# Patient Record
Sex: MALE | Race: AMERICAN INDIAN OR ALASKA NATIVE | NOT HISPANIC OR LATINO
[De-identification: names, ages, dates, MRNs, and addresses within clinical notes are randomized per-mention and may not be internally consistent; named-entity substitution may affect disease eponyms.]

---

## 2017-01-01 PROBLEM — Z00.00 ENCOUNTER FOR PREVENTIVE HEALTH EXAMINATION: Status: ACTIVE | Noted: 2017-01-01

## 2017-01-31 ENCOUNTER — APPOINTMENT (OUTPATIENT)
Dept: PULMONOLOGY | Facility: CLINIC | Age: 67
End: 2017-01-31

## 2017-01-31 VITALS
HEIGHT: 66 IN | DIASTOLIC BLOOD PRESSURE: 90 MMHG | OXYGEN SATURATION: 97 % | SYSTOLIC BLOOD PRESSURE: 150 MMHG | BODY MASS INDEX: 22.18 KG/M2 | WEIGHT: 138 LBS | HEART RATE: 87 BPM

## 2017-01-31 DIAGNOSIS — Z82.3 FAMILY HISTORY OF STROKE: ICD-10-CM

## 2017-01-31 DIAGNOSIS — Z87.01 PERSONAL HISTORY OF PNEUMONIA (RECURRENT): ICD-10-CM

## 2017-02-14 ENCOUNTER — APPOINTMENT (OUTPATIENT)
Dept: PULMONOLOGY | Facility: CLINIC | Age: 67
End: 2017-02-14

## 2017-02-14 VITALS
DIASTOLIC BLOOD PRESSURE: 80 MMHG | SYSTOLIC BLOOD PRESSURE: 120 MMHG | HEIGHT: 66 IN | BODY MASS INDEX: 22.18 KG/M2 | OXYGEN SATURATION: 96 % | WEIGHT: 138 LBS | RESPIRATION RATE: 17 BRPM | HEART RATE: 82 BPM

## 2017-03-07 ENCOUNTER — RESULT CHARGE (OUTPATIENT)
Age: 67
End: 2017-03-07

## 2017-03-14 ENCOUNTER — APPOINTMENT (OUTPATIENT)
Dept: PULMONOLOGY | Facility: CLINIC | Age: 67
End: 2017-03-14

## 2017-04-18 ENCOUNTER — APPOINTMENT (OUTPATIENT)
Dept: PULMONOLOGY | Facility: CLINIC | Age: 67
End: 2017-04-18

## 2017-04-18 ENCOUNTER — OUTPATIENT (OUTPATIENT)
Dept: OUTPATIENT SERVICES | Facility: HOSPITAL | Age: 67
LOS: 1 days | Discharge: HOME | End: 2017-04-18

## 2017-04-18 VITALS
OXYGEN SATURATION: 97 % | RESPIRATION RATE: 18 BRPM | SYSTOLIC BLOOD PRESSURE: 112 MMHG | DIASTOLIC BLOOD PRESSURE: 72 MMHG | HEART RATE: 80 BPM

## 2017-04-18 DIAGNOSIS — E78.00 PURE HYPERCHOLESTEROLEMIA, UNSPECIFIED: ICD-10-CM

## 2017-04-18 RX ORDER — PREDNISONE 10 MG/1
10 TABLET ORAL
Qty: 20 | Refills: 0 | Status: DISCONTINUED | COMMUNITY
Start: 2017-01-31 | End: 2017-04-18

## 2017-04-18 RX ORDER — BUDESONIDE AND FORMOTEROL FUMARATE DIHYDRATE 160; 4.5 UG/1; UG/1
160-4.5 AEROSOL RESPIRATORY (INHALATION) TWICE DAILY
Qty: 1 | Refills: 11 | Status: DISCONTINUED | COMMUNITY
Start: 2017-01-31 | End: 2017-04-18

## 2017-04-18 RX ORDER — ALBUTEROL SULFATE 90 UG/1
108 (90 BASE) AEROSOL, METERED RESPIRATORY (INHALATION) EVERY 4 HOURS
Qty: 3 | Refills: 4 | Status: DISCONTINUED | COMMUNITY
Start: 2017-02-14 | End: 2017-04-18

## 2017-04-18 RX ORDER — TIOTROPIUM BROMIDE 18 UG/1
18 CAPSULE ORAL; RESPIRATORY (INHALATION) DAILY
Qty: 30 | Refills: 3 | Status: DISCONTINUED | COMMUNITY
End: 2017-04-18

## 2017-04-18 RX ORDER — TIOTROPIUM BROMIDE 18 UG/1
18 CAPSULE ORAL; RESPIRATORY (INHALATION) DAILY
Qty: 30 | Refills: 11 | Status: DISCONTINUED | COMMUNITY
Start: 2017-01-31 | End: 2017-04-18

## 2017-04-18 RX ORDER — ALBUTEROL SULFATE 90 UG/1
108 (90 BASE) AEROSOL, METERED RESPIRATORY (INHALATION)
Qty: 18 | Refills: 11 | Status: DISCONTINUED | COMMUNITY
Start: 2017-01-31 | End: 2017-04-18

## 2017-04-18 RX ORDER — BUDESONIDE AND FORMOTEROL FUMARATE DIHYDRATE 160; 4.5 UG/1; UG/1
160-4.5 AEROSOL RESPIRATORY (INHALATION) TWICE DAILY
Qty: 1 | Refills: 3 | Status: DISCONTINUED | COMMUNITY
End: 2017-04-18

## 2017-04-18 RX ORDER — ALBUTEROL 90 MCG
90 AEROSOL (GRAM) INHALATION
Qty: 1 | Refills: 5 | Status: DISCONTINUED | COMMUNITY
End: 2017-04-18

## 2017-06-27 ENCOUNTER — OUTPATIENT (OUTPATIENT)
Dept: OUTPATIENT SERVICES | Facility: HOSPITAL | Age: 67
LOS: 1 days | Discharge: HOME | End: 2017-06-27

## 2017-06-27 DIAGNOSIS — J44.9 CHRONIC OBSTRUCTIVE PULMONARY DISEASE, UNSPECIFIED: ICD-10-CM

## 2017-06-27 DIAGNOSIS — J44.1 CHRONIC OBSTRUCTIVE PULMONARY DISEASE WITH (ACUTE) EXACERBATION: ICD-10-CM

## 2017-06-28 DIAGNOSIS — R91.1 SOLITARY PULMONARY NODULE: ICD-10-CM

## 2017-11-28 ENCOUNTER — APPOINTMENT (OUTPATIENT)
Dept: PULMONOLOGY | Facility: CLINIC | Age: 67
End: 2017-11-28

## 2017-11-28 ENCOUNTER — OUTPATIENT (OUTPATIENT)
Dept: OUTPATIENT SERVICES | Facility: HOSPITAL | Age: 67
LOS: 1 days | Discharge: HOME | End: 2017-11-28

## 2017-11-28 VITALS
WEIGHT: 136 LBS | HEART RATE: 98 BPM | DIASTOLIC BLOOD PRESSURE: 68 MMHG | OXYGEN SATURATION: 93 % | SYSTOLIC BLOOD PRESSURE: 120 MMHG | BODY MASS INDEX: 21.86 KG/M2 | HEIGHT: 66 IN

## 2017-11-28 DIAGNOSIS — R91.1 SOLITARY PULMONARY NODULE: ICD-10-CM

## 2017-11-28 DIAGNOSIS — J44.9 CHRONIC OBSTRUCTIVE PULMONARY DISEASE, UNSPECIFIED: ICD-10-CM

## 2017-11-28 DIAGNOSIS — J44.1 CHRONIC OBSTRUCTIVE PULMONARY DISEASE WITH (ACUTE) EXACERBATION: ICD-10-CM

## 2017-12-19 ENCOUNTER — APPOINTMENT (OUTPATIENT)
Dept: PULMONOLOGY | Facility: CLINIC | Age: 67
End: 2017-12-19

## 2018-01-11 ENCOUNTER — INPATIENT (INPATIENT)
Facility: HOSPITAL | Age: 68
LOS: 3 days | Discharge: HOME | End: 2018-01-15
Attending: INTERNAL MEDICINE

## 2018-01-11 DIAGNOSIS — J44.1 CHRONIC OBSTRUCTIVE PULMONARY DISEASE WITH (ACUTE) EXACERBATION: ICD-10-CM

## 2018-01-19 DIAGNOSIS — Z99.81 DEPENDENCE ON SUPPLEMENTAL OXYGEN: ICD-10-CM

## 2018-01-19 DIAGNOSIS — Z87.891 PERSONAL HISTORY OF NICOTINE DEPENDENCE: ICD-10-CM

## 2018-01-19 DIAGNOSIS — J96.01 ACUTE RESPIRATORY FAILURE WITH HYPOXIA: ICD-10-CM

## 2018-01-19 DIAGNOSIS — J15.6 PNEUMONIA DUE TO OTHER GRAM-NEGATIVE BACTERIA: ICD-10-CM

## 2018-01-19 DIAGNOSIS — A41.9 SEPSIS, UNSPECIFIED ORGANISM: ICD-10-CM

## 2018-01-19 DIAGNOSIS — R91.1 SOLITARY PULMONARY NODULE: ICD-10-CM

## 2018-01-19 DIAGNOSIS — Z53.29 PROCEDURE AND TREATMENT NOT CARRIED OUT BECAUSE OF PATIENT'S DECISION FOR OTHER REASONS: ICD-10-CM

## 2018-01-19 DIAGNOSIS — E78.5 HYPERLIPIDEMIA, UNSPECIFIED: ICD-10-CM

## 2018-01-19 DIAGNOSIS — J44.0 CHRONIC OBSTRUCTIVE PULMONARY DISEASE WITH (ACUTE) LOWER RESPIRATORY INFECTION: ICD-10-CM

## 2018-01-19 DIAGNOSIS — R00.0 TACHYCARDIA, UNSPECIFIED: ICD-10-CM

## 2018-01-19 DIAGNOSIS — J44.1 CHRONIC OBSTRUCTIVE PULMONARY DISEASE WITH (ACUTE) EXACERBATION: ICD-10-CM

## 2018-01-30 ENCOUNTER — APPOINTMENT (OUTPATIENT)
Dept: PULMONOLOGY | Facility: CLINIC | Age: 68
End: 2018-01-30

## 2018-01-30 ENCOUNTER — OUTPATIENT (OUTPATIENT)
Dept: OUTPATIENT SERVICES | Facility: HOSPITAL | Age: 68
LOS: 1 days | Discharge: HOME | End: 2018-01-30

## 2018-01-30 VITALS
OXYGEN SATURATION: 96 % | SYSTOLIC BLOOD PRESSURE: 118 MMHG | HEART RATE: 86 BPM | DIASTOLIC BLOOD PRESSURE: 70 MMHG | HEIGHT: 66 IN | BODY MASS INDEX: 21.69 KG/M2 | WEIGHT: 135 LBS

## 2018-01-30 DIAGNOSIS — Z87.891 PERSONAL HISTORY OF NICOTINE DEPENDENCE: ICD-10-CM

## 2018-01-30 RX ORDER — TIOTROPIUM BROMIDE INHALATION SPRAY 3.12 UG/1
2.5 SPRAY, METERED RESPIRATORY (INHALATION) DAILY
Qty: 3 | Refills: 5 | Status: ACTIVE | COMMUNITY
Start: 2017-11-29

## 2018-01-30 RX ORDER — ALBUTEROL SULFATE 90 UG/1
108 (90 BASE) AEROSOL, METERED RESPIRATORY (INHALATION) EVERY 4 HOURS
Qty: 1 | Refills: 5 | Status: ACTIVE | COMMUNITY
Start: 2018-01-30 | End: 1900-01-01

## 2018-01-30 RX ORDER — BUDESONIDE AND FORMOTEROL FUMARATE DIHYDRATE 160; 4.5 UG/1; UG/1
160-4.5 AEROSOL RESPIRATORY (INHALATION) TWICE DAILY
Qty: 3 | Refills: 3 | Status: ACTIVE | COMMUNITY
Start: 2017-02-14 | End: 1900-01-01

## 2018-02-04 DIAGNOSIS — J44.1 CHRONIC OBSTRUCTIVE PULMONARY DISEASE WITH (ACUTE) EXACERBATION: ICD-10-CM

## 2018-02-10 ENCOUNTER — TRANSCRIPTION ENCOUNTER (OUTPATIENT)
Age: 68
End: 2018-02-10

## 2018-02-10 ENCOUNTER — INPATIENT (INPATIENT)
Facility: HOSPITAL | Age: 68
LOS: 5 days | Discharge: HOME | End: 2018-02-16
Attending: INTERNAL MEDICINE

## 2018-02-10 VITALS — HEART RATE: 108 BPM | OXYGEN SATURATION: 100 %

## 2018-02-10 DIAGNOSIS — Z89.431 ACQUIRED ABSENCE OF RIGHT FOOT: Chronic | ICD-10-CM

## 2018-02-10 LAB
ALBUMIN SERPL ELPH-MCNC: 3.3 G/DL — SIGNIFICANT CHANGE UP (ref 3–5.5)
ALP SERPL-CCNC: 71 U/L — SIGNIFICANT CHANGE UP (ref 30–115)
ALT FLD-CCNC: 24 U/L — SIGNIFICANT CHANGE UP (ref 0–41)
ANION GAP SERPL CALC-SCNC: 11 MMOL/L — SIGNIFICANT CHANGE UP (ref 7–14)
AST SERPL-CCNC: 24 U/L — SIGNIFICANT CHANGE UP (ref 0–41)
B-TYPE NATRIURETIC PEPTIDE BNP RESULT: 88 PG/ML — SIGNIFICANT CHANGE UP (ref 0–99)
BASE EXCESS BLDV CALC-SCNC: 1.6 MMOL/L — SIGNIFICANT CHANGE UP (ref -2–2)
BASOPHILS # BLD AUTO: 0.05 K/UL — SIGNIFICANT CHANGE UP (ref 0–0.2)
BASOPHILS NFR BLD AUTO: 0.2 % — SIGNIFICANT CHANGE UP (ref 0–1)
BILIRUB SERPL-MCNC: 3.8 MG/DL — HIGH (ref 0.2–1.2)
BUN SERPL-MCNC: 17 MG/DL — SIGNIFICANT CHANGE UP (ref 10–20)
CA-I SERPL-SCNC: 1.18 MMOL/L — SIGNIFICANT CHANGE UP (ref 1.12–1.3)
CALCIUM SERPL-MCNC: 9.4 MG/DL — SIGNIFICANT CHANGE UP (ref 8.5–10.1)
CHLORIDE SERPL-SCNC: 104 MMOL/L — SIGNIFICANT CHANGE UP (ref 98–110)
CK MB CFR SERPL CALC: 4.2 NG/ML — SIGNIFICANT CHANGE UP (ref 0.6–6.3)
CO2 SERPL-SCNC: 24 MMOL/L — SIGNIFICANT CHANGE UP (ref 17–32)
CREAT SERPL-MCNC: 1 MG/DL — SIGNIFICANT CHANGE UP (ref 0.7–1.5)
EOSINOPHIL # BLD AUTO: 0.06 K/UL — SIGNIFICANT CHANGE UP (ref 0–0.7)
EOSINOPHIL NFR BLD AUTO: 0.3 % — SIGNIFICANT CHANGE UP (ref 0–8)
FLU A RESULT: NEGATIVE — SIGNIFICANT CHANGE UP
FLU A RESULT: NEGATIVE — SIGNIFICANT CHANGE UP
FLUAV AG NPH QL: NEGATIVE — SIGNIFICANT CHANGE UP
FLUBV AG NPH QL: NEGATIVE — SIGNIFICANT CHANGE UP
GAS PNL BLDV: 134 MMOL/L — LOW (ref 136–145)
GAS PNL BLDV: SIGNIFICANT CHANGE UP
GAS PNL BLDV: SIGNIFICANT CHANGE UP
GLUCOSE SERPL-MCNC: 157 MG/DL — HIGH (ref 70–110)
HCO3 BLDV-SCNC: 28 MMOL/L — SIGNIFICANT CHANGE UP (ref 22–29)
HCT VFR BLD CALC: 47.6 % — SIGNIFICANT CHANGE UP (ref 42–52)
HGB BLD-MCNC: 15.5 G/DL — SIGNIFICANT CHANGE UP (ref 14–18)
IMM GRANULOCYTES NFR BLD AUTO: 0.7 % — HIGH (ref 0.1–0.3)
LACTATE BLDV-MCNC: 2.6 MMOL/L — HIGH (ref 0.5–1.6)
LACTATE SERPL-SCNC: 2.8 MMOL/L — HIGH (ref 0.5–2.2)
LYMPHOCYTES # BLD AUTO: 0.83 K/UL — LOW (ref 1.2–3.4)
LYMPHOCYTES # BLD AUTO: 4 % — LOW (ref 20.5–51.1)
MAGNESIUM SERPL-MCNC: 2.2 MG/DL — SIGNIFICANT CHANGE UP (ref 1.8–2.4)
MCHC RBC-ENTMCNC: 28.9 PG — SIGNIFICANT CHANGE UP (ref 27–31)
MCHC RBC-ENTMCNC: 32.6 G/DL — SIGNIFICANT CHANGE UP (ref 32–37)
MCV RBC AUTO: 88.6 FL — SIGNIFICANT CHANGE UP (ref 80–94)
MONOCYTES # BLD AUTO: 1.08 K/UL — HIGH (ref 0.1–0.6)
MONOCYTES NFR BLD AUTO: 5.2 % — SIGNIFICANT CHANGE UP (ref 1.7–9.3)
NEUTROPHILS # BLD AUTO: 18.57 K/UL — HIGH (ref 1.4–6.5)
NEUTROPHILS NFR BLD AUTO: 89.6 % — HIGH (ref 42.2–75.2)
NRBC # BLD: 0 /100 WBCS — SIGNIFICANT CHANGE UP (ref 0–0)
PCO2 BLDV: 49 MMHG — SIGNIFICANT CHANGE UP (ref 41–51)
PH BLDV: 7.36 — SIGNIFICANT CHANGE UP (ref 7.26–7.43)
PLATELET # BLD AUTO: 434 K/UL — HIGH (ref 130–400)
PO2 BLDV: 36 MMHG — SIGNIFICANT CHANGE UP (ref 20–40)
POTASSIUM BLDV-SCNC: 4.1 MMOL/L — SIGNIFICANT CHANGE UP (ref 3.3–5.6)
POTASSIUM SERPL-MCNC: 4.9 MMOL/L — SIGNIFICANT CHANGE UP (ref 3.5–5)
POTASSIUM SERPL-SCNC: 4.9 MMOL/L — SIGNIFICANT CHANGE UP (ref 3.5–5)
PROT SERPL-MCNC: 5.9 G/DL — LOW (ref 6–8)
RBC # BLD: 5.37 M/UL — SIGNIFICANT CHANGE UP (ref 4.7–6.1)
RBC # FLD: 16.7 % — HIGH (ref 11.5–14.5)
SAO2 % BLDV: 62 % — SIGNIFICANT CHANGE UP
SODIUM SERPL-SCNC: 139 MMOL/L — SIGNIFICANT CHANGE UP (ref 135–146)
TROPONIN I SERPL-MCNC: <0.02 NG/ML — SIGNIFICANT CHANGE UP (ref 0–0.05)
WBC # BLD: 20.73 K/UL — HIGH (ref 4.8–10.8)
WBC # FLD AUTO: 20.73 K/UL — HIGH (ref 4.8–10.8)

## 2018-02-10 RX ORDER — VANCOMYCIN HCL 1 G
1000 VIAL (EA) INTRAVENOUS EVERY 12 HOURS
Qty: 0 | Refills: 0 | Status: DISCONTINUED | OUTPATIENT
Start: 2018-02-11 | End: 2018-02-14

## 2018-02-10 RX ORDER — VANCOMYCIN HCL 1 G
1000 VIAL (EA) INTRAVENOUS ONCE
Qty: 0 | Refills: 0 | Status: COMPLETED | OUTPATIENT
Start: 2018-02-10 | End: 2018-02-10

## 2018-02-10 RX ORDER — TIOTROPIUM BROMIDE 18 UG/1
1 CAPSULE ORAL; RESPIRATORY (INHALATION) DAILY
Qty: 0 | Refills: 0 | Status: DISCONTINUED | OUTPATIENT
Start: 2018-02-10 | End: 2018-02-16

## 2018-02-10 RX ORDER — IPRATROPIUM/ALBUTEROL SULFATE 18-103MCG
3 AEROSOL WITH ADAPTER (GRAM) INHALATION
Qty: 0 | Refills: 0 | Status: COMPLETED | OUTPATIENT
Start: 2018-02-10 | End: 2018-02-10

## 2018-02-10 RX ORDER — MORPHINE SULFATE 50 MG/1
2 CAPSULE, EXTENDED RELEASE ORAL EVERY 6 HOURS
Qty: 0 | Refills: 0 | Status: DISCONTINUED | OUTPATIENT
Start: 2018-02-10 | End: 2018-02-16

## 2018-02-10 RX ORDER — MONTELUKAST 4 MG/1
10 TABLET, CHEWABLE ORAL DAILY
Qty: 0 | Refills: 0 | Status: DISCONTINUED | OUTPATIENT
Start: 2018-02-10 | End: 2018-02-16

## 2018-02-10 RX ORDER — ASPIRIN/CALCIUM CARB/MAGNESIUM 324 MG
325 TABLET ORAL ONCE
Qty: 0 | Refills: 0 | Status: DISCONTINUED | OUTPATIENT
Start: 2018-02-10 | End: 2018-02-10

## 2018-02-10 RX ORDER — CEFEPIME 1 G/1
2000 INJECTION, POWDER, FOR SOLUTION INTRAMUSCULAR; INTRAVENOUS EVERY 8 HOURS
Qty: 0 | Refills: 0 | Status: DISCONTINUED | OUTPATIENT
Start: 2018-02-10 | End: 2018-02-12

## 2018-02-10 RX ORDER — AZITHROMYCIN 500 MG/1
500 TABLET, FILM COATED ORAL ONCE
Qty: 0 | Refills: 0 | Status: COMPLETED | OUTPATIENT
Start: 2018-02-10 | End: 2018-02-10

## 2018-02-10 RX ORDER — VANCOMYCIN HCL 1 G
VIAL (EA) INTRAVENOUS
Qty: 0 | Refills: 0 | Status: DISCONTINUED | OUTPATIENT
Start: 2018-02-10 | End: 2018-02-14

## 2018-02-10 RX ORDER — PANTOPRAZOLE SODIUM 20 MG/1
40 TABLET, DELAYED RELEASE ORAL
Qty: 0 | Refills: 0 | Status: DISCONTINUED | OUTPATIENT
Start: 2018-02-10 | End: 2018-02-16

## 2018-02-10 RX ORDER — CEFEPIME 1 G/1
INJECTION, POWDER, FOR SOLUTION INTRAMUSCULAR; INTRAVENOUS
Qty: 0 | Refills: 0 | Status: DISCONTINUED | OUTPATIENT
Start: 2018-02-10 | End: 2018-02-12

## 2018-02-10 RX ORDER — BUDESONIDE AND FORMOTEROL FUMARATE DIHYDRATE 160; 4.5 UG/1; UG/1
2 AEROSOL RESPIRATORY (INHALATION)
Qty: 0 | Refills: 0 | Status: DISCONTINUED | OUTPATIENT
Start: 2018-02-10 | End: 2018-02-16

## 2018-02-10 RX ORDER — CEFTRIAXONE 500 MG/1
1 INJECTION, POWDER, FOR SOLUTION INTRAMUSCULAR; INTRAVENOUS ONCE
Qty: 0 | Refills: 0 | Status: COMPLETED | OUTPATIENT
Start: 2018-02-10 | End: 2018-02-10

## 2018-02-10 RX ORDER — ENOXAPARIN SODIUM 100 MG/ML
40 INJECTION SUBCUTANEOUS DAILY
Qty: 0 | Refills: 0 | Status: DISCONTINUED | OUTPATIENT
Start: 2018-02-10 | End: 2018-02-16

## 2018-02-10 RX ORDER — CEFEPIME 1 G/1
2000 INJECTION, POWDER, FOR SOLUTION INTRAMUSCULAR; INTRAVENOUS ONCE
Qty: 0 | Refills: 0 | Status: COMPLETED | OUTPATIENT
Start: 2018-02-10 | End: 2018-02-10

## 2018-02-10 RX ORDER — ALBUTEROL 90 UG/1
2.5 AEROSOL, METERED ORAL
Qty: 0 | Refills: 0 | Status: DISCONTINUED | OUTPATIENT
Start: 2018-02-10 | End: 2018-02-16

## 2018-02-10 RX ADMIN — CEFTRIAXONE 100 GRAM(S): 500 INJECTION, POWDER, FOR SOLUTION INTRAMUSCULAR; INTRAVENOUS at 11:48

## 2018-02-10 RX ADMIN — Medication 250 MILLIGRAM(S): at 19:39

## 2018-02-10 RX ADMIN — Medication 60 MILLIGRAM(S): at 18:18

## 2018-02-10 RX ADMIN — Medication 3 MILLILITER(S): at 12:08

## 2018-02-10 RX ADMIN — AZITHROMYCIN 255 MILLIGRAM(S): 500 TABLET, FILM COATED ORAL at 10:57

## 2018-02-10 RX ADMIN — Medication 3 MILLILITER(S): at 10:40

## 2018-02-10 RX ADMIN — Medication 100 MILLIGRAM(S): at 10:57

## 2018-02-10 RX ADMIN — Medication 3 MILLILITER(S): at 10:30

## 2018-02-10 RX ADMIN — ALBUTEROL 2.5 MILLIGRAM(S): 90 AEROSOL, METERED ORAL at 21:12

## 2018-02-10 RX ADMIN — CEFEPIME 100 MILLIGRAM(S): 1 INJECTION, POWDER, FOR SOLUTION INTRAMUSCULAR; INTRAVENOUS at 19:39

## 2018-02-10 NOTE — ED PROVIDER NOTE - PROGRESS NOTE DETAILS
Pt immediately placed on BIPAP with improvement in symptoms. EKG does not show STEMI, stable compared to previous one. CXr with PNA, will give abx. Labs pending. ATTENDING NOTE: 66 y/o male with pmhx of COPD presents to the ED stating he was referred [facility] elevations in EKG, pt c/o CP, SOB, cough, and subjective fever. Here pt is febrile with rhonchi left lower lung field, no respiratory distress. EKG here with no stemi, CXR shows pneumonia. Will give ABX, obtain lactate, give neb and steroid, likely admit.

## 2018-02-10 NOTE — H&P ADULT - NSHPLABSRESULTS_GEN_ALL_CORE
CARDIAC MARKERS ( 10 Feb 2018 10:30 )  <0.02 ng/mL / x     / x     / x     / 4.2 ng/mL                        15.5   20.73 )-----------( 434      ( 10 Feb 2018 10:30 )             47.6     02-10    139  |  104  |  17  ----------------------------<  157<H>  4.9   |  24  |  1.0    Ca    9.4      10 Feb 2018 10:30  Mg     2.2     02-10    TPro  5.9<L>  /  Alb  3.3  /  TBili  3.8<H>  /  DBili  x   /  AST  24  /  ALT  24  /  AlkPhos  71  02-10    LIVER FUNCTIONS - ( 10 Feb 2018 10:30 )  Alb: 3.3 g/dL / Pro: 5.9 g/dL / ALK PHOS: 71 U/L / ALT: 24 U/L / AST: 24 U/L / GGT: x  Influenza negative    CXR 2/10/2018: Interval increase in bibasilar opacities with likely bronchiectasis. No pleural effusions or pneumothorax.  CT Chest 3/2017: No proximal endobronchial obstruction.   Chronic right middle lobe atelectasis (with patent proximal airway).      Diffuse emphysema with areas of reticulonodular opacity/peripheral  bronchiectasis, compatible with chronic postinflammatory disease.      Lobulated 10 x 8 mm nodule right upper lobe (series 4 image 52). Follow-up CT  scan in 3 months time to assess stability suggested.  PET Scan 6/2017: negative uptake  Echo 12/2016: EF 55-65, G1DD

## 2018-02-10 NOTE — H&P ADULT - HISTORY OF PRESENT ILLNESS
66 y/o M, Cantonese speaking, with PMHx HTN, DLD, asthma/COPD on home O2 2L, s/p R foot transmetatarsal resection (2012) bc of concern for cancer but says that after the resection it was found to not be cancer. Pt presenting for sudden onset SOB and L sided chest pain that started this morning at 7am upon waking. The SOB was associated with yellow productive phelgm and fever of 101.8F. The chest pain was mostly L sided with radiation to the back and bilateral neck, worse with breathing and coughing, denies trauma to the area. Pt went to urgent care this morning and they told him to come to the ER. He does note that the urgent care place gave him a certain medication that helped with the chest pain, but he does not recall the name. Pt denies prior hx of chest pain and does not relate it to any physical activity. As per phone discussion with daughter, pt was experiencing the difficulty breathing the day prior to arrival as well, associated with general fatigue and muscle aches. Pt at time of exam said his breathing and chest pain were both improved since arrival. Of note, pt was admitted on 1/11/2018 for COPD 2/2 RLL PNA, was d/c on oral antibiotics as per ID recommendation. Pt states that during that admission he felt a tightness in his chest but does not feel that this admission is the same. Received flu and pneumonia vaccine. Endorses taking recent prednisone.

## 2018-02-10 NOTE — ED PROVIDER NOTE - PHYSICAL EXAMINATION
CONSTITUTIONAL: Well-developed; well-nourished; in no acute distress.   SKIN: warm, dry  HEAD: Normocephalic; atraumatic.  EYES: normal sclera and conjunctiva   ENT: No nasal discharge; airway clear.  NECK: Supple; non tender.  CARD: S1, S2 normal; no murmurs, gallops, or rubs. Tachycardia.   RESP: Decreased air entry bilaterally, accessory muscle use and increase work of breathing.   ABD: soft ntnd  EXT: Normal ROM.  No clubbing, cyanosis or edema.   LYMPH: No acute cervical adenopathy.  NEURO: Alert, oriented, grossly unremarkable  PSYCH: Cooperative, appropriate.

## 2018-02-10 NOTE — H&P ADULT - NSHPREVIEWOFSYSTEMS_GEN_ALL_CORE
General: +fever  Lungs: +SOB, +productive cough  HEART: +left sided chest pain with radiation to bilateral neck and back   Abd: NTND, no nausea, vomiting  LE: no edema

## 2018-02-10 NOTE — ED PROVIDER NOTE - NS ED ROS FT
Eyes:  No visual changes, eye pain or discharge.  ENMT:  No hearing changes, pain, discharge or infections. No neck pain or stiffness.  Cardiac:  CP, SOB. No edema.   Respiratory:  Cough, respiratory distress, sputum production.   GI:  No nausea, vomiting, diarrhea or abdominal pain.  :  No dysuria, frequency or burning.  MS:  No myalgia, muscle weakness, joint pain or back pain.  Neuro:  No headache or weakness.  No LOC.  Skin:  No skin rash.

## 2018-02-10 NOTE — H&P ADULT - NSHPPHYSICALEXAM_GEN_ALL_CORE
General: underweight, skin intact  HEENT: no lymphadenopathy  Lungs: poor air entry bilaterally, no wheezes appreciated  Abdomen: NTND, +BS  LE: no edema, +R foot transmetatarsal resection

## 2018-02-10 NOTE — H&P ADULT - ASSESSMENT
68 y/o M w/ PMHx HTN, DLD, asthma/COPD on home O2 2L presenting for now 2 day hx of SOB and left sided chest pain, with recent admission for COPD ex and PNA in 1/2018. Pt was placed on BIPAP after arrival to the ER and has not been switched over the nonrebreather. He was able to sit up and answer questions off the nonrebreather throughout the interview, sating 98% on RA.    1. SOB likely 2/2 Viral vs Bacterial Pulmonary Infection with COPD Ex vs Rule out cardiac etiology  -flu negative, check RSV, nasal MRSA, sputum culture  -elevated WBC could be 2/2 steroids, will start on abx: cefepime, levaquin, vancomycin (considering pt was recently receiving abx in the hospital)  -monitor for signs of infection, ID c/s: was seen by Dr Ignacio on prior admission  -consider Pulmonary c/s if pt declines, as of now will try to wean pt to nasal canula  -continue with symbicort, spiriva, albuterol nebs  -trend 3 sets of cardiac enzymes, repeat EKG in am, check electrolytes  -can give morphine prn for chest/pleuritic pain     2. HTN: not on any home meds  3. DVT PPx/GI PPx: lovenox 40mg subcut q24, protonix 40mg q24 68 y/o M w/ PMHx HTN, DLD, asthma/COPD on home O2 2L presenting for now 2 day hx of SOB and left sided chest pain, with recent admission for COPD ex and PNA in 1/2018. Pt was placed on BIPAP after arrival to the ER and has not been switched over the nonrebreather. He was able to sit up and answer questions off the nonrebreather throughout the interview, sating 98% on RA.    1. SOB likely 2/2 Viral vs Bacterial Pulmonary Infection with COPD Ex vs Rule out cardiac etiology  -admit to telemetry x24hrs  -flu negative, check RSV, nasal MRSA, sputum culture  -elevated WBC could be 2/2 steroids, will start on abx: cefepime, levaquin, vancomycin (considering pt was recently receiving abx in the hospital)  -monitor for signs of infection, ID c/s: was seen by Dr Ignacio on prior admission  -consider Pulmonary c/s if pt declines, as of now will try to wean pt to nasal canula  -continue with symbicort, spiriva, albuterol nebs  -trend 3 sets of cardiac enzymes, repeat EKG in am, check electrolytes  -can give morphine prn for chest/pleuritic pain     2. HTN: not on any home meds  3. DVT PPx/GI PPx: lovenox 40mg subcut q24, protonix 40mg q24 68 y/o M w/ PMHx HTN, DLD, asthma/COPD on home O2 2L presenting for now 2 day hx of SOB and left sided chest pain, with recent admission for COPD ex and PNA in 1/2018. Pt was placed on BIPAP after arrival to the ER and has not been switched over the nonrebreather. He was able to sit up and answer questions off the nonrebreather throughout the interview, sating 98% on RA.    1. SOB likely 2/2 Viral vs Bacterial Pulmonary Infection with COPD Ex vs Rule out cardiac etiology  -admit to telemetry x24hrs  -flu negative, check RSV, nasal MRSA, sputum culture  -elevated WBC could be 2/2 steroids, will start on abx: cefepime, levaquin, vancomycin (considering pt was recently receiving abx in the hospital)  -monitor for signs of infection, ID c/s: was seen by Dr Ignacio on prior admission  -consider Pulmonary c/s if pt declines, as of now will try to wean pt to nasal canula  -continue with symbicort, spiriva, albuterol nebs  -can place pt on bipap if resp status declines, uses 2L O2 at home via NC  -trend 3 sets of cardiac enzymes, repeat EKG in am, check electrolytes  -can give morphine prn for chest/pleuritic pain     2. HTN: not on any home meds  3. DVT PPx/GI PPx: lovenox 40mg subcut q24, protonix 40mg q24

## 2018-02-10 NOTE — H&P ADULT - NSHPSOCIALHISTORY_GEN_ALL_CORE
former smoker, quit 3 years ago, used to smoke 1 pack/day for 30-40 years  no alcohol or drug abuse  used to work in restoration  no recent travel  immigrated from [place] in 1983

## 2018-02-10 NOTE — ED PROVIDER NOTE - OBJECTIVE STATEMENT
67 y m pmh copd c/o sob and cp. SOB, subjective fever, increased sputum yesterday. Seen at urgent care today for new onset chest pain worse with deep breaths and sent in for possible ST elevations on his EKG. Denies abdominal pain, leg swelling, calf tenderness, n/v.

## 2018-02-10 NOTE — H&P ADULT - ATTENDING COMMENTS
Addendum:  The patient was sen and examined at bedside independently.  Agree with above by intern except as follows;    Sepsis ( POA) 2/2 PNA possibly complicated by viral syndrome.  agree with IV abx, but would consider to de-esecalate .  follow up with blood cx, RVP.  F/u with ID as well.

## 2018-02-11 LAB
ANION GAP SERPL CALC-SCNC: 9 MMOL/L — SIGNIFICANT CHANGE UP (ref 7–14)
BUN SERPL-MCNC: 21 MG/DL — HIGH (ref 10–20)
CALCIUM SERPL-MCNC: 9.2 MG/DL — SIGNIFICANT CHANGE UP (ref 8.5–10.1)
CHLORIDE SERPL-SCNC: 108 MMOL/L — SIGNIFICANT CHANGE UP (ref 98–110)
CK MB BLD-MCNC: 10 % — HIGH (ref 0–4)
CK MB CFR SERPL CALC: 3.4 NG/ML — SIGNIFICANT CHANGE UP (ref 0.6–6.3)
CK MB CFR SERPL CALC: 3.5 NG/ML — SIGNIFICANT CHANGE UP (ref 0.6–6.3)
CK SERPL-CCNC: 34 U/L — SIGNIFICANT CHANGE UP (ref 0–225)
CK SERPL-CCNC: 36 U/L — SIGNIFICANT CHANGE UP (ref 0–225)
CO2 SERPL-SCNC: 25 MMOL/L — SIGNIFICANT CHANGE UP (ref 17–32)
CREAT SERPL-MCNC: 1 MG/DL — SIGNIFICANT CHANGE UP (ref 0.7–1.5)
GLUCOSE SERPL-MCNC: 143 MG/DL — HIGH (ref 70–110)
HCT VFR BLD CALC: 43.8 % — SIGNIFICANT CHANGE UP (ref 42–52)
HGB BLD-MCNC: 14.3 G/DL — SIGNIFICANT CHANGE UP (ref 14–18)
MAGNESIUM SERPL-MCNC: 2.2 MG/DL — SIGNIFICANT CHANGE UP (ref 1.8–2.4)
MCHC RBC-ENTMCNC: 28.8 PG — SIGNIFICANT CHANGE UP (ref 27–31)
MCHC RBC-ENTMCNC: 32.6 G/DL — SIGNIFICANT CHANGE UP (ref 32–37)
MCV RBC AUTO: 88.3 FL — SIGNIFICANT CHANGE UP (ref 80–94)
NRBC # BLD: 0 /100 WBCS — SIGNIFICANT CHANGE UP (ref 0–0)
PLATELET # BLD AUTO: 357 K/UL — SIGNIFICANT CHANGE UP (ref 130–400)
POTASSIUM SERPL-MCNC: 4.3 MMOL/L — SIGNIFICANT CHANGE UP (ref 3.5–5)
POTASSIUM SERPL-SCNC: 4.3 MMOL/L — SIGNIFICANT CHANGE UP (ref 3.5–5)
RBC # BLD: 4.96 M/UL — SIGNIFICANT CHANGE UP (ref 4.7–6.1)
RBC # FLD: 16.2 % — HIGH (ref 11.5–14.5)
SODIUM SERPL-SCNC: 142 MMOL/L — SIGNIFICANT CHANGE UP (ref 135–146)
TROPONIN I SERPL-MCNC: <0.02 NG/ML — SIGNIFICANT CHANGE UP (ref 0–0.05)
TROPONIN I SERPL-MCNC: <0.02 NG/ML — SIGNIFICANT CHANGE UP (ref 0–0.05)
WBC # BLD: 19.91 K/UL — HIGH (ref 4.8–10.8)
WBC # FLD AUTO: 19.91 K/UL — HIGH (ref 4.8–10.8)

## 2018-02-11 RX ADMIN — Medication 250 MILLIGRAM(S): at 19:50

## 2018-02-11 RX ADMIN — BUDESONIDE AND FORMOTEROL FUMARATE DIHYDRATE 2 PUFF(S): 160; 4.5 AEROSOL RESPIRATORY (INHALATION) at 20:40

## 2018-02-11 RX ADMIN — Medication 250 MILLIGRAM(S): at 06:19

## 2018-02-11 RX ADMIN — TIOTROPIUM BROMIDE 1 CAPSULE(S): 18 CAPSULE ORAL; RESPIRATORY (INHALATION) at 12:24

## 2018-02-11 RX ADMIN — ENOXAPARIN SODIUM 40 MILLIGRAM(S): 100 INJECTION SUBCUTANEOUS at 12:24

## 2018-02-11 RX ADMIN — PANTOPRAZOLE SODIUM 40 MILLIGRAM(S): 20 TABLET, DELAYED RELEASE ORAL at 12:24

## 2018-02-11 RX ADMIN — Medication 60 MILLIGRAM(S): at 06:19

## 2018-02-11 RX ADMIN — ALBUTEROL 2.5 MILLIGRAM(S): 90 AEROSOL, METERED ORAL at 22:16

## 2018-02-11 RX ADMIN — BUDESONIDE AND FORMOTEROL FUMARATE DIHYDRATE 2 PUFF(S): 160; 4.5 AEROSOL RESPIRATORY (INHALATION) at 12:24

## 2018-02-11 RX ADMIN — Medication 60 MILLIGRAM(S): at 17:57

## 2018-02-11 RX ADMIN — MONTELUKAST 10 MILLIGRAM(S): 4 TABLET, CHEWABLE ORAL at 12:24

## 2018-02-11 RX ADMIN — CEFEPIME 100 MILLIGRAM(S): 1 INJECTION, POWDER, FOR SOLUTION INTRAMUSCULAR; INTRAVENOUS at 21:10

## 2018-02-11 RX ADMIN — CEFEPIME 100 MILLIGRAM(S): 1 INJECTION, POWDER, FOR SOLUTION INTRAMUSCULAR; INTRAVENOUS at 13:28

## 2018-02-11 RX ADMIN — CEFEPIME 100 MILLIGRAM(S): 1 INJECTION, POWDER, FOR SOLUTION INTRAMUSCULAR; INTRAVENOUS at 06:00

## 2018-02-12 DIAGNOSIS — R73.09 OTHER ABNORMAL GLUCOSE: ICD-10-CM

## 2018-02-12 DIAGNOSIS — I10 ESSENTIAL (PRIMARY) HYPERTENSION: ICD-10-CM

## 2018-02-12 DIAGNOSIS — J18.9 PNEUMONIA, UNSPECIFIED ORGANISM: ICD-10-CM

## 2018-02-12 DIAGNOSIS — E78.5 HYPERLIPIDEMIA, UNSPECIFIED: ICD-10-CM

## 2018-02-12 DIAGNOSIS — J44.9 CHRONIC OBSTRUCTIVE PULMONARY DISEASE, UNSPECIFIED: ICD-10-CM

## 2018-02-12 LAB
ANION GAP SERPL CALC-SCNC: 10 MMOL/L — SIGNIFICANT CHANGE UP (ref 7–14)
BUN SERPL-MCNC: 26 MG/DL — HIGH (ref 10–20)
CALCIUM SERPL-MCNC: 9 MG/DL — SIGNIFICANT CHANGE UP (ref 8.5–10.1)
CHLORIDE SERPL-SCNC: 105 MMOL/L — SIGNIFICANT CHANGE UP (ref 98–110)
CO2 SERPL-SCNC: 25 MMOL/L — SIGNIFICANT CHANGE UP (ref 17–32)
CREAT SERPL-MCNC: 1.2 MG/DL — SIGNIFICANT CHANGE UP (ref 0.7–1.5)
GLUCOSE SERPL-MCNC: 208 MG/DL — HIGH (ref 70–110)
HCT VFR BLD CALC: 43 % — SIGNIFICANT CHANGE UP (ref 42–52)
HGB BLD-MCNC: 13.9 G/DL — LOW (ref 14–18)
MAGNESIUM SERPL-MCNC: 2.3 MG/DL — SIGNIFICANT CHANGE UP (ref 1.8–2.4)
MCHC RBC-ENTMCNC: 29.1 PG — SIGNIFICANT CHANGE UP (ref 27–31)
MCHC RBC-ENTMCNC: 32.3 G/DL — SIGNIFICANT CHANGE UP (ref 32–37)
MCV RBC AUTO: 90 FL — SIGNIFICANT CHANGE UP (ref 80–94)
MRSA PCR RESULT.: NEGATIVE — SIGNIFICANT CHANGE UP
NRBC # BLD: 0 /100 WBCS — SIGNIFICANT CHANGE UP (ref 0–0)
PLATELET # BLD AUTO: 421 K/UL — HIGH (ref 130–400)
POTASSIUM SERPL-MCNC: 4.3 MMOL/L — SIGNIFICANT CHANGE UP (ref 3.5–5)
POTASSIUM SERPL-SCNC: 4.3 MMOL/L — SIGNIFICANT CHANGE UP (ref 3.5–5)
RBC # BLD: 4.78 M/UL — SIGNIFICANT CHANGE UP (ref 4.7–6.1)
RBC # FLD: 16.2 % — HIGH (ref 11.5–14.5)
SODIUM SERPL-SCNC: 140 MMOL/L — SIGNIFICANT CHANGE UP (ref 135–146)
WBC # BLD: 23.99 K/UL — HIGH (ref 4.8–10.8)
WBC # FLD AUTO: 23.99 K/UL — HIGH (ref 4.8–10.8)

## 2018-02-12 RX ORDER — DEXTROSE 50 % IN WATER 50 %
12.5 SYRINGE (ML) INTRAVENOUS ONCE
Qty: 0 | Refills: 0 | Status: DISCONTINUED | OUTPATIENT
Start: 2018-02-12 | End: 2018-02-16

## 2018-02-12 RX ORDER — INSULIN LISPRO 100/ML
3 VIAL (ML) SUBCUTANEOUS
Qty: 0 | Refills: 0 | Status: DISCONTINUED | OUTPATIENT
Start: 2018-02-12 | End: 2018-02-16

## 2018-02-12 RX ORDER — DEXTROSE 50 % IN WATER 50 %
25 SYRINGE (ML) INTRAVENOUS ONCE
Qty: 0 | Refills: 0 | Status: DISCONTINUED | OUTPATIENT
Start: 2018-02-12 | End: 2018-02-16

## 2018-02-12 RX ORDER — PIPERACILLIN AND TAZOBACTAM 4; .5 G/20ML; G/20ML
INJECTION, POWDER, LYOPHILIZED, FOR SOLUTION INTRAVENOUS
Qty: 0 | Refills: 0 | Status: DISCONTINUED | OUTPATIENT
Start: 2018-02-12 | End: 2018-02-12

## 2018-02-12 RX ORDER — PIPERACILLIN AND TAZOBACTAM 4; .5 G/20ML; G/20ML
4.5 INJECTION, POWDER, LYOPHILIZED, FOR SOLUTION INTRAVENOUS EVERY 6 HOURS
Qty: 0 | Refills: 0 | Status: DISCONTINUED | OUTPATIENT
Start: 2018-02-12 | End: 2018-02-16

## 2018-02-12 RX ORDER — PIPERACILLIN AND TAZOBACTAM 4; .5 G/20ML; G/20ML
4.5 INJECTION, POWDER, LYOPHILIZED, FOR SOLUTION INTRAVENOUS ONCE
Qty: 0 | Refills: 0 | Status: COMPLETED | OUTPATIENT
Start: 2018-02-12 | End: 2018-02-12

## 2018-02-12 RX ORDER — SODIUM CHLORIDE 9 MG/ML
1000 INJECTION, SOLUTION INTRAVENOUS
Qty: 0 | Refills: 0 | Status: DISCONTINUED | OUTPATIENT
Start: 2018-02-12 | End: 2018-02-16

## 2018-02-12 RX ORDER — GLUCAGON INJECTION, SOLUTION 0.5 MG/.1ML
1 INJECTION, SOLUTION SUBCUTANEOUS ONCE
Qty: 0 | Refills: 0 | Status: DISCONTINUED | OUTPATIENT
Start: 2018-02-12 | End: 2018-02-16

## 2018-02-12 RX ORDER — IPRATROPIUM/ALBUTEROL SULFATE 18-103MCG
3 AEROSOL WITH ADAPTER (GRAM) INHALATION EVERY 4 HOURS
Qty: 0 | Refills: 0 | Status: DISCONTINUED | OUTPATIENT
Start: 2018-02-12 | End: 2018-02-16

## 2018-02-12 RX ORDER — DEXTROSE 50 % IN WATER 50 %
1 SYRINGE (ML) INTRAVENOUS ONCE
Qty: 0 | Refills: 0 | Status: DISCONTINUED | OUTPATIENT
Start: 2018-02-12 | End: 2018-02-16

## 2018-02-12 RX ADMIN — Medication 3 MILLILITER(S): at 19:04

## 2018-02-12 RX ADMIN — Medication 60 MILLIGRAM(S): at 19:04

## 2018-02-12 RX ADMIN — BUDESONIDE AND FORMOTEROL FUMARATE DIHYDRATE 2 PUFF(S): 160; 4.5 AEROSOL RESPIRATORY (INHALATION) at 19:04

## 2018-02-12 RX ADMIN — Medication 250 MILLIGRAM(S): at 20:59

## 2018-02-12 RX ADMIN — PANTOPRAZOLE SODIUM 40 MILLIGRAM(S): 20 TABLET, DELAYED RELEASE ORAL at 05:56

## 2018-02-12 RX ADMIN — TIOTROPIUM BROMIDE 1 CAPSULE(S): 18 CAPSULE ORAL; RESPIRATORY (INHALATION) at 13:25

## 2018-02-12 RX ADMIN — ENOXAPARIN SODIUM 40 MILLIGRAM(S): 100 INJECTION SUBCUTANEOUS at 13:35

## 2018-02-12 RX ADMIN — CEFEPIME 100 MILLIGRAM(S): 1 INJECTION, POWDER, FOR SOLUTION INTRAMUSCULAR; INTRAVENOUS at 05:56

## 2018-02-12 RX ADMIN — Medication 60 MILLIGRAM(S): at 05:56

## 2018-02-12 RX ADMIN — CEFEPIME 100 MILLIGRAM(S): 1 INJECTION, POWDER, FOR SOLUTION INTRAMUSCULAR; INTRAVENOUS at 13:36

## 2018-02-12 RX ADMIN — Medication 250 MILLIGRAM(S): at 05:57

## 2018-02-12 RX ADMIN — PIPERACILLIN AND TAZOBACTAM 200 GRAM(S): 4; .5 INJECTION, POWDER, LYOPHILIZED, FOR SOLUTION INTRAVENOUS at 19:04

## 2018-02-12 RX ADMIN — Medication 3 UNIT(S): at 19:03

## 2018-02-12 RX ADMIN — MONTELUKAST 10 MILLIGRAM(S): 4 TABLET, CHEWABLE ORAL at 13:36

## 2018-02-12 RX ADMIN — PIPERACILLIN AND TAZOBACTAM 25 GRAM(S): 4; .5 INJECTION, POWDER, LYOPHILIZED, FOR SOLUTION INTRAVENOUS at 23:09

## 2018-02-12 RX ADMIN — BUDESONIDE AND FORMOTEROL FUMARATE DIHYDRATE 2 PUFF(S): 160; 4.5 AEROSOL RESPIRATORY (INHALATION) at 13:25

## 2018-02-12 NOTE — CONSULT NOTE ADULT - ASSESSMENT
67 year old Cantonese speaking male with PMHx of asthma/ COPD on 2L at home, s/p right foot metatarsal resection in 2012, presenting with left sided chest pain, productive cough and shortness of breath. Afebrile during this admission but daughter notes fever of 101.8F at home. Blood culture shows no growth after one day. XRAY shows worsening bibasilar opacities with likely bronchiectasis.     COPD exacerbation secondary to CAP without ARDS  Start Levofloxacin 750   c/w respiratory hygiene       NOT DISCUSSED WITH ATTENDING! 67 year old Cantonese speaking male with PMHx of asthma/ COPD on 2L at home, s/p right foot metatarsal resection in 2012, presenting with left sided chest pain, productive cough and shortness of breath. Afebrile during this admission but daughter notes fever of 101.8F at home. CBC shows significant leukocytosis with neurophilic predominance. Blood culture shows no growth after one day. XRAY shows worsening bibasilar opacities with likely bronchiectasis.     COPD exacerbation secondary to CAP without ARDS  Start Levofloxacin 750   c/w respiratory hygiene       NOT DISCUSSED WITH ATTENDING!

## 2018-02-12 NOTE — PROGRESS NOTE ADULT - PROBLEM SELECTOR PLAN 1
- Per ID: DC cefepime and Levaquin and continue with Vancomycin and Zosyn  - Flu swap negative  - MRSA nasal swap  - monitor cbc

## 2018-02-12 NOTE — PROGRESS NOTE ADULT - PROBLEM SELECTOR PLAN 3
- controlled  - c/w current meds elevated FS- no documented history of DM. Elevated FS may be 2/2 solumedrol  - f/u Hba1c   - started insulin lispro premeal. no basal insulin for now

## 2018-02-12 NOTE — CONSULT NOTE ADULT - ASSESSMENT
COPD with exacerbation, hypoxic respiratory failure, pneumonia due to gram -negative plus MRSA suspected.     D/C levaquin and cefepime  Continue with vancomycin and zosyn.   MRSA PCR was negative last admission. Check again.   Consider CT chest if no clinical improvement.

## 2018-02-12 NOTE — CONSULT NOTE ADULT - SUBJECTIVE AND OBJECTIVE BOX
67y Male seen previously by me presents with cough, dyspnea and sputum production. He was initially stabilized with bipap. Currently using 4 liters.       Allergies- No Known Allergies    Vital Signs Last 24 Hrs  T(C): 37.1 (12 Feb 2018 16:27), Max: 37.1 (12 Feb 2018 16:27)  T(F): 98.7 (12 Feb 2018 16:27), Max: 98.7 (12 Feb 2018 16:27)  HR: 82 (12 Feb 2018 16:27) (82 - 98)  BP: 108/67 (12 Feb 2018 16:27) (108/67 - 133/62)  BP(mean): --  RR: 18 (12 Feb 2018 16:27) (18 - 18)  SpO2: 100% (12 Feb 2018 16:27) (99% - 100%)    Awake, alert, resident helped with translation.   Bilateral crackles, S1S2, tachycardiac.   Abdomen- non tender.                         13.9   23.99 )-----------( 421      ( 12 Feb 2018 07:12 )             43.0       CBC Full  -  ( 12 Feb 2018 07:12 )  WBC Count : 23.99 K/uL  Hemoglobin : 13.9 g/dL  Hematocrit : 43.0 %  Platelet Count - Automated : 421 K/uL  Mean Cell Volume : 90.0 fL  Mean Cell Hemoglobin : 29.1 pg  Mean Cell Hemoglobin Concentration : 32.3 g/dL  Auto Neutrophil # : x  Auto Lymphocyte # : x  Auto Monocyte # : x  Auto Eosinophil # : x  Auto Basophil # : x  Auto Neutrophil % : x  Auto Lymphocyte % : x  Auto Monocyte % : x  Auto Eosinophil % : x  Auto Basophil % : x    HEALTH ISSUES - PROBLEM Dx:  Elevated glucose: Elevated glucose  Dyslipidemia: Dyslipidemia  Essential hypertension: Essential hypertension  COPD (chronic obstructive pulmonary disease): COPD (chronic obstructive pulmonary disease)  Pneumonia: Pneumonia      MEDICATIONS  (STANDING):  ALBUTerol/ipratropium for Nebulization 3 milliLiter(s) Nebulizer every 4 hours  buDESOnide 160 MICROgram(s)/formoterol 4.5 MICROgram(s) Inhaler 2 Puff(s) Inhalation two times a day  dextrose 5%. 1000 milliLiter(s) (50 mL/Hr) IV Continuous <Continuous>  dextrose 50% Injectable 12.5 Gram(s) IV Push once  dextrose 50% Injectable 25 Gram(s) IV Push once  dextrose 50% Injectable 25 Gram(s) IV Push once  enoxaparin Injectable 40 milliGRAM(s) SubCutaneous daily  insulin lispro Injectable (HumaLOG) 3 Unit(s) SubCutaneous three times a day before meals  methylPREDNISolone sodium succinate Injectable 60 milliGRAM(s) IV Push daily  montelukast 10 milliGRAM(s) Oral daily  pantoprazole    Tablet 40 milliGRAM(s) Oral before breakfast  piperacillin/tazobactam IVPB.      piperacillin/tazobactam IVPB. 4.5 Gram(s) IV Intermittent once  tiotropium 18 MICROgram(s) Capsule 1 Capsule(s) Inhalation daily  vancomycin  IVPB      vancomycin  IVPB 1000 milliGRAM(s) IV Intermittent every 12 hours    MEDICATIONS  (PRN):  ALBUTerol   0.5% 2.5 milliGRAM(s) Nebulizer four times a day PRN Shortness of Breath and/or Wheezing  dextrose Gel 1 Dose(s) Oral once PRN Blood Glucose LESS THAN 70 milliGRAM(s)/deciliter  glucagon  Injectable 1 milliGRAM(s) IntraMuscular once PRN Glucose LESS THAN 70 milligrams/deciliter  morphine  - Injectable 2 milliGRAM(s) IV Push every 6 hours PRN chest pain    FLU A B RSV Detection by PCR (02.10.18 @ 10:43)    Flu A Result: Negative: Notes  marie clark  Negative results do not preclude influenza infection and  should not be used as the sole basis for treatment or  other patient management decisions.  A positive result may occur in the absence of viable virus.  By: [employer] viral assay by Reverse Transcriptase  Polymerase Chain Reaction (RT-PCR).    Flu B Result: Negative: Notes  marie clark    Culture - Blood (02.10.18 @ 10:31)    Specimen Source: .Blood Blood    Culture Results:   No growth to date.    CXR- Interval increase in bibasilar opacities with likely bronchiectasis. No   pleural effusions or pneumothorax

## 2018-02-12 NOTE — PROGRESS NOTE ADULT - SUBJECTIVE AND OBJECTIVE BOX
Patient is a 67y old  Male who presents with a chief complaint of difficulty breathing, +chest pain (10 Feb 2018 15:46). Pt c/o difficulty bring up sputum today and decreased Po intake 2/2 nausea today. No vomiting. CP resolved.       PAST MEDICAL & SURGICAL HISTORY:  Dyslipidemia  Essential hypertension  COPD (chronic obstructive pulmonary disease)  S/P transmetatarsal amputation of foot, right      No Known Allergies      MEDICATIONS  (STANDING):  buDESOnide 160 MICROgram(s)/formoterol 4.5 MICROgram(s) Inhaler 2 Puff(s) Inhalation two times a day  cefepime  IVPB 2000 milliGRAM(s) IV Intermittent every 8 hours  cefepime  IVPB      enoxaparin Injectable 40 milliGRAM(s) SubCutaneous daily  levoFLOXacin IVPB 750 milliGRAM(s) IV Intermittent every 24 hours  levoFLOXacin IVPB      methylPREDNISolone sodium succinate Injectable 60 milliGRAM(s) IV Push two times a day  montelukast 10 milliGRAM(s) Oral daily  pantoprazole    Tablet 40 milliGRAM(s) Oral before breakfast  piperacillin/tazobactam IVPB.      piperacillin/tazobactam IVPB. 4.5 Gram(s) IV Intermittent once  tiotropium 18 MICROgram(s) Capsule 1 Capsule(s) Inhalation daily  vancomycin  IVPB      vancomycin  IVPB 1000 milliGRAM(s) IV Intermittent every 12 hours    MEDICATIONS  (PRN):  ALBUTerol   0.5% 2.5 milliGRAM(s) Nebulizer four times a day PRN Shortness of Breath and/or Wheezing  morphine  - Injectable 2 milliGRAM(s) IV Push every 6 hours PRN chest pain      Overnight events:    Vital Signs Last 24 Hrs  T(C): 37.1 (12 Feb 2018 16:27), Max: 37.1 (12 Feb 2018 16:27)  T(F): 98.7 (12 Feb 2018 16:27), Max: 98.7 (12 Feb 2018 16:27)  HR: 82 (12 Feb 2018 16:27) (82 - 98)  BP: 108/67 (12 Feb 2018 16:27) (108/67 - 133/62)  BP(mean): --  RR: 18 (12 Feb 2018 16:27) (18 - 18)  SpO2: 98% on 4 L NC  CAPILLARY BLOOD GLUCOSE        I&O's Summary      PHYSICAL EXAM:  Appearance: Normal	  HEENT:   Normal oral mucosa, PERRL, EOMI	  Lymphatic: No lymphadenopathy  Cardiovascular: Normal S1 S2, No JVD, No murmurs, No edema  Respiratory: Decreased air entry in bibasilar lung. no wheezes, rales  Psychiatry: A & O x 3, Mood & affect appropriate  Gastrointestinal:  Soft, Non-tender, + BS	  Skin: No rashes, No ecchymoses, No cyanosis	  Neurologic: Non-focal, A&Ox3, nonfocal, ALVES x 4  Extremities: Normal range of motion, No clubbing, cyanosis or edema  Vascular: Peripheral pulses palpable 2+ bilaterally        Labs:                        13.9   23.99 )-----------( 421      ( 12 Feb 2018 07:12 )             43.0             02-12    140  |  105  |  26<H>  ----------------------------<  208<H>  4.3   |  25  |  1.2    Ca    9.0      12 Feb 2018 07:12  Mg     2.3     02-12                CARDIAC MARKERS ( 11 Feb 2018 04:30 )  <0.02 ng/mL / x     / 36 U/L / x     / 3.5 ng/mL  CARDIAC MARKERS ( 10 Feb 2018 22:24 )  <0.02 ng/mL / x     / 34 U/L / x     / 3.4 ng/mL      Culture - Blood (collected 10 Feb 2018 10:31)  Source: .Blood Blood  Preliminary Report (11 Feb 2018 16:01):    No growth to date.    Influenza: Negative      Imaging:  < from: Xray Chest 1 View AP/PA (02.10.18 @ 10:37) >  Impression:    Interval increase in bibasilar opacities with likely bronchiectasis. No   pleural effusions or pneumothorax    < end of copied text >

## 2018-02-12 NOTE — CONSULT NOTE ADULT - ASSESSMENT
67 year old Cantonese speaking male with PMHx of asthma/ COPD on 2L at home, s/p right foot metatarsal resection in 2012, presenting with left sided chest pain, productive cough and shortness of breath. Afebrile during this admission but daughter notes fever of 101.8F at home. CBC shows significant leukocytosis with neurophilic predominance. Blood culture shows no growth after one day. XRAY shows worsening bibasilar opacities with likely bronchiectasis.   Discussed with patients daughter on the phone.  ex smoker. Quit 2 yrs ago. doesn't work    PNA with sepsis:  sob at rest with silent lungs.  will recommend a CT as the pt has significant SOB and the CXR is not that impressive  GNR PNA?    recommendations:  CT chest.   nares fro ORSA  Zosyn 4.5gm q6h

## 2018-02-12 NOTE — CONSULT NOTE ADULT - SUBJECTIVE AND OBJECTIVE BOX
NILE VALDEZ  67y, Male  Allergy: No Known Allergies      HPI:  66 y/o M, Cantonese speaking, with PMHx HTN, DLD, asthma/COPD on home O2 2L, s/p R foot transmetatarsal resection (2012) bc of concern for cancer but says that after the resection it was found to not be cancer. Pt presenting for sudden onset SOB and L sided chest pain that started this morning at 7am upon waking. The SOB was associated with yellow productive phelgm and fever of 101.8F. The chest pain was mostly L sided with radiation to the back and bilateral neck, worse with breathing and coughing, denies trauma to the area. Pt went to urgent care this morning and they told him to come to the ER. He does note that the urgent care place gave him a certain medication that helped with the chest pain, but he does not recall the name. Pt denies prior hx of chest pain and does not relate it to any physical activity. As per phone discussion with daughter, pt was experiencing the difficulty breathing the day prior to arrival as well, associated with general fatigue and muscle aches. Pt at time of exam said his breathing and chest pain were both improved since arrival. Of note, pt was admitted on 1/11/2018 for COPD 2/2 RLL PNA, was d/c on oral antibiotics as per ID recommendation. Pt states that during that admission he felt a tightness in his chest but does not feel that this admission is the same. Received flu and pneumonia vaccine. Endorses taking recent prednisone. (10 Feb 2018 15:46)    FAMILY HISTORY:    PAST MEDICAL & SURGICAL HISTORY:  Dyslipidemia  Essential hypertension  COPD (chronic obstructive pulmonary disease)  S/P transmetatarsal amputation of foot, right        VITALS:  T(F): 97.5, Max: 97.5 (02-12-18 @ 07:39)  HR: 88  BP: 133/62  RR: 18Vital Signs Last 24 Hrs  T(C): 36.4 (12 Feb 2018 07:39), Max: 36.4 (12 Feb 2018 07:39)  T(F): 97.5 (12 Feb 2018 07:39), Max: 97.5 (12 Feb 2018 07:39)  HR: 88 (12 Feb 2018 07:39) (88 - 98)  BP: 133/62 (12 Feb 2018 07:39) (114/69 - 133/62)  BP(mean): --  RR: 18 (12 Feb 2018 07:39) (18 - 18)  SpO2: 99% (12 Feb 2018 07:39) (99% - 100%)    TESTS & MEASUREMENTS:                        14.3   19.91 )-----------( 357      ( 11 Feb 2018 04:30 )             43.8     02-11    142  |  108  |  21<H>  ----------------------------<  143<H>  4.3   |  25  |  1.0    Ca    9.2      11 Feb 2018 04:30  Mg     2.2     02-11    TPro  5.9<L>  /  Alb  3.3  /  TBili  3.8<H>  /  DBili  x   /  AST  24  /  ALT  24  /  AlkPhos  71  02-10    LIVER FUNCTIONS - ( 10 Feb 2018 10:30 )  Alb: 3.3 g/dL / Pro: 5.9 g/dL / ALK PHOS: 71 U/L / ALT: 24 U/L / AST: 24 U/L / GGT: x             Culture - Blood (collected 02-10-18 @ 10:31)  Source: .Blood Blood  Preliminary Report (02-11-18 @ 16:01):    No growth to date.      RADIOLOGY & ADDITIONAL TESTS:  < from: Xray Chest 1 View AP/PA (02.10.18 @ 10:37) >  Impression:    Interval increase in bibasilar opacities with likely bronchiectasis. No   pleural effusions or pneumothorax      ANTIBIOTICS:  cefepime  IVPB 2000 milliGRAM(s) IV Intermittent every 8 hours     levoFLOXacin IVPB 750 milliGRAM(s) IV Intermittent every 24 hours         vancomycin  IVPB 1000 milliGRAM(s) IV Intermittent every 12 hours

## 2018-02-12 NOTE — PROGRESS NOTE ADULT - PROBLEM SELECTOR PLAN 2
- COPD exacerbation   - taper solumedrol to 60mg once a day and symbicort  - consider switching to po tomorrow once air entry improves  - c/w duoneb q4 and prn - COPD exacerbation   - taper solumedrol to 60mg once a day and symbicort and Spiriva  - consider switching to po tomorrow once air entry improves  - c/w duoneb q4 and prn

## 2018-02-13 LAB
ANION GAP SERPL CALC-SCNC: 12 MMOL/L — SIGNIFICANT CHANGE UP (ref 7–14)
BUN SERPL-MCNC: 25 MG/DL — HIGH (ref 10–20)
CALCIUM SERPL-MCNC: 8.7 MG/DL — SIGNIFICANT CHANGE UP (ref 8.5–10.1)
CHLORIDE SERPL-SCNC: 101 MMOL/L — SIGNIFICANT CHANGE UP (ref 98–110)
CO2 SERPL-SCNC: 27 MMOL/L — SIGNIFICANT CHANGE UP (ref 17–32)
CREAT SERPL-MCNC: 1 MG/DL — SIGNIFICANT CHANGE UP (ref 0.7–1.5)
GLUCOSE SERPL-MCNC: 183 MG/DL — HIGH (ref 70–110)
HCT VFR BLD CALC: 41.3 % — LOW (ref 42–52)
HGB BLD-MCNC: 13.2 G/DL — LOW (ref 14–18)
MAGNESIUM SERPL-MCNC: 2.4 MG/DL — SIGNIFICANT CHANGE UP (ref 1.8–2.4)
MCHC RBC-ENTMCNC: 28.5 PG — SIGNIFICANT CHANGE UP (ref 27–31)
MCHC RBC-ENTMCNC: 32 G/DL — SIGNIFICANT CHANGE UP (ref 32–37)
MCV RBC AUTO: 89.2 FL — SIGNIFICANT CHANGE UP (ref 80–94)
NRBC # BLD: 0 /100 WBCS — SIGNIFICANT CHANGE UP (ref 0–0)
PLATELET # BLD AUTO: 431 K/UL — HIGH (ref 130–400)
POTASSIUM SERPL-MCNC: 4.3 MMOL/L — SIGNIFICANT CHANGE UP (ref 3.5–5)
POTASSIUM SERPL-SCNC: 4.3 MMOL/L — SIGNIFICANT CHANGE UP (ref 3.5–5)
RBC # BLD: 4.63 M/UL — LOW (ref 4.7–6.1)
RBC # FLD: 16 % — HIGH (ref 11.5–14.5)
SODIUM SERPL-SCNC: 140 MMOL/L — SIGNIFICANT CHANGE UP (ref 135–146)
WBC # BLD: 19.27 K/UL — HIGH (ref 4.8–10.8)
WBC # FLD AUTO: 19.27 K/UL — HIGH (ref 4.8–10.8)

## 2018-02-13 RX ADMIN — Medication 3 MILLILITER(S): at 16:28

## 2018-02-13 RX ADMIN — Medication 60 MILLIGRAM(S): at 05:23

## 2018-02-13 RX ADMIN — PIPERACILLIN AND TAZOBACTAM 25 GRAM(S): 4; .5 INJECTION, POWDER, LYOPHILIZED, FOR SOLUTION INTRAVENOUS at 23:37

## 2018-02-13 RX ADMIN — BUDESONIDE AND FORMOTEROL FUMARATE DIHYDRATE 2 PUFF(S): 160; 4.5 AEROSOL RESPIRATORY (INHALATION) at 11:09

## 2018-02-13 RX ADMIN — PIPERACILLIN AND TAZOBACTAM 25 GRAM(S): 4; .5 INJECTION, POWDER, LYOPHILIZED, FOR SOLUTION INTRAVENOUS at 17:11

## 2018-02-13 RX ADMIN — Medication 250 MILLIGRAM(S): at 05:19

## 2018-02-13 RX ADMIN — Medication 3 UNIT(S): at 12:08

## 2018-02-13 RX ADMIN — PIPERACILLIN AND TAZOBACTAM 25 GRAM(S): 4; .5 INJECTION, POWDER, LYOPHILIZED, FOR SOLUTION INTRAVENOUS at 05:18

## 2018-02-13 RX ADMIN — PIPERACILLIN AND TAZOBACTAM 25 GRAM(S): 4; .5 INJECTION, POWDER, LYOPHILIZED, FOR SOLUTION INTRAVENOUS at 11:10

## 2018-02-13 RX ADMIN — Medication 3 MILLILITER(S): at 20:01

## 2018-02-13 RX ADMIN — TIOTROPIUM BROMIDE 1 CAPSULE(S): 18 CAPSULE ORAL; RESPIRATORY (INHALATION) at 08:16

## 2018-02-13 RX ADMIN — Medication 3 MILLILITER(S): at 11:38

## 2018-02-13 RX ADMIN — ENOXAPARIN SODIUM 40 MILLIGRAM(S): 100 INJECTION SUBCUTANEOUS at 11:10

## 2018-02-13 RX ADMIN — Medication 3 MILLILITER(S): at 08:16

## 2018-02-13 RX ADMIN — Medication 250 MILLIGRAM(S): at 17:12

## 2018-02-13 RX ADMIN — Medication 3 UNIT(S): at 08:59

## 2018-02-13 RX ADMIN — Medication 3 UNIT(S): at 17:11

## 2018-02-13 RX ADMIN — PANTOPRAZOLE SODIUM 40 MILLIGRAM(S): 20 TABLET, DELAYED RELEASE ORAL at 08:58

## 2018-02-13 RX ADMIN — MONTELUKAST 10 MILLIGRAM(S): 4 TABLET, CHEWABLE ORAL at 11:09

## 2018-02-13 NOTE — PROGRESS NOTE ADULT - SUBJECTIVE AND OBJECTIVE BOX
Tristan Podlog; [place]; Octavia Villarreal; User ADM; Not Available Doctor; An Matson; Ordering Physician; Jelena Vincent; Jelena Vincent; Kaitlynn Morgan; Michael Goldberg; Bryan Damian; Candy King; Alberto Estrada; Rafia Goode; Geovanna Bradshaw; Rich Schwab; Rich Schwab; Deion Mcwilliams  Patient is a 67y old  Male who presents with a chief complaint of difficulty breathing, +chest pain (10 Feb 2018 15:46)      Vital Signs Last 24 Hrs  T(C): 35.8 (13 Feb 2018 05:27), Max: 37.1 (12 Feb 2018 16:27)  T(F): 96.4 (13 Feb 2018 05:27), Max: 98.7 (12 Feb 2018 16:27)  HR: 76 (13 Feb 2018 05:27) (76 - 83)  BP: 109/62 (13 Feb 2018 05:27) (108/67 - 129/-)  BP(mean): --  RR: 18 (13 Feb 2018 05:27) (16 - 18)  SpO2: 95% (13 Feb 2018 07:36) (95% - 100%)  CAPILLARY BLOOD GLUCOSE  169 (13 Feb 2018 07:23)      PAST MEDICAL & SURGICAL HISTORY:  Dyslipidemia  Essential hypertension  COPD (chronic obstructive pulmonary disease)  S/P transmetatarsal amputation of foot, right    MEDICATIONS  (STANDING):  ALBUTerol/ipratropium for Nebulization 3 milliLiter(s) Nebulizer every 4 hours  buDESOnide 160 MICROgram(s)/formoterol 4.5 MICROgram(s) Inhaler 2 Puff(s) Inhalation two times a day  dextrose 5%. 1000 milliLiter(s) (50 mL/Hr) IV Continuous <Continuous>  dextrose 50% Injectable 12.5 Gram(s) IV Push once  dextrose 50% Injectable 25 Gram(s) IV Push once  dextrose 50% Injectable 25 Gram(s) IV Push once  enoxaparin Injectable 40 milliGRAM(s) SubCutaneous daily  insulin lispro Injectable (HumaLOG) 3 Unit(s) SubCutaneous three times a day before meals  methylPREDNISolone sodium succinate Injectable 60 milliGRAM(s) IV Push daily  montelukast 10 milliGRAM(s) Oral daily  pantoprazole    Tablet 40 milliGRAM(s) Oral before breakfast  piperacillin/tazobactam IVPB. 4.5 Gram(s) IV Intermittent every 6 hours  tiotropium 18 MICROgram(s) Capsule 1 Capsule(s) Inhalation daily  vancomycin  IVPB      vancomycin  IVPB 1000 milliGRAM(s) IV Intermittent every 12 hours    MEDICATIONS  (PRN):  ALBUTerol   0.5% 2.5 milliGRAM(s) Nebulizer four times a day PRN Shortness of Breath and/or Wheezing  dextrose Gel 1 Dose(s) Oral once PRN Blood Glucose LESS THAN 70 milliGRAM(s)/deciliter  glucagon  Injectable 1 milliGRAM(s) IntraMuscular once PRN Glucose LESS THAN 70 milligrams/deciliter  morphine  - Injectable 2 milliGRAM(s) IV Push every 6 hours PRN chest pain      Physical Exam:  General: WNWD, NAD  HEENT: Neck supple, no lymphadenopathy, PERRLA, EOMI  Heart: RRR, S1, S2 noted, no murmurs, rubs, gallops  Lungs: Poor respiratory effort, decreased lung sounds b/l. CTA b/l, no wheezes, rales, rhonchi   Abdomen: soft, non tender, non distended, + bowel sounds  Extremities: no C/C/E, full ROM in all extremities  Neuro: A&O x 3, no focal deficits  Skin: No rashes    Labs:                        13.9   23.99 )-----------( 421      ( 12 Feb 2018 07:12 )             43.0             02-12    140  |  105  |  26<H>  ----------------------------<  208<H>  4.3   |  25  |  1.2    Ca    9.0      12 Feb 2018 07:12  Mg     2.3     02-12    Troponin I, Serum (02.11.18 @ 04:30)    Troponin I, Serum: <0.02: TROPONIN I (ng/mL)     INTERPRETATION  -----------------------------------------------------------  0 - 0.05               NORMAL RANGE INCLUDES 99TH PERCENTILE  OF A HEALTHY REFERENCE POPULATION  ------------------------------------------------------------  0.05 - 0.5*            ELEVATED TROPONIN LEVEL. POSSIBLE  CARDIAC DAMAGE AND INCREASED RISK.  USE IN CONJUCTION WITH CLINICAL  FINDINGS AND/OR ECG CHANGES.  CONSIDER SERIAL MEASUREMENTS.  ------------------------------------------------------------  >0.5                   POSITIVE, SUGGESTIVE OF MYOCARDIAL  INFARCTION. SUGGEST ESTABLISHING  RISING OR FALLING PATTERN AND  EVIDENCE OF CARDIAC ISCHEMIA.  ------------------------------------------------------------  * NON ACUTE CORONARY SYNDROME CONDITIONS THAT CAN INCREASE  TROPONIN LEVELS INCLUDE CORONARY VASOSPASM, CONGESTIVE  CARDIAC FAILURE, MYOCARDITIS, PULMONARY EMBOLUS, SEPSIS AND  CARDIAC SURGERY. ng/mL                Culture - Blood (collected 10 Feb 2018 10:31)  Source: .Blood Blood  Preliminary Report (11 Feb 2018 16:01):    No growth to date.    FLU A B RSV Detection by PCR (02.10.18 @ 10:43)    Flu A Result: Negative: Notes  mon ,[place]  Negative results do not preclude influenza infection and  should not be used as the sole basis for treatment or  other patient management decisions.  A positive result may occur in the absence of viable virus.  By: Power OLEDsertFlu viral assay by Reverse Transcriptase  Polymerase Chain Reaction (RT-PCR).    Flu B Result: Negative: Notes  marie valdez    MRSA/MSSA PCR (02.12.18 @ 19:03)    MRSA PCR Result.: Negative: Notes  NILE VALDEZ  By: Real-Time PCR (Polymerase Reaction Method)        I&O's Summary      Imaging:  < from: Xray Chest 1 View AP/PA (02.10.18 @ 10:37) >  Interval increase in bibasilar opacities with likely bronchiectasis. No   pleural effusions or pneumothorax    < end of copied text >      ECG:  < from: 12 Lead ECG (02.10.18 @ 23:42) >  Diagnosis Line Normal sinus rhythm  Normal ECG    < end of copied text > Tristan Podlog; [place]; Octavia Villarreal; User ADM; Not Available Doctor; An Matson; Ordering Physician; Jelena Vincent; Jelena Vincent; Kaitlynn Morgan; Michael Goldberg; Bryan Damian; Candy King; Alberto Estrada; Rafia Goode; Geovanna Bradshaw; Rich Schwab; Rich Schwab; Deion Mcwilliams  Patient is a 67y old  Male who presents with a chief complaint of difficulty breathing, +chest pain (10 Feb 2018 15:46)      Vital Signs Last 24 Hrs  T(C): 36.3 (13 Feb 2018 12:34), Max: 37.1 (12 Feb 2018 16:27)  T(F): 97.3 (13 Feb 2018 12:34), Max: 98.7 (12 Feb 2018 16:27)  HR: 111 (13 Feb 2018 12:34) (76 - 111)  BP: 129/66 (13 Feb 2018 12:34) (108/67 - 129/-)  BP(mean): --  RR: 16 (13 Feb 2018 12:34) (16 - 18)  SpO2: 95% (13 Feb 2018 07:36) (95% - 100%)  CAPILLARY BLOOD GLUCOSE  151 (13 Feb 2018 11:22)  169 (13 Feb 2018 07:23)      PAST MEDICAL & SURGICAL HISTORY:  Dyslipidemia  Essential hypertension  COPD (chronic obstructive pulmonary disease)  S/P transmetatarsal amputation of foot, right    MEDICATIONS  (STANDING):  ALBUTerol/ipratropium for Nebulization 3 milliLiter(s) Nebulizer every 4 hours  buDESOnide 160 MICROgram(s)/formoterol 4.5 MICROgram(s) Inhaler 2 Puff(s) Inhalation two times a day  dextrose 5%. 1000 milliLiter(s) (50 mL/Hr) IV Continuous <Continuous>  dextrose 50% Injectable 12.5 Gram(s) IV Push once  dextrose 50% Injectable 25 Gram(s) IV Push once  dextrose 50% Injectable 25 Gram(s) IV Push once  enoxaparin Injectable 40 milliGRAM(s) SubCutaneous daily  insulin lispro Injectable (HumaLOG) 3 Unit(s) SubCutaneous three times a day before meals  methylPREDNISolone sodium succinate Injectable 60 milliGRAM(s) IV Push daily  montelukast 10 milliGRAM(s) Oral daily  pantoprazole    Tablet 40 milliGRAM(s) Oral before breakfast  piperacillin/tazobactam IVPB. 4.5 Gram(s) IV Intermittent every 6 hours  tiotropium 18 MICROgram(s) Capsule 1 Capsule(s) Inhalation daily  vancomycin  IVPB      vancomycin  IVPB 1000 milliGRAM(s) IV Intermittent every 12 hours    MEDICATIONS  (PRN):  ALBUTerol   0.5% 2.5 milliGRAM(s) Nebulizer four times a day PRN Shortness of Breath and/or Wheezing  dextrose Gel 1 Dose(s) Oral once PRN Blood Glucose LESS THAN 70 milliGRAM(s)/deciliter  glucagon  Injectable 1 milliGRAM(s) IntraMuscular once PRN Glucose LESS THAN 70 milligrams/deciliter  morphine  - Injectable 2 milliGRAM(s) IV Push every 6 hours PRN chest pain      Physical Exam:  General: WNWD, NAD  HEENT: Neck supple, no lymphadenopathy, PERRLA, EOMI  Heart: RRR, S1, S2 noted, no murmurs, rubs, gallops  Lungs: clear lungs b/l. Very little air flow noted in all lung fields  Abdomen: soft, non tender, non distended, + bowel sounds  Extremities: no C/C/E, full ROM in all extremities  Neuro: A&O x 3, no focal deficits  Skin: No rashes    Labs:                        13.2   19.27 )-----------( 431      ( 13 Feb 2018 07:14 )             41.3             02-13    140  |  101  |  25<H>  ----------------------------<  183<H>  4.3   |  27  |  1.0    Ca    8.7      13 Feb 2018 07:14  Mg     2.4     02-13                  Culture - Blood (collected 12 Feb 2018 07:12)  Source: .Blood None  Preliminary Report (13 Feb 2018 15:01):    No growth to date.      I&O's Summary      Imaging:  < from: Xray Chest 1 View AP/PA (02.10.18 @ 10:37) >  Interval increase in bibasilar opacities with likely bronchiectasis. No   pleural effusions or pneumothorax    < end of copied text >        ECG:

## 2018-02-13 NOTE — PROGRESS NOTE ADULT - SUBJECTIVE AND OBJECTIVE BOX
I was called for consult. Dr Damian informed via email and spectra.   INTERVAL HPI/OVERNIGHT EVENTS:  No acute events.   ANTIBIOTICS/RELEVANT:  Zosyn and vancomycin  Allergies- No Known Allergies    Vital Signs Last 24 Hrs  T(C): 36.3 (13 Feb 2018 12:34), Max: 37.1 (12 Feb 2018 16:27)  T(F): 97.3 (13 Feb 2018 12:34), Max: 98.7 (12 Feb 2018 16:27)  HR: 111 (13 Feb 2018 12:34) (76 - 111)  BP: 129/66 (13 Feb 2018 12:34) (108/67 - 129/-)  BP(mean): --  RR: 16 (13 Feb 2018 12:34) (16 - 18)  SpO2: 95% (13 Feb 2018 07:36) (95% - 100%)      ALBUTerol   0.5% 2.5 milliGRAM(s) Nebulizer four times a day PRN  ALBUTerol/ipratropium for Nebulization 3 milliLiter(s) Nebulizer every 4 hours  buDESOnide 160 MICROgram(s)/formoterol 4.5 MICROgram(s) Inhaler 2 Puff(s) Inhalation two times a day  dextrose 5%. 1000 milliLiter(s) IV Continuous <Continuous>  dextrose 50% Injectable 12.5 Gram(s) IV Push once  dextrose 50% Injectable 25 Gram(s) IV Push once  dextrose 50% Injectable 25 Gram(s) IV Push once  dextrose Gel 1 Dose(s) Oral once PRN  enoxaparin Injectable 40 milliGRAM(s) SubCutaneous daily  glucagon  Injectable 1 milliGRAM(s) IntraMuscular once PRN  insulin lispro Injectable (HumaLOG) 3 Unit(s) SubCutaneous three times a day before meals  methylPREDNISolone sodium succinate Injectable 60 milliGRAM(s) IV Push daily  montelukast 10 milliGRAM(s) Oral daily  morphine  - Injectable 2 milliGRAM(s) IV Push every 6 hours PRN  pantoprazole    Tablet 40 milliGRAM(s) Oral before breakfast  piperacillin/tazobactam IVPB. 4.5 Gram(s) IV Intermittent every 6 hours  tiotropium 18 MICROgram(s) Capsule 1 Capsule(s) Inhalation daily  vancomycin  IVPB      vancomycin  IVPB 1000 milliGRAM(s) IV Intermittent every 12 hours    PHYSICAL EXAM: Alert, using O2, oriented, Bilateral crackles, abdomen- soft.     LABS:                        13.2   19.27 )-----------( 431      ( 13 Feb 2018 07:14 )             41.3     02-13    140  |  101  |  25<H>  ----------------------------<  183<H>  4.3   |  27  |  1.0    Ca    8.7      13 Feb 2018 07:14  Mg     2.4     02-13    MRSA/MSSA PCR (02.12.18 @ 19:03)    MRSA PCR Result.: Negative: NILE Carty  By: Real-Time PCR (Polymerase Reaction Method)    Rapid Respiratory Viral Panel (02.10.18 @ 18:13)    Rapid RVP Result: NotDetec:     FLU A B RSV Detection by PCR (02.10.18 @ 10:43)    Flu A Result: Negative:   Polymerase Chain Reaction (RT-PCR).    Flu B Result: Negative:    Culture - Blood (02.10.18 @ 10:31)    Specimen Source: .Blood Blood    Culture Results:   No growth to date.    CXR 2/10  Interval increase in bibasilar opacities with likely bronchiectasis. No   pleural effusions or pneumothorax

## 2018-02-13 NOTE — PROGRESS NOTE ADULT - SUBJECTIVE AND OBJECTIVE BOX
INTERVAL HPI/OVERNIGHT EVENTS:  patient seen and examined independently on rounds-   no overnight events- sitting up in bed    T(F): 97.3 (02-13-18 @ 12:34), Max: 98.7 (02-12-18 @ 16:27)  HR: 111 (02-13-18 @ 12:34) (76 - 111)  BP: 129/66 (02-13-18 @ 12:34) (108/67 - 129/-)  RR: 16 (02-13-18 @ 12:34) (16 - 18)  SpO2: 95% (02-13-18 @ 07:36) (95% - 100%)    CAPILLARY BLOOD GLUCOSE  151 (13 Feb 2018 11:22)  169 (13 Feb 2018 07:23)        PHYSICAL EXAM:  GENERAL: NAD, AAOx3, cantonese speaking  HEAD:  Atraumatic, Normocephalic  EYES:  conjunctiva and sclera clear  ENMT: Moist mucous membranes  NECK: Supple  CHEST/LUNG: decreased air entry bilat   HEART: Regular rate and rhythm; No murmurs  ABDOMEN: Soft, Nontender, Nondistended; Bowel sounds present  EXTREMITIES:  2+ Peripheral Pulses, No edema, +Right transmetarsal amputation (stump c/d/i)  SKIN: No rashes or lesions    Consultant(s) Notes Reviewed:  [x ] YES  [ ] NO  Care Discussed with Consultants/Other Providers [ x] YES  [ ] NO    Medications reviewed    LABS:                        13.2   19.27 )-----------( 431      ( 13 Feb 2018 07:14 )             41.3     02-13    140  |  101  |  25<H>  ----------------------------<  183<H>  4.3   |  27  |  1.0    Ca    8.7      13 Feb 2018 07:14  Mg     2.4     02-13            RADIOLOGY & ADDITIONAL TESTS:    Imaging or report Personally Reviewed:  [X] YES  [ ] NO    Case discussed with resident    Care discussed with pt/family

## 2018-02-13 NOTE — PROGRESS NOTE ADULT - ASSESSMENT
COPD with exacerbation, hypoxic respiratory failure, pneumonia due to gram -negative plus MRSA suspected.     1.Continue with vancomycin and zosyn.   2. MRSA PCR was negative.   3. Will deescalate vancomycin tomorrow.

## 2018-02-13 NOTE — PROGRESS NOTE ADULT - ASSESSMENT
a/p:  #CAP- Sepsis  -cont abx (IV zosyn)---can dc vanco after today 2/2 MRSA pcr negative  -f/u bcx  -monitor wbc/fever curve  -ID consult    #COPD-with acute exac  -change to po prednisone 40 mg daily in am (clinically not wheezing)  -cont o2 suppl, nebs- monitor o2 sat    #DVT/GI ppx    d/w medicine resident

## 2018-02-13 NOTE — PROGRESS NOTE ADULT - SUBJECTIVE AND OBJECTIVE BOX
MON, NILE  67y, Male      OVERNIGHT EVENTS:    no fevers, SOB/ cough/minimal chest pain    VITALS:  T(F): 96.4, Max: 98.7 (02-12-18 @ 16:27)  HR: 76  BP: 109/62  RR: 18Vital Signs Last 24 Hrs  T(C): 35.8 (13 Feb 2018 05:27), Max: 37.1 (12 Feb 2018 16:27)  T(F): 96.4 (13 Feb 2018 05:27), Max: 98.7 (12 Feb 2018 16:27)  HR: 76 (13 Feb 2018 05:27) (76 - 88)  BP: 109/62 (13 Feb 2018 05:27) (108/67 - 133/62)  BP(mean): --  RR: 18 (13 Feb 2018 05:27) (16 - 18)  SpO2: 100% (12 Feb 2018 16:27) (99% - 100%)    TESTS & MEASUREMENTS:                        13.9   23.99 )-----------( 421      ( 12 Feb 2018 07:12 )             43.0     02-12    140  |  105  |  26<H>  ----------------------------<  208<H>  4.3   |  25  |  1.2    Ca    9.0      12 Feb 2018 07:12  Mg     2.3     02-12          Culture - Blood (collected 02-10-18 @ 10:31)  Source: .Blood Blood  Preliminary Report (02-11-18 @ 16:01):    No growth to date.            RADIOLOGY & ADDITIONAL TESTS:    ANTIBIOTICS:  piperacillin/tazobactam IVPB. 4.5 Gram(s) IV Intermittent every 6 hours  vancomycin  IVPB      vancomycin  IVPB 1000 milliGRAM(s) IV Intermittent every 12 hours

## 2018-02-13 NOTE — PROGRESS NOTE ADULT - ASSESSMENT
#Pneumonia w/ SIRS + sepsis  ID: c/w vancomycin and zosyn, recommend CT chest  flu negative  MRSA negative    #COPD exacerbation  c/w solumedrol taper  c/w nebs, and inhalers symbicort and spiriva    #Essential HTN:  c/w home meds    #dyslipidemia:  c/w current meds #Pneumonia w/ SIRS + sepsis  ID: c/w vanc and zosyn. d/c vanc 2/14, f/u CT chest  flu negative  MRSA negative    #COPD exacerbation  c/w solumedrol taper  starting prednisone PO tomorrow  c/w nebs, inhalers    #Essential HTN:  c/w home meds    #dyslipidemia:  c/w current meds

## 2018-02-14 LAB
ANION GAP SERPL CALC-SCNC: 7 MMOL/L — SIGNIFICANT CHANGE UP (ref 7–14)
BUN SERPL-MCNC: 21 MG/DL — HIGH (ref 10–20)
CALCIUM SERPL-MCNC: 8.9 MG/DL — SIGNIFICANT CHANGE UP (ref 8.5–10.1)
CHLORIDE SERPL-SCNC: 102 MMOL/L — SIGNIFICANT CHANGE UP (ref 98–110)
CO2 SERPL-SCNC: 31 MMOL/L — SIGNIFICANT CHANGE UP (ref 17–32)
CREAT SERPL-MCNC: 1.2 MG/DL — SIGNIFICANT CHANGE UP (ref 0.7–1.5)
GLUCOSE SERPL-MCNC: 143 MG/DL — HIGH (ref 70–110)
HCT VFR BLD CALC: 43.2 % — SIGNIFICANT CHANGE UP (ref 42–52)
HGB BLD-MCNC: 14 G/DL — SIGNIFICANT CHANGE UP (ref 14–18)
MCHC RBC-ENTMCNC: 29 PG — SIGNIFICANT CHANGE UP (ref 27–31)
MCHC RBC-ENTMCNC: 32.4 G/DL — SIGNIFICANT CHANGE UP (ref 32–37)
MCV RBC AUTO: 89.6 FL — SIGNIFICANT CHANGE UP (ref 80–94)
NRBC # BLD: 0 /100 WBCS — SIGNIFICANT CHANGE UP (ref 0–0)
PLATELET # BLD AUTO: 464 K/UL — HIGH (ref 130–400)
POTASSIUM SERPL-MCNC: 4 MMOL/L — SIGNIFICANT CHANGE UP (ref 3.5–5)
POTASSIUM SERPL-SCNC: 4 MMOL/L — SIGNIFICANT CHANGE UP (ref 3.5–5)
RBC # BLD: 4.82 M/UL — SIGNIFICANT CHANGE UP (ref 4.7–6.1)
RBC # FLD: 15.9 % — HIGH (ref 11.5–14.5)
SODIUM SERPL-SCNC: 140 MMOL/L — SIGNIFICANT CHANGE UP (ref 135–146)
WBC # BLD: 17.31 K/UL — HIGH (ref 4.8–10.8)
WBC # FLD AUTO: 17.31 K/UL — HIGH (ref 4.8–10.8)

## 2018-02-14 RX ADMIN — PIPERACILLIN AND TAZOBACTAM 25 GRAM(S): 4; .5 INJECTION, POWDER, LYOPHILIZED, FOR SOLUTION INTRAVENOUS at 11:39

## 2018-02-14 RX ADMIN — Medication 60 MILLIGRAM(S): at 11:39

## 2018-02-14 RX ADMIN — PANTOPRAZOLE SODIUM 40 MILLIGRAM(S): 20 TABLET, DELAYED RELEASE ORAL at 06:21

## 2018-02-14 RX ADMIN — Medication 3 MILLILITER(S): at 21:23

## 2018-02-14 RX ADMIN — Medication 60 MILLIGRAM(S): at 05:31

## 2018-02-14 RX ADMIN — Medication 3 UNIT(S): at 17:17

## 2018-02-14 RX ADMIN — ENOXAPARIN SODIUM 40 MILLIGRAM(S): 100 INJECTION SUBCUTANEOUS at 11:40

## 2018-02-14 RX ADMIN — PIPERACILLIN AND TAZOBACTAM 25 GRAM(S): 4; .5 INJECTION, POWDER, LYOPHILIZED, FOR SOLUTION INTRAVENOUS at 17:18

## 2018-02-14 RX ADMIN — Medication 250 MILLIGRAM(S): at 05:27

## 2018-02-14 RX ADMIN — TIOTROPIUM BROMIDE 1 CAPSULE(S): 18 CAPSULE ORAL; RESPIRATORY (INHALATION) at 08:40

## 2018-02-14 RX ADMIN — PIPERACILLIN AND TAZOBACTAM 25 GRAM(S): 4; .5 INJECTION, POWDER, LYOPHILIZED, FOR SOLUTION INTRAVENOUS at 05:27

## 2018-02-14 RX ADMIN — BUDESONIDE AND FORMOTEROL FUMARATE DIHYDRATE 2 PUFF(S): 160; 4.5 AEROSOL RESPIRATORY (INHALATION) at 23:39

## 2018-02-14 RX ADMIN — Medication 3 MILLILITER(S): at 08:39

## 2018-02-14 RX ADMIN — MONTELUKAST 10 MILLIGRAM(S): 4 TABLET, CHEWABLE ORAL at 11:39

## 2018-02-14 RX ADMIN — Medication 3 MILLILITER(S): at 16:04

## 2018-02-14 RX ADMIN — PIPERACILLIN AND TAZOBACTAM 25 GRAM(S): 4; .5 INJECTION, POWDER, LYOPHILIZED, FOR SOLUTION INTRAVENOUS at 23:04

## 2018-02-14 NOTE — PROGRESS NOTE ADULT - SUBJECTIVE AND OBJECTIVE BOX
Tristan Podlog; [place]; Octavia Villarreal; User ADM; Not Available Doctor; An Matson; Ordering Physician; Jelena Vincent; Jelena Vincent; Kaitlynn Morgan; Michael Goldberg; Bryan Damian; Candy King; Alberto Sean; Geovanna Leeann; Rich Schwab; Deion Mcwilliams; Niels Gonzales  Patient is a 67y old  Male who presents with a chief complaint of difficulty breathing, +chest pain (10 Feb 2018 15:46)      Vital Signs Last 24 Hrs  T(C): 36.2 (14 Feb 2018 06:41), Max: 36.6 (13 Feb 2018 20:45)  T(F): 97.1 (14 Feb 2018 06:41), Max: 97.9 (13 Feb 2018 20:45)  HR: 75 (14 Feb 2018 06:41) (75 - 111)  BP: 122/75 (14 Feb 2018 06:41) (114/63 - 129/66)  BP(mean): --  RR: 18 (14 Feb 2018 06:41) (16 - 18)  SpO2: 95% (14 Feb 2018 08:03) (95% - 95%)  CAPILLARY BLOOD GLUCOSE  79 (14 Feb 2018 08:08)  132 (13 Feb 2018 20:45)  226 (13 Feb 2018 16:42)          PAST MEDICAL & SURGICAL HISTORY:  Dyslipidemia  Essential hypertension  COPD (chronic obstructive pulmonary disease)  S/P transmetatarsal amputation of foot, right    MEDICATIONS  (STANDING):  ALBUTerol/ipratropium for Nebulization 3 milliLiter(s) Nebulizer every 4 hours  buDESOnide 160 MICROgram(s)/formoterol 4.5 MICROgram(s) Inhaler 2 Puff(s) Inhalation two times a day  dextrose 5%. 1000 milliLiter(s) (50 mL/Hr) IV Continuous <Continuous>  dextrose 50% Injectable 12.5 Gram(s) IV Push once  dextrose 50% Injectable 25 Gram(s) IV Push once  dextrose 50% Injectable 25 Gram(s) IV Push once  enoxaparin Injectable 40 milliGRAM(s) SubCutaneous daily  insulin lispro Injectable (HumaLOG) 3 Unit(s) SubCutaneous three times a day before meals  montelukast 10 milliGRAM(s) Oral daily  pantoprazole    Tablet 40 milliGRAM(s) Oral before breakfast  piperacillin/tazobactam IVPB. 4.5 Gram(s) IV Intermittent every 6 hours  predniSONE   Tablet 60 milliGRAM(s) Oral daily  tiotropium 18 MICROgram(s) Capsule 1 Capsule(s) Inhalation daily    MEDICATIONS  (PRN):  ALBUTerol   0.5% 2.5 milliGRAM(s) Nebulizer four times a day PRN Shortness of Breath and/or Wheezing  dextrose Gel 1 Dose(s) Oral once PRN Blood Glucose LESS THAN 70 milliGRAM(s)/deciliter  glucagon  Injectable 1 milliGRAM(s) IntraMuscular once PRN Glucose LESS THAN 70 milligrams/deciliter  morphine  - Injectable 2 milliGRAM(s) IV Push every 6 hours PRN chest pain      Physical Exam:  General: WNWD, NAD  HEENT: Neck supple, no lymphadenopathy, PERRLA, EOMI  Heart: RRR, S1, S2 noted, no murmurs, rubs, gallops  Lungs: very poor air flow bilaterally. No rales, rhonchi or wheezes noted on aucsultation.   Abdomen: soft, non tender, non distended, + bowel sounds  Extremities: no C/C/E, full ROM in all extremities  Neuro: A&O x 3, no focal deficits  Skin: No rashes    Labs:                        14.0   17.31 )-----------( 464      ( 14 Feb 2018 09:25 )             43.2             02-14    140  |  102  |  21<H>  ----------------------------<  143<H>  4.0   |  31  |  1.2    Ca    8.9      14 Feb 2018 09:25  Mg     2.4     02-13                Culture - Blood (collected 12 Feb 2018 07:12)  Source: .Blood None  Preliminary Report (13 Feb 2018 15:01):    No growth to date.      I&O's Summary    Imaging:  < from: Xray Chest 1 View AP/PA (02.10.18 @ 10:37) >  Interval increase in bibasilar opacities with likely bronchiectasis. No   pleural effusions or pneumothorax    < end of copied text >      ECG:

## 2018-02-14 NOTE — PROGRESS NOTE ADULT - ASSESSMENT
#Pneumonia w/ SIRS + sepsis  ID: currently on zosyn, f/u ID for any new recommendations   MRSA nares negative, flu negative  CXR: indicating likely bronchiectasis, need CT for better eval  CT chest: F/U results     #COPD exacerbation  Starting prednisone PO taper  c/w nebs, inhalers  improving    #Essential HTN: c/w home meds    #dyslipidemia: c/w current meds

## 2018-02-14 NOTE — PROGRESS NOTE ADULT - ASSESSMENT
COPD with exacerbation, hypoxic respiratory failure, pneumonia due to gram -negative plus MRSA suspected.     1.Continue with vancomycin and zosyn.   2. MRSA PCR was negative.   3. Will deescalate vancomycin tomorrow. COPD with exacerbation, hypoxic respiratory failure, pneumonia due to gram -negative plus MRSA suspected.     1.Continue with  zosyn for 1-2 days. Switch to PO Vanin 200 mg q12. Complete 10 days in total.   2. MRSA PCR was negative.   3. D/W primary team.

## 2018-02-14 NOTE — PROGRESS NOTE ADULT - SUBJECTIVE AND OBJECTIVE BOX
INTERVAL HPI/OVERNIGHT EVENTS:  ANTIBIOTICS/RELEVANT:  Zosyn and vancomycin  Allergies- No Known Allergies      Vital Signs Last 24 Hrs  T(C): 36.2 (14 Feb 2018 06:41), Max: 36.6 (13 Feb 2018 20:45)  T(F): 97.1 (14 Feb 2018 06:41), Max: 97.9 (13 Feb 2018 20:45)  HR: 75 (14 Feb 2018 06:41) (75 - 111)  BP: 122/75 (14 Feb 2018 06:41) (114/63 - 129/66)  BP(mean): --  RR: 18 (14 Feb 2018 06:41) (16 - 18)  SpO2: 95% (14 Feb 2018 08:03) (95% - 95%)      ALBUTerol   0.5% 2.5 milliGRAM(s) Nebulizer four times a day PRN  ALBUTerol/ipratropium for Nebulization 3 milliLiter(s) Nebulizer every 4 hours  buDESOnide 160 MICROgram(s)/formoterol 4.5 MICROgram(s) Inhaler 2 Puff(s) Inhalation two times a day  dextrose 5%. 1000 milliLiter(s) IV Continuous <Continuous>  dextrose 50% Injectable 12.5 Gram(s) IV Push once  dextrose 50% Injectable 25 Gram(s) IV Push once  dextrose 50% Injectable 25 Gram(s) IV Push once  dextrose Gel 1 Dose(s) Oral once PRN  enoxaparin Injectable 40 milliGRAM(s) SubCutaneous daily  glucagon  Injectable 1 milliGRAM(s) IntraMuscular once PRN  insulin lispro Injectable (HumaLOG) 3 Unit(s) SubCutaneous three times a day before meals  montelukast 10 milliGRAM(s) Oral daily  morphine  - Injectable 2 milliGRAM(s) IV Push every 6 hours PRN  pantoprazole    Tablet 40 milliGRAM(s) Oral before breakfast  piperacillin/tazobactam IVPB. 4.5 Gram(s) IV Intermittent every 6 hours  predniSONE   Tablet 60 milliGRAM(s) Oral daily  tiotropium 18 MICROgram(s) Capsule 1 Capsule(s) Inhalation daily    PHYSICAL EXAM: Alert, using O2, oriented, Bilateral crackles, abdomen- soft.     LABS:                        13.2   19.27 )-----------( 431      ( 13 Feb 2018 07:14 )             41.3     02-13    140  |  101  |  25<H>  ----------------------------<  183<H>  4.3   |  27  |  1.0    Ca    8.7      13 Feb 2018 07:14  Mg     2.4     02-13        MRSA/MSSA PCR (02.12.18 @ 19:03)    MRSA PCR Result.: Negative: NILE Carty  By: Real-Time PCR (Polymerase Reaction Method)    Rapid Respiratory Viral Panel (02.10.18 @ 18:13)    Rapid RVP Result: NotDete:     FLU A B RSV Detection by PCR (02.10.18 @ 10:43)    Flu A Result: Negative:   Polymerase Chain Reaction (RT-PCR).    Flu B Result: Negative:    Culture - Blood (02.10.18 @ 10:31)    Specimen Source: .Blood Blood    Culture Results:   No growth to date.    CXR 2/10  Interval increase in bibasilar opacities with likely bronchiectasis. No   pleural effusions or pneumothorax INTERVAL HPI/OVERNIGHT EVENTS: No acute events.   ANTIBIOTICS/RELEVANT:  Zosyn and vancomycin  Allergies- No Known Allergies    Vital Signs Last 24 Hrs  T(C): 36.2 (14 Feb 2018 06:41), Max: 36.6 (13 Feb 2018 20:45)  T(F): 97.1 (14 Feb 2018 06:41), Max: 97.9 (13 Feb 2018 20:45)  HR: 75 (14 Feb 2018 06:41) (75 - 111)  BP: 122/75 (14 Feb 2018 06:41) (114/63 - 129/66)  BP(mean): --  RR: 18 (14 Feb 2018 06:41) (16 - 18)  SpO2: 95% (14 Feb 2018 08:03) (95% - 95%)    ALBUTerol   0.5% 2.5 milliGRAM(s) Nebulizer four times a day PRN  ALBUTerol/ipratropium for Nebulization 3 milliLiter(s) Nebulizer every 4 hours  buDESOnide 160 MICROgram(s)/formoterol 4.5 MICROgram(s) Inhaler 2 Puff(s) Inhalation two times a day  dextrose 5%. 1000 milliLiter(s) IV Continuous <Continuous>  dextrose 50% Injectable 12.5 Gram(s) IV Push once  dextrose 50% Injectable 25 Gram(s) IV Push once  dextrose 50% Injectable 25 Gram(s) IV Push once  dextrose Gel 1 Dose(s) Oral once PRN  enoxaparin Injectable 40 milliGRAM(s) SubCutaneous daily  glucagon  Injectable 1 milliGRAM(s) IntraMuscular once PRN  insulin lispro Injectable (HumaLOG) 3 Unit(s) SubCutaneous three times a day before meals  montelukast 10 milliGRAM(s) Oral daily  morphine  - Injectable 2 milliGRAM(s) IV Push every 6 hours PRN  pantoprazole    Tablet 40 milliGRAM(s) Oral before breakfast  piperacillin/tazobactam IVPB. 4.5 Gram(s) IV Intermittent every 6 hours  predniSONE   Tablet 60 milliGRAM(s) Oral daily  tiotropium 18 MICROgram(s) Capsule 1 Capsule(s) Inhalation daily    PHYSICAL EXAM: Alert, using O2, oriented, Bilateral crackles, abdomen- soft. no edema.     LABS:                       13.2   19.27 )-----------( 431      ( 13 Feb 2018 07:14 )             41.3     02-13    140  |  101  |  25<H>  ----------------------------<  183<H>  4.3   |  27  |  1.0    Ca    8.7      13 Feb 2018 07:14  Mg     2.4     02-13    MRSA/MSSA PCR (02.12.18 @ 19:03)    MRSA PCR Result.: Negative: NILE Carty  By: Real-Time PCR (Polymerase Reaction Method)    Rapid Respiratory Viral Panel (02.10.18 @ 18:13)    Rapid RVP Result: NotDetec:     FLU A B RSV Detection by PCR (02.10.18 @ 10:43)    Flu A Result: Negative:   Polymerase Chain Reaction (RT-PCR).    Flu B Result: Negative:    Culture - Blood (02.10.18 @ 10:31)    Specimen Source: .Blood Blood    Culture Results: No growth to date.    CXR 2/10  Interval increase in bibasilar opacities with likely bronchiectasis. No   pleural effusions or pneumothorax

## 2018-02-15 ENCOUNTER — TRANSCRIPTION ENCOUNTER (OUTPATIENT)
Age: 68
End: 2018-02-15

## 2018-02-15 LAB
ANION GAP SERPL CALC-SCNC: 11 MMOL/L — SIGNIFICANT CHANGE UP (ref 7–14)
BUN SERPL-MCNC: 22 MG/DL — HIGH (ref 10–20)
CALCIUM SERPL-MCNC: 9.1 MG/DL — SIGNIFICANT CHANGE UP (ref 8.5–10.1)
CHLORIDE SERPL-SCNC: 98 MMOL/L — SIGNIFICANT CHANGE UP (ref 98–110)
CO2 SERPL-SCNC: 27 MMOL/L — SIGNIFICANT CHANGE UP (ref 17–32)
CREAT SERPL-MCNC: 1.1 MG/DL — SIGNIFICANT CHANGE UP (ref 0.7–1.5)
CULTURE RESULTS: SIGNIFICANT CHANGE UP
GLUCOSE SERPL-MCNC: 150 MG/DL — HIGH (ref 70–110)
HCT VFR BLD CALC: 44.7 % — SIGNIFICANT CHANGE UP (ref 42–52)
HGB BLD-MCNC: 14.3 G/DL — SIGNIFICANT CHANGE UP (ref 14–18)
MCHC RBC-ENTMCNC: 28.5 PG — SIGNIFICANT CHANGE UP (ref 27–31)
MCHC RBC-ENTMCNC: 32 G/DL — SIGNIFICANT CHANGE UP (ref 32–37)
MCV RBC AUTO: 89.2 FL — SIGNIFICANT CHANGE UP (ref 80–94)
NRBC # BLD: 0 /100 WBCS — SIGNIFICANT CHANGE UP (ref 0–0)
PLATELET # BLD AUTO: 494 K/UL — HIGH (ref 130–400)
POTASSIUM SERPL-MCNC: 5.1 MMOL/L — HIGH (ref 3.5–5)
POTASSIUM SERPL-SCNC: 5.1 MMOL/L — HIGH (ref 3.5–5)
RBC # BLD: 5.01 M/UL — SIGNIFICANT CHANGE UP (ref 4.7–6.1)
RBC # FLD: 16 % — HIGH (ref 11.5–14.5)
SODIUM SERPL-SCNC: 136 MMOL/L — SIGNIFICANT CHANGE UP (ref 135–146)
SPECIMEN SOURCE: SIGNIFICANT CHANGE UP
WBC # BLD: 21.8 K/UL — HIGH (ref 4.8–10.8)
WBC # FLD AUTO: 21.8 K/UL — HIGH (ref 4.8–10.8)

## 2018-02-15 RX ADMIN — BUDESONIDE AND FORMOTEROL FUMARATE DIHYDRATE 2 PUFF(S): 160; 4.5 AEROSOL RESPIRATORY (INHALATION) at 05:31

## 2018-02-15 RX ADMIN — Medication 3 UNIT(S): at 17:53

## 2018-02-15 RX ADMIN — ENOXAPARIN SODIUM 40 MILLIGRAM(S): 100 INJECTION SUBCUTANEOUS at 11:56

## 2018-02-15 RX ADMIN — Medication 3 MILLILITER(S): at 08:16

## 2018-02-15 RX ADMIN — PIPERACILLIN AND TAZOBACTAM 25 GRAM(S): 4; .5 INJECTION, POWDER, LYOPHILIZED, FOR SOLUTION INTRAVENOUS at 23:32

## 2018-02-15 RX ADMIN — BUDESONIDE AND FORMOTEROL FUMARATE DIHYDRATE 2 PUFF(S): 160; 4.5 AEROSOL RESPIRATORY (INHALATION) at 08:26

## 2018-02-15 RX ADMIN — Medication 60 MILLIGRAM(S): at 05:31

## 2018-02-15 RX ADMIN — Medication 3 UNIT(S): at 11:56

## 2018-02-15 RX ADMIN — Medication 3 UNIT(S): at 08:24

## 2018-02-15 RX ADMIN — Medication 3 MILLILITER(S): at 04:33

## 2018-02-15 RX ADMIN — PIPERACILLIN AND TAZOBACTAM 25 GRAM(S): 4; .5 INJECTION, POWDER, LYOPHILIZED, FOR SOLUTION INTRAVENOUS at 17:53

## 2018-02-15 RX ADMIN — PANTOPRAZOLE SODIUM 40 MILLIGRAM(S): 20 TABLET, DELAYED RELEASE ORAL at 06:22

## 2018-02-15 RX ADMIN — Medication 3 MILLILITER(S): at 16:26

## 2018-02-15 RX ADMIN — PIPERACILLIN AND TAZOBACTAM 25 GRAM(S): 4; .5 INJECTION, POWDER, LYOPHILIZED, FOR SOLUTION INTRAVENOUS at 05:30

## 2018-02-15 RX ADMIN — PIPERACILLIN AND TAZOBACTAM 25 GRAM(S): 4; .5 INJECTION, POWDER, LYOPHILIZED, FOR SOLUTION INTRAVENOUS at 13:04

## 2018-02-15 RX ADMIN — Medication 3 MILLILITER(S): at 11:55

## 2018-02-15 RX ADMIN — Medication 3 MILLILITER(S): at 20:52

## 2018-02-15 RX ADMIN — MONTELUKAST 10 MILLIGRAM(S): 4 TABLET, CHEWABLE ORAL at 11:55

## 2018-02-15 NOTE — PROGRESS NOTE ADULT - SUBJECTIVE AND OBJECTIVE BOX
Tristan Podlog; [place]; Octavia Villarreal; User ADM; Not Available Doctor; An Matson; Ordering Physician; Jelena Vincent; Jelena Vincent; Kaitlynn Morgan; Michael Goldberg; Bryan Damian; Candy King; Alberto Sean; Geovanna Bradshaw; Rich Schwab; Deion Mcwilliams; Niels Gonzales; Team [facility] TCM  Patient is a 67y old  Male who presents with a chief complaint of difficulty breathing, +chest pain (15 Feb 2018 10:29)      Vital Signs Last 24 Hrs  T(C): 36.2 (15 Feb 2018 05:50), Max: 37.2 (14 Feb 2018 12:55)  T(F): 97.1 (15 Feb 2018 05:50), Max: 99 (14 Feb 2018 12:55)  HR: 89 (15 Feb 2018 05:50) (88 - 114)  BP: 132/79 (15 Feb 2018 05:50) (105/73 - 132/79)  BP(mean): --  RR: 18 (15 Feb 2018 05:50) (16 - 18)  SpO2: --  CAPILLARY BLOOD GLUCOSE  116 (15 Feb 2018 11:28)  146 (15 Feb 2018 08:00)  202 (14 Feb 2018 20:18)  167 (14 Feb 2018 16:30)    PAST MEDICAL & SURGICAL HISTORY:  Dyslipidemia  Essential hypertension  COPD (chronic obstructive pulmonary disease)  S/P transmetatarsal amputation of foot, right    MEDICATIONS  (STANDING):  ALBUTerol/ipratropium for Nebulization 3 milliLiter(s) Nebulizer every 4 hours  buDESOnide 160 MICROgram(s)/formoterol 4.5 MICROgram(s) Inhaler 2 Puff(s) Inhalation two times a day  dextrose 5%. 1000 milliLiter(s) (50 mL/Hr) IV Continuous <Continuous>  dextrose 50% Injectable 12.5 Gram(s) IV Push once  dextrose 50% Injectable 25 Gram(s) IV Push once  dextrose 50% Injectable 25 Gram(s) IV Push once  enoxaparin Injectable 40 milliGRAM(s) SubCutaneous daily  insulin lispro Injectable (HumaLOG) 3 Unit(s) SubCutaneous three times a day before meals  montelukast 10 milliGRAM(s) Oral daily  pantoprazole    Tablet 40 milliGRAM(s) Oral before breakfast  piperacillin/tazobactam IVPB. 4.5 Gram(s) IV Intermittent every 6 hours  predniSONE   Tablet 60 milliGRAM(s) Oral daily  tiotropium 18 MICROgram(s) Capsule 1 Capsule(s) Inhalation daily    MEDICATIONS  (PRN):  ALBUTerol   0.5% 2.5 milliGRAM(s) Nebulizer four times a day PRN Shortness of Breath and/or Wheezing  dextrose Gel 1 Dose(s) Oral once PRN Blood Glucose LESS THAN 70 milliGRAM(s)/deciliter  glucagon  Injectable 1 milliGRAM(s) IntraMuscular once PRN Glucose LESS THAN 70 milligrams/deciliter  morphine  - Injectable 2 milliGRAM(s) IV Push every 6 hours PRN chest pain    Physical Exam:  General: WNWD, NAD  HEENT: Neck supple, no lymphadenopathy, PERRLA, EOMI  Heart: RRR, S1, S2 noted, no murmurs, rubs, gallops  Lungs: decreased breath sounds, no wheezes, rales   Abdomen: soft, non tender, non distended, + bowel sounds  Extremities: no C/C/E, full ROM in all extremities  Neuro: A&O x 3, no focal deficits  Skin: No rashes    Labs:                        14.3   21.80 )-----------( 494      ( 15 Feb 2018 08:20 )             44.7             02-15    136  |  98  |  22<H>  ----------------------------<  150<H>  5.1<H>   |  27  |  1.1    Ca    9.1      15 Feb 2018 08:20                  I&O's Summary    15 Feb 2018 07:01  -  15 Feb 2018 11:47  --------------------------------------------------------  IN: 280 mL / OUT: 1 mL / NET: 279 mL    Imaging:  < from: Xray Chest 1 View AP/PA (02.10.18 @ 10:37) >  Interval increase in bibasilar opacities with likely bronchiectasis. No   pleural effusions or pneumothorax    < end of copied text >      ECG:  < from: 12 Lead ECG (02.10.18 @ 23:42) >  Diagnosis Line Normal sinus rhythm  Normal ECG    < end of copied text >

## 2018-02-15 NOTE — DISCHARGE NOTE ADULT - HOSPITAL COURSE
67 year old male with medical history including COPD, HTN, dyslipidemia presented with complaint of difficulty breathing and chest pain. Cardiac cause of this was ruled out with negative cardiac enzymes x 3. Patient was found to be SIRS criteria +, COPD exacerbation secondary to pneumonia. Patient was treated with IV antibiotics for full course. He was also treated with steroids, inhaled nebulizer treatment and will continue to take prednisone taper for completion of steroid course. His COPD exacerbation resolved, and breathing improved and finished his antibiotic course. He has been instructed to follow up with his primary doctor and pulmonologist for re-evaluation and further instruction. 67 year old male with medical history including COPD, HTN, dyslipidemia presented with complaint of difficulty breathing and chest pain. Cardiac cause of this was ruled out with negative cardiac enzymes x 3. Patient was found to be SIRS criteria +, COPD exacerbation secondary to pneumonia. Patient was treated with IV antibiotics for full course. He was also treated with steroids, inhaled nebulizer treatment and will continue to take prednisone taper for completion of steroid course. His COPD exacerbation resolved, and breathing improved and finished his antibiotic course. He has been instructed to follow up with his primary doctor and pulmonologist for re-evaluation and further instruction.     Attending attestation : Patient seen/examined independently. Agree with above discharge note.

## 2018-02-15 NOTE — PROGRESS NOTE ADULT - ATTENDING COMMENTS
68 y/o M p/w PNA & COPD Exacerbation.    2 more days of IV Zosyn per ID.   Change to PO Prednisone.    Outpatient Pulm f/u for PFT
Patient is a 67y old  Male who presents with a chief complaint of difficulty breathing, +chest pain    1) Emphysema Exacerbation  : TAper Steroids. last day of IV zosyn tomorrow then D/c. F/u ID if he will need PO Antibiotics ?    Will F/u
PATIENT SEEN AND EXAMINED INDEPENDENTLY AGREE WITH PLAN OF RESIDENT.   Pneumonia- will get ID eval-presently on multidrug therapy and need to be adjusted.   COPD on solumedrol-- WBC elevated-- not febrile-- oxygen saturation stable.

## 2018-02-15 NOTE — DISCHARGE NOTE ADULT - CARE PROVIDER_API CALL
sonia espinoza  [address] [place] [address]  Phone: [phone]  Fax: (   )    -    Niels Meneses (MD; YANG), Critical Care Medicine; Internal Medicine; Pulmonary Disease  [address] [place] [address]  Phone: [phone]  Fax: [phone]

## 2018-02-15 NOTE — DISCHARGE NOTE ADULT - PLAN OF CARE
Resolved Pneumonia resolved. Patient completed course of antibiotics. Please follow up with your primary doctor in 1 week for follow up and re-evaluation. Resolving. To prevent complications of disease Continue taking prednisone as prescribed, and using inhalers as prescribed by your pulmonologist. Follow up with your primary doctor and pulmonologist in 1 to 2 weeks for re-evaluation and further instructions. Resolving Pneumonia resolving. Patient completed course of IV antibiotics in the hospital. Continue taking vantin 200mg every 12 hours for 4 more days starting 2/17. Please follow up with your primary doctor in 1 week for follow up and re-evaluation. Continue taking prednisone as prescribed, and using inhalers as prescribed by your pulmonologist. Follow up with your primary doctor and pulmonologist in 1 to 2 weeks for re-evaluation and further instructions. Continue taking prednisone as prescribed (40mg daily for 3 more days, then 20mg daily for 3 more days, then 10mg for 3 days then stop.)

## 2018-02-15 NOTE — DISCHARGE NOTE ADULT - CARE PLAN
Principal Discharge DX:	Pneumonia  Goal:	Resolved  Assessment and plan of treatment:	Pneumonia resolved. Patient completed course of antibiotics. Please follow up with your primary doctor in 1 week for follow up and re-evaluation.  Secondary Diagnosis:	COPD (chronic obstructive pulmonary disease)  Goal:	Resolving. To prevent complications of disease  Assessment and plan of treatment:	Continue taking prednisone as prescribed, and using inhalers as prescribed by your pulmonologist. Follow up with your primary doctor and pulmonologist in 1 to 2 weeks for re-evaluation and further instructions. Principal Discharge DX:	Pneumonia  Goal:	Resolving  Assessment and plan of treatment:	Pneumonia resolving. Patient completed course of IV antibiotics in the hospital. Continue taking vantin 200mg every 12 hours for 4 more days starting 2/17. Please follow up with your primary doctor in 1 week for follow up and re-evaluation.  Secondary Diagnosis:	COPD (chronic obstructive pulmonary disease)  Goal:	Resolving. To prevent complications of disease  Assessment and plan of treatment:	Continue taking prednisone as prescribed, and using inhalers as prescribed by your pulmonologist. Follow up with your primary doctor and pulmonologist in 1 to 2 weeks for re-evaluation and further instructions. Continue taking prednisone as prescribed (40mg daily for 3 more days, then 20mg daily for 3 more days, then 10mg for 3 days then stop.)

## 2018-02-15 NOTE — DISCHARGE NOTE ADULT - PATIENT PORTAL LINK FT
You can access the [facility] Patient Portal, offered by [facility], by registering with the following website: [email or web address]

## 2018-02-15 NOTE — DISCHARGE NOTE ADULT - MEDICATION SUMMARY - MEDICATIONS TO TAKE
I will START or STAY ON the medications listed below when I get home from the hospital:    predniSONE 10 mg oral tablet  -- 4 tab(s) by mouth once a day MDD:Take 4 tabs daily  for 3 days, then take 2 tabs daily for 3 days, then take 1 tab daily for 3 days, then stop  -- It is very important that you take or use this exactly as directed.  Do not skip doses or discontinue unless directed by your doctor.  Obtain medical advice before taking any non-prescription drugs as some may affect the action of this medication.  Take with food or milk.    -- Indication: For COPD (chronic obstructive pulmonary disease)    Spiriva 18 mcg inhalation capsule  -- 1 cap(s) inhaled once a day  -- Indication: For COPD (chronic obstructive pulmonary disease)    Ventolin HFA 90 mcg/inh inhalation aerosol  -- 2 puff(s) inhaled 4 times a day, As Needed  -- Indication: For COPD (chronic obstructive pulmonary disease)    Symbicort 160 mcg-4.5 mcg/inh inhalation aerosol  -- 2 puff(s) inhaled 2 times a day  -- Indication: For COPD (chronic obstructive pulmonary disease)    montelukast 10 mg oral tablet  -- 1 tab(s) by mouth once a day  -- Indication: For COPD (chronic obstructive pulmonary disease)    Protonix 40 mg oral delayed release tablet  -- 1 tab(s) by mouth once a day  -- Indication: For Gastrointestinal reflux I will START or STAY ON the medications listed below when I get home from the hospital:    predniSONE 10 mg oral tablet  -- 4 tab(s) by mouth once a day MDD:Take 4 tabs daily  for 3 days, then take 2 tabs daily for 3 days, then take 1 tab daily for 3 days, then stop  -- It is very important that you take or use this exactly as directed.  Do not skip doses or discontinue unless directed by your doctor.  Obtain medical advice before taking any non-prescription drugs as some may affect the action of this medication.  Take with food or milk.    -- Indication: For COPD (chronic obstructive pulmonary disease)    Spiriva 18 mcg inhalation capsule  -- 1 cap(s) inhaled once a day  -- Indication: For COPD (chronic obstructive pulmonary disease)    Ventolin HFA 90 mcg/inh inhalation aerosol  -- 2 puff(s) inhaled 4 times a day, As Needed  -- Indication: For COPD (chronic obstructive pulmonary disease)    Symbicort 160 mcg-4.5 mcg/inh inhalation aerosol  -- 2 puff(s) inhaled 2 times a day  -- Indication: For COPD (chronic obstructive pulmonary disease)    cefpodoxime 200 mg oral tablet  -- 1 tab(s) by mouth 2 times a day   -- Finish all this medication unless otherwise directed by prescriber.  Take with food or milk.    -- Indication: For Pneumonia    montelukast 10 mg oral tablet  -- 1 tab(s) by mouth once a day  -- Indication: For COPD (chronic obstructive pulmonary disease)    Protonix 40 mg oral delayed release tablet  -- 1 tab(s) by mouth once a day  -- Indication: For Gastrointestinal reflux

## 2018-02-15 NOTE — PROGRESS NOTE ADULT - ASSESSMENT
· Assessment		  #COPD 2/2 Pneumonia  ID: currently on zosyn 1 more day  CT chest: F/U results  today    #COPD exacerbation  c/w prednisone 60mg today, tomorrow go to 40mg PO    #Essential HTN: c/w home meds    #dyslipidemia: c/w current meds

## 2018-02-15 NOTE — DISCHARGE NOTE ADULT - CARE PROVIDERS DIRECT ADDRESSES
,DirectAddress_Unknown,[email or web address]

## 2018-02-16 VITALS
HEART RATE: 109 BPM | RESPIRATION RATE: 16 BRPM | SYSTOLIC BLOOD PRESSURE: 128 MMHG | DIASTOLIC BLOOD PRESSURE: 72 MMHG | TEMPERATURE: 97 F

## 2018-02-16 RX ORDER — CEFPODOXIME PROXETIL 100 MG
1 TABLET ORAL
Qty: 8 | Refills: 0 | OUTPATIENT
Start: 2018-02-16 | End: 2018-02-19

## 2018-02-16 RX ADMIN — Medication 60 MILLIGRAM(S): at 05:09

## 2018-02-16 RX ADMIN — BUDESONIDE AND FORMOTEROL FUMARATE DIHYDRATE 2 PUFF(S): 160; 4.5 AEROSOL RESPIRATORY (INHALATION) at 08:35

## 2018-02-16 RX ADMIN — TIOTROPIUM BROMIDE 1 CAPSULE(S): 18 CAPSULE ORAL; RESPIRATORY (INHALATION) at 08:36

## 2018-02-16 RX ADMIN — Medication 3 UNIT(S): at 11:33

## 2018-02-16 RX ADMIN — ENOXAPARIN SODIUM 40 MILLIGRAM(S): 100 INJECTION SUBCUTANEOUS at 11:28

## 2018-02-16 RX ADMIN — Medication 3 MILLILITER(S): at 11:30

## 2018-02-16 RX ADMIN — PIPERACILLIN AND TAZOBACTAM 25 GRAM(S): 4; .5 INJECTION, POWDER, LYOPHILIZED, FOR SOLUTION INTRAVENOUS at 11:28

## 2018-02-16 RX ADMIN — MONTELUKAST 10 MILLIGRAM(S): 4 TABLET, CHEWABLE ORAL at 11:28

## 2018-02-16 RX ADMIN — PIPERACILLIN AND TAZOBACTAM 25 GRAM(S): 4; .5 INJECTION, POWDER, LYOPHILIZED, FOR SOLUTION INTRAVENOUS at 05:10

## 2018-02-16 RX ADMIN — PANTOPRAZOLE SODIUM 40 MILLIGRAM(S): 20 TABLET, DELAYED RELEASE ORAL at 06:26

## 2018-02-16 RX ADMIN — Medication 40 MILLIGRAM(S): at 11:28

## 2018-02-16 RX ADMIN — Medication 3 UNIT(S): at 08:35

## 2018-02-16 RX ADMIN — Medication 3 MILLILITER(S): at 08:31

## 2018-02-16 NOTE — PROGRESS NOTE ADULT - SUBJECTIVE AND OBJECTIVE BOX
INTERVAL HPI/OVERNIGHT EVENTS: No acute events.   Clinically improving.     ANTIBIOTICS/RELEVANT:  Zosyn    Allergies- No Known Allergies    Vital Signs Last 24 Hrs  T(C): 35.9 (16 Feb 2018 12:36), Max: 36.5 (15 Feb 2018 14:15)  T(F): 96.7 (16 Feb 2018 12:36), Max: 97.7 (15 Feb 2018 14:15)  HR: 109 (16 Feb 2018 12:36) (87 - 109)  BP: 128/72 (16 Feb 2018 12:36) (101/56 - 128/72)  BP(mean): --  RR: 16 (16 Feb 2018 12:36) (16 - 18)  SpO2: --    02-15-18 @ 07:01  -  02-16-18 @ 07:00  --------------------------------------------------------  IN: 760 mL / OUT: 701 mL / NET: 59 mL    ALBUTerol   0.5% 2.5 milliGRAM(s) Nebulizer four times a day PRN  ALBUTerol/ipratropium for Nebulization 3 milliLiter(s) Nebulizer every 4 hours  buDESOnide 160 MICROgram(s)/formoterol 4.5 MICROgram(s) Inhaler 2 Puff(s) Inhalation two times a day  dextrose 5%. 1000 milliLiter(s) IV Continuous <Continuous>  dextrose 50% Injectable 12.5 Gram(s) IV Push once  dextrose 50% Injectable 25 Gram(s) IV Push once  dextrose 50% Injectable 25 Gram(s) IV Push once  dextrose Gel 1 Dose(s) Oral once PRN  enoxaparin Injectable 40 milliGRAM(s) SubCutaneous daily  glucagon  Injectable 1 milliGRAM(s) IntraMuscular once PRN  insulin lispro Injectable (HumaLOG) 3 Unit(s) SubCutaneous three times a day before meals  montelukast 10 milliGRAM(s) Oral daily  morphine  - Injectable 2 milliGRAM(s) IV Push every 6 hours PRN  pantoprazole    Tablet 40 milliGRAM(s) Oral before breakfast  piperacillin/tazobactam IVPB. 4.5 Gram(s) IV Intermittent every 6 hours  predniSONE   Tablet 40 milliGRAM(s) Oral daily  tiotropium 18 MICROgram(s) Capsule 1 Capsule(s) Inhalation daily    PHYSICAL EXAM: Alert, using O2, oriented, Bilateral crackles decreased from last exam. Abdomen- soft. No edema.   LABS:                        14.3   21.80 )-----------( 494      ( 15 Feb 2018 08:20 )             44.7     02-15    136  |  98  |  22<H>  ----------------------------<  150<H>  5.1<H>   |  27  |  1.1    Previous LABS:                       13.2   19.27 )-----------( 431      ( 13 Feb 2018 07:14 )             41.3     02-13    140  |  101  |  25<H>  ----------------------------<  183<H>  4.3   |  27  |  1.0    Ca    8.7      13 Feb 2018 07:14  Mg     2.4     02-13    MRSA/MSSA PCR (02.12.18 @ 19:03)    MRSA PCR Result.: Negative: Notes  NILE VALDEZ  By: Real-Time PCR (Polymerase Reaction Method)    Rapid Respiratory Viral Panel (02.10.18 @ 18:13)    Rapid RVP Result: [facility]:     FLU A B RSV Detection by PCR (02.10.18 @ 10:43)    Flu A Result: Negative:   Polymerase Chain Reaction (RT-PCR).    Flu B Result: Negative:    Culture - Blood (02.10.18 @ 10:31)    Specimen Source: .Blood Blood    Culture Results: No growth to date.    CXR 2/10  Interval increase in bibasilar opacities with likely bronchiectasis. No   pleural effusions or pneumothorax

## 2018-02-16 NOTE — PROGRESS NOTE ADULT - PROVIDER SPECIALTY LIST ADULT
Hospitalist
Infectious Disease
Internal Medicine

## 2018-02-16 NOTE — PROGRESS NOTE ADULT - ASSESSMENT
COPD with exacerbation, hypoxic respiratory failure, pneumonia due to gram -negative (MRSA PCR negative)      1.Continue with  zosyn for 1-2 days. Switch to PO Vanin 200 mg q12 for 4 more days. Steroid taper.   2. MRSA PCR was negative.   3. D/W primary team.

## 2018-02-17 LAB
CULTURE RESULTS: SIGNIFICANT CHANGE UP
SPECIMEN SOURCE: SIGNIFICANT CHANGE UP

## 2018-02-19 DIAGNOSIS — R06.02 SHORTNESS OF BREATH: ICD-10-CM

## 2018-02-19 DIAGNOSIS — A41.9 SEPSIS, UNSPECIFIED ORGANISM: ICD-10-CM

## 2018-02-19 DIAGNOSIS — Z99.81 DEPENDENCE ON SUPPLEMENTAL OXYGEN: ICD-10-CM

## 2018-02-19 DIAGNOSIS — B34.9 VIRAL INFECTION, UNSPECIFIED: ICD-10-CM

## 2018-02-19 DIAGNOSIS — T38.0X5A ADVERSE EFFECT OF GLUCOCORTICOIDS AND SYNTHETIC ANALOGUES, INITIAL ENCOUNTER: ICD-10-CM

## 2018-02-19 DIAGNOSIS — J47.0 BRONCHIECTASIS WITH ACUTE LOWER RESPIRATORY INFECTION: ICD-10-CM

## 2018-02-19 DIAGNOSIS — E78.5 HYPERLIPIDEMIA, UNSPECIFIED: ICD-10-CM

## 2018-02-19 DIAGNOSIS — R73.02 IMPAIRED GLUCOSE TOLERANCE (ORAL): ICD-10-CM

## 2018-02-19 DIAGNOSIS — J15.6 PNEUMONIA DUE TO OTHER GRAM-NEGATIVE BACTERIA: ICD-10-CM

## 2018-02-19 DIAGNOSIS — Z87.891 PERSONAL HISTORY OF NICOTINE DEPENDENCE: ICD-10-CM

## 2018-02-19 DIAGNOSIS — R07.9 CHEST PAIN, UNSPECIFIED: ICD-10-CM

## 2018-02-19 DIAGNOSIS — I10 ESSENTIAL (PRIMARY) HYPERTENSION: ICD-10-CM

## 2018-02-19 DIAGNOSIS — J44.1 CHRONIC OBSTRUCTIVE PULMONARY DISEASE WITH (ACUTE) EXACERBATION: ICD-10-CM

## 2018-02-19 DIAGNOSIS — J43.9 EMPHYSEMA, UNSPECIFIED: ICD-10-CM

## 2018-02-19 DIAGNOSIS — J96.11 CHRONIC RESPIRATORY FAILURE WITH HYPOXIA: ICD-10-CM

## 2018-02-19 DIAGNOSIS — Z89.431 ACQUIRED ABSENCE OF RIGHT FOOT: ICD-10-CM

## 2018-03-16 ENCOUNTER — TRANSCRIPTION ENCOUNTER (OUTPATIENT)
Age: 68
End: 2018-03-16

## 2018-06-26 ENCOUNTER — RX RENEWAL (OUTPATIENT)
Age: 68
End: 2018-06-26

## 2018-09-18 ENCOUNTER — RX RENEWAL (OUTPATIENT)
Age: 68
End: 2018-09-18

## 2018-09-18 RX ORDER — ALBUTEROL SULFATE 1.25 MG/3ML
1.25 SOLUTION RESPIRATORY (INHALATION)
Qty: 360 | Refills: 0 | Status: ACTIVE | COMMUNITY
Start: 2017-02-21 | End: 1900-01-01

## 2018-09-20 ENCOUNTER — EMERGENCY (EMERGENCY)
Facility: HOSPITAL | Age: 68
LOS: 0 days | Discharge: HOME | End: 2018-09-20
Attending: EMERGENCY MEDICINE | Admitting: EMERGENCY MEDICINE

## 2018-09-20 VITALS
TEMPERATURE: 96 F | SYSTOLIC BLOOD PRESSURE: 141 MMHG | HEART RATE: 70 BPM | DIASTOLIC BLOOD PRESSURE: 80 MMHG | OXYGEN SATURATION: 98 % | RESPIRATION RATE: 18 BRPM

## 2018-09-20 VITALS
DIASTOLIC BLOOD PRESSURE: 85 MMHG | WEIGHT: 130.07 LBS | RESPIRATION RATE: 22 BRPM | SYSTOLIC BLOOD PRESSURE: 142 MMHG | OXYGEN SATURATION: 97 % | TEMPERATURE: 97 F | HEART RATE: 68 BPM

## 2018-09-20 DIAGNOSIS — I10 ESSENTIAL (PRIMARY) HYPERTENSION: ICD-10-CM

## 2018-09-20 DIAGNOSIS — Z79.899 OTHER LONG TERM (CURRENT) DRUG THERAPY: ICD-10-CM

## 2018-09-20 DIAGNOSIS — Z99.81 DEPENDENCE ON SUPPLEMENTAL OXYGEN: ICD-10-CM

## 2018-09-20 DIAGNOSIS — J44.1 CHRONIC OBSTRUCTIVE PULMONARY DISEASE WITH (ACUTE) EXACERBATION: ICD-10-CM

## 2018-09-20 DIAGNOSIS — Z89.431 ACQUIRED ABSENCE OF RIGHT FOOT: Chronic | ICD-10-CM

## 2018-09-20 DIAGNOSIS — R09.81 NASAL CONGESTION: ICD-10-CM

## 2018-09-20 DIAGNOSIS — E78.5 HYPERLIPIDEMIA, UNSPECIFIED: ICD-10-CM

## 2018-09-20 DIAGNOSIS — Z87.891 PERSONAL HISTORY OF NICOTINE DEPENDENCE: ICD-10-CM

## 2018-09-20 DIAGNOSIS — J02.9 ACUTE PHARYNGITIS, UNSPECIFIED: ICD-10-CM

## 2018-09-20 DIAGNOSIS — K21.9 GASTRO-ESOPHAGEAL REFLUX DISEASE WITHOUT ESOPHAGITIS: ICD-10-CM

## 2018-09-20 PROBLEM — J44.9 CHRONIC OBSTRUCTIVE PULMONARY DISEASE, UNSPECIFIED: Chronic | Status: ACTIVE | Noted: 2018-02-10

## 2018-09-20 LAB
ALBUMIN SERPL ELPH-MCNC: 4.3 G/DL — SIGNIFICANT CHANGE UP (ref 3.5–5.2)
ALP SERPL-CCNC: 68 U/L — SIGNIFICANT CHANGE UP (ref 30–115)
ALT FLD-CCNC: 24 U/L — SIGNIFICANT CHANGE UP (ref 0–41)
ANION GAP SERPL CALC-SCNC: 14 MMOL/L — SIGNIFICANT CHANGE UP (ref 7–14)
AST SERPL-CCNC: 25 U/L — SIGNIFICANT CHANGE UP (ref 0–41)
BASOPHILS # BLD AUTO: 0.12 K/UL — SIGNIFICANT CHANGE UP (ref 0–0.2)
BASOPHILS NFR BLD AUTO: 0.9 % — SIGNIFICANT CHANGE UP (ref 0–1)
BILIRUB SERPL-MCNC: 1.2 MG/DL — SIGNIFICANT CHANGE UP (ref 0.2–1.2)
BUN SERPL-MCNC: 9 MG/DL — LOW (ref 10–20)
CALCIUM SERPL-MCNC: 9 MG/DL — SIGNIFICANT CHANGE UP (ref 8.5–10.1)
CHLORIDE SERPL-SCNC: 105 MMOL/L — SIGNIFICANT CHANGE UP (ref 98–110)
CO2 SERPL-SCNC: 23 MMOL/L — SIGNIFICANT CHANGE UP (ref 17–32)
CREAT SERPL-MCNC: 0.9 MG/DL — SIGNIFICANT CHANGE UP (ref 0.7–1.5)
EOSINOPHIL # BLD AUTO: 3.61 K/UL — HIGH (ref 0–0.7)
EOSINOPHIL NFR BLD AUTO: 26.1 % — HIGH (ref 0–8)
GLUCOSE SERPL-MCNC: 103 MG/DL — HIGH (ref 70–99)
HCT VFR BLD CALC: 43 % — SIGNIFICANT CHANGE UP (ref 42–52)
HGB BLD-MCNC: 14.2 G/DL — SIGNIFICANT CHANGE UP (ref 14–18)
LYMPHOCYTES # BLD AUTO: 1.08 K/UL — LOW (ref 1.2–3.4)
LYMPHOCYTES # BLD AUTO: 7.8 % — LOW (ref 20.5–51.1)
MCHC RBC-ENTMCNC: 29 PG — SIGNIFICANT CHANGE UP (ref 27–31)
MCHC RBC-ENTMCNC: 33 G/DL — SIGNIFICANT CHANGE UP (ref 32–37)
MCV RBC AUTO: 87.8 FL — SIGNIFICANT CHANGE UP (ref 80–94)
MONOCYTES # BLD AUTO: 0.59 K/UL — SIGNIFICANT CHANGE UP (ref 0.1–0.6)
MONOCYTES NFR BLD AUTO: 4.3 % — SIGNIFICANT CHANGE UP (ref 1.7–9.3)
NEUTROPHILS # BLD AUTO: 7.93 K/UL — HIGH (ref 1.4–6.5)
NEUTROPHILS NFR BLD AUTO: 57.4 % — SIGNIFICANT CHANGE UP (ref 42.2–75.2)
PLATELET # BLD AUTO: 318 K/UL — SIGNIFICANT CHANGE UP (ref 130–400)
POTASSIUM SERPL-MCNC: 4 MMOL/L — SIGNIFICANT CHANGE UP (ref 3.5–5)
POTASSIUM SERPL-SCNC: 4 MMOL/L — SIGNIFICANT CHANGE UP (ref 3.5–5)
PROT SERPL-MCNC: 6.6 G/DL — SIGNIFICANT CHANGE UP (ref 6–8)
RBC # BLD: 4.9 M/UL — SIGNIFICANT CHANGE UP (ref 4.7–6.1)
RBC # FLD: 15.8 % — HIGH (ref 11.5–14.5)
SODIUM SERPL-SCNC: 142 MMOL/L — SIGNIFICANT CHANGE UP (ref 135–146)
TROPONIN T SERPL-MCNC: <0.01 NG/ML — SIGNIFICANT CHANGE UP
WBC # BLD: 13.82 K/UL — HIGH (ref 4.8–10.8)
WBC # FLD AUTO: 13.82 K/UL — HIGH (ref 4.8–10.8)

## 2018-09-20 RX ORDER — AZITHROMYCIN 500 MG/1
1 TABLET, FILM COATED ORAL
Qty: 4 | Refills: 0 | OUTPATIENT
Start: 2018-09-20 | End: 2018-09-23

## 2018-09-20 RX ORDER — IPRATROPIUM/ALBUTEROL SULFATE 18-103MCG
3 AEROSOL WITH ADAPTER (GRAM) INHALATION ONCE
Qty: 0 | Refills: 0 | Status: COMPLETED | OUTPATIENT
Start: 2018-09-20 | End: 2018-09-20

## 2018-09-20 RX ORDER — AZITHROMYCIN 500 MG/1
500 TABLET, FILM COATED ORAL ONCE
Qty: 0 | Refills: 0 | Status: COMPLETED | OUTPATIENT
Start: 2018-09-20 | End: 2018-09-20

## 2018-09-20 RX ADMIN — Medication 125 MILLIGRAM(S): at 05:05

## 2018-09-20 RX ADMIN — Medication 3 MILLILITER(S): at 05:19

## 2018-09-20 RX ADMIN — AZITHROMYCIN 500 MILLIGRAM(S): 500 TABLET, FILM COATED ORAL at 06:42

## 2018-09-20 RX ADMIN — Medication 3 MILLILITER(S): at 05:05

## 2018-09-20 NOTE — ED ADULT NURSE NOTE - CHPI ED NUR SYMPTOMS NEG
no chest pain/no headache/no wheezing/no diaphoresis/no edema/no hemoptysis/no fever/no body aches/no chills

## 2018-09-20 NOTE — ED PROVIDER NOTE - PHYSICAL EXAMINATION
Constitutional: Well developed, well nourished. NAD.  Head: Normocephalic, atraumatic.  Eyes: PERRL. EOMI.  ENT: No nasal discharge. Mucous membranes moist.  Neck: Supple. Painless ROM.  Cardiovascular: Normal S1, S2. Regular rate and rhythm. No murmurs, rubs, or gallops.  Pulmonary: Normal respiratory rate and effort. Poor air movement bilaterally. Lungs clear to auscultation bilaterally. No wheezing, rales, or rhonchi.  Abdominal: Soft. Nondistended. Nontender. No rebound, guarding, rigidity.  Extremities. Pelvis stable. No lower extremity edema, symmetric calves.  Skin: No rashes, cyanosis.  Neuro: AAOx3. No focal neurological deficits.  Psych: Normal mood. Normal affect.

## 2018-09-20 NOTE — ED PROVIDER NOTE - ATTENDING CONTRIBUTION TO CARE
I personally evaluated the patient. I reviewed the Resident’s or Physician Assistant’s note (as assigned above), and agree with the findings and plan except as documented in my note.  68 yr M with COPD on 2L O2 at home around the clock, HTN, HLD, and GERD with complaints of 1 day of coughing, sore throat, nasal congestion and shortness of breath. Denies CP. No f/c. Quit smoking 5 years ago. VS reviewed, pt on NC O2, non toxic, NAD. Head ncat, pharyngeal exam w/o erythema, edema or exudates, neck supple, no JVD, normal s1s2 without any murmurs, Lungs CTAB with normal work of breathing, no wheezes. abd +BS, s/nd/nt, extremities wnl, neuro exam grossly normal. No acute skin rashes. Plan is EKG, labs, CXR, duonebs and steroids and reassess.

## 2018-09-20 NOTE — ED ADULT NURSE NOTE - OBJECTIVE STATEMENT
Pt presents with congestion x 1 day. Pt states he feels like something is stuck in his throat, unable to cough it out. Pt with hx of COPD, on home o2 2L NC. Pt denies chest pain, n/v, chills, fever

## 2018-09-20 NOTE — ED PROVIDER NOTE - OBJECTIVE STATEMENT
PT is a 69 y/o male with hx of COPD, angina on nitro, who presents to ED for congestion since yesterday, feels like something is stuck in his chest. + SOB, worse with exertion. No cough, fever, chills, abd pain, n/v/d. Pt also notes sore throat.

## 2018-09-20 NOTE — ED PROVIDER NOTE - MEDICAL DECISION MAKING DETAILS
PT with O2 dependent COPD with uri and wheezing. Feels much better after duonebs and steriods. Wheezing resolved. Will d/c with outpt PMD f/up

## 2018-09-20 NOTE — ED ADULT NURSE NOTE - NSIMPLEMENTINTERV_GEN_ALL_ED
Implemented All Universal Safety Interventions:  [place] to call system. Call bell, personal items and telephone within reach. Instruct patient to call for assistance. Room bathroom lighting operational. Non-slip footwear when patient is off stretcher. Physically safe environment: no spills, clutter or unnecessary equipment. Stretcher in lowest position, wheels locked, appropriate side rails in place.

## 2018-10-02 ENCOUNTER — APPOINTMENT (OUTPATIENT)
Dept: PULMONOLOGY | Facility: CLINIC | Age: 68
End: 2018-10-02

## 2018-10-02 ENCOUNTER — OUTPATIENT (OUTPATIENT)
Dept: OUTPATIENT SERVICES | Facility: HOSPITAL | Age: 68
LOS: 1 days | Discharge: HOME | End: 2018-10-02

## 2018-10-02 VITALS
HEIGHT: 66 IN | BODY MASS INDEX: 21.05 KG/M2 | WEIGHT: 131 LBS | SYSTOLIC BLOOD PRESSURE: 136 MMHG | HEART RATE: 84 BPM | OXYGEN SATURATION: 97 % | DIASTOLIC BLOOD PRESSURE: 80 MMHG

## 2018-10-02 DIAGNOSIS — R91.1 SOLITARY PULMONARY NODULE: ICD-10-CM

## 2018-10-02 DIAGNOSIS — Z89.431 ACQUIRED ABSENCE OF RIGHT FOOT: Chronic | ICD-10-CM

## 2018-10-02 DIAGNOSIS — J44.9 CHRONIC OBSTRUCTIVE PULMONARY DISEASE, UNSPECIFIED: ICD-10-CM

## 2018-10-02 RX ORDER — MONTELUKAST 10 MG/1
10 TABLET, FILM COATED ORAL DAILY
Qty: 90 | Refills: 3 | Status: DISCONTINUED | COMMUNITY
End: 2018-10-02

## 2018-10-03 RX ORDER — PREDNISONE 20 MG/1
20 TABLET ORAL DAILY
Qty: 10 | Refills: 0 | Status: ACTIVE | COMMUNITY
Start: 2017-04-18 | End: 1900-01-01

## 2018-10-03 NOTE — PHYSICAL EXAM
[No Acute Distress] : no acute distress [Normal Sclera/Conjunctiva] : normal sclera/conjunctiva [No JVD] : no jugular venous distention [No Respiratory Distress] : no respiratory distress  [Regular Rhythm] : with a regular rhythm [Normal S1, S2] : normal S1 and S2 [No Carotid Bruits] : no carotid bruits [No Rash] : no rash [de-identified] : Bilateral diffuse ronchi, mild scattered expiratory wheezing, bibasilar crackles [de-identified] : Bilateral hands clubbing

## 2018-10-03 NOTE — REVIEW OF SYSTEMS
[Shortness Of Breath] : shortness of breath [Wheezing] : wheezing [Cough] : cough [Dyspnea on Exertion] : dyspnea on exertion [Fever] : no fever [Night Sweats] : no night sweats [Discharge] : no discharge [Vision Problems] : no vision problems [Earache] : no earache [Sore Throat] : no sore throat [Chest Pain] : no chest pain [Palpitations] : no palpitations [Lower Ext Edema] : no lower extremity edema [Orthopena] : no orthopnea [Abdominal Pain] : no abdominal pain [Constipation] : no constipation [Diarrhea] : no diarrhea [Vomiting] : no vomiting [Dysuria] : no dysuria [Joint Pain] : no joint pain [Skin Rash] : no skin rash [Headache] : no headache [Suicidal] : not suicidal

## 2018-10-03 NOTE — ASSESSMENT
[FreeTextEntry1] : 66 yo male patient with PMHx of severe COPD on home O2 2L/min, lung nodule, DL, presenting following an ED visit for COPD exacerbation.\par \par 1- Severe COPD with recent exacerbation 2 weeks ago\par S/P 5 days of prednisone and azithromycin\par Patient is not back to his baseline\par Told the patient to continue monitoring himself for few days, if he does not improve, take prednisone and doxycycline as per rescue plan\par Patient asking for the double booster flu shot (which we do not carry); he does not want the regular flu shot and wants to get the other one from somewhere else.\par \par 2- Lung nodule\par stable, with negative PET scan a year ago\par no CT needed for now\par \par 3- RTC in 3m and as needed

## 2018-10-03 NOTE — HISTORY OF PRESENT ILLNESS
[Family Member] : family member [FreeTextEntry1] : Follow up post-ED visit 2 weeks ago [de-identified] : 66 yo male patient with PMHx of severe COPD on chronic O2 (2L by nasal cannula, its been 2 years), right upper lobe nodule 10x8mm (stable, negative PET in june 2017), presenting for follow up of ED visit 2 weeks ago.\par Patient is cantonese speaking, offered translation services but he wanted he daughter to translate for him.\par Patient reports URTI symptoms 2 weeks ago. He describes sore throat, runny nose and worsening in baseline SOB, cough and sputum. He went to ED, was diagnosed with COPD exacerbation, was prescribed azythromycin and prednisone for 5 days.\par Patient says he had partial improvement in his symptoms, but he is still not back to his baseline as of today.\par No fever, no chills. Still has SOB, cough and sputum more than his baseline.\par Denies other complaints.

## 2019-02-09 ENCOUNTER — INPATIENT (INPATIENT)
Facility: HOSPITAL | Age: 69
LOS: 3 days | Discharge: HOME HEALTH | End: 2019-02-13
Attending: INTERNAL MEDICINE | Admitting: INTERNAL MEDICINE

## 2019-02-09 VITALS
RESPIRATION RATE: 20 BRPM | DIASTOLIC BLOOD PRESSURE: 66 MMHG | OXYGEN SATURATION: 93 % | SYSTOLIC BLOOD PRESSURE: 134 MMHG | HEART RATE: 118 BPM | TEMPERATURE: 96 F

## 2019-02-09 DIAGNOSIS — Z89.431 ACQUIRED ABSENCE OF RIGHT FOOT: Chronic | ICD-10-CM

## 2019-02-10 LAB
ANION GAP SERPL CALC-SCNC: 17 MMOL/L — HIGH (ref 7–14)
APTT BLD: 36.3 SEC — SIGNIFICANT CHANGE UP (ref 27–39.2)
BASOPHILS # BLD AUTO: 0.08 K/UL — SIGNIFICANT CHANGE UP (ref 0–0.2)
BASOPHILS NFR BLD AUTO: 0.3 % — SIGNIFICANT CHANGE UP (ref 0–1)
BUN SERPL-MCNC: 14 MG/DL — SIGNIFICANT CHANGE UP (ref 10–20)
CALCIUM SERPL-MCNC: 9.3 MG/DL — SIGNIFICANT CHANGE UP (ref 8.5–10.1)
CHLORIDE SERPL-SCNC: 99 MMOL/L — SIGNIFICANT CHANGE UP (ref 98–110)
CK MB CFR SERPL CALC: 2 NG/ML — SIGNIFICANT CHANGE UP (ref 0.6–6.3)
CK SERPL-CCNC: 57 U/L — SIGNIFICANT CHANGE UP (ref 0–225)
CK SERPL-CCNC: 75 U/L — SIGNIFICANT CHANGE UP (ref 0–225)
CO2 SERPL-SCNC: 24 MMOL/L — SIGNIFICANT CHANGE UP (ref 17–32)
CREAT SERPL-MCNC: 1 MG/DL — SIGNIFICANT CHANGE UP (ref 0.7–1.5)
EOSINOPHIL # BLD AUTO: 2.34 K/UL — HIGH (ref 0–0.7)
EOSINOPHIL NFR BLD AUTO: 9.3 % — HIGH (ref 0–8)
FLU A RESULT: NEGATIVE — SIGNIFICANT CHANGE UP
FLU A RESULT: NEGATIVE — SIGNIFICANT CHANGE UP
FLUAV AG NPH QL: NEGATIVE — SIGNIFICANT CHANGE UP
FLUBV AG NPH QL: NEGATIVE — SIGNIFICANT CHANGE UP
GLUCOSE SERPL-MCNC: 125 MG/DL — HIGH (ref 70–99)
HCT VFR BLD CALC: 43.9 % — SIGNIFICANT CHANGE UP (ref 42–52)
HGB BLD-MCNC: 14.2 G/DL — SIGNIFICANT CHANGE UP (ref 14–18)
IMM GRANULOCYTES NFR BLD AUTO: 0.5 % — HIGH (ref 0.1–0.3)
INR BLD: 1.15 RATIO — SIGNIFICANT CHANGE UP (ref 0.65–1.3)
LYMPHOCYTES # BLD AUTO: 1.36 K/UL — SIGNIFICANT CHANGE UP (ref 1.2–3.4)
LYMPHOCYTES # BLD AUTO: 5.4 % — LOW (ref 20.5–51.1)
MCHC RBC-ENTMCNC: 28.7 PG — SIGNIFICANT CHANGE UP (ref 27–31)
MCHC RBC-ENTMCNC: 32.3 G/DL — SIGNIFICANT CHANGE UP (ref 32–37)
MCV RBC AUTO: 88.7 FL — SIGNIFICANT CHANGE UP (ref 80–94)
MONOCYTES # BLD AUTO: 1.83 K/UL — HIGH (ref 0.1–0.6)
MONOCYTES NFR BLD AUTO: 7.3 % — SIGNIFICANT CHANGE UP (ref 1.7–9.3)
NEUTROPHILS # BLD AUTO: 19.46 K/UL — HIGH (ref 1.4–6.5)
NEUTROPHILS NFR BLD AUTO: 77.2 % — HIGH (ref 42.2–75.2)
NRBC # BLD: 0 /100 WBCS — SIGNIFICANT CHANGE UP (ref 0–0)
NT-PROBNP SERPL-SCNC: 124 PG/ML — SIGNIFICANT CHANGE UP (ref 0–300)
PLATELET # BLD AUTO: 363 K/UL — SIGNIFICANT CHANGE UP (ref 130–400)
POTASSIUM SERPL-MCNC: 5 MMOL/L — SIGNIFICANT CHANGE UP (ref 3.5–5)
POTASSIUM SERPL-SCNC: 5 MMOL/L — SIGNIFICANT CHANGE UP (ref 3.5–5)
PROTHROM AB SERPL-ACNC: 13.2 SEC — HIGH (ref 9.95–12.87)
RBC # BLD: 4.95 M/UL — SIGNIFICANT CHANGE UP (ref 4.7–6.1)
RBC # FLD: 15.3 % — HIGH (ref 11.5–14.5)
RSV RESULT: NEGATIVE — SIGNIFICANT CHANGE UP
RSV RNA RESP QL NAA+PROBE: NEGATIVE — SIGNIFICANT CHANGE UP
SODIUM SERPL-SCNC: 140 MMOL/L — SIGNIFICANT CHANGE UP (ref 135–146)
TROPONIN T SERPL-MCNC: <0.01 NG/ML — SIGNIFICANT CHANGE UP
TROPONIN T SERPL-MCNC: <0.01 NG/ML — SIGNIFICANT CHANGE UP
WBC # BLD: 25.2 K/UL — HIGH (ref 4.8–10.8)
WBC # FLD AUTO: 25.2 K/UL — HIGH (ref 4.8–10.8)

## 2019-02-10 RX ORDER — AZITHROMYCIN 500 MG/1
500 TABLET, FILM COATED ORAL ONCE
Qty: 0 | Refills: 0 | Status: COMPLETED | OUTPATIENT
Start: 2019-02-10 | End: 2019-02-10

## 2019-02-10 RX ORDER — MONTELUKAST 4 MG/1
10 TABLET, CHEWABLE ORAL DAILY
Qty: 0 | Refills: 0 | Status: DISCONTINUED | OUTPATIENT
Start: 2019-02-10 | End: 2019-02-13

## 2019-02-10 RX ORDER — BUDESONIDE AND FORMOTEROL FUMARATE DIHYDRATE 160; 4.5 UG/1; UG/1
2 AEROSOL RESPIRATORY (INHALATION)
Qty: 0 | Refills: 0 | Status: DISCONTINUED | OUTPATIENT
Start: 2019-02-10 | End: 2019-02-13

## 2019-02-10 RX ORDER — REGADENOSON 0.08 MG/ML
0.4 INJECTION, SOLUTION INTRAVENOUS ONCE
Qty: 0 | Refills: 0 | Status: DISCONTINUED | OUTPATIENT
Start: 2019-02-10 | End: 2019-02-10

## 2019-02-10 RX ORDER — CEFTRIAXONE 500 MG/1
1 INJECTION, POWDER, FOR SOLUTION INTRAMUSCULAR; INTRAVENOUS ONCE
Qty: 0 | Refills: 0 | Status: COMPLETED | OUTPATIENT
Start: 2019-02-10 | End: 2019-02-10

## 2019-02-10 RX ORDER — PANTOPRAZOLE SODIUM 20 MG/1
40 TABLET, DELAYED RELEASE ORAL
Qty: 0 | Refills: 0 | Status: DISCONTINUED | OUTPATIENT
Start: 2019-02-10 | End: 2019-02-13

## 2019-02-10 RX ORDER — IPRATROPIUM/ALBUTEROL SULFATE 18-103MCG
3 AEROSOL WITH ADAPTER (GRAM) INHALATION EVERY 6 HOURS
Qty: 0 | Refills: 0 | Status: DISCONTINUED | OUTPATIENT
Start: 2019-02-10 | End: 2019-02-13

## 2019-02-10 RX ORDER — CEFTRIAXONE 500 MG/1
1 INJECTION, POWDER, FOR SOLUTION INTRAMUSCULAR; INTRAVENOUS EVERY 24 HOURS
Qty: 0 | Refills: 0 | Status: DISCONTINUED | OUTPATIENT
Start: 2019-02-10 | End: 2019-02-11

## 2019-02-10 RX ORDER — ENOXAPARIN SODIUM 100 MG/ML
40 INJECTION SUBCUTANEOUS DAILY
Qty: 0 | Refills: 0 | Status: DISCONTINUED | OUTPATIENT
Start: 2019-02-10 | End: 2019-02-13

## 2019-02-10 RX ORDER — IPRATROPIUM BROMIDE 0.2 MG/ML
500 SOLUTION, NON-ORAL INHALATION ONCE
Qty: 0 | Refills: 0 | Status: COMPLETED | OUTPATIENT
Start: 2019-02-10 | End: 2019-02-10

## 2019-02-10 RX ORDER — ALBUTEROL 90 UG/1
2.5 AEROSOL, METERED ORAL
Qty: 0 | Refills: 0 | Status: COMPLETED | OUTPATIENT
Start: 2019-02-10 | End: 2019-02-10

## 2019-02-10 RX ORDER — CHLORHEXIDINE GLUCONATE 213 G/1000ML
1 SOLUTION TOPICAL
Qty: 0 | Refills: 0 | Status: DISCONTINUED | OUTPATIENT
Start: 2019-02-10 | End: 2019-02-13

## 2019-02-10 RX ORDER — PANTOPRAZOLE SODIUM 20 MG/1
1 TABLET, DELAYED RELEASE ORAL
Qty: 0 | Refills: 0 | COMMUNITY

## 2019-02-10 RX ORDER — AZITHROMYCIN 500 MG/1
500 TABLET, FILM COATED ORAL EVERY 24 HOURS
Qty: 0 | Refills: 0 | Status: DISCONTINUED | OUTPATIENT
Start: 2019-02-10 | End: 2019-02-13

## 2019-02-10 RX ORDER — MAGNESIUM SULFATE 500 MG/ML
2 VIAL (ML) INJECTION ONCE
Qty: 0 | Refills: 0 | Status: COMPLETED | OUTPATIENT
Start: 2019-02-10 | End: 2019-02-10

## 2019-02-10 RX ADMIN — Medication 3 MILLILITER(S): at 13:12

## 2019-02-10 RX ADMIN — Medication 500 MICROGRAM(S): at 01:30

## 2019-02-10 RX ADMIN — ALBUTEROL 2.5 MILLIGRAM(S): 90 AEROSOL, METERED ORAL at 01:29

## 2019-02-10 RX ADMIN — CEFTRIAXONE 100 GRAM(S): 500 INJECTION, POWDER, FOR SOLUTION INTRAMUSCULAR; INTRAVENOUS at 02:35

## 2019-02-10 RX ADMIN — CEFTRIAXONE 100 GRAM(S): 500 INJECTION, POWDER, FOR SOLUTION INTRAMUSCULAR; INTRAVENOUS at 12:13

## 2019-02-10 RX ADMIN — ALBUTEROL 2.5 MILLIGRAM(S): 90 AEROSOL, METERED ORAL at 01:31

## 2019-02-10 RX ADMIN — MONTELUKAST 10 MILLIGRAM(S): 4 TABLET, CHEWABLE ORAL at 12:13

## 2019-02-10 RX ADMIN — Medication 2 GRAM(S): at 01:41

## 2019-02-10 RX ADMIN — ALBUTEROL 2.5 MILLIGRAM(S): 90 AEROSOL, METERED ORAL at 01:41

## 2019-02-10 RX ADMIN — BUDESONIDE AND FORMOTEROL FUMARATE DIHYDRATE 2 PUFF(S): 160; 4.5 AEROSOL RESPIRATORY (INHALATION) at 20:35

## 2019-02-10 RX ADMIN — AZITHROMYCIN 255 MILLIGRAM(S): 500 TABLET, FILM COATED ORAL at 12:13

## 2019-02-10 RX ADMIN — Medication 125 MILLIGRAM(S): at 01:28

## 2019-02-10 RX ADMIN — CEFTRIAXONE 1 GRAM(S): 500 INJECTION, POWDER, FOR SOLUTION INTRAMUSCULAR; INTRAVENOUS at 02:58

## 2019-02-10 RX ADMIN — Medication 50 GRAM(S): at 01:28

## 2019-02-10 RX ADMIN — Medication 3 MILLILITER(S): at 20:17

## 2019-02-10 RX ADMIN — AZITHROMYCIN 255 MILLIGRAM(S): 500 TABLET, FILM COATED ORAL at 02:58

## 2019-02-10 RX ADMIN — ENOXAPARIN SODIUM 40 MILLIGRAM(S): 100 INJECTION SUBCUTANEOUS at 12:13

## 2019-02-10 NOTE — ED PROVIDER NOTE - OBJECTIVE STATEMENT
patient is BIB son for evaluation of fever, cough, congestion, sob x 3 days, Temp at home was 101F, patient has been using his inhalers/nebulizers, but SOB continued, today started having chest pain, substernal area with radiation to B/L shoulders, so took NTG with some improvement, but pain continued, so took another NTG, and had no response, so was brought to ED. Patient is on Home oxygen for COPD, denies lower ext swelling, denies abd pain, no back pain, no travel, no sick contacts. Patient had pneumonia vaccine, but not flu vaccine.

## 2019-02-10 NOTE — H&P ADULT - ASSESSMENT
67 yo M with PMHx of COPD/Emphysema on 2 L NC (two COPD exacerbations requiring hospitalization in 2018), ex-smoker (120 pack-year), hx of intubation in 2004 for 3 days 2/2 COPD exacerbation, lung transplant candidate, pulmonary nodule (10 x 8 mm, 2017), essential HTN, DLD, GERD, asthma, R foot infection s/p metatarsal amputation, presents with community-acquired pneumonia.    #) COPD Exacerbation 2/2 community-acquired pneumonia in context of chronic bronchiectatic changes, FEV1/FVC 43% (GOLD 3, 03/2017)  - History of intubation due to COPD exacerbation (2004), refused lung transplantation (2018)  - Currently on BiPAP PRN, on 2 L NC at Home   - Symbicort, Duonebs  - Continue with IV Rocephin 1 g QD, IV Azithromycin 500 mg QD  - Blood culture, Sputum culture  - Maintain SpO2 88-92%  - Pulmonary consult     #) Chest pain, rule out ACS although unlikely  - CE negative x 1, follow-up second set at 11:00 am  - EKG NSR   - He denied stress testing or history of catheterization.   - Telemetry for 24 hrs    #) RUL Pulmonary Nodule   - 10 x 8 mm, had underwent negative PET scan (2017)    #) DVT ppx: Lovenox 40 mg QD  #) GI ppx: PO Protonix 40 mg QD    #) Diet: Low Sodium  #) Activity: Ambulate as tolerated    #) Dispo: From Home, lives with daughter, has a HHA (7 days/week + 7 hrs/day)  #) Full Code 69 yo M with PMHx of COPD/Emphysema on 2 L NC (two COPD exacerbations requiring hospitalization in 2018), ex-smoker (120 pack-year), hx of intubation in 2004 for 3 days 2/2 COPD exacerbation, lung transplant candidate, pulmonary nodule (10 x 8 mm, 2017), essential HTN, DLD, GERD, asthma, R foot infection s/p metatarsal amputation, presents with community-acquired pneumonia vs GNR PNA.    #) COPD Exacerbation 2/2 community-acquired pneumonia vs GNR PNA in context of chronic bronchiectatic changes, FEV1/FVC 43% (GOLD 3, 03/2017)  - History of intubation due to COPD exacerbation (2004), refused lung transplantation (2018)  - Currently on BiPAP PRN, on 2 L NC at Home   - Symbicort, Duonebs  - Continue with IV Rocephin 1 g QD, IV Azithromycin 500 mg QD  - Blood culture, Sputum culture  - Maintain SpO2 88-92%  - Pulmonary consult (Dr. Coates)     #) Atypical chest pain, rule out ACS although unlikely. Possible angina  - CE negative x 1, follow-up second set at 11:00 am  - EKG NSR   - He denied stress testing or history of catheterization.   - Telemetry for 24 hrs  - Nuclear medicine stress test; intermediate risk    #) RUL Pulmonary Nodule   - 10 x 8 mm, had underwent negative PET scan (2017)    #) DVT ppx: Lovenox 40 mg QD  #) GI ppx: PO Protonix 40 mg QD    #) Diet: Low Sodium  #) Activity: Ambulate as tolerated    #) Dispo: From Home, lives with daughter, has a HHA (7 days/week + 7 hrs/day)  #) Full Code 67 yo M with PMHx of COPD/Emphysema on 2 L NC (two COPD exacerbations requiring hospitalization in 2018), ex-smoker (120 pack-year), hx of intubation in 2004 for 3 days 2/2 COPD exacerbation, lung transplant candidate, pulmonary nodule (10 x 8 mm, 2017), essential HTN, DLD, GERD, asthma, R foot infection s/p metatarsal amputation, presents with community-acquired pneumonia vs GNR PNA.    #) COPD Exacerbation 2/2 community-acquired pneumonia vs GNR PNA in context of chronic bronchiectatic changes, FEV1/FVC 43% (GOLD 3, 03/2017)  - History of intubation due to COPD exacerbation (2004), refused lung transplantation (2018)  - Currently on BiPAP PRN, on 2 L NC at Home   - Symbicort, Duonebs  - Continue with IV Rocephin 1 g QD, IV Azithromycin 500 mg QD  - Blood culture, Sputum culture  - Maintain SpO2 88-92%  - Pulmonary consult (Dr. Coates)     #) Atypical chest pain, rule out ACS although unlikely. Possible angina  - CE negative x 1, follow-up second set at 11:00 am  - EKG NSR   - He denied stress testing or history of catheterization.   - Telemetry for 24 hrs  - Will do nuclear medicine stress test as outpatient if second cardiac enzyme is negative; intermediate risk    #) RUL Pulmonary Nodule   - 10 x 8 mm, had underwent negative PET scan (2017)    #) DVT ppx: Lovenox 40 mg QD  #) GI ppx: PO Protonix 40 mg QD    #) Diet: Low Sodium  #) Activity: Ambulate as tolerated    #) Dispo: From Home, lives with daughter, has a HHA (7 days/week + 7 hrs/day)  #) Full Code

## 2019-02-10 NOTE — H&P ADULT - ATTENDING COMMENTS
Patient seen and examined independently. Agree with resident note/ history / physical exam and plan of care with following exceptions/additions/updates. Case discussed with house-staff, nursing and patient/pt decision maker.     spoke with daughter on the phone as per pt request, refused phone  service at this time, he would prefer his daughter to translate, but if she would be unavailable, he would accept the phone instead.     daughter's name: Jane    pt is feeling better, sputum was yellow but now it is clear.     Vital Signs Last 24 Hrs  T(C): 36.3 (09 Feb 2019 23:48), Max: 36.3 (09 Feb 2019 23:48)  T(F): 97.3 (09 Feb 2019 23:48), Max: 97.3 (09 Feb 2019 23:48)  HR: 66 (10 Feb 2019 07:26) (66 - 118)  BP: 101/55 (09 Feb 2019 23:48) (101/55 - 134/66)  BP(mean): --  RR: 20 (09 Feb 2019 23:48) (20 - 20)  SpO2: 100% (10 Feb 2019 07:26) (93% - 100%)    Physical exam:   constitutional NAD, AAOX3, Respiratory  lungs bilat basal crackles, CVS heart RRR, GI: abdomen Soft NT, ND, BS+, skin: intact  neuro exam non focal.    < from: Xray Chest 1 View-PORTABLE IMMEDIATE (02.10.19 @ 00:57) >    Hyperaeration bilaterally. Upper lung field bilateral calcified   granulomas. Bibasilar opacities.    < end of copied text >    #Progress Note Handoff  Pending (specify):  Consults__pulm______  Family discussion: Full code, pt decisional.   Disposition: Home

## 2019-02-10 NOTE — ED ADULT NURSE NOTE - OBJECTIVE STATEMENT
Pt c/o chest pain x couple of days, denies n/v/d. Denies fevers/chills. Pt h/o home O2 use. H/O COPD.

## 2019-02-10 NOTE — H&P ADULT - HISTORY OF PRESENT ILLNESS
69 yo M with PMHx of COPD/Emphysema on 2 L NC (two COPD exacerbations requiring hospitalization in 2018), ex-smoker (120 pack-year), hx of intubation in 2004 for 3 days 2/2 COPD exacerbation, pulmonary nodule (10 x 8 mm, 2017), essential HTN, DLD, GERD, asthma, R foot infection s/p metatarsal amputation, presents with fever, generalized anterior chest pain, and SOB x 3 days. He been having an increased cough, productive of yellow sputum x 3 days. As per the patient, had been using his inhalers/nebulizers at home Q4H but shortness of breath continued to worsen. He had a T 101 F with development of parasternal chest pain with radiation to bilateral shoulders, pressure-like, worsened with breathing. He denied recent travel, sick contacts, abdominal pain, dysuria, bilateral lower extremity swelling, palpitations. He endorsed that he had received both pneumococcal and influenza vaccines. Of note, he mentioned that he was advised to be assessed for lung transplant at [place], but he refused. XR imaging in ED showed a LLL pneumonia, possible chronic bronchiectatic changes. Flu panel was negative. He was given IV Azithromycin 500 mg, IV Rocephin 1 g, IV Solumedrol 125 mg x 1, Albuterol x 3, Atrovent x 1. 67 yo M with PMHx of COPD/Emphysema on 2 L NC (two COPD exacerbations requiring hospitalization in 2018), ex-smoker (120 pack-year), hx of intubation in 2004 for 3 days 2/2 COPD exacerbation, pulmonary nodule (10 x 8 mm, 2017), essential HTN, DLD, GERD, asthma, R foot infection s/p metatarsal amputation, presents with fever, generalized anterior chest pain, and SOB x 3 days. He been having an increased cough, productive of yellow sputum x 3 days. As per the patient, had been using his inhalers/nebulizers at home Q4H but shortness of breath continued to worsen. He had a T 101 F with development of parasternal chest pain with radiation to bilateral shoulders, pressure-like, worsened with breathing. He denied recent travel, sick contacts, abdominal pain, dysuria, bilateral lower extremity swelling, palpitations. He endorsed that he had received both pneumococcal and influenza vaccines. Of note, he mentioned that he was advised to be assessed for lung transplant at [place], but he refused. XR imaging in ED showed a LLL pneumonia, possible chronic bronchiectatic changes. Flu panel was negative. He was given IV Azithromycin 500 mg, IV Rocephin 1 g, IV Solumedrol 125 mg x 1, Albuterol x 3, Atrovent x 1.     Addendum: He had complained of pressure-like chest pain, non radiating, L parasternal, lasting seconds, while he was at rest. EKG was ordered. He notes that the same episode had happened yesterday, however, last 3 hours and he had taken 2 tablets of nitroglycerin SL. The first tablet relieved the chest pain but the second tablet did not. So he decided to take Ibuprofen 800 x 2 tablets [originally prescribed by his PMD Dr. Bolaños for back pain] which initially relieved some of the pain, but returned. 67 yo M with PMHx of COPD/Emphysema on 2 L NC (two COPD exacerbations requiring hospitalization in 2018), ex-smoker (120 pack-year), hx of intubation in 2004 for 3 days 2/2 COPD exacerbation, pulmonary nodule (10 x 8 mm, 2017), essential HTN, DLD, GERD, asthma, R foot infection s/p metatarsal amputation, presents with fever, generalized anterior chest pain, and SOB x 3 days. He been having an increased cough, productive of yellow sputum x 3 days. As per the patient, had been using his inhalers/nebulizers at home Q4H but shortness of breath continued to worsen. He had a T 101 F with development of parasternal chest pain with radiation to bilateral shoulders, pressure-like, worsened with breathing. He denied recent travel, sick contacts, abdominal pain, dysuria, bilateral lower extremity swelling, palpitations. He endorsed that he had received both pneumococcal and influenza vaccines. Of note, he mentioned that he was advised to be assessed for lung transplant at [place], but he refused. XR imaging in ED showed a LLL pneumonia, possible chronic bronchiectatic changes. Flu panel was negative. He was given IV Azithromycin 500 mg, IV Rocephin 1 g, IV Solumedrol 125 mg x 1, Albuterol x 3, Atrovent x 1.     Addendum: He had complained of pressure-like chest pain, non radiating, L parasternal, lasting seconds, while he was at rest. EKG was ordered. He notes that the same episode had happened yesterday, however, last 3 hours and he had taken 2 tablets of nitroglycerin SL. The first tablet relieved the chest pain but the second tablet did not. So he decided to take Ibuprofen 800 x 2 tablets [originally prescribed by his PMD Dr. Bolaños for back pain] which initially relieved some of the pain, but returned. He says at baseline he can only walk about 10 ft before he becomes tachypneic, short of breath and pressure-like chest pain in parasternal lasting seconds.

## 2019-02-10 NOTE — H&P ADULT - NSHPLABSRESULTS_GEN_ALL_CORE
LABS:                        14.2   25.20 )-----------( 363      ( 10 Feb 2019 00:12 )             43.9     02-10    140  |  99  |  14  ----------------------------<  125<H>  5.0   |  24  |  1.0    Ca    9.3      10 Feb 2019 00:12      PT/INR - ( 10 Feb 2019 00:12 )   PT: 13.20 sec;   INR: 1.15 ratio         PTT - ( 10 Feb 2019 00:12 )  PTT:36.3 sec      Creatine Kinase, Serum: 75 U/L (02-10-19 @ 00:12)  Troponin T, Serum: <0.01 ng/mL (02-10-19 @ 00:12)      CARDIAC MARKERS ( 10 Feb 2019 00:12 )  x     / <0.01 ng/mL / 75 U/L / x     / x    CXR: LLL pneumonia vs chronic bronchiectatic change

## 2019-02-10 NOTE — H&P ADULT - NSHPPHYSICALEXAM_GEN_ALL_CORE
PHYSICAL EXAM:  GEN: No acute distress. On BiPAP  HEENT: NCAT  LUNGS: Distant breath sounds  HEART: S1/S2 present. RRR.   ABD: Soft, non-tender, non-distended. Bowel sounds present  EXT: No pitting edema, R metatarsal amputation  NEURO: AAOX3

## 2019-02-10 NOTE — ED PROVIDER NOTE - FAMILY DETAILS FREE TEXT FOR MDM ADDL HISTORY OBTAINED FROM QUESTION
patient son in ED, who also translated for patient as preferred by patient.   Patient SON Translated to communicate in patient preferred language.

## 2019-02-10 NOTE — ED PROVIDER NOTE - CLINICAL SUMMARY MEDICAL DECISION MAKING FREE TEXT BOX
Patient presented to ED with moderate respiratory distress, improved well with BIPAP and treatments/medications. Labs/EKG/CXR findings noted and discussed with patient and his son, abx given, discussed with Dr. Simons and MAR, patient is admitted to Medicine.

## 2019-02-10 NOTE — ED PROVIDER NOTE - CARE PLAN
Principal Discharge DX:	Pneumonia  Secondary Diagnosis:	COPD exacerbation  Secondary Diagnosis:	Chest pain, unspecified type

## 2019-02-11 LAB
ANION GAP SERPL CALC-SCNC: 16 MMOL/L — HIGH (ref 7–14)
BASOPHILS # BLD AUTO: 0.06 K/UL — SIGNIFICANT CHANGE UP (ref 0–0.2)
BASOPHILS NFR BLD AUTO: 0.3 % — SIGNIFICANT CHANGE UP (ref 0–1)
BUN SERPL-MCNC: 24 MG/DL — HIGH (ref 10–20)
CALCIUM SERPL-MCNC: 8.9 MG/DL — SIGNIFICANT CHANGE UP (ref 8.5–10.1)
CHLORIDE SERPL-SCNC: 102 MMOL/L — SIGNIFICANT CHANGE UP (ref 98–110)
CO2 SERPL-SCNC: 23 MMOL/L — SIGNIFICANT CHANGE UP (ref 17–32)
CREAT SERPL-MCNC: 1 MG/DL — SIGNIFICANT CHANGE UP (ref 0.7–1.5)
EOSINOPHIL # BLD AUTO: 0.05 K/UL — SIGNIFICANT CHANGE UP (ref 0–0.7)
EOSINOPHIL NFR BLD AUTO: 0.3 % — SIGNIFICANT CHANGE UP (ref 0–8)
GLUCOSE SERPL-MCNC: 108 MG/DL — HIGH (ref 70–99)
GRAM STN FLD: SIGNIFICANT CHANGE UP
HCT VFR BLD CALC: 39.1 % — LOW (ref 42–52)
HGB BLD-MCNC: 12.5 G/DL — LOW (ref 14–18)
IMM GRANULOCYTES NFR BLD AUTO: 0.6 % — HIGH (ref 0.1–0.3)
LYMPHOCYTES # BLD AUTO: 1.23 K/UL — SIGNIFICANT CHANGE UP (ref 1.2–3.4)
LYMPHOCYTES # BLD AUTO: 6.2 % — LOW (ref 20.5–51.1)
MCHC RBC-ENTMCNC: 28.2 PG — SIGNIFICANT CHANGE UP (ref 27–31)
MCHC RBC-ENTMCNC: 32 G/DL — SIGNIFICANT CHANGE UP (ref 32–37)
MCV RBC AUTO: 88.3 FL — SIGNIFICANT CHANGE UP (ref 80–94)
MONOCYTES # BLD AUTO: 1.68 K/UL — HIGH (ref 0.1–0.6)
MONOCYTES NFR BLD AUTO: 8.5 % — SIGNIFICANT CHANGE UP (ref 1.7–9.3)
MRSA PCR RESULT.: NEGATIVE — SIGNIFICANT CHANGE UP
NEUTROPHILS # BLD AUTO: 16.63 K/UL — HIGH (ref 1.4–6.5)
NEUTROPHILS NFR BLD AUTO: 84.1 % — HIGH (ref 42.2–75.2)
NRBC # BLD: 0 /100 WBCS — SIGNIFICANT CHANGE UP (ref 0–0)
PLATELET # BLD AUTO: 395 K/UL — SIGNIFICANT CHANGE UP (ref 130–400)
POTASSIUM SERPL-MCNC: 4.4 MMOL/L — SIGNIFICANT CHANGE UP (ref 3.5–5)
POTASSIUM SERPL-SCNC: 4.4 MMOL/L — SIGNIFICANT CHANGE UP (ref 3.5–5)
RBC # BLD: 4.43 M/UL — LOW (ref 4.7–6.1)
RBC # FLD: 15 % — HIGH (ref 11.5–14.5)
SODIUM SERPL-SCNC: 141 MMOL/L — SIGNIFICANT CHANGE UP (ref 135–146)
SPECIMEN SOURCE: SIGNIFICANT CHANGE UP
WBC # BLD: 19.77 K/UL — HIGH (ref 4.8–10.8)
WBC # FLD AUTO: 19.77 K/UL — HIGH (ref 4.8–10.8)

## 2019-02-11 RX ORDER — REGADENOSON 0.08 MG/ML
0.4 INJECTION, SOLUTION INTRAVENOUS ONCE
Qty: 0 | Refills: 0 | Status: DISCONTINUED | OUTPATIENT
Start: 2019-02-11 | End: 2019-02-13

## 2019-02-11 RX ORDER — PIPERACILLIN AND TAZOBACTAM 4; .5 G/20ML; G/20ML
4.5 INJECTION, POWDER, LYOPHILIZED, FOR SOLUTION INTRAVENOUS EVERY 8 HOURS
Qty: 0 | Refills: 0 | Status: DISCONTINUED | OUTPATIENT
Start: 2019-02-11 | End: 2019-02-11

## 2019-02-11 RX ADMIN — BUDESONIDE AND FORMOTEROL FUMARATE DIHYDRATE 2 PUFF(S): 160; 4.5 AEROSOL RESPIRATORY (INHALATION) at 10:03

## 2019-02-11 RX ADMIN — Medication 60 MILLIGRAM(S): at 06:17

## 2019-02-11 RX ADMIN — MONTELUKAST 10 MILLIGRAM(S): 4 TABLET, CHEWABLE ORAL at 12:49

## 2019-02-11 RX ADMIN — PIPERACILLIN AND TAZOBACTAM 25 GRAM(S): 4; .5 INJECTION, POWDER, LYOPHILIZED, FOR SOLUTION INTRAVENOUS at 09:49

## 2019-02-11 RX ADMIN — Medication 3 MILLILITER(S): at 07:41

## 2019-02-11 RX ADMIN — PANTOPRAZOLE SODIUM 40 MILLIGRAM(S): 20 TABLET, DELAYED RELEASE ORAL at 06:17

## 2019-02-11 RX ADMIN — BUDESONIDE AND FORMOTEROL FUMARATE DIHYDRATE 2 PUFF(S): 160; 4.5 AEROSOL RESPIRATORY (INHALATION) at 22:09

## 2019-02-11 RX ADMIN — PIPERACILLIN AND TAZOBACTAM 25 GRAM(S): 4; .5 INJECTION, POWDER, LYOPHILIZED, FOR SOLUTION INTRAVENOUS at 14:07

## 2019-02-11 RX ADMIN — AZITHROMYCIN 255 MILLIGRAM(S): 500 TABLET, FILM COATED ORAL at 17:28

## 2019-02-11 RX ADMIN — ENOXAPARIN SODIUM 40 MILLIGRAM(S): 100 INJECTION SUBCUTANEOUS at 12:50

## 2019-02-11 RX ADMIN — Medication 3 MILLILITER(S): at 13:04

## 2019-02-11 RX ADMIN — Medication 3 MILLILITER(S): at 20:35

## 2019-02-11 NOTE — CONSULT NOTE ADULT - ASSESSMENT
IMPRESSION:        PLAN: IMPRESSION:    Severe COPD on home oxygen currently not in exacerbation last FEV1/FVC 40%, FEV1 31% consistent with severe obstructive defect  CXR hyperinflation and flattened diaphragms and stable bibasilar likely bronchiectatic changes no acute infiltrates  Hx of RUL Pulmonary nodule enlarging but Not FDG avid on PET scan 6/2017  Chest pain relieved with Nitroglycerine need to evaluate for ischemic cardiac disease    PLAN:    ·	Oxygen therapy to keep pulse ox>92%  ·	C/w home inhalers, symbicort and spiriva, nebs prn  ·	D/c solumedrol, prednisone 40mg x 5 days  ·	Repeat CT chest noncontrast to evaluate further bibasilar opacities  ·	Cardiology evaluation  ·	DVT ppx IMPRESSION:    Severe COPD on home oxygen currently not in exacerbation last FEV1/FVC 40%, FEV1 31% consistent with severe obstructive defect  CXR hyperinflation and flattened diaphragms and stable bibasilar likely bronchiectatic changes no acute infiltrates  Hx of RUL Pulmonary nodule enlarging but Not FDG avid on PET scan 6/2017  Chest pain relieved with Nitroglycerine need to evaluate for ischemic cardiac disease    PLAN:    ·	Oxygen therapy to keep pulse ox>92%  ·	C/w home inhalers, symbicort and spiriva, nebs prn  ·	D/c solumedrol, prednisone 40mg x 5 days  ·	C/w azithro and zosyn, if cultures negative and CT chest unchanged would deescalate to azithro 5 day course  ·	Repeat CT chest noncontrast to evaluate further bibasilar opacities  ·	Cardiology evaluation  ·	DVT ppx IMPRESSION:    End stage COPD currently not in exacerbation last FEV1/FVC 40%, FEV1 31% consistent with severe obstructive defect, Refused lung transplant evaluation  Cant rule out pneumonia with leukocytosis [which may be second to prednisone he is on intermittently] and fever, no change in cough quality or quantity  CXR hyperinflation and flattened diaphragms and bibasilar likely bronchiectatic changes but need CT chest to further evaluate any new infiltrates   Hx of RUL Pulmonary nodule Not FDG avid on PET scan 6/2017  Chest pain relieved with Nitroglycerine need to evaluate for ischemic cardiac disease    PLAN:    ·	Oxygen therapy to keep pulse ox>92%  ·	C/w home inhalers, symbicort and spiriva, nebs prn  ·	D/c solumedrol, prednisone 40mg x 5 days  ·	C/w azithro and zosyn, if cultures negative and CT chest unchanged would deescalate to azithro 5 day course  ·	CT chest noncontrast to evaluate further bibasilar opacities  ·	Ischemic Cardiac workup  ·	Discussed goals of care - wishes to be full code  ·	DVT ppx  ·	Oupatient follow up, Please send for 6 min walk test to have done before he follows up with us in next clinic visit IMPRESSION:    End stage COPD currently not in exacerbation last FEV1/FVC 40%, FEV1 31% (<1L)  consistent with severe obstructive defect, Refused lung transplant evaluation  Cant rule out pneumonia with leukocytosis [which may be second to prednisone he is on intermittently] and fever, no change in cough quality or quantity  CXR hyperinflation and flattened diaphragms and bibasilar likely scaring but need CT chest to further evaluate any new infiltrates . Lung exam was diffuse decreased breath sounds with no other findings  Hx of RUL Pulmonary nodule Not FDG avid on PET scan 6/2017 - size was unchanged over 3 months at that time - will reevaluate with CT this admission  Chest pain relieved with Nitroglycerine need to evaluate for ischemic cardiac disease. BNP normal, troponins negative, ekg no acute changes    PLAN:    ·	Oxygen therapy to keep pulse ox>92%  ·	C/w home inhalers, symbicort and spiriva, nebs prn  ·	D/c solumedrol, prednisone 40mg x 5 days  ·	C/w azithro and zosyn, if cultures negative and CT chest unchanged would deescalate to azithro 5 day course  ·	CT chest noncontrast to evaluate further bibasilar opacities  ·	Ischemic Cardiac workup  ·	Discussed goals of care - wishes to be full code  ·	DVT ppx  ·	Oupatient follow up, Please send for 6 min walk test to have done before he follows up with us in next clinic visit

## 2019-02-11 NOTE — PROGRESS NOTE ADULT - SUBJECTIVE AND OBJECTIVE BOX
NILE VALDEZ  68y Male    CHIEF COMPLAINT:    Patient is a 68y old  Male who presents with a chief complaint of Generalized anterior chest pain, SOB and fever (2019 07:47)      INTERVAL HPI/OVERNIGHT EVENTS:    Patient seen and examined. C/O cough with yellow colored sputum. No fever. Also complains that he had cp son admission which got relieved by SL NTG.    ROS: All other systems are negative.    Vital Signs:    T(F): 96.9 (19 @ 05:41), Max: 98.2 (02-10-19 @ 17:54)  HR: 66 (19 @ 05:41) (66 - 92)  BP: 115/64 (19 @ 05:41) (105/56 - 150/84)  RR: 18 (19 @ 05:41) (18 - 18)  SpO2: 99% (02-10-19 @ 16:54) (99% - 99%)  I&O's Summary    Daily Height in cm: 162.56 (10 Feb 2019 17:54)    Daily Weight in k.8 (10 Feb 2019 17:54)  CAPILLARY BLOOD GLUCOSE          PHYSICAL EXAM:    GENERAL:  NAD  SKIN: No rashes or lesions  HENT: Atrumatic. Normocephalic. PERRL. Moist membranes.  NECK: Supple, No JVD. No lymphadenopathy.  PULMONARY: Intensity of BS is remarkably decreased B/L. No wheezing. No rales  CVS: Normal S1, S2. Rate and Rythm are regular. No murmurs.  ABDOMEN/GI: Soft, Nontender, Nondistended; BS present  EXTREMITIES: Peripheral pulses intact. No edema B/L LE.  NEUROLOGIC:  No motor or sensory deficit.  PSYCH: Alert & oriented x 3    Consultant(s) Notes Reviewed:  [x ] YES  [ ] NO  Care Discussed with Consultants/Other Providers [ x] YES  [ ] NO    EKG reviewed  Telemetry reviewed    LABS:                        12.5   19.77 )-----------( 395      ( 2019 05:45 )             39.1         141  |  102  |  24<H>  ----------------------------<  108<H>  4.4   |  23  |  1.0    Ca    8.9      2019 05:45      PT/INR - ( 10 Feb 2019 00:12 )   PT: 13.20 sec;   INR: 1.15 ratio         PTT - ( 10 Feb 2019 00:12 )  PTT:36.3 sec  Serum Pro-Brain Natriuretic Peptide: 124 pg/mL (02-10-19 @ 00:12)    Trop <0.01, CKMB 2.0, CK 57, 02-10-19 @ 11:51  Trop <0.01, CKMB --, CK 75, 02-10-19 @ 00:12      Culture - Blood (collected 10 Feb 2019 00:12)  Source: .Blood Blood-Venous  Preliminary Report (2019 08:00):    No growth to date.        RADIOLOGY & ADDITIONAL TESTS:    < from: CT Chest No Cont (19 @ 11:39) >    IMPRESSION:     Secretions within the central airways with areas of distal   bronchiectasis. Minor areas of patchy reticulonodular opacities within   the anterior left upper lobe and bilateral lower lobes, improved since     < end of copied text >    Imaging or report Personally Reviewed:  [ ] YES  [ ] NO    Medications:  Standing  ALBUTerol/ipratropium for Nebulization 3 milliLiter(s) Nebulizer every 6 hours  azithromycin  IVPB 500 milliGRAM(s) IV Intermittent every 24 hours  buDESOnide 160 MICROgram(s)/formoterol 4.5 MICROgram(s) Inhaler 2 Puff(s) Inhalation two times a day  chlorhexidine 4% Liquid 1 Application(s) Topical <User Schedule>  enoxaparin Injectable 40 milliGRAM(s) SubCutaneous daily  methylPREDNISolone sodium succinate Injectable 60 milliGRAM(s) IV Push daily  montelukast 10 milliGRAM(s) Oral daily  pantoprazole    Tablet 40 milliGRAM(s) Oral before breakfast  piperacillin/tazobactam IVPB. 4.5 Gram(s) IV Intermittent every 8 hours    PRN Meds      Case discussed with resident    Care discussed with pt/family

## 2019-02-11 NOTE — PROGRESS NOTE ADULT - ASSESSMENT
69 yo M with PMHx of COPD/Emphysema on 2 L NC (two COPD exacerbations requiring hospitalization in 2018), ex-smoker (120 pack-year), hx of intubation in 2004 for 3 days 2/2 COPD exacerbation, pulmonary nodule (10 x 8 mm, 2017), essential HTN, DLD, GERD, asthma, R foot infection s/p metatarsal amputation, presents with fever, generalized anterior chest pain, and SOB x 3 days. He been having an increased cough, productive of yellow sputum x 3 days.    1.	Endstage COPD on home O2  2.	Ac. Bronchitis  3.	No evidence of PNA  4.	CP   5.	HTN/DL         PLAN:    ·	Care D/W the pulmonary  ·	CT chest reviewed. No evidence of PNA. Will cont Azithromycin and D/C other Abx  ·	D/C Solumedrol. Start Prednisone 40 mg po daily  ·	Cont Nebs and Symbicort.  ·	Cont tele and order Lexiscan  ·	CE x 3 negative.  ·	Overall poor prognosis

## 2019-02-11 NOTE — CONSULT NOTE ADULT - SUBJECTIVE AND OBJECTIVE BOX
Patient is a 68y old  Male who presents with a chief complaint of Generalized anterior chest pain, SOB and fever (10 Feb 2019 08:56)      HPI:  67 yo M with PMHx of COPD/Emphysema on 2 L NC (two COPD exacerbations requiring hospitalization in 2018), ex-smoker (120 pack-year), hx of intubation in 2004 for 3 days 2/2 COPD exacerbation, pulmonary nodule (10 x 8 mm, 2017), essential HTN, DLD, GERD, asthma, R foot infection s/p metatarsal amputation, presents with fever, generalized anterior chest pain, and SOB x 3 days. He been having an increased cough, productive of yellow sputum x 3 days. As per the patient, had been using his inhalers/nebulizers at home Q4H but shortness of breath continued to worsen. He had a T 101 F with development of parasternal chest pain with radiation to bilateral shoulders, pressure-like, worsened with breathing. He denied recent travel, sick contacts, abdominal pain, dysuria, bilateral lower extremity swelling, palpitations. He endorsed that he had received both pneumococcal and influenza vaccines. Of note, he mentioned that he was advised to be assessed for lung transplant at [place], but he refused. XR imaging in ED showed a LLL pneumonia, possible chronic bronchiectatic changes. Flu panel was negative. He was given IV Azithromycin 500 mg, IV Rocephin 1 g, IV Solumedrol 125 mg x 1, Albuterol x 3, Atrovent x 1.     Addendum: He had complained of pressure-like chest pain, non radiating, L parasternal, lasting seconds, while he was at rest. EKG was ordered. He notes that the same episode had happened yesterday, however, last 3 hours and he had taken 2 tablets of nitroglycerin SL. The first tablet relieved the chest pain but the second tablet did not. So he decided to take Ibuprofen 800 x 2 tablets [originally prescribed by his PMD Dr. Bolaños for back pain] which initially relieved some of the pain, but returned. He says at baseline he can only walk about 10 ft before he becomes tachypneic, short of breath and pressure-like chest pain in parasternal lasting seconds. (10 Feb 2019 08:56)      PAST MEDICAL & SURGICAL HISTORY:  Dyslipidemia  Essential hypertension  COPD (chronic obstructive pulmonary disease)  S/P transmetatarsal amputation of foot, right      SOCIAL HX:   Smoking                         ETOH                            Other    FAMILY HISTORY:  No pertinent family history in first degree relatives  .  No cardiovascular or pulmonary family history     Review of System:  See HPI    Allergies    No Known Allergies    Intolerances          PHYSICAL EXAM  Vital Signs Last 24 Hrs  T(C): 36.1 (11 Feb 2019 05:41), Max: 36.8 (10 Feb 2019 17:54)  T(F): 96.9 (11 Feb 2019 05:41), Max: 98.2 (10 Feb 2019 17:54)  HR: 66 (11 Feb 2019 05:41) (66 - 92)  BP: 115/64 (11 Feb 2019 05:41) (105/56 - 150/84)  BP(mean): --  RR: 18 (11 Feb 2019 05:41) (18 - 18)  SpO2: 99% (10 Feb 2019 16:54) (99% - 99%)    General: In NAD  HEENT: FRANNY             Lymphatic system: No cervical LN     Lungs: Catrachito BS  Cardiovascular: Regular  Gastrointestinal: Soft.  + BS   Musculoskeletal: No Clubbing.  Full range of motion.. Moves all extremities  Skin: Warm.  Intact  Neurological: No motor or sensory deficit       LABS:                          14.2   25.20 )-----------( 363      ( 10 Feb 2019 00:12 )             43.9                                               02-10    140  |  99  |  14  ----------------------------<  125<H>  5.0   |  24  |  1.0    Ca    9.3      10 Feb 2019 00:12        PT/INR - ( 10 Feb 2019 00:12 )   PT: 13.20 sec;   INR: 1.15 ratio         PTT - ( 10 Feb 2019 00:12 )  PTT:36.3 sec                                           CARDIAC MARKERS ( 10 Feb 2019 11:51 )  x     / <0.01 ng/mL / 57 U/L / x     / 2.0 ng/mL  CARDIAC MARKERS ( 10 Feb 2019 00:12 )  x     / <0.01 ng/mL / 75 U/L / x     / x                                                                                     < from: Xray Chest 1 View-PORTABLE IMMEDIATE (02.10.19 @ 00:57) >    Impression:      Hyperaeration bilaterally. Upper lung field bilateral calcified   granulomas. Bibasilar opacities.    < end of copied text >                                                      MEDICATIONS  (STANDING):  ALBUTerol/ipratropium for Nebulization 3 milliLiter(s) Nebulizer every 6 hours  azithromycin  IVPB 500 milliGRAM(s) IV Intermittent every 24 hours  buDESOnide 160 MICROgram(s)/formoterol 4.5 MICROgram(s) Inhaler 2 Puff(s) Inhalation two times a day  cefTRIAXone   IVPB 1 Gram(s) IV Intermittent every 24 hours  chlorhexidine 4% Liquid 1 Application(s) Topical <User Schedule>  enoxaparin Injectable 40 milliGRAM(s) SubCutaneous daily  methylPREDNISolone sodium succinate Injectable 60 milliGRAM(s) IV Push daily  montelukast 10 milliGRAM(s) Oral daily  pantoprazole    Tablet 40 milliGRAM(s) Oral before breakfast    MEDICATIONS  (PRN): Patient is a 68y old  Male who presents with a chief complaint of Generalized anterior chest pain, SOB and fever (10 Feb 2019 08:56)      HPI:  69 yo M with PMHx of COPD/Emphysema on 2 L NC (two COPD exacerbations requiring hospitalization in 2018), ex-smoker (120 pack-year), hx of intubation in 2004 for 3 days 2/2 COPD exacerbation, pulmonary nodule (10 x 8 mm, 2017), essential HTN, DLD, GERD, asthma, R foot infection s/p metatarsal amputation, presents with fever, generalized anterior chest pain, and SOB x 3 days. He been having an increased cough, productive of yellow sputum x 3 days. As per the patient, had been using his inhalers/nebulizers at home Q4H but shortness of breath continued to worsen. He had a T 101 F with development of parasternal chest pain with radiation to bilateral shoulders, pressure-like, worsened with breathing. He denied recent travel, sick contacts, abdominal pain, dysuria, bilateral lower extremity swelling, palpitations. He endorsed that he had received both pneumococcal and influenza vaccines. Of note, he mentioned that he was advised to be assessed for lung transplant at [place], but he refused. XR imaging in ED showed a LLL pneumonia, possible chronic bronchiectatic changes. Flu panel was negative. He was given IV Azithromycin 500 mg, IV Rocephin 1 g, IV Solumedrol 125 mg x 1, Albuterol x 3, Atrovent x 1.     Addendum: He had complained of pressure-like chest pain, non radiating, L parasternal, lasting seconds, while he was at rest. EKG was ordered. He notes that the same episode had happened yesterday, however, last 3 hours and he had taken 2 tablets of nitroglycerin SL. The first tablet relieved the chest pain but the second tablet did not. So he decided to take Ibuprofen 800 x 2 tablets [originally prescribed by his PMD Dr. Bolaños for back pain] which initially relieved some of the pain, but returned. He says at baseline he can only walk about 10 ft before he becomes tachypneic, short of breath and pressure-like chest pain in parasternal lasting seconds. (10 Feb 2019 08:56)      PAST MEDICAL & SURGICAL HISTORY:  Dyslipidemia  Essential hypertension  COPD (chronic obstructive pulmonary disease)  S/P transmetatarsal amputation of foot, right      SOCIAL HX:   Smoking     former                    ETOH     no                           FAMILY HISTORY:  No pertinent family history in first degree relatives  .  No cardiovascular or pulmonary family history     Review of System:  See HPI    Allergies    No Known Allergies    Intolerances    PHYSICAL EXAM  Vital Signs Last 24 Hrs  T(C): 36.1 (11 Feb 2019 05:41), Max: 36.8 (10 Feb 2019 17:54)  T(F): 96.9 (11 Feb 2019 05:41), Max: 98.2 (10 Feb 2019 17:54)  HR: 66 (11 Feb 2019 05:41) (66 - 92)  BP: 115/64 (11 Feb 2019 05:41) (105/56 - 150/84)  BP(mean): --  RR: 18 (11 Feb 2019 05:41) (18 - 18)  SpO2: 99% (10 Feb 2019 16:54) (99% - 99%) 2L    General: In NAD  HEENT: FRANNY             Lymphatic system: No cervical LN     Lungs: Catrachito BS reduced b/l no wheeze no crackles increased AP diameter, reduced br sounds b/l  Cardiovascular: Regular  Gastrointestinal: Soft.  + BS   Musculoskeletal: No Clubbing.  Full range of motion.. Moves all extremities no edema  Skin: Warm.  Intact  Neurological: No motor or sensory deficit       LABS:                          14.2   25.20 )-----------( 363      ( 10 Feb 2019 00:12 )             43.9                                               02-10    140  |  99  |  14  ----------------------------<  125<H>  5.0   |  24  |  1.0    Ca    9.3      10 Feb 2019 00:12        PT/INR - ( 10 Feb 2019 00:12 )   PT: 13.20 sec;   INR: 1.15 ratio         PTT - ( 10 Feb 2019 00:12 )  PTT:36.3 sec                                           CARDIAC MARKERS ( 10 Feb 2019 11:51 )  x     / <0.01 ng/mL / 57 U/L / x     / 2.0 ng/mL  CARDIAC MARKERS ( 10 Feb 2019 00:12 )  x     / <0.01 ng/mL / 75 U/L / x     / x                                                                                     < from: Xray Chest 1 View-PORTABLE IMMEDIATE (02.10.19 @ 00:57) >    Impression:      Hyperaeration bilaterally. Upper lung field bilateral calcified   granulomas. Bibasilar opacities.    < end of copied text >                                                      MEDICATIONS  (STANDING):  ALBUTerol/ipratropium for Nebulization 3 milliLiter(s) Nebulizer every 6 hours  azithromycin  IVPB 500 milliGRAM(s) IV Intermittent every 24 hours  buDESOnide 160 MICROgram(s)/formoterol 4.5 MICROgram(s) Inhaler 2 Puff(s) Inhalation two times a day  cefTRIAXone   IVPB 1 Gram(s) IV Intermittent every 24 hours  chlorhexidine 4% Liquid 1 Application(s) Topical <User Schedule>  enoxaparin Injectable 40 milliGRAM(s) SubCutaneous daily  methylPREDNISolone sodium succinate Injectable 60 milliGRAM(s) IV Push daily  montelukast 10 milliGRAM(s) Oral daily  pantoprazole    Tablet 40 milliGRAM(s) Oral before breakfast    MEDICATIONS  (PRN):

## 2019-02-11 NOTE — PROGRESS NOTE ADULT - ASSESSMENT
67 yo M PMH COPD/Emphysema on 2 L NC (two COPD exacerbations requiring hospitalization in Jan and Feb 2018), ex-smoker (120 pack-year), hx of intubation in 2004 for 3 days 2/2 COPD exacerbation, lung transplant candidate, pulmonary nodule (10 x 8 mm, 2017), essential HTN, DLD, GERD, asthma, R foot infection s/p metatarsal amputation, p/w fever, generalized anterior chest pain, SOB, cough with productive yellow sputum x 3 days.     #) Endstage COPD, acute bronchitis- Active  - Hx intubation due to COPD exacerbation (2004), refused lung transplantation (2018)  -Pulm recs maggie  - Currently on BiPAP PRN, does not use bipap at home, on 2 L NC at Home   - c/w Symbicort, Duonebs, tiotropium  -CT chest- Secretions within central airways with areas of distal bronchiectasis. Minor areas of patchy reticulonodular opacities within anterior BERENICE and b/l lower lobes, improved since 2017. C/w chronic small airways disease.  -No sign of PNA on CT chest- d/c zosyn. Continue azithro 5 days  -D/c solumedrol, prednisone 40mg x 5 days  - f/u Blood culture, Sputum culture. MRSA nares neg  -6 min walk test to have done before he f/u pulm outpt    #) Atypical chest pain, possible unstable angina- Active  -CP relieved by sublingual med per pt, possibly nitroglycerin  - CE negative x 2  - EKG NSR   - Lexiscan- NPO midnight, no caffeine before test  - He denied stress testing or history of catheterization.   - Cont tele  - Will do nuclear medicine stress test as outpatient if second cardiac enzyme is negative; intermediate risk    #) RUL Pulmonary Nodule Stable  - Stable on CT chest- 10 x 8 mm, had underwent negative PET scan (2017)    #) DVT ppx: Lovenox 40 mg QD  #) GI ppx: PO Protonix 40 mg QD    #) Diet: Low Sodium  #) Activity: Ambulate as tolerated    #) Dispo: From Home, lives with daughter, has a HHA (7 days/week + 7 hrs/day)  #) Full Code- spoke with pt today about code status 67 yo M PMH COPD/Emphysema on 2 L NC (two COPD exacerbations requiring hospitalization in Jan and Feb 2018), ex-smoker (120 pack-year), hx of intubation in 2004 for 3 days 2/2 COPD exacerbation, lung transplant candidate, pulmonary nodule (10 x 8 mm, 2017), essential HTN, DLD, GERD, asthma, R foot infection s/p metatarsal amputation, p/w fever, generalized anterior chest pain, SOB, cough with productive yellow sputum x 3 days.     #) Endstage COPD, acute bronchitis- Active  - Hx intubation due to COPD exacerbation (2004), refused lung transplantation (2018)  -Pulm recs maggie  - Currently on BiPAP PRN, does not use bipap at home, on 2 L NC at Home   - c/w Symbicort, Duonebs, tiotropium  -CT chest- Secretions within central airways with areas of distal bronchiectasis. Minor areas of patchy reticulonodular opacities within anterior BERENICE and b/l lower lobes, improved since 2017. C/w chronic small airways disease.  -No sign of PNA on CT chest- d/c zosyn. Continue azithro 5 days  -D/c solumedrol, prednisone 40mg x 5 days  - f/u Blood culture, Sputum culture. MRSA nares, flu, RSV neg  -6 min walk test to have done before he f/u pulm outpt    #) Atypical chest pain, possible unstable angina- Active  -CP relieved by sublingual med per pt, possibly nitroglycerin  - CE negative x 2  - EKG NSR   - Lexiscan- NPO midnight, no caffeine before test  - He denied stress testing or history of catheterization.   - Cont tele  - Will do nuclear medicine stress test as outpatient if second cardiac enzyme is negative; intermediate risk    #) RUL Pulmonary Nodule Stable  - Stable on CT chest- 10 x 8 mm, had underwent negative PET scan (2017)    #) DVT ppx: Lovenox 40 mg QD  #) GI ppx: PO Protonix 40 mg QD    #) Diet: Low Sodium  #) Activity: Ambulate as tolerated    #) Dispo: From Home, lives with daughter, has a HHA (7 days/week + 7 hrs/day)  #) Full Code- spoke with pt today about code status

## 2019-02-11 NOTE — PROGRESS NOTE ADULT - SUBJECTIVE AND OBJECTIVE BOX
SUBJECTIVE:    Patient is a 68y old Male who presents with a chief complaint of Generalized anterior chest pain, SOB and fever (11 Feb 2019 14:50)    Currently admitted to medicine with the primary diagnosis of Pneumonia     Today is hospital day 1d. Cantonese speaker. SOB controlled on current regimen. Denied current CP, abd pain, dysuria, diarrhea, constipation.     PAST MEDICAL & SURGICAL HISTORY  Dyslipidemia  Essential hypertension  COPD (chronic obstructive pulmonary disease)  S/P transmetatarsal amputation of foot, right        ALLERGIES:  No Known Allergies    MEDICATIONS:  STANDING MEDICATIONS  ALBUTerol/ipratropium for Nebulization 3 milliLiter(s) Nebulizer every 6 hours  azithromycin  IVPB 500 milliGRAM(s) IV Intermittent every 24 hours  buDESOnide 160 MICROgram(s)/formoterol 4.5 MICROgram(s) Inhaler 2 Puff(s) Inhalation two times a day  chlorhexidine 4% Liquid 1 Application(s) Topical <User Schedule>  enoxaparin Injectable 40 milliGRAM(s) SubCutaneous daily  montelukast 10 milliGRAM(s) Oral daily  pantoprazole    Tablet 40 milliGRAM(s) Oral before breakfast  predniSONE   Tablet 40 milliGRAM(s) Oral daily  regadenoson Injectable 0.4 milliGRAM(s) IV Push once    PRN MEDICATIONS    VITALS:   T(F): 96.9  HR: 66  BP: 115/64  RR: 18  SpO2: --    LABS:                        12.5   19.77 )-----------( 395      ( 11 Feb 2019 05:45 )             39.1     02-11    141  |  102  |  24<H>  ----------------------------<  108<H>  4.4   |  23  |  1.0    Ca    8.9      11 Feb 2019 05:45      PT/INR - ( 10 Feb 2019 00:12 )   PT: 13.20 sec;   INR: 1.15 ratio         PTT - ( 10 Feb 2019 00:12 )  PTT:36.3 sec          Culture - Blood (collected 10 Feb 2019 00:12)  Source: .Blood Blood-Venous  Preliminary Report (11 Feb 2019 08:00):    No growth to date.      CARDIAC MARKERS ( 10 Feb 2019 11:51 )  x     / <0.01 ng/mL / 57 U/L / x     / 2.0 ng/mL  CARDIAC MARKERS ( 10 Feb 2019 00:12 )  x     / <0.01 ng/mL / 75 U/L / x     / x          RADIOLOGY:  < from: CT Chest No Cont (02.11.19 @ 11:39) >    AIRWAYS, LUNGS AND PLEURA: There are opacities in the central bronchi   compatible with secretions. There are areas of distal bronchiectasis.   Severe upper lobe predominant emphysema. There is complete right middle   lobe scarring/collapse, with subsegmental similar findings in the   lingula, stable since 2017. Minor areas of patchy reticulonodular   opacities within the anterior left upper lobe and bilateral lower lobes,   improved since 2017. Stable right upper lobe 8 mm nodule (series 5, image   74), stable since 2017. No pleural effusion. No pneumothorax.    MEDIASTINUM: There are no enlarged mediastinal, hilar or axillary lymph   nodes. The visualized portion of the thyroid gland is unremarkable. The   esophagus is unremarkable.    HEART AND VESSELS: The heart is normal in size. There is no pericardial   effusion.     UPPER ABDOMEN: Imagesof the upper abdomen demonstrate cholelithiasis.   Bilateral fat-containing Bochdalek hernias.    BONES AND SOFT TISSUES: Degenerative changes of the spine.     TUBES AND LINES: None.    IMPRESSION:     Secretions within the central airways with areas of distal   bronchiectasis. Minor areas of patchy reticulonodular opacities within   the anterior left upper lobe and bilateral lower lobes, improved since   2017. Findings compatible with chronic small airways disease.      < end of copied text >    PHYSICAL EXAM:  GENERAL:  NAD  SKIN: No rashes or lesions  HENT: Atraumatic. Normocephalic. PERRL. Moist membranes.  NECK: Supple, No JVD. No lymphadenopathy.  PULMONARY: Dec BS B/L. No wheezing. No rales  CVS: Normal S1, S2. Rate and Rhythm are regular. No murmurs.  ABDOMEN/GI: Soft, Nontender, Nondistended; BS present  EXTREMITIES: Peripheral pulses intact. No edema B/L LE.  NEUROLOGIC:  No motor or sensory deficit.  PSYCH: Alert & oriented x 3

## 2019-02-12 LAB
ALBUMIN SERPL ELPH-MCNC: 3.8 G/DL — SIGNIFICANT CHANGE UP (ref 3.5–5.2)
ALP SERPL-CCNC: 77 U/L — SIGNIFICANT CHANGE UP (ref 30–115)
ALT FLD-CCNC: 42 U/L — HIGH (ref 0–41)
ANION GAP SERPL CALC-SCNC: 16 MMOL/L — HIGH (ref 7–14)
AST SERPL-CCNC: 30 U/L — SIGNIFICANT CHANGE UP (ref 0–41)
BASOPHILS # BLD AUTO: 0.06 K/UL — SIGNIFICANT CHANGE UP (ref 0–0.2)
BASOPHILS NFR BLD AUTO: 0.3 % — SIGNIFICANT CHANGE UP (ref 0–1)
BILIRUB SERPL-MCNC: 0.6 MG/DL — SIGNIFICANT CHANGE UP (ref 0.2–1.2)
BLD GP AB SCN SERPL QL: SIGNIFICANT CHANGE UP
BUN SERPL-MCNC: 23 MG/DL — HIGH (ref 10–20)
CALCIUM SERPL-MCNC: 9.6 MG/DL — SIGNIFICANT CHANGE UP (ref 8.5–10.1)
CHLORIDE SERPL-SCNC: 101 MMOL/L — SIGNIFICANT CHANGE UP (ref 98–110)
CO2 SERPL-SCNC: 26 MMOL/L — SIGNIFICANT CHANGE UP (ref 17–32)
CREAT SERPL-MCNC: 1.1 MG/DL — SIGNIFICANT CHANGE UP (ref 0.7–1.5)
EOSINOPHIL # BLD AUTO: 0.01 K/UL — SIGNIFICANT CHANGE UP (ref 0–0.7)
EOSINOPHIL NFR BLD AUTO: 0 % — SIGNIFICANT CHANGE UP (ref 0–8)
GAS PNL BLDA: SIGNIFICANT CHANGE UP
GLUCOSE SERPL-MCNC: 116 MG/DL — HIGH (ref 70–99)
HCT VFR BLD CALC: 42 % — SIGNIFICANT CHANGE UP (ref 42–52)
HGB BLD-MCNC: 13.6 G/DL — LOW (ref 14–18)
IMM GRANULOCYTES NFR BLD AUTO: 0.7 % — HIGH (ref 0.1–0.3)
LYMPHOCYTES # BLD AUTO: 1.72 K/UL — SIGNIFICANT CHANGE UP (ref 1.2–3.4)
LYMPHOCYTES # BLD AUTO: 8.4 % — LOW (ref 20.5–51.1)
MAGNESIUM SERPL-MCNC: 2.4 MG/DL — SIGNIFICANT CHANGE UP (ref 1.8–2.4)
MCHC RBC-ENTMCNC: 28.6 PG — SIGNIFICANT CHANGE UP (ref 27–31)
MCHC RBC-ENTMCNC: 32.4 G/DL — SIGNIFICANT CHANGE UP (ref 32–37)
MCV RBC AUTO: 88.4 FL — SIGNIFICANT CHANGE UP (ref 80–94)
MONOCYTES # BLD AUTO: 1.44 K/UL — HIGH (ref 0.1–0.6)
MONOCYTES NFR BLD AUTO: 7 % — SIGNIFICANT CHANGE UP (ref 1.7–9.3)
NEUTROPHILS # BLD AUTO: 17.2 K/UL — HIGH (ref 1.4–6.5)
NEUTROPHILS NFR BLD AUTO: 83.6 % — HIGH (ref 42.2–75.2)
NRBC # BLD: 0 /100 WBCS — SIGNIFICANT CHANGE UP (ref 0–0)
PLATELET # BLD AUTO: 442 K/UL — HIGH (ref 130–400)
POTASSIUM SERPL-MCNC: 4.6 MMOL/L — SIGNIFICANT CHANGE UP (ref 3.5–5)
POTASSIUM SERPL-SCNC: 4.6 MMOL/L — SIGNIFICANT CHANGE UP (ref 3.5–5)
PROT SERPL-MCNC: 6.5 G/DL — SIGNIFICANT CHANGE UP (ref 6–8)
RBC # BLD: 4.75 M/UL — SIGNIFICANT CHANGE UP (ref 4.7–6.1)
RBC # FLD: 14.7 % — HIGH (ref 11.5–14.5)
SODIUM SERPL-SCNC: 143 MMOL/L — SIGNIFICANT CHANGE UP (ref 135–146)
TYPE + AB SCN PNL BLD: SIGNIFICANT CHANGE UP
WBC # BLD: 20.58 K/UL — HIGH (ref 4.8–10.8)
WBC # FLD AUTO: 20.58 K/UL — HIGH (ref 4.8–10.8)

## 2019-02-12 RX ADMIN — MONTELUKAST 10 MILLIGRAM(S): 4 TABLET, CHEWABLE ORAL at 14:00

## 2019-02-12 RX ADMIN — Medication 3 MILLILITER(S): at 21:03

## 2019-02-12 RX ADMIN — PANTOPRAZOLE SODIUM 40 MILLIGRAM(S): 20 TABLET, DELAYED RELEASE ORAL at 06:08

## 2019-02-12 RX ADMIN — ENOXAPARIN SODIUM 40 MILLIGRAM(S): 100 INJECTION SUBCUTANEOUS at 14:01

## 2019-02-12 RX ADMIN — Medication 3 MILLILITER(S): at 07:56

## 2019-02-12 RX ADMIN — BUDESONIDE AND FORMOTEROL FUMARATE DIHYDRATE 2 PUFF(S): 160; 4.5 AEROSOL RESPIRATORY (INHALATION) at 14:01

## 2019-02-12 RX ADMIN — Medication 40 MILLIGRAM(S): at 06:08

## 2019-02-12 RX ADMIN — AZITHROMYCIN 255 MILLIGRAM(S): 500 TABLET, FILM COATED ORAL at 13:59

## 2019-02-12 RX ADMIN — Medication 3 MILLILITER(S): at 13:31

## 2019-02-12 NOTE — PROGRESS NOTE ADULT - ASSESSMENT
69 yo M PMH COPD/Emphysema on 2 L NC (two COPD exacerbations requiring hospitalization in Jan and Feb 2018), ex-smoker (120 pack-year), hx of intubation in 2004 for 3 days 2/2 COPD exacerbation, lung transplant candidate, pulmonary nodule (10 x 8 mm, 2017), essential HTN, DLD, GERD, asthma, R foot infection s/p metatarsal amputation, p/w fever, generalized anterior chest pain, SOB, cough with productive yellow sputum x 3 days.     #) Endstage COPD, acute bronchitis- Improved  - Hx intubation due to COPD exacerbation (2004), refused lung transplantation (2018)  -Pulm recs maggie  - Currently on BiPAP PRN, does not use bipap at home, on 2 L NC at Home   - c/w Symbicort, Duonebs, tiotropium  -CT chest- Secretions within central airways with areas of distal bronchiectasis. Minor areas of patchy reticulonodular opacities within anterior BERENICE and b/l lower lobes, improved since 2017. C/w chronic small airways disease.  -No sign of PNA on CT chest- zosyn was stopped continue azithro 5 days  - prednisone 40mg x 5 days (Day 2)  - BCx NGTD, Sputum cx normal resp toni. MRSA nares, flu, RSV neg  -6 min walk test to have done before he f/u pulm outpt  -Eval pt for home O2    #) Atypical chest pain- resolved  - CE negative x 2  - EKG NSR   - Lexiscan normal      #) RUL Pulmonary Nodule Stable  - Stable on CT chest- 10 x 8 mm, had underwent negative PET scan (2017)    #) DVT ppx: Lovenox 40 mg QD  #) GI ppx: PO Protonix 40 mg QD    #) Diet: Low Sodium  #) Activity: Ambulate as tolerated    #) Dispo: From Home, lives with daughter, has a HHA (7 days/week + 7 hrs/day)  #) Full Code 69 yo M PMH COPD/Emphysema on 2 L NC (two COPD exacerbations requiring hospitalization in Jan and Feb 2018), ex-smoker (120 pack-year), hx of intubation in 2004 for 3 days 2/2 COPD exacerbation, lung transplant candidate, pulmonary nodule (10 x 8 mm, 2017), essential HTN, DLD, GERD, asthma, R foot infection s/p metatarsal amputation, p/w fever, generalized anterior chest pain, SOB, cough with productive yellow sputum x 3 days.     #) Endstage COPD, acute bronchitis- Improved  - Hx intubation due to COPD exacerbation (2004), refused lung transplantation (2018)  -Pulm recs maggie  - Currently on BiPAP PRN, does not use bipap at home, on 2 L NC at Home   - c/w Symbicort, Duonebs, tiotropium  -CT chest- Secretions within central airways with areas of distal bronchiectasis. Minor areas of patchy reticulonodular opacities within anterior BERENICE and b/l lower lobes, improved since 2017. C/w chronic small airways disease.  -No sign of PNA on CT chest- zosyn was stopped continue azithro 5 days  - prednisone 40mg x 5 days (Day 2)  - BCx NGTD, Sputum cx normal resp toni. MRSA nares, flu, RSV neg  -6 min walk test to have done before he f/u pulm outpt  -Eval pt for home O2    #) Atypical chest pain- resolved  - CE negative x 2  - EKG NSR   - Lexiscan normal      #) RUL Pulmonary Nodule Stable  - Stable on CT chest- 10 x 8 mm, had underwent negative PET scan (2017)    #) DVT ppx: Lovenox 40 mg QD  #) GI ppx: PO Protonix 40 mg QD    #) Diet: Low Sodium  #) Activity: Ambulate as tolerated    #) Dispo: From Home, lives with daughter, has a HHA (7 days/week + 7 hrs/day)  #) Full Code    Prepare for d/c tomorrow 67 yo M PMH COPD/Emphysema on 2 L NC (two COPD exacerbations requiring hospitalization in Jan and Feb 2018), ex-smoker (120 pack-year), hx of intubation in 2004 for 3 days 2/2 COPD exacerbation, lung transplant candidate, pulmonary nodule (10 x 8 mm, 2017), essential HTN, DLD, GERD, asthma, R foot infection s/p metatarsal amputation, p/w fever, generalized anterior chest pain, SOB, cough with productive yellow sputum x 3 days.     #) Endstage COPD, acute bronchitis- Improved  - Hx intubation due to COPD exacerbation (2004), refused lung transplantation (2018)  -Pulm recs maggie  - Currently on BiPAP PRN, does not use bipap at home, on 2 L NC at Home   - c/w Symbicort, Duonebs, tiotropium  -CT chest- Secretions within central airways with areas of distal bronchiectasis. Minor areas of patchy reticulonodular opacities within anterior BERENICE and b/l lower lobes, improved since 2017. C/w chronic small airways disease.  -No sign of PNA on CT chest- zosyn was stopped continue azithro 5 days  - prednisone 40mg x 5 days (Day 2)  - BCx NGTD, Sputum cx normal resp toni. MRSA nares, flu, RSV neg  -6 min walk test to have done before he f/u pulm outpt  -Pt O2 sat %RA at rest. Pt hs COPD and therefore needs home O2. Pt is aware he will be going home on oxygen. Pt was tested in a chronic, stable state. Pt O2 sat is 83% RA upon ambulation. O2 sat is 93% on 3 LPM via NC.      #) Atypical chest pain- resolved  - CE negative x 2  - EKG NSR   - Lexiscan normal      #) RUL Pulmonary Nodule Stable  - Stable on CT chest- 10 x 8 mm, had underwent negative PET scan (2017)    #) DVT ppx: Lovenox 40 mg QD  #) GI ppx: PO Protonix 40 mg QD    #) Diet: Low Sodium  #) Activity: Ambulate as tolerated    #) Dispo: From Home, lives with daughter, has a HHA (7 days/week + 7 hrs/day)  #) Full Code    Prepare for d/c tomorrow 67 yo M PMH COPD/Emphysema on 2 L NC (two COPD exacerbations requiring hospitalization in Jan and Feb 2018), ex-smoker (120 pack-year), hx of intubation in 2004 for 3 days 2/2 COPD exacerbation, lung transplant candidate, pulmonary nodule (10 x 8 mm, 2017), essential HTN, DLD, GERD, asthma, R foot infection s/p metatarsal amputation, p/w fever, generalized anterior chest pain, SOB, cough with productive yellow sputum x 3 days.     #) Endstage COPD, acute bronchitis- Improved  - Hx intubation due to COPD exacerbation (2004), refused lung transplantation (2018)  -Pulm recs maggie  - Currently on BiPAP PRN, does not use bipap at home, on 2 L NC at Home   - c/w Symbicort, Duonebs, tiotropium  -CT chest- Secretions within central airways with areas of distal bronchiectasis. Minor areas of patchy reticulonodular opacities within anterior BERENICE and b/l lower lobes, improved since 2017. C/w chronic small airways disease.  -No sign of PNA on CT chest- zosyn was stopped continue azithro 5 days  - prednisone 40mg x 5 days (Day 2)  - BCx NGTD, Sputum cx normal resp toni. MRSA nares, flu, RSV neg  -6 min walk test to have done before he f/u pulm outpt  -Pt O2 sat 94% RA at rest. Pt hs COPD and therefore needs home O2. Pt is aware he will be going home on oxygen. Pt was tested in a chronic, stable state. Pt O2 sat is 83% RA upon ambulation. O2 sat is 91% on 2 LPM via NC.      #) Atypical chest pain- resolved  - CE negative x 2  - EKG NSR   - Lexiscan normal      #) RUL Pulmonary Nodule Stable  - Stable on CT chest- 10 x 8 mm, had underwent negative PET scan (2017)    #) DVT ppx: Lovenox 40 mg QD  #) GI ppx: PO Protonix 40 mg QD    #) Diet: Low Sodium  #) Activity: Ambulate as tolerated    #) Dispo: From Home, lives with daughter, has a HHA (7 days/week + 7 hrs/day)  #) Full Code    Prepare for d/c tomorrow

## 2019-02-12 NOTE — PROGRESS NOTE ADULT - SUBJECTIVE AND OBJECTIVE BOX
NILE VALDEZ  68y Male    CHIEF COMPLAINT:    Patient is a 68y old  Male who presents with a chief complaint of Generalized anterior chest pain, SOB and fever (12 Feb 2019 10:19)      INTERVAL HPI/OVERNIGHT EVENTS:    Patient seen and examined. Having cough with yellow colored sputum but improving. No more fever. +ve sob but at baseline.     ROS: All other systems are negative.    Vital Signs:    T(F): 96.3 (02-12-19 @ 13:03), Max: 97.4 (02-12-19 @ 04:15)  HR: 97 (02-12-19 @ 13:03) (69 - 97)  BP: 131/70 (02-12-19 @ 13:03) (117/60 - 131/70)  RR: 18 (02-12-19 @ 13:03) (18 - 18)  SpO2: --  I&O's Summary    Daily     Daily   CAPILLARY BLOOD GLUCOSE          PHYSICAL EXAM:    GENERAL:  NAD  SKIN: No rashes or lesions  HENT: Atrumatic. Normocephalic. PERRL. Moist membranes.  NECK: Supple, No JVD. No lymphadenopathy.  PULMONARY: BS are remarkably decreased B/L. No wheezing. No rales  CVS: Normal S1, S2. Rate and Rythm are regular. No murmurs.  ABDOMEN/GI: Soft, Nontender, Nondistended; BS present  EXTREMITIES: Peripheral pulses intact. No edema B/L LE.  NEUROLOGIC:  No motor or sensory deficit.  PSYCH: Alert & oriented x 3    Consultant(s) Notes Reviewed:  [x ] YES  [ ] NO  Care Discussed with Consultants/Other Providers [ x] YES  [ ] NO    EKG reviewed  Telemetry reviewed    LABS:                        13.6   20.58 )-----------( 442      ( 12 Feb 2019 05:54 )             42.0     02-12    143  |  101  |  23<H>  ----------------------------<  116<H>  4.6   |  26  |  1.1    Ca    9.6      12 Feb 2019 05:54  Mg     2.4     02-12    TPro  6.5  /  Alb  3.8  /  TBili  0.6  /  DBili  x   /  AST  30  /  ALT  42<H>  /  AlkPhos  77  02-12      Serum Pro-Brain Natriuretic Peptide: 124 pg/mL (02-10-19 @ 00:12)    Trop <0.01, CKMB 2.0, CK 57, 02-10-19 @ 11:51  Trop <0.01, CKMB --, CK 75, 02-10-19 @ 00:12      Culture - Sputum (collected 11 Feb 2019 08:26)  Source: .Sputum Sputum  Gram Stain (11 Feb 2019 23:29):    Few polymorphonuclear leukocytes per low power field    Moderate Squamous epithelial cells per low power field    Few Gram positive cocci in pairs per oil power field    Few Gram Variable Rods per oil power field    Culture - Blood (collected 10 Feb 2019 00:12)  Source: .Blood Blood-Venous  Preliminary Report (11 Feb 2019 08:00):    No growth to date.        RADIOLOGY & ADDITIONAL TESTS:      Imaging or report Personally Reviewed:  [ ] YES  [ ] NO  < from: NM Nuclear Stress Pharmacologic Multiple (02.12.19 @ 13:21) >    Impression:   1. NORMAL LEXISCAN / REST MYOCARDIAL PERFUSION TOMOGRAPHY, WITH NO   EVIDENCE FOR ISCHEMIA DURING LEXISCAN INFUSION.   2. NORMAL RESTING LEFT VENTRICULAR WALLMOTION AND WALL THICKENING.  3. LEFT VENTRICULAR EJECTION FRACTION OF  79 % WHICH IS WITHIN RANGE OF   NORMAL.     < end of copied text >    Medications:  Standing  ALBUTerol/ipratropium for Nebulization 3 milliLiter(s) Nebulizer every 6 hours  azithromycin  IVPB 500 milliGRAM(s) IV Intermittent every 24 hours  buDESOnide 160 MICROgram(s)/formoterol 4.5 MICROgram(s) Inhaler 2 Puff(s) Inhalation two times a day  chlorhexidine 4% Liquid 1 Application(s) Topical <User Schedule>  enoxaparin Injectable 40 milliGRAM(s) SubCutaneous daily  montelukast 10 milliGRAM(s) Oral daily  pantoprazole    Tablet 40 milliGRAM(s) Oral before breakfast  predniSONE   Tablet 40 milliGRAM(s) Oral daily  regadenoson Injectable 0.4 milliGRAM(s) IV Push once    PRN Meds      Case discussed with resident    Care discussed with pt/family

## 2019-02-12 NOTE — PROGRESS NOTE ADULT - ASSESSMENT
IMPRESSION:    End stage COPD currently not in exacerbation last FEV1/FVC 40%, FEV1 31% (<1L)  consistent with severe obstructive defect, Refused lung transplant evaluation  CT chest reviewed - no evidence of pneumonia, has bibasilar bronchiectasis and scarring, RUL pulmonary nodule which is stable since 2017  Chest pain relieved with Nitroglycerine need to evaluate for ischemic cardiac disease. BNP normal, troponins negative, ekg no acute changes. Stress test pending    PLAN:    ·	Oxygen therapy to keep pulse ox>92%  ·	C/w home inhalers, symbicort and spiriva, nebs prn  ·	D/c solumedrol, prednisone 40mg x 5 days  ·	Complete course of azithro  ·	Discussed goals of care - wishes to be full code  ·	DVT ppx  ·	Oupatient follow up, Please send for 6 min walk test to have done before he follows up with us in next clinic visit IMPRESSION:    End stage COPD currently not in exacerbation last FEV1/FVC 40%, FEV1 31% (<1L)  consistent with severe obstructive defect, Refused lung transplant evaluation  CT chest reviewed - no evidence of pneumonia, has bibasilar bronchiectasis and scarring unchanged from previous imaging, RUL pulmonary nodule which is stable since 2017  Chest pain relieved with Nitroglycerine need to evaluate for ischemic cardiac disease. BNP normal, troponins negative, ekg no acute changes. Stress test pending. History of overuse of prednisone as he does not appear to have much response to steroids.    PLAN:    ·	Oxygen therapy to keep pulse ox>92%  ·	C/w home inhalers, symbicort and spiriva, nebs prn  ·	D/c solumedrol, prednisone 40mg x 5 days  ·	Complete course of azithro  ·	Discussed goals of care - wishes to be full code  ·	DVT ppx  ·	Oupatient follow up, Please send for 6 min walk test to have done before he follows up with us in next clinic visit

## 2019-02-12 NOTE — PROGRESS NOTE ADULT - SUBJECTIVE AND OBJECTIVE BOX
SUBJECTIVE:    Patient is a 68y old Male who presents with a chief complaint of Generalized anterior chest pain, SOB and fever (12 Feb 2019 14:41)    Currently admitted to medicine with the primary diagnosis of Pneumonia     Today is hospital day 2d. Overnight no event. SOB controlled on 3 L NC. Denied CP, abd pain, dysuria, diarrhea/constipation.    PAST MEDICAL & SURGICAL HISTORY  Dyslipidemia  Essential hypertension  COPD (chronic obstructive pulmonary disease)  S/P transmetatarsal amputation of foot, right        ALLERGIES:  No Known Allergies    MEDICATIONS:  STANDING MEDICATIONS  ALBUTerol/ipratropium for Nebulization 3 milliLiter(s) Nebulizer every 6 hours  azithromycin  IVPB 500 milliGRAM(s) IV Intermittent every 24 hours  buDESOnide 160 MICROgram(s)/formoterol 4.5 MICROgram(s) Inhaler 2 Puff(s) Inhalation two times a day  chlorhexidine 4% Liquid 1 Application(s) Topical <User Schedule>  enoxaparin Injectable 40 milliGRAM(s) SubCutaneous daily  montelukast 10 milliGRAM(s) Oral daily  pantoprazole    Tablet 40 milliGRAM(s) Oral before breakfast  predniSONE   Tablet 40 milliGRAM(s) Oral daily  regadenoson Injectable 0.4 milliGRAM(s) IV Push once    PRN MEDICATIONS    VITALS:   T(F): 96.3  HR: 97  BP: 131/70  RR: 18  SpO2: --    LABS:                        13.6   20.58 )-----------( 442      ( 12 Feb 2019 05:54 )             42.0     02-12    143  |  101  |  23<H>  ----------------------------<  116<H>  4.6   |  26  |  1.1    Ca    9.6      12 Feb 2019 05:54  Mg     2.4     02-12    TPro  6.5  /  Alb  3.8  /  TBili  0.6  /  DBili  x   /  AST  30  /  ALT  42<H>  /  AlkPhos  77  02-12        ABG - ( 12 Feb 2019 16:24 )  pH, Arterial: 7.43  pH, Blood: x     /  pCO2: 41    /  pO2: 78    / HCO3: 27    / Base Excess: 2.8   /  SaO2: 95                    Culture - Sputum (collected 11 Feb 2019 08:26)  Source: .Sputum Sputum  Gram Stain (11 Feb 2019 23:29):    Few polymorphonuclear leukocytes per low power field    Moderate Squamous epithelial cells per low power field    Few Gram positive cocci in pairs per oil power field    Few Gram Variable Rods per oil power field  Preliminary Report (12 Feb 2019 17:11):    Normal Respiratory Shereen present    Culture - Blood (collected 11 Feb 2019 05:45)  Source: .Blood None  Preliminary Report (12 Feb 2019 16:01):    No growth to date.    Culture - Blood (collected 10 Feb 2019 00:12)  Source: .Blood Blood-Venous  Preliminary Report (11 Feb 2019 08:00):    No growth to date.          RADIOLOGY:  < from: NM Nuclear Stress Pharmacologic Multiple (02.12.19 @ 13:21) >  1. NORMAL LEXISCAN / REST MYOCARDIAL PERFUSION TOMOGRAPHY, WITH NO   EVIDENCE FOR ISCHEMIA DURING LEXISCAN INFUSION.   2. NORMAL RESTING LEFT VENTRICULAR WALLMOTION AND WALL THICKENING.  3. LEFT VENTRICULAR EJECTION FRACTION OF  79 % WHICH IS WITHIN RANGE OF   NORMAL.     < end of copied text >    PHYSICAL EXAM:  GENERAL:  NAD  SKIN: No rashes or lesions  HENT: Atraumatic. Normocephalic. PERRL. Moist membranes.  NECK: Supple, No JVD. No lymphadenopathy.  PULMONARY: Dec BS B/L. No wheezing. No rales  CVS: Normal S1, S2. Rate and Rhythm are regular. No murmurs.  ABDOMEN/GI: Soft, Nontender, Nondistended; BS present  EXTREMITIES: Peripheral pulses intact. No edema B/L LE.  NEUROLOGIC:  No motor or sensory deficit.  PSYCH: Alert & oriented x 3

## 2019-02-12 NOTE — CONSULT NOTE ADULT - SUBJECTIVE AND OBJECTIVE BOX
NILE VALDEZ  68y, Male  Allergy: No Known Allergies      HPI:  69 yo M with PMHx of COPD/Emphysema on 2 L NC (two COPD exacerbations requiring hospitalization in 2018), ex-smoker (120 pack-year), hx of intubation in 2004 for 3 days 2/2 COPD exacerbation, pulmonary nodule (10 x 8 mm, 2017), essential HTN, DLD, GERD, asthma, R foot infection s/p metatarsal amputation, presents with fever, generalized anterior chest pain, and SOB x 3 days. He been having an increased cough, productive of yellow sputum x 3 days. As per the patient, had been using his inhalers/nebulizers at home Q4H but shortness of breath continued to worsen. He had a T 101 F with development of parasternal chest pain with radiation to bilateral shoulders, pressure-like, worsened with breathing. He denied recent travel, sick contacts, abdominal pain, dysuria, bilateral lower extremity swelling, palpitations. He endorsed that he had received both pneumococcal and influenza vaccines. Of note, he mentioned that he was advised to be assessed for lung transplant at [place], but he refused. XR imaging in ED showed a LLL pneumonia, possible chronic bronchiectatic changes. Flu panel was negative. He was given IV Azithromycin 500 mg, IV Rocephin 1 g, IV Solumedrol 125 mg x 1, Albuterol x 3, Atrovent x 1.     Addendum: He had complained of pressure-like chest pain, non radiating, L parasternal, lasting seconds, while he was at rest. EKG was ordered. He notes that the same episode had happened yesterday, however, last 3 hours and he had taken 2 tablets of nitroglycerin SL. The first tablet relieved the chest pain but the second tablet did not. So he decided to take Ibuprofen 800 x 2 tablets [originally prescribed by his PMD Dr. Bolaños for back pain] which initially relieved some of the pain, but returned. He says at baseline he can only walk about 10 ft before he becomes tachypneic, short of breath and pressure-like chest pain in parasternal lasting seconds. (10 Feb 2019 08:56)    FAMILY HISTORY:  No pertinent family history in first degree relatives    PAST MEDICAL & SURGICAL HISTORY:  Dyslipidemia  Essential hypertension  COPD (chronic obstructive pulmonary disease)  S/P transmetatarsal amputation of foot, right        VITALS:  T(F): 97.4, Max: 97.4 (02-12-19 @ 04:15)  HR: 69  BP: 128/70  RR: 18Vital Signs Last 24 Hrs  T(C): 36.3 (12 Feb 2019 04:15), Max: 36.3 (12 Feb 2019 04:15)  T(F): 97.4 (12 Feb 2019 04:15), Max: 97.4 (12 Feb 2019 04:15)  HR: 69 (12 Feb 2019 04:15) (69 - 72)  BP: 128/70 (12 Feb 2019 04:15) (117/60 - 128/70)  BP(mean): --  RR: 18 (12 Feb 2019 04:15) (18 - 18)  SpO2: --    TESTS & MEASUREMENTS:                        13.6   20.58 )-----------( 442      ( 12 Feb 2019 05:54 )             42.0     02-12    143  |  101  |  23<H>  ----------------------------<  116<H>  4.6   |  26  |  1.1    Ca    9.6      12 Feb 2019 05:54  Mg     2.4     02-12    TPro  6.5  /  Alb  3.8  /  TBili  0.6  /  DBili  x   /  AST  30  /  ALT  42<H>  /  AlkPhos  77  02-12    LIVER FUNCTIONS - ( 12 Feb 2019 05:54 )  Alb: 3.8 g/dL / Pro: 6.5 g/dL / ALK PHOS: 77 U/L / ALT: 42 U/L / AST: 30 U/L / GGT: x             Culture - Sputum (collected 02-11-19 @ 08:26)  Source: .Sputum Sputum  Gram Stain (02-11-19 @ 23:29):    Few polymorphonuclear leukocytes per low power field    Moderate Squamous epithelial cells per low power field    Few Gram positive cocci in pairs per oil power field    Few Gram Variable Rods per oil power field    Culture - Blood (collected 02-10-19 @ 00:12)  Source: .Blood Blood-Venous  Preliminary Report (02-11-19 @ 08:00):    No growth to date.            RADIOLOGY & ADDITIONAL TESTS:    ANTIBIOTICS:  azithromycin  IVPB 500 milliGRAM(s) IV Intermittent every 24 hours

## 2019-02-12 NOTE — PROGRESS NOTE ADULT - SUBJECTIVE AND OBJECTIVE BOX
Patient is a 68y old  Male who presents with a chief complaint of Generalized anterior chest pain, SOB and fever (12 Feb 2019 08:03)        SUBJECTIVE:    Down in nuclear getting stress test    REVIEW OF SYSTEMS:  See HPI    PHYSICAL EXAM  Vital Signs Last 24 Hrs  T(C): 36.3 (12 Feb 2019 04:15), Max: 36.3 (12 Feb 2019 04:15)  T(F): 97.4 (12 Feb 2019 04:15), Max: 97.4 (12 Feb 2019 04:15)  HR: 69 (12 Feb 2019 04:15) (69 - 72)  BP: 128/70 (12 Feb 2019 04:15) (117/60 - 128/70)  BP(mean): --  RR: 18 (12 Feb 2019 04:15) (18 - 18)  SpO2: --    Not in room did not examine    LABS:                          13.6   20.58 )-----------( 442      ( 12 Feb 2019 05:54 )             42.0                                               02-12    143  |  101  |  23<H>  ----------------------------<  116<H>  4.6   |  26  |  1.1    Ca    9.6      12 Feb 2019 05:54  Mg     2.4     02-12    TPro  6.5  /  Alb  3.8  /  TBili  0.6  /  DBili  x   /  AST  30  /  ALT  42<H>  /  AlkPhos  77  02-12                                                 CARDIAC MARKERS ( 10 Feb 2019 11:51 )  x     / <0.01 ng/mL / 57 U/L / x     / 2.0 ng/mL                                            LIVER FUNCTIONS - ( 12 Feb 2019 05:54 )  Alb: 3.8 g/dL / Pro: 6.5 g/dL / ALK PHOS: 77 U/L / ALT: 42 U/L / AST: 30 U/L / GGT: x                                                  Culture - Sputum (collected 11 Feb 2019 08:26)  Source: .Sputum Sputum  Gram Stain (11 Feb 2019 23:29):    Few polymorphonuclear leukocytes per low power field    Moderate Squamous epithelial cells per low power field    Few Gram positive cocci in pairs per oil power field    Few Gram Variable Rods per oil power field    Culture - Blood (collected 10 Feb 2019 00:12)  Source: .Blood Blood-Venous  Preliminary Report (11 Feb 2019 08:00):    No growth to date.                                                    MEDICATIONS  (STANDING):  ALBUTerol/ipratropium for Nebulization 3 milliLiter(s) Nebulizer every 6 hours  azithromycin  IVPB 500 milliGRAM(s) IV Intermittent every 24 hours  buDESOnide 160 MICROgram(s)/formoterol 4.5 MICROgram(s) Inhaler 2 Puff(s) Inhalation two times a day  chlorhexidine 4% Liquid 1 Application(s) Topical <User Schedule>  enoxaparin Injectable 40 milliGRAM(s) SubCutaneous daily  montelukast 10 milliGRAM(s) Oral daily  pantoprazole    Tablet 40 milliGRAM(s) Oral before breakfast  predniSONE   Tablet 40 milliGRAM(s) Oral daily  regadenoson Injectable 0.4 milliGRAM(s) IV Push once    MEDICATIONS  (PRN):

## 2019-02-12 NOTE — PROGRESS NOTE ADULT - ASSESSMENT
67 yo M with PMHx of COPD/Emphysema on 2 L NC (two COPD exacerbations requiring hospitalization in 2018), ex-smoker (120 pack-year), hx of intubation in 2004 for 3 days 2/2 COPD exacerbation, pulmonary nodule (10 x 8 mm, 2017), essential HTN, DLD, GERD, asthma, R foot infection s/p metatarsal amputation, presents with fever, generalized anterior chest pain, and SOB x 3 days. He been having an increased cough, productive of yellow sputum x 3 days.    1.	Endstage COPD on home O2  2.	Ac. Bronchitis  3.	No evidence of PNA  4.	CP   5.	HTN/DL         PLAN:    ·	Care D/W the pulmonary today. Can D/C from pulmonary standpoint  ·	CT chest reviewed. No evidence of PNA. Will cont Azithromycin for bronchitis   ·	Prednisone 40 mg po daily for 5 days.  ·	Cont Nebs and Symbicort.  ·	Do ABG'S so see if pt qualifies for home O2  ·	Lexiscan negative for ischemia  ·	CE x 3 negative.  ·	Overall poor prognosis  ·	Goals of care D/W the pt. Wishes to be full code.  ·	D/C planning.    * Med rec reviewed with the intern. Plan of care D/W the pt. Time spent 40 minutes.

## 2019-02-12 NOTE — CONSULT NOTE ADULT - ASSESSMENT
69 yo M with PMHx of COPD/Emphysema on 2 L NC (two COPD exacerbations requiring hospitalization in 2018), ex-smoker (120 pack-year), hx of intubation in 2004 for 3 days 2/2 COPD exacerbation, pulmonary nodule (10 x 8 mm, 2017), essential HTN, DLD, GERD, asthma, R foot infection s/p metatarsal amputation, presents with fever, generalized anterior chest pain, and SOB x 3 days. He been having an increased cough, productive of yellow sputum x 3 days PTA.  Hospital course has been unremarkable with no progression of complaints    IMPRESSION:  Bacterial bronchitis  RVP NTD  BCx NTD  Sputa with few PMNs, mixed toni   CT with no focal consolidation  No PNA    RECOMMENDATIONS:  Azithromycin 500 mg po q24h for 7 days in all

## 2019-02-13 ENCOUNTER — TRANSCRIPTION ENCOUNTER (OUTPATIENT)
Age: 69
End: 2019-02-13

## 2019-02-13 VITALS
TEMPERATURE: 96 F | RESPIRATION RATE: 19 BRPM | DIASTOLIC BLOOD PRESSURE: 74 MMHG | SYSTOLIC BLOOD PRESSURE: 134 MMHG | HEART RATE: 95 BPM

## 2019-02-13 LAB
ALBUMIN SERPL ELPH-MCNC: 3.3 G/DL — LOW (ref 3.5–5.2)
ALP SERPL-CCNC: 59 U/L — SIGNIFICANT CHANGE UP (ref 30–115)
ALT FLD-CCNC: 37 U/L — SIGNIFICANT CHANGE UP (ref 0–41)
ANION GAP SERPL CALC-SCNC: 14 MMOL/L — SIGNIFICANT CHANGE UP (ref 7–14)
AST SERPL-CCNC: 21 U/L — SIGNIFICANT CHANGE UP (ref 0–41)
BASOPHILS # BLD AUTO: 0.1 K/UL — SIGNIFICANT CHANGE UP (ref 0–0.2)
BASOPHILS NFR BLD AUTO: 0.9 % — SIGNIFICANT CHANGE UP (ref 0–1)
BILIRUB SERPL-MCNC: 0.7 MG/DL — SIGNIFICANT CHANGE UP (ref 0.2–1.2)
BUN SERPL-MCNC: 22 MG/DL — HIGH (ref 10–20)
CALCIUM SERPL-MCNC: 8.8 MG/DL — SIGNIFICANT CHANGE UP (ref 8.5–10.1)
CHLORIDE SERPL-SCNC: 103 MMOL/L — SIGNIFICANT CHANGE UP (ref 98–110)
CO2 SERPL-SCNC: 25 MMOL/L — SIGNIFICANT CHANGE UP (ref 17–32)
CREAT SERPL-MCNC: 0.9 MG/DL — SIGNIFICANT CHANGE UP (ref 0.7–1.5)
CULTURE RESULTS: SIGNIFICANT CHANGE UP
EOSINOPHIL # BLD AUTO: 0.73 K/UL — HIGH (ref 0–0.7)
EOSINOPHIL NFR BLD AUTO: 6.8 % — SIGNIFICANT CHANGE UP (ref 0–8)
GLUCOSE SERPL-MCNC: 89 MG/DL — SIGNIFICANT CHANGE UP (ref 70–99)
HCT VFR BLD CALC: 38.4 % — LOW (ref 42–52)
HGB BLD-MCNC: 12.5 G/DL — LOW (ref 14–18)
IMM GRANULOCYTES NFR BLD AUTO: 0.7 % — HIGH (ref 0.1–0.3)
LYMPHOCYTES # BLD AUTO: 2.41 K/UL — SIGNIFICANT CHANGE UP (ref 1.2–3.4)
LYMPHOCYTES # BLD AUTO: 22.4 % — SIGNIFICANT CHANGE UP (ref 20.5–51.1)
MAGNESIUM SERPL-MCNC: 2.2 MG/DL — SIGNIFICANT CHANGE UP (ref 1.8–2.4)
MCHC RBC-ENTMCNC: 28.7 PG — SIGNIFICANT CHANGE UP (ref 27–31)
MCHC RBC-ENTMCNC: 32.6 G/DL — SIGNIFICANT CHANGE UP (ref 32–37)
MCV RBC AUTO: 88.3 FL — SIGNIFICANT CHANGE UP (ref 80–94)
MONOCYTES # BLD AUTO: 1.28 K/UL — HIGH (ref 0.1–0.6)
MONOCYTES NFR BLD AUTO: 11.9 % — HIGH (ref 1.7–9.3)
NEUTROPHILS # BLD AUTO: 6.15 K/UL — SIGNIFICANT CHANGE UP (ref 1.4–6.5)
NEUTROPHILS NFR BLD AUTO: 57.3 % — SIGNIFICANT CHANGE UP (ref 42.2–75.2)
NRBC # BLD: 0 /100 WBCS — SIGNIFICANT CHANGE UP (ref 0–0)
PLATELET # BLD AUTO: 426 K/UL — HIGH (ref 130–400)
POTASSIUM SERPL-MCNC: 4.3 MMOL/L — SIGNIFICANT CHANGE UP (ref 3.5–5)
POTASSIUM SERPL-SCNC: 4.3 MMOL/L — SIGNIFICANT CHANGE UP (ref 3.5–5)
PROT SERPL-MCNC: 5.5 G/DL — LOW (ref 6–8)
RBC # BLD: 4.35 M/UL — LOW (ref 4.7–6.1)
RBC # FLD: 14.6 % — HIGH (ref 11.5–14.5)
SODIUM SERPL-SCNC: 142 MMOL/L — SIGNIFICANT CHANGE UP (ref 135–146)
SPECIMEN SOURCE: SIGNIFICANT CHANGE UP
WBC # BLD: 10.74 K/UL — SIGNIFICANT CHANGE UP (ref 4.8–10.8)
WBC # FLD AUTO: 10.74 K/UL — SIGNIFICANT CHANGE UP (ref 4.8–10.8)

## 2019-02-13 RX ORDER — AZITHROMYCIN 500 MG/1
1 TABLET, FILM COATED ORAL
Qty: 2 | Refills: 0
Start: 2019-02-13 | End: 2019-02-14

## 2019-02-13 RX ADMIN — AZITHROMYCIN 255 MILLIGRAM(S): 500 TABLET, FILM COATED ORAL at 11:19

## 2019-02-13 RX ADMIN — ENOXAPARIN SODIUM 40 MILLIGRAM(S): 100 INJECTION SUBCUTANEOUS at 11:19

## 2019-02-13 RX ADMIN — PANTOPRAZOLE SODIUM 40 MILLIGRAM(S): 20 TABLET, DELAYED RELEASE ORAL at 06:20

## 2019-02-13 RX ADMIN — BUDESONIDE AND FORMOTEROL FUMARATE DIHYDRATE 2 PUFF(S): 160; 4.5 AEROSOL RESPIRATORY (INHALATION) at 08:52

## 2019-02-13 RX ADMIN — Medication 40 MILLIGRAM(S): at 06:20

## 2019-02-13 RX ADMIN — MONTELUKAST 10 MILLIGRAM(S): 4 TABLET, CHEWABLE ORAL at 11:19

## 2019-02-13 RX ADMIN — Medication 3 MILLILITER(S): at 07:39

## 2019-02-13 NOTE — PROGRESS NOTE ADULT - SUBJECTIVE AND OBJECTIVE BOX
NILE VALDEZ  68y Male    CHIEF COMPLAINT:    Patient is a 68y old  Male who presents with a chief complaint of Generalized anterior chest pain, SOB and fever (12 Feb 2019 18:31)      INTERVAL HPI/OVERNIGHT EVENTS:    Patient seen and examined.    ROS: All other systems are negative.    Vital Signs:    T(F): 97.4 (02-13-19 @ 05:19), Max: 97.4 (02-13-19 @ 05:19)  HR: 65 (02-13-19 @ 05:19) (65 - 97)  BP: 114/67 (02-13-19 @ 05:19) (110/61 - 131/70)  RR: 18 (02-13-19 @ 05:19) (18 - 18)  SpO2: 83% (02-13-19 @ 05:19) (83% - 83%)  I&O's Summary    Daily     Daily   CAPILLARY BLOOD GLUCOSE          PHYSICAL EXAM:    GENERAL:  NAD  SKIN: No rashes or lesions  HENT: Atrumatic. Normocephalic. PERRL. Moist membranes.  NECK: Supple, No JVD. No lymphadenopathy.  PULMONARY: CTA B/L. No wheezing. No rales  CVS: Normal S1, S2. Rate and Rythm are regular. No murmurs.  ABDOMEN/GI: Soft, Nontender, Nondistended; BS present  EXTREMITIES: Peripheral pulses intact. No edema B/L LE.  NEUROLOGIC:  No motor or sensory deficit.  PSYCH: Alert & oriented x 3    Consultant(s) Notes Reviewed:  [x ] YES  [ ] NO  Care Discussed with Consultants/Other Providers [ x] YES  [ ] NO    EKG reviewed  Telemetry reviewed    LABS:                        12.5   10.74 )-----------( 426      ( 13 Feb 2019 05:42 )             38.4     02-13    142  |  103  |  22<H>  ----------------------------<  89  4.3   |  25  |  0.9    Ca    8.8      13 Feb 2019 05:42  Mg     2.2     02-13    TPro  5.5<L>  /  Alb  3.3<L>  /  TBili  0.7  /  DBili  x   /  AST  21  /  ALT  37  /  AlkPhos  59  02-13      Serum Pro-Brain Natriuretic Peptide: 124 pg/mL (02-10-19 @ 00:12)    Trop <0.01, CKMB 2.0, CK 57, 02-10-19 @ 11:51      Culture - Sputum (collected 11 Feb 2019 08:26)  Source: .Sputum Sputum  Gram Stain (11 Feb 2019 23:29):    Few polymorphonuclear leukocytes per low power field    Moderate Squamous epithelial cells per low power field    Few Gram positive cocci in pairs per oil power field    Few Gram Variable Rods per oil power field  Preliminary Report (12 Feb 2019 17:11):    Normal Respiratory Shereen present    Culture - Blood (collected 11 Feb 2019 05:45)  Source: .Blood None  Preliminary Report (12 Feb 2019 16:01):    No growth to date.        RADIOLOGY & ADDITIONAL TESTS:      Imaging or report Personally Reviewed:  [ ] YES  [ ] NO    Medications:  Standing  ALBUTerol/ipratropium for Nebulization 3 milliLiter(s) Nebulizer every 6 hours  azithromycin  IVPB 500 milliGRAM(s) IV Intermittent every 24 hours  buDESOnide 160 MICROgram(s)/formoterol 4.5 MICROgram(s) Inhaler 2 Puff(s) Inhalation two times a day  chlorhexidine 4% Liquid 1 Application(s) Topical <User Schedule>  enoxaparin Injectable 40 milliGRAM(s) SubCutaneous daily  montelukast 10 milliGRAM(s) Oral daily  pantoprazole    Tablet 40 milliGRAM(s) Oral before breakfast  predniSONE   Tablet 40 milliGRAM(s) Oral daily  regadenoson Injectable 0.4 milliGRAM(s) IV Push once    PRN Meds      Case discussed with resident    Care discussed with pt/family NILE VALDEZ  68y Male    CHIEF COMPLAINT:    Patient is a 68y old  Male who presents with a chief complaint of Generalized anterior chest pain, SOB and fever (12 Feb 2019 18:31)      INTERVAL HPI/OVERNIGHT EVENTS:    Patient seen and examined. Feels much better. No cough. No fever. Dropped his O2 saturation to 83% on exertion as per RN    ROS: All other systems are negative.    Vital Signs:    T(F): 97.4 (02-13-19 @ 05:19), Max: 97.4 (02-13-19 @ 05:19)  HR: 65 (02-13-19 @ 05:19) (65 - 97)  BP: 114/67 (02-13-19 @ 05:19) (110/61 - 131/70)  RR: 18 (02-13-19 @ 05:19) (18 - 18)  SpO2: 83% (02-13-19 @ 05:19) (83% - 83%)  I&O's Summary    Daily     Daily   CAPILLARY BLOOD GLUCOSE          PHYSICAL EXAM:    GENERAL:  NAD  SKIN: No rashes or lesions  HENT: Atrumatic. Normocephalic. PERRL. Moist membranes.  NECK: Supple, No JVD. No lymphadenopathy.  PULMONARY: BS are decreased remarkably B/L. No wheezing. No rales  CVS: Normal S1, S2. Rate and Rhythm are regular. No murmurs.  ABDOMEN/GI: Soft, Nontender, Nondistended; BS present  EXTREMITIES: Peripheral pulses intact. No edema B/L LE.  NEUROLOGIC:  No motor or sensory deficit.  PSYCH: Alert & oriented x 3    Consultant(s) Notes Reviewed:  [x ] YES  [ ] NO  Care Discussed with Consultants/Other Providers [ x] YES  [ ] NO    EKG reviewed  Telemetry reviewed    LABS:                        12.5   10.74 )-----------( 426      ( 13 Feb 2019 05:42 )             38.4     02-13    142  |  103  |  22<H>  ----------------------------<  89  4.3   |  25  |  0.9    Ca    8.8      13 Feb 2019 05:42  Mg     2.2     02-13    TPro  5.5<L>  /  Alb  3.3<L>  /  TBili  0.7  /  DBili  x   /  AST  21  /  ALT  37  /  AlkPhos  59  02-13      Serum Pro-Brain Natriuretic Peptide: 124 pg/mL (02-10-19 @ 00:12)    Trop <0.01, CKMB 2.0, CK 57, 02-10-19 @ 11:51      Culture - Sputum (collected 11 Feb 2019 08:26)  Source: .Sputum Sputum  Gram Stain (11 Feb 2019 23:29):    Few polymorphonuclear leukocytes per low power field    Moderate Squamous epithelial cells per low power field    Few Gram positive cocci in pairs per oil power field    Few Gram Variable Rods per oil power field  Preliminary Report (12 Feb 2019 17:11):    Normal Respiratory Shereen present    Culture - Blood (collected 11 Feb 2019 05:45)  Source: .Blood None  Preliminary Report (12 Feb 2019 16:01):    No growth to date.        RADIOLOGY & ADDITIONAL TESTS:      Imaging or report Personally Reviewed:  [ ] YES  [ ] NO    Medications:  Standing  ALBUTerol/ipratropium for Nebulization 3 milliLiter(s) Nebulizer every 6 hours  azithromycin  IVPB 500 milliGRAM(s) IV Intermittent every 24 hours  buDESOnide 160 MICROgram(s)/formoterol 4.5 MICROgram(s) Inhaler 2 Puff(s) Inhalation two times a day  chlorhexidine 4% Liquid 1 Application(s) Topical <User Schedule>  enoxaparin Injectable 40 milliGRAM(s) SubCutaneous daily  montelukast 10 milliGRAM(s) Oral daily  pantoprazole    Tablet 40 milliGRAM(s) Oral before breakfast  predniSONE   Tablet 40 milliGRAM(s) Oral daily  regadenoson Injectable 0.4 milliGRAM(s) IV Push once    PRN Meds      Case discussed with resident    Care discussed with pt/family

## 2019-02-13 NOTE — DISCHARGE NOTE ADULT - MEDICATION SUMMARY - MEDICATIONS TO STOP TAKING
I will STOP taking the medications listed below when I get home from the hospital:    cefpodoxime 200 mg oral tablet  -- 1 tab(s) by mouth 2 times a day   -- Finish all this medication unless otherwise directed by prescriber.  Take with food or milk.    predniSONE 50 mg oral tablet  -- 1 tab(s) by mouth once a day   -- It is very important that you take or use this exactly as directed.  Do not skip doses or discontinue unless directed by your doctor.  Obtain medical advice before taking any non-prescription drugs as some may affect the action of this medication.  Take with food or milk.

## 2019-02-13 NOTE — DISCHARGE NOTE ADULT - PLAN OF CARE
Treat a For COPD exacerbation possibly from acute bronchitis, you were treated with prednisone and azithromycin (continue for 2 more days for total of 5 days), duonebs, montelukast, and symbicort. You did not have the flu or pneumonia. Take medications as prescribed.  Follow up with pulmonologist Dr. Coates and primary care Dr. Bolaños within 1 week.  Please set up 6 min walk test before seeing Dr. Coates.  Your oxygen saturation is 94% on room air at rest. You have COPD and therefore need home O2. You were tested in a chronic, stable state. Oxygen saturation is 83% room air upon ambulation. Oxygen saturation is 91% on 2 LPM via nasal cannula.    Return to emergency room if worsening symptoms Monitor Chest pain is not from heart attack. Lexiscan stress test was negative for ischemia. Troponin blood work were negative. EKG was normal. Follow up with cardiologist or primary care doctor. For COPD exacerbation possibly from acute bronchitis, you were treated with prednisone and azithromycin (continue for 2 more days for total of 5 days), duonebs, montelukast, and symbicort. You did not have the flu or pneumonia. Take medications as prescribed.  Follow up with pulmonologist Dr. Coates and primary care Dr. Bolaños within 1 week.  Please set up 6 min walk test before seeing Dr. Coates.  Your oxygen saturation is 94% on room air at rest. You have COPD and therefore need home O2. You were tested in a chronic, stable state. Oxygen saturation is 83% room air upon ambulation. Oxygen saturation is 91% on 2 LPM via nasal cannula with ambulation.    Return to emergency room if worsening symptoms

## 2019-02-13 NOTE — DISCHARGE NOTE ADULT - MEDICATION SUMMARY - MEDICATIONS TO TAKE
I will START or STAY ON the medications listed below when I get home from the hospital:    predniSONE 20 mg oral tablet  -- 2 tab(s) by mouth once a day for 2 more days  -- Indication: For COPD exacerbation    Spiriva 18 mcg inhalation capsule  -- 1 cap(s) inhaled once a day  -- Indication: For COPD    Ventolin HFA 90 mcg/inh inhalation aerosol  -- 2 puff(s) inhaled 4 times a day, As Needed  -- Indication: For COPD    Symbicort 160 mcg-4.5 mcg/inh inhalation aerosol  -- 2 puff(s) inhaled 2 times a day  -- Indication: For COPD    montelukast 10 mg oral tablet  -- 1 tab(s) by mouth once a day  -- Indication: For COPD    azithromycin 250 mg oral tablet  -- 1 tab(s) by mouth once a day for 2 days  -- Do not take dairy products, antacids, or iron preparations within one hour of this medication.  Finish all this medication unless otherwise directed by prescriber.    -- Indication: For COPD, bronchitis

## 2019-02-13 NOTE — PROGRESS NOTE ADULT - ASSESSMENT
69 yo M with PMHx of COPD/Emphysema on 2 L NC (two COPD exacerbations requiring hospitalization in 2018), ex-smoker (120 pack-year), hx of intubation in 2004 for 3 days 2/2 COPD exacerbation, pulmonary nodule (10 x 8 mm, 2017), essential HTN, DLD, GERD, asthma, R foot infection s/p metatarsal amputation, presents with fever, generalized anterior chest pain, and SOB x 3 days. He been having an increased cough, productive of yellow sputum x 3 days.    1.	Endstage COPD on home O2  2.	Ac. Bronchitis  3.	No evidence of PNA  4.	CP   5.	HTN/DL         PLAN:    ·	As per RN pt dropped his O2 saturation to 83% on exertion. Will arrange home O2 and D/C him home.  ·	Care D/W the pulmonary . Can D/C from pulmonary standpoint  ·	CT chest reviewed. No evidence of PNA. Will cont Azithromycin for bronchitis   ·	Prednisone 40 mg po daily for 5 days.  ·	Cont Nebs and Symbicort.  ·	Do ABG'S so see if pt qualifies for home O2  ·	Lexiscan negative for ischemia  ·	CE x 3 negative.  ·	Overall poor prognosis  ·	Goals of care D/W the pt. Wishes to be full code.  ·	D/C planning.    * Med rec reviewed with the intern. Plan of care D/W the pt. Time spent 40 minutes. 67 yo M with PMHx of COPD/Emphysema on 2 L NC (two COPD exacerbations requiring hospitalization in 2018), ex-smoker (120 pack-year), hx of intubation in 2004 for 3 days 2/2 COPD exacerbation, pulmonary nodule (10 x 8 mm, 2017), essential HTN, DLD, GERD, asthma, R foot infection s/p metatarsal amputation, presents with fever, generalized anterior chest pain, and SOB x 3 days. He been having an increased cough, productive of yellow sputum x 3 days.    1.	Endstage COPD on home O2  2.	Cachexia due to #1  3.	Ac. Bronchitis  4.	No evidence of PNA  5.	CP   6.	HTN/DL         PLAN:    ·	As per RN pt dropped his O2 saturation to 83% on exertion. Will arrange home O2 and D/C him home.  ·	Care D/W the pulmonary . Can D/C from pulmonary standpoint  ·	CT chest reviewed. No evidence of PNA. Will cont Azithromycin for bronchitis   ·	Prednisone 40 mg po daily for 5 days.  ·	Cont Nebs and Symbicort.  ·	Lexiscan negative for ischemia  ·	CE x 3 negative.  ·	Overall poor prognosis  ·	Goals of care D/W the pt. Wishes to be full code.  ·	D/C planning    * Med rec reviewed with the intern. Plan of care D/W the pt. Time spent 40 minutes.

## 2019-02-13 NOTE — DISCHARGE NOTE ADULT - PROVIDER TOKENS
PROVIDER:[TOKEN:[[id number]]],FREE:[LAST:[Mami],FIRST:[Marlin],PHONE:[phone]],FAX:[(   )    -]]

## 2019-02-13 NOTE — DISCHARGE NOTE ADULT - FUNCTIONAL SCREEN CURRENT LEVEL: AMBULATION, MLM
I will STOP taking the medications listed below when I get home from the hospital:  None 0 = independent

## 2019-02-13 NOTE — DISCHARGE NOTE ADULT - HOSPITAL COURSE
67 yo M PMH COPD/Emphysema on 2 L NC (two COPD exacerbations requiring hospitalization in Jan and Feb 2018), ex-smoker (120 pack-year), hx of intubation in 2004 for 3 days 2/2 COPD exacerbation, lung transplant candidate, pulmonary nodule (10 x 8 mm, 2017), essential HTN, DLD, GERD, asthma, R foot infection s/p metatarsal amputation, p/w fever, generalized anterior chest pain, SOB, cough with productive yellow sputum x 3 days.     #) Endstage COPD, acute bronchitis- Improved  - Hx intubation due to COPD exacerbation (2004), refused lung transplantation (2018)  -Pulm recs maggie  - Currently on BiPAP PRN, does not use bipap at home, on 2 L NC at Home   - c/w Symbicort, Duonebs, tiotropium  -CT chest- Secretions within central airways with areas of distal bronchiectasis. Minor areas of patchy reticulonodular opacities within anterior BERENICE and b/l lower lobes, improved since 2017. C/w chronic small airways disease.  -No sign of PNA on CT chest- zosyn was stopped continue azithro 5 days  - prednisone 40mg x 5 days (Day 2)  - BCx NGTD, Sputum cx normal resp toni. MRSA nares, flu, RSV neg  -6 min walk test to have done before he f/u pulm outpt  -Pt O2 sat 94% RA at rest. Pt hs COPD and therefore needs home O2. Pt is aware he will be going home on oxygen. Pt was tested in a chronic, stable state. Pt O2 sat is 83% RA upon ambulation. O2 sat is 91% on 2 LPM via NC.      #) Atypical chest pain- resolved  - CE negative x 2  - EKG NSR   - Lexiscan normal      #) RUL Pulmonary Nodule Stable  - Stable on CT chest- 10 x 8 mm, had underwent negative PET scan (2017)    #) DVT ppx: Lovenox 40 mg QD  #) GI ppx: PO Protonix 40 mg QD    #) Diet: Low Sodium  #) Activity: Ambulate as tolerated    #) Dispo: From Home, lives with daughter, has a HHA (7 days/week + 7 hrs/day) 67 yo M PMH COPD/Emphysema on 2 L NC (two COPD exacerbations requiring hospitalization in Jan and Feb 2018), ex-smoker (120 pack-year), hx of intubation in 2004 for 3 days 2/2 COPD exacerbation, lung transplant candidate, pulmonary nodule (10 x 8 mm, 2017), essential HTN, DLD, GERD, asthma, R foot infection s/p metatarsal amputation, p/w fever, generalized anterior chest pain, SOB, cough with productive yellow sputum x 3 days.     #) Endstage COPD, acute bronchitis- Improved  - Hx intubation due to COPD exacerbation (2004), refused lung transplantation (2018)  -Pulm recs maggie  - Currently on BiPAP PRN, does not use bipap at home, on 2 L NC at Home   - c/w Symbicort, Duonebs, tiotropium  -CT chest- Secretions within central airways with areas of distal bronchiectasis. Minor areas of patchy reticulonodular opacities within anterior BERENICE and b/l lower lobes, improved since 2017. C/w chronic small airways disease.  -No sign of PNA on CT chest- zosyn was stopped continue azithro 5 days  - prednisone 40mg x 5 days   - BCx NGTD, Sputum cx normal resp toni. MRSA nares, flu, RSV neg  -6 min walk test to have done before he f/u pulm outpt  -Pt O2 sat 94% RA at rest. Pt hs COPD and therefore needs home O2. Pt is aware he will be going home on oxygen. Pt was tested in a chronic, stable state. Pt O2 sat is 83% RA upon ambulation. O2 sat is 91% on 2 LPM via NC.      #) Atypical chest pain- resolved  - CE negative x 2  - EKG NSR   - Lexiscan normal      #) RUL Pulmonary Nodule Stable  - Stable on CT chest- 10 x 8 mm, had underwent negative PET scan (2017)    Pt will be discharged home with home O2. 67 yo M PMH COPD/Emphysema on 2 L NC (two COPD exacerbations requiring hospitalization in Jan and Feb 2018), ex-smoker (120 pack-year), hx of intubation in 2004 for 3 days 2/2 COPD exacerbation, lung transplant candidate, pulmonary nodule (10 x 8 mm, 2017), essential HTN, DLD, GERD, asthma, R foot infection s/p metatarsal amputation, p/w fever, generalized anterior chest pain, SOB, cough with productive yellow sputum x 3 days.     #) Endstage COPD, acute bronchitis- Improved  - Hx intubation due to COPD exacerbation (2004), refused lung transplantation (2018)  -Pulm recs maggie  - Currently on BiPAP PRN, does not use bipap at home, on 2 L NC at Home   - c/w Symbicort, Duonebs, tiotropium  -CT chest- Secretions within central airways with areas of distal bronchiectasis. Minor areas of patchy reticulonodular opacities within anterior BERENICE and b/l lower lobes, improved since 2017. C/w chronic small airways disease.  -No sign of PNA on CT chest- zosyn was stopped continue azithro 5 days  - prednisone 40mg x 5 days   - BCx NGTD, Sputum cx normal resp toni. MRSA nares, flu, RSV neg  -6 min walk test to have done before he f/u pulm outpt  -Pt O2 sat 94% RA at rest. Pt hs COPD and therefore needs home O2. Pt is aware he will be going home on oxygen. Pt was tested in a chronic, stable state. Pt O2 sat is 83% RA upon ambulation. O2 sat is 91% on 2 LPM via NC.      #) Atypical chest pain- resolved  - CE negative x 2  - EKG NSR   - Lexiscan normal      #) RUL Pulmonary Nodule Stable  - Stable on CT chest- 10 x 8 mm, had underwent negative PET scan (2017)    Pt will be discharged home with home O2.    -Despite steroids, azithromycin, inhalers, pt still hypoxic. Pt O2 sat 94% RA at rest. Pt hs COPD and therefore needs home O2. Pt is aware he will be going home on oxygen. Pt was tested in a chronic, stable state. Pt O2 sat is 83% RA with ambulation. O2 sat is 91% on 2 LPM via NC with ambulation.

## 2019-02-13 NOTE — DISCHARGE NOTE ADULT - MEDICATION SUMMARY - MEDICATIONS TO CHANGE
I will SWITCH the dose or number of times a day I take the medications listed below when I get home from the hospital:    predniSONE 10 mg oral tablet  -- 4 tab(s) by mouth once a day MDD:Take 4 tabs daily  for 3 days, then take 2 tabs daily for 3 days, then take 1 tab daily for 3 days, then stop  -- It is very important that you take or use this exactly as directed.  Do not skip doses or discontinue unless directed by your doctor.  Obtain medical advice before taking any non-prescription drugs as some may affect the action of this medication.  Take with food or milk.

## 2019-02-13 NOTE — DISCHARGE NOTE ADULT - CARE PROVIDER_API CALL
Niels Meneses (MD; YANG)  Critical Care Medicine; Internal Medicine; Pulmonary Disease  [address] [place] [address]  Phone: [phone]  Fax: [phone]  Follow Up Time:     Marlin Bolaños  Phone: [phone]  Fax: (   )    -  Follow Up Time:

## 2019-02-13 NOTE — DISCHARGE NOTE ADULT - PATIENT PORTAL LINK FT
You can access the [facility] Patient Portal, offered by [facility], by registering with the following website: [email or web address]

## 2019-02-13 NOTE — DISCHARGE NOTE ADULT - CARE PLAN
Principal Discharge DX:	COPD exacerbation  Goal:	Treat  Assessment and plan of treatment:	a  Secondary Diagnosis:	Chest pain, unspecified type Principal Discharge DX:	COPD exacerbation  Goal:	Treat  Assessment and plan of treatment:	For COPD exacerbation possibly from acute bronchitis, you were treated with prednisone and azithromycin (continue for 2 more days for total of 5 days), duonebs, montelukast, and symbicort. You did not have the flu or pneumonia. Take medications as prescribed.  Follow up with pulmonologist Dr. Coates and primary care Dr. Bolaños within 1 week.  Please set up 6 min walk test before seeing Dr. Coates.  Your oxygen saturation is 94% on room air at rest. You have COPD and therefore need home O2. You were tested in a chronic, stable state. Oxygen saturation is 83% room air upon ambulation. Oxygen saturation is 91% on 2 LPM via nasal cannula.    Return to emergency room if worsening symptoms  Secondary Diagnosis:	Chest pain, unspecified type  Goal:	Monitor  Assessment and plan of treatment:	Chest pain is not from heart attack. Lexiscan stress test was negative for ischemia. Troponin blood work were negative. EKG was normal. Follow up with cardiologist or primary care doctor. Principal Discharge DX:	COPD exacerbation  Goal:	Treat  Assessment and plan of treatment:	For COPD exacerbation possibly from acute bronchitis, you were treated with prednisone and azithromycin (continue for 2 more days for total of 5 days), duonebs, montelukast, and symbicort. You did not have the flu or pneumonia. Take medications as prescribed.  Follow up with pulmonologist Dr. Coates and primary care Dr. Bolaños within 1 week.  Please set up 6 min walk test before seeing Dr. Coates.  Your oxygen saturation is 94% on room air at rest. You have COPD and therefore need home O2. You were tested in a chronic, stable state. Oxygen saturation is 83% room air upon ambulation. Oxygen saturation is 91% on 2 LPM via nasal cannula with ambulation.    Return to emergency room if worsening symptoms  Secondary Diagnosis:	Chest pain, unspecified type  Goal:	Monitor  Assessment and plan of treatment:	Chest pain is not from heart attack. Lexiscan stress test was negative for ischemia. Troponin blood work were negative. EKG was normal. Follow up with cardiologist or primary care doctor.

## 2019-02-15 LAB
CULTURE RESULTS: SIGNIFICANT CHANGE UP
SPECIMEN SOURCE: SIGNIFICANT CHANGE UP

## 2019-02-16 LAB
CULTURE RESULTS: SIGNIFICANT CHANGE UP
SPECIMEN SOURCE: SIGNIFICANT CHANGE UP

## 2019-02-26 ENCOUNTER — CHART COPY (OUTPATIENT)
Age: 69
End: 2019-02-26

## 2019-02-27 DIAGNOSIS — Z89.431 ACQUIRED ABSENCE OF RIGHT FOOT: ICD-10-CM

## 2019-02-27 DIAGNOSIS — J20.9 ACUTE BRONCHITIS, UNSPECIFIED: ICD-10-CM

## 2019-02-27 DIAGNOSIS — R91.1 SOLITARY PULMONARY NODULE: ICD-10-CM

## 2019-02-27 DIAGNOSIS — Z87.891 PERSONAL HISTORY OF NICOTINE DEPENDENCE: ICD-10-CM

## 2019-02-27 DIAGNOSIS — K21.9 GASTRO-ESOPHAGEAL REFLUX DISEASE WITHOUT ESOPHAGITIS: ICD-10-CM

## 2019-02-27 DIAGNOSIS — J44.0 CHRONIC OBSTRUCTIVE PULMONARY DISEASE WITH (ACUTE) LOWER RESPIRATORY INFECTION: ICD-10-CM

## 2019-02-27 DIAGNOSIS — I10 ESSENTIAL (PRIMARY) HYPERTENSION: ICD-10-CM

## 2019-02-27 DIAGNOSIS — R09.02 HYPOXEMIA: ICD-10-CM

## 2019-02-27 DIAGNOSIS — J44.1 CHRONIC OBSTRUCTIVE PULMONARY DISEASE WITH (ACUTE) EXACERBATION: ICD-10-CM

## 2019-02-27 DIAGNOSIS — R07.89 OTHER CHEST PAIN: ICD-10-CM

## 2019-02-27 DIAGNOSIS — R64 CACHEXIA: ICD-10-CM

## 2019-02-27 DIAGNOSIS — E78.5 HYPERLIPIDEMIA, UNSPECIFIED: ICD-10-CM

## 2019-02-27 DIAGNOSIS — Z76.82 AWAITING ORGAN TRANSPLANT STATUS: ICD-10-CM

## 2019-02-27 DIAGNOSIS — Z99.81 DEPENDENCE ON SUPPLEMENTAL OXYGEN: ICD-10-CM
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
## 2019-12-19 NOTE — ED PROVIDER NOTE - TOBACCO USE
Quality 110: Preventive Care And Screening: Influenza Immunization: Influenza Immunization not Administered for Documented Reasons.
Detail Level: Zone
Former smoker

## 2020-06-26 NOTE — ED ADULT NURSE NOTE - DISCHARGE DATE/TIME
Requested Prescriptions     Signed Prescriptions Disp Refills    atorvastatin (LIPITOR) 80 mg tablet 90 Tab 2     Sig: Take 1 Tab by mouth nightly. Authorizing Provider: Cherry Gomez     Ordering User: Gilberto Mohamud     Refill per verbal order Dr. Selma Redman. 12-Feb-2018 19:22

## 2021-05-25 NOTE — ED PROVIDER NOTE - CARE PLAN
Principal Discharge DX:	Pneumonia  Secondary Diagnosis:	COPD (chronic obstructive pulmonary disease) Mucosal Advancement Flap Text: Given the location of the defect, shape of the defect and the proximity to free margins a mucosal advancement flap was deemed most appropriate. Incisions were made with a 15 blade scalpel in the appropriate fashion along the cutaneous vermilion border and the mucosal lip. The remaining actinically damaged mucosal tissue was excised.  The mucosal advancement flap was then elevated to the gingival sulcus with care taken to preserve the neurovascular structures and advanced into the primary defect. Care was taken to ensure that precise realignment of the vermilion border was achieved.

## 2022-01-01 ENCOUNTER — INPATIENT (INPATIENT)
Facility: HOSPITAL | Age: 72
LOS: 19 days | Discharge: HOME HEALTH | End: 2022-08-19
Attending: INTERNAL MEDICINE | Admitting: INTERNAL MEDICINE

## 2022-01-01 ENCOUNTER — APPOINTMENT (OUTPATIENT)
Dept: CARE COORDINATION | Facility: HOME HEALTH | Age: 72
End: 2022-01-01

## 2022-01-01 ENCOUNTER — TRANSCRIPTION ENCOUNTER (OUTPATIENT)
Age: 72
End: 2022-01-01

## 2022-01-01 ENCOUNTER — INPATIENT (INPATIENT)
Facility: HOSPITAL | Age: 72
LOS: 4 days | End: 2022-09-01
Attending: INTERNAL MEDICINE | Admitting: INTERNAL MEDICINE

## 2022-01-01 VITALS
DIASTOLIC BLOOD PRESSURE: 90 MMHG | HEART RATE: 130 BPM | RESPIRATION RATE: 26 BRPM | OXYGEN SATURATION: 97 % | SYSTOLIC BLOOD PRESSURE: 120 MMHG

## 2022-01-01 VITALS
DIASTOLIC BLOOD PRESSURE: 74 MMHG | HEART RATE: 93 BPM | OXYGEN SATURATION: 96 % | RESPIRATION RATE: 26 BRPM | TEMPERATURE: 97 F | SYSTOLIC BLOOD PRESSURE: 109 MMHG

## 2022-01-01 VITALS
SYSTOLIC BLOOD PRESSURE: 137 MMHG | RESPIRATION RATE: 28 BRPM | HEIGHT: 64 IN | DIASTOLIC BLOOD PRESSURE: 80 MMHG | HEART RATE: 127 BPM | WEIGHT: 139.99 LBS | TEMPERATURE: 100 F | OXYGEN SATURATION: 100 %

## 2022-01-01 VITALS — OXYGEN SATURATION: 80 %

## 2022-01-01 DIAGNOSIS — D72.829 ELEVATED WHITE BLOOD CELL COUNT, UNSPECIFIED: ICD-10-CM

## 2022-01-01 DIAGNOSIS — J43.9 EMPHYSEMA, UNSPECIFIED: ICD-10-CM

## 2022-01-01 DIAGNOSIS — Q79.0 CONGENITAL DIAPHRAGMATIC HERNIA: ICD-10-CM

## 2022-01-01 DIAGNOSIS — Z89.421 ACQUIRED ABSENCE OF OTHER RIGHT TOE(S): ICD-10-CM

## 2022-01-01 DIAGNOSIS — E63.9 NUTRITIONAL DEFICIENCY, UNSPECIFIED: ICD-10-CM

## 2022-01-01 DIAGNOSIS — K21.9 GASTRO-ESOPHAGEAL REFLUX DISEASE WITHOUT ESOPHAGITIS: ICD-10-CM

## 2022-01-01 DIAGNOSIS — J96.02 ACUTE RESPIRATORY FAILURE WITH HYPERCAPNIA: ICD-10-CM

## 2022-01-01 DIAGNOSIS — E66.9 OBESITY, UNSPECIFIED: ICD-10-CM

## 2022-01-01 DIAGNOSIS — Z99.89 DEPENDENCE ON OTHER ENABLING MACHINES AND DEVICES: ICD-10-CM

## 2022-01-01 DIAGNOSIS — Z79.51 LONG TERM (CURRENT) USE OF INHALED STEROIDS: ICD-10-CM

## 2022-01-01 DIAGNOSIS — F17.200 NICOTINE DEPENDENCE, UNSPECIFIED, UNCOMPLICATED: ICD-10-CM

## 2022-01-01 DIAGNOSIS — E78.5 HYPERLIPIDEMIA, UNSPECIFIED: ICD-10-CM

## 2022-01-01 DIAGNOSIS — J96.21 ACUTE AND CHRONIC RESPIRATORY FAILURE WITH HYPOXIA: ICD-10-CM

## 2022-01-01 DIAGNOSIS — J44.1 CHRONIC OBSTRUCTIVE PULMONARY DISEASE WITH (ACUTE) EXACERBATION: ICD-10-CM

## 2022-01-01 DIAGNOSIS — A41.9 SEPSIS, UNSPECIFIED ORGANISM: ICD-10-CM

## 2022-01-01 DIAGNOSIS — I10 ESSENTIAL (PRIMARY) HYPERTENSION: ICD-10-CM

## 2022-01-01 DIAGNOSIS — E87.5 HYPERKALEMIA: ICD-10-CM

## 2022-01-01 DIAGNOSIS — R91.8 OTHER NONSPECIFIC ABNORMAL FINDING OF LUNG FIELD: ICD-10-CM

## 2022-01-01 DIAGNOSIS — Z66 DO NOT RESUSCITATE: ICD-10-CM

## 2022-01-01 DIAGNOSIS — E43 UNSPECIFIED SEVERE PROTEIN-CALORIE MALNUTRITION: ICD-10-CM

## 2022-01-01 DIAGNOSIS — J45.909 UNSPECIFIED ASTHMA, UNCOMPLICATED: ICD-10-CM

## 2022-01-01 DIAGNOSIS — R06.00 DYSPNEA, UNSPECIFIED: ICD-10-CM

## 2022-01-01 DIAGNOSIS — Z20.822 CONTACT WITH AND (SUSPECTED) EXPOSURE TO COVID-19: ICD-10-CM

## 2022-01-01 DIAGNOSIS — R79.89 OTHER SPECIFIED ABNORMAL FINDINGS OF BLOOD CHEMISTRY: ICD-10-CM

## 2022-01-01 DIAGNOSIS — M85.80 OTHER SPECIFIED DISORDERS OF BONE DENSITY AND STRUCTURE, UNSPECIFIED SITE: ICD-10-CM

## 2022-01-01 DIAGNOSIS — Z71.89 OTHER SPECIFIED COUNSELING: ICD-10-CM

## 2022-01-01 DIAGNOSIS — J69.0 PNEUMONITIS DUE TO INHALATION OF FOOD AND VOMIT: ICD-10-CM

## 2022-01-01 DIAGNOSIS — Z89.431 ACQUIRED ABSENCE OF RIGHT FOOT: Chronic | ICD-10-CM

## 2022-01-01 DIAGNOSIS — K76.89 OTHER SPECIFIED DISEASES OF LIVER: ICD-10-CM

## 2022-01-01 DIAGNOSIS — Z74.01 BED CONFINEMENT STATUS: ICD-10-CM

## 2022-01-01 DIAGNOSIS — R64 CACHEXIA: ICD-10-CM

## 2022-01-01 DIAGNOSIS — J44.9 CHRONIC OBSTRUCTIVE PULMONARY DISEASE, UNSPECIFIED: ICD-10-CM

## 2022-01-01 DIAGNOSIS — J98.11 ATELECTASIS: ICD-10-CM

## 2022-01-01 DIAGNOSIS — Z51.5 ENCOUNTER FOR PALLIATIVE CARE: ICD-10-CM

## 2022-01-01 DIAGNOSIS — J18.9 PNEUMONIA, UNSPECIFIED ORGANISM: ICD-10-CM

## 2022-01-01 DIAGNOSIS — Z79.52 LONG TERM (CURRENT) USE OF SYSTEMIC STEROIDS: ICD-10-CM

## 2022-01-01 DIAGNOSIS — R65.21 SEVERE SEPSIS WITH SEPTIC SHOCK: ICD-10-CM

## 2022-01-01 DIAGNOSIS — Z99.81 DEPENDENCE ON SUPPLEMENTAL OXYGEN: ICD-10-CM

## 2022-01-01 DIAGNOSIS — D18.03 HEMANGIOMA OF INTRA-ABDOMINAL STRUCTURES: ICD-10-CM

## 2022-01-01 DIAGNOSIS — T38.0X5A ADVERSE EFFECT OF GLUCOCORTICOIDS AND SYNTHETIC ANALOGUES, INITIAL ENCOUNTER: ICD-10-CM

## 2022-01-01 DIAGNOSIS — G47.33 OBSTRUCTIVE SLEEP APNEA (ADULT) (PEDIATRIC): ICD-10-CM

## 2022-01-01 DIAGNOSIS — D72.10 EOSINOPHILIA, UNSPECIFIED: ICD-10-CM

## 2022-01-01 DIAGNOSIS — Z87.891 PERSONAL HISTORY OF NICOTINE DEPENDENCE: ICD-10-CM

## 2022-01-01 DIAGNOSIS — Z89.431 ACQUIRED ABSENCE OF RIGHT FOOT: ICD-10-CM

## 2022-01-01 DIAGNOSIS — E87.2 ACIDOSIS: ICD-10-CM

## 2022-01-01 DIAGNOSIS — E11.9 TYPE 2 DIABETES MELLITUS WITHOUT COMPLICATIONS: ICD-10-CM

## 2022-01-01 DIAGNOSIS — K83.1 OBSTRUCTION OF BILE DUCT: ICD-10-CM

## 2022-01-01 DIAGNOSIS — E80.7 DISORDER OF BILIRUBIN METABOLISM, UNSPECIFIED: ICD-10-CM

## 2022-01-01 DIAGNOSIS — E83.42 HYPOMAGNESEMIA: ICD-10-CM

## 2022-01-01 DIAGNOSIS — Z99.3 DEPENDENCE ON WHEELCHAIR: ICD-10-CM

## 2022-01-01 LAB
A1C WITH ESTIMATED AVERAGE GLUCOSE RESULT: 6.5 % — HIGH (ref 4–5.6)
A1C WITH ESTIMATED AVERAGE GLUCOSE RESULT: 7.3 % — HIGH (ref 4–5.6)
ALBUMIN SERPL ELPH-MCNC: 2.8 G/DL — LOW (ref 3.5–5.2)
ALBUMIN SERPL ELPH-MCNC: 2.8 G/DL — LOW (ref 3.5–5.2)
ALBUMIN SERPL ELPH-MCNC: 3.3 G/DL — LOW (ref 3.5–5.2)
ALBUMIN SERPL ELPH-MCNC: 3.4 G/DL — LOW (ref 3.5–5.2)
ALBUMIN SERPL ELPH-MCNC: 3.5 G/DL — SIGNIFICANT CHANGE UP (ref 3.5–5.2)
ALBUMIN SERPL ELPH-MCNC: 3.5 G/DL — SIGNIFICANT CHANGE UP (ref 3.5–5.2)
ALBUMIN SERPL ELPH-MCNC: 3.6 G/DL — SIGNIFICANT CHANGE UP (ref 3.5–5.2)
ALBUMIN SERPL ELPH-MCNC: 3.7 G/DL — SIGNIFICANT CHANGE UP (ref 3.5–5.2)
ALBUMIN SERPL ELPH-MCNC: 3.8 G/DL — SIGNIFICANT CHANGE UP (ref 3.5–5.2)
ALBUMIN SERPL ELPH-MCNC: 3.8 G/DL — SIGNIFICANT CHANGE UP (ref 3.5–5.2)
ALBUMIN SERPL ELPH-MCNC: 3.9 G/DL — SIGNIFICANT CHANGE UP (ref 3.5–5.2)
ALBUMIN SERPL ELPH-MCNC: 4 G/DL — SIGNIFICANT CHANGE UP (ref 3.5–5.2)
ALBUMIN SERPL ELPH-MCNC: 4.1 G/DL — SIGNIFICANT CHANGE UP (ref 3.5–5.2)
ALBUMIN SERPL ELPH-MCNC: 4.1 G/DL — SIGNIFICANT CHANGE UP (ref 3.5–5.2)
ALBUMIN SERPL ELPH-MCNC: 4.4 G/DL — SIGNIFICANT CHANGE UP (ref 3.5–5.2)
ALP SERPL-CCNC: 59 U/L — SIGNIFICANT CHANGE UP (ref 30–115)
ALP SERPL-CCNC: 64 U/L — SIGNIFICANT CHANGE UP (ref 30–115)
ALP SERPL-CCNC: 65 U/L — SIGNIFICANT CHANGE UP (ref 30–115)
ALP SERPL-CCNC: 65 U/L — SIGNIFICANT CHANGE UP (ref 30–115)
ALP SERPL-CCNC: 68 U/L — SIGNIFICANT CHANGE UP (ref 30–115)
ALP SERPL-CCNC: 69 U/L — SIGNIFICANT CHANGE UP (ref 30–115)
ALP SERPL-CCNC: 70 U/L — SIGNIFICANT CHANGE UP (ref 30–115)
ALP SERPL-CCNC: 70 U/L — SIGNIFICANT CHANGE UP (ref 30–115)
ALP SERPL-CCNC: 71 U/L — SIGNIFICANT CHANGE UP (ref 30–115)
ALP SERPL-CCNC: 72 U/L — SIGNIFICANT CHANGE UP (ref 30–115)
ALP SERPL-CCNC: 73 U/L — SIGNIFICANT CHANGE UP (ref 30–115)
ALP SERPL-CCNC: 74 U/L — SIGNIFICANT CHANGE UP (ref 30–115)
ALP SERPL-CCNC: 76 U/L — SIGNIFICANT CHANGE UP (ref 30–115)
ALP SERPL-CCNC: 77 U/L — SIGNIFICANT CHANGE UP (ref 30–115)
ALP SERPL-CCNC: 77 U/L — SIGNIFICANT CHANGE UP (ref 30–115)
ALP SERPL-CCNC: 79 U/L — SIGNIFICANT CHANGE UP (ref 30–115)
ALP SERPL-CCNC: 79 U/L — SIGNIFICANT CHANGE UP (ref 30–115)
ALP SERPL-CCNC: 90 U/L — SIGNIFICANT CHANGE UP (ref 30–115)
ALP SERPL-CCNC: 94 U/L — SIGNIFICANT CHANGE UP (ref 30–115)
ALP SERPL-CCNC: 95 U/L — SIGNIFICANT CHANGE UP (ref 30–115)
ALT FLD-CCNC: 18 U/L — SIGNIFICANT CHANGE UP (ref 0–41)
ALT FLD-CCNC: 35 U/L — SIGNIFICANT CHANGE UP (ref 0–41)
ALT FLD-CCNC: 42 U/L — HIGH (ref 0–41)
ALT FLD-CCNC: 42 U/L — HIGH (ref 0–41)
ALT FLD-CCNC: 43 U/L — HIGH (ref 0–41)
ALT FLD-CCNC: 52 U/L — HIGH (ref 0–41)
ALT FLD-CCNC: 55 U/L — HIGH (ref 0–41)
ALT FLD-CCNC: 59 U/L — HIGH (ref 0–41)
ALT FLD-CCNC: 59 U/L — HIGH (ref 0–41)
ALT FLD-CCNC: 62 U/L — HIGH (ref 0–41)
ALT FLD-CCNC: 67 U/L — HIGH (ref 0–41)
ALT FLD-CCNC: 68 U/L — HIGH (ref 0–41)
ALT FLD-CCNC: 72 U/L — HIGH (ref 0–41)
ALT FLD-CCNC: 72 U/L — HIGH (ref 0–41)
ALT FLD-CCNC: 73 U/L — HIGH (ref 0–41)
ALT FLD-CCNC: 75 U/L — HIGH (ref 0–41)
ALT FLD-CCNC: 77 U/L — HIGH (ref 0–41)
ALT FLD-CCNC: 87 U/L — HIGH (ref 0–41)
ALT FLD-CCNC: 89 U/L — HIGH (ref 0–41)
ALT FLD-CCNC: 89 U/L — HIGH (ref 0–41)
ANION GAP SERPL CALC-SCNC: 10 MMOL/L — SIGNIFICANT CHANGE UP (ref 7–14)
ANION GAP SERPL CALC-SCNC: 11 MMOL/L — SIGNIFICANT CHANGE UP (ref 7–14)
ANION GAP SERPL CALC-SCNC: 12 MMOL/L — SIGNIFICANT CHANGE UP (ref 7–14)
ANION GAP SERPL CALC-SCNC: 12 MMOL/L — SIGNIFICANT CHANGE UP (ref 7–14)
ANION GAP SERPL CALC-SCNC: 13 MMOL/L — SIGNIFICANT CHANGE UP (ref 7–14)
ANION GAP SERPL CALC-SCNC: 13 MMOL/L — SIGNIFICANT CHANGE UP (ref 7–14)
ANION GAP SERPL CALC-SCNC: 14 MMOL/L — SIGNIFICANT CHANGE UP (ref 7–14)
ANION GAP SERPL CALC-SCNC: 15 MMOL/L — HIGH (ref 7–14)
ANION GAP SERPL CALC-SCNC: 16 MMOL/L — HIGH (ref 7–14)
ANION GAP SERPL CALC-SCNC: 4 MMOL/L — LOW (ref 7–14)
ANION GAP SERPL CALC-SCNC: 7 MMOL/L — SIGNIFICANT CHANGE UP (ref 7–14)
ANION GAP SERPL CALC-SCNC: 8 MMOL/L — SIGNIFICANT CHANGE UP (ref 7–14)
ANION GAP SERPL CALC-SCNC: 9 MMOL/L — SIGNIFICANT CHANGE UP (ref 7–14)
APPEARANCE UR: CLEAR — SIGNIFICANT CHANGE UP
APPEARANCE UR: CLEAR — SIGNIFICANT CHANGE UP
APTT BLD: 40.1 SEC — HIGH (ref 27–39.2)
AST SERPL-CCNC: 21 U/L — SIGNIFICANT CHANGE UP (ref 0–41)
AST SERPL-CCNC: 23 U/L — SIGNIFICANT CHANGE UP (ref 0–41)
AST SERPL-CCNC: 24 U/L — SIGNIFICANT CHANGE UP (ref 0–41)
AST SERPL-CCNC: 25 U/L — SIGNIFICANT CHANGE UP (ref 0–41)
AST SERPL-CCNC: 26 U/L — SIGNIFICANT CHANGE UP (ref 0–41)
AST SERPL-CCNC: 27 U/L — SIGNIFICANT CHANGE UP (ref 0–41)
AST SERPL-CCNC: 29 U/L — SIGNIFICANT CHANGE UP (ref 0–41)
AST SERPL-CCNC: 30 U/L — SIGNIFICANT CHANGE UP (ref 0–41)
AST SERPL-CCNC: 31 U/L — SIGNIFICANT CHANGE UP (ref 0–41)
AST SERPL-CCNC: 32 U/L — SIGNIFICANT CHANGE UP (ref 0–41)
AST SERPL-CCNC: 32 U/L — SIGNIFICANT CHANGE UP (ref 0–41)
AST SERPL-CCNC: 33 U/L — SIGNIFICANT CHANGE UP (ref 0–41)
AST SERPL-CCNC: 34 U/L — SIGNIFICANT CHANGE UP (ref 0–41)
AST SERPL-CCNC: 34 U/L — SIGNIFICANT CHANGE UP (ref 0–41)
AST SERPL-CCNC: 35 U/L — SIGNIFICANT CHANGE UP (ref 0–41)
AST SERPL-CCNC: 44 U/L — HIGH (ref 0–41)
AST SERPL-CCNC: 48 U/L — HIGH (ref 0–41)
AST SERPL-CCNC: 60 U/L — HIGH (ref 0–41)
BACTERIA # UR AUTO: NEGATIVE — SIGNIFICANT CHANGE UP
BASE EXCESS BLDA CALC-SCNC: -2.4 MMOL/L — LOW (ref -2–3)
BASE EXCESS BLDA CALC-SCNC: 1.9 MMOL/L — SIGNIFICANT CHANGE UP (ref -2–3)
BASE EXCESS BLDV CALC-SCNC: 1.2 MMOL/L — SIGNIFICANT CHANGE UP (ref -2–3)
BASE EXCESS BLDV CALC-SCNC: 5.5 MMOL/L — HIGH (ref -2–3)
BASOPHILS # BLD AUTO: 0 K/UL — SIGNIFICANT CHANGE UP (ref 0–0.2)
BASOPHILS # BLD AUTO: 0 K/UL — SIGNIFICANT CHANGE UP (ref 0–0.2)
BASOPHILS # BLD AUTO: 0.01 K/UL — SIGNIFICANT CHANGE UP (ref 0–0.2)
BASOPHILS # BLD AUTO: 0.02 K/UL — SIGNIFICANT CHANGE UP (ref 0–0.2)
BASOPHILS # BLD AUTO: 0.02 K/UL — SIGNIFICANT CHANGE UP (ref 0–0.2)
BASOPHILS # BLD AUTO: 0.03 K/UL — SIGNIFICANT CHANGE UP (ref 0–0.2)
BASOPHILS # BLD AUTO: 0.04 K/UL — SIGNIFICANT CHANGE UP (ref 0–0.2)
BASOPHILS # BLD AUTO: 0.05 K/UL — SIGNIFICANT CHANGE UP (ref 0–0.2)
BASOPHILS # BLD AUTO: 0.06 K/UL — SIGNIFICANT CHANGE UP (ref 0–0.2)
BASOPHILS # BLD AUTO: 0.06 K/UL — SIGNIFICANT CHANGE UP (ref 0–0.2)
BASOPHILS # BLD AUTO: 0.07 K/UL — SIGNIFICANT CHANGE UP (ref 0–0.2)
BASOPHILS # BLD AUTO: 0.09 K/UL — SIGNIFICANT CHANGE UP (ref 0–0.2)
BASOPHILS NFR BLD AUTO: 0 % — SIGNIFICANT CHANGE UP (ref 0–1)
BASOPHILS NFR BLD AUTO: 0 % — SIGNIFICANT CHANGE UP (ref 0–1)
BASOPHILS NFR BLD AUTO: 0.1 % — SIGNIFICANT CHANGE UP (ref 0–1)
BASOPHILS NFR BLD AUTO: 0.2 % — SIGNIFICANT CHANGE UP (ref 0–1)
BASOPHILS NFR BLD AUTO: 0.3 % — SIGNIFICANT CHANGE UP (ref 0–1)
BASOPHILS NFR BLD AUTO: 0.4 % — SIGNIFICANT CHANGE UP (ref 0–1)
BILIRUB DIRECT SERPL-MCNC: 0.2 MG/DL — SIGNIFICANT CHANGE UP (ref 0–0.3)
BILIRUB INDIRECT FLD-MCNC: 0.8 MG/DL — SIGNIFICANT CHANGE UP (ref 0.2–1.2)
BILIRUB SERPL-MCNC: 0.6 MG/DL — SIGNIFICANT CHANGE UP (ref 0.2–1.2)
BILIRUB SERPL-MCNC: 0.8 MG/DL — SIGNIFICANT CHANGE UP (ref 0.2–1.2)
BILIRUB SERPL-MCNC: 0.9 MG/DL — SIGNIFICANT CHANGE UP (ref 0.2–1.2)
BILIRUB SERPL-MCNC: 0.9 MG/DL — SIGNIFICANT CHANGE UP (ref 0.2–1.2)
BILIRUB SERPL-MCNC: 1 MG/DL — SIGNIFICANT CHANGE UP (ref 0.2–1.2)
BILIRUB SERPL-MCNC: 1.1 MG/DL — SIGNIFICANT CHANGE UP (ref 0.2–1.2)
BILIRUB SERPL-MCNC: 1.2 MG/DL — SIGNIFICANT CHANGE UP (ref 0.2–1.2)
BILIRUB SERPL-MCNC: 1.3 MG/DL — HIGH (ref 0.2–1.2)
BILIRUB SERPL-MCNC: 1.4 MG/DL — HIGH (ref 0.2–1.2)
BILIRUB SERPL-MCNC: 1.4 MG/DL — HIGH (ref 0.2–1.2)
BILIRUB SERPL-MCNC: 1.5 MG/DL — HIGH (ref 0.2–1.2)
BILIRUB SERPL-MCNC: 1.5 MG/DL — HIGH (ref 0.2–1.2)
BILIRUB SERPL-MCNC: 1.6 MG/DL — HIGH (ref 0.2–1.2)
BILIRUB SERPL-MCNC: 1.9 MG/DL — HIGH (ref 0.2–1.2)
BILIRUB SERPL-MCNC: 2.3 MG/DL — HIGH (ref 0.2–1.2)
BILIRUB UR-MCNC: NEGATIVE — SIGNIFICANT CHANGE UP
BILIRUB UR-MCNC: NEGATIVE — SIGNIFICANT CHANGE UP
BUN SERPL-MCNC: 14 MG/DL — SIGNIFICANT CHANGE UP (ref 10–20)
BUN SERPL-MCNC: 20 MG/DL — SIGNIFICANT CHANGE UP (ref 10–20)
BUN SERPL-MCNC: 21 MG/DL — HIGH (ref 10–20)
BUN SERPL-MCNC: 22 MG/DL — HIGH (ref 10–20)
BUN SERPL-MCNC: 24 MG/DL — HIGH (ref 10–20)
BUN SERPL-MCNC: 24 MG/DL — HIGH (ref 10–20)
BUN SERPL-MCNC: 25 MG/DL — HIGH (ref 10–20)
BUN SERPL-MCNC: 25 MG/DL — HIGH (ref 10–20)
BUN SERPL-MCNC: 26 MG/DL — HIGH (ref 10–20)
BUN SERPL-MCNC: 28 MG/DL — HIGH (ref 10–20)
BUN SERPL-MCNC: 29 MG/DL — HIGH (ref 10–20)
BUN SERPL-MCNC: 29 MG/DL — HIGH (ref 10–20)
BUN SERPL-MCNC: 30 MG/DL — HIGH (ref 10–20)
BUN SERPL-MCNC: 30 MG/DL — HIGH (ref 10–20)
BUN SERPL-MCNC: 31 MG/DL — HIGH (ref 10–20)
BUN SERPL-MCNC: 33 MG/DL — HIGH (ref 10–20)
BUN SERPL-MCNC: 34 MG/DL — HIGH (ref 10–20)
BUN SERPL-MCNC: 35 MG/DL — HIGH (ref 10–20)
BUN SERPL-MCNC: 41 MG/DL — HIGH (ref 10–20)
CA-I SERPL-SCNC: 1.15 MMOL/L — SIGNIFICANT CHANGE UP (ref 1.15–1.33)
CALCIUM SERPL-MCNC: 8.5 MG/DL — SIGNIFICANT CHANGE UP (ref 8.5–10.1)
CALCIUM SERPL-MCNC: 8.6 MG/DL — SIGNIFICANT CHANGE UP (ref 8.5–10.1)
CALCIUM SERPL-MCNC: 8.8 MG/DL — SIGNIFICANT CHANGE UP (ref 8.5–10.1)
CALCIUM SERPL-MCNC: 8.8 MG/DL — SIGNIFICANT CHANGE UP (ref 8.5–10.1)
CALCIUM SERPL-MCNC: 9 MG/DL — SIGNIFICANT CHANGE UP (ref 8.5–10.1)
CALCIUM SERPL-MCNC: 9.1 MG/DL — SIGNIFICANT CHANGE UP (ref 8.5–10.1)
CALCIUM SERPL-MCNC: 9.2 MG/DL — SIGNIFICANT CHANGE UP (ref 8.5–10.1)
CALCIUM SERPL-MCNC: 9.3 MG/DL — SIGNIFICANT CHANGE UP (ref 8.5–10.1)
CALCIUM SERPL-MCNC: 9.4 MG/DL — SIGNIFICANT CHANGE UP (ref 8.5–10.1)
CALCIUM SERPL-MCNC: 9.6 MG/DL — SIGNIFICANT CHANGE UP (ref 8.5–10.1)
CALCIUM SERPL-MCNC: 9.6 MG/DL — SIGNIFICANT CHANGE UP (ref 8.5–10.1)
CALCIUM SERPL-MCNC: 9.7 MG/DL — SIGNIFICANT CHANGE UP (ref 8.5–10.1)
CALCIUM SERPL-MCNC: 9.8 MG/DL — SIGNIFICANT CHANGE UP (ref 8.5–10.1)
CALCIUM SERPL-MCNC: 9.9 MG/DL — SIGNIFICANT CHANGE UP (ref 8.5–10.1)
CHLORIDE SERPL-SCNC: 100 MMOL/L — SIGNIFICANT CHANGE UP (ref 98–110)
CHLORIDE SERPL-SCNC: 100 MMOL/L — SIGNIFICANT CHANGE UP (ref 98–110)
CHLORIDE SERPL-SCNC: 101 MMOL/L — SIGNIFICANT CHANGE UP (ref 98–110)
CHLORIDE SERPL-SCNC: 102 MMOL/L — SIGNIFICANT CHANGE UP (ref 98–110)
CHLORIDE SERPL-SCNC: 105 MMOL/L — SIGNIFICANT CHANGE UP (ref 98–110)
CHLORIDE SERPL-SCNC: 91 MMOL/L — LOW (ref 98–110)
CHLORIDE SERPL-SCNC: 92 MMOL/L — LOW (ref 98–110)
CHLORIDE SERPL-SCNC: 93 MMOL/L — LOW (ref 98–110)
CHLORIDE SERPL-SCNC: 94 MMOL/L — LOW (ref 98–110)
CHLORIDE SERPL-SCNC: 95 MMOL/L — LOW (ref 98–110)
CHLORIDE SERPL-SCNC: 95 MMOL/L — LOW (ref 98–110)
CHLORIDE SERPL-SCNC: 96 MMOL/L — LOW (ref 98–110)
CHLORIDE SERPL-SCNC: 98 MMOL/L — SIGNIFICANT CHANGE UP (ref 98–110)
CHLORIDE SERPL-SCNC: 98 MMOL/L — SIGNIFICANT CHANGE UP (ref 98–110)
CHLORIDE SERPL-SCNC: 99 MMOL/L — SIGNIFICANT CHANGE UP (ref 98–110)
CHOLEST SERPL-MCNC: 133 MG/DL — SIGNIFICANT CHANGE UP
CHOLEST SERPL-MCNC: 206 MG/DL — HIGH
CK MB CFR SERPL CALC: 6.4 NG/ML — HIGH (ref 0.6–6.3)
CK SERPL-CCNC: 50 U/L — SIGNIFICANT CHANGE UP (ref 0–225)
CO2 SERPL-SCNC: 24 MMOL/L — SIGNIFICANT CHANGE UP (ref 17–32)
CO2 SERPL-SCNC: 27 MMOL/L — SIGNIFICANT CHANGE UP (ref 17–32)
CO2 SERPL-SCNC: 28 MMOL/L — SIGNIFICANT CHANGE UP (ref 17–32)
CO2 SERPL-SCNC: 28 MMOL/L — SIGNIFICANT CHANGE UP (ref 17–32)
CO2 SERPL-SCNC: 29 MMOL/L — SIGNIFICANT CHANGE UP (ref 17–32)
CO2 SERPL-SCNC: 30 MMOL/L — SIGNIFICANT CHANGE UP (ref 17–32)
CO2 SERPL-SCNC: 30 MMOL/L — SIGNIFICANT CHANGE UP (ref 17–32)
CO2 SERPL-SCNC: 31 MMOL/L — SIGNIFICANT CHANGE UP (ref 17–32)
CO2 SERPL-SCNC: 31 MMOL/L — SIGNIFICANT CHANGE UP (ref 17–32)
CO2 SERPL-SCNC: 32 MMOL/L — SIGNIFICANT CHANGE UP (ref 17–32)
CO2 SERPL-SCNC: 33 MMOL/L — HIGH (ref 17–32)
CO2 SERPL-SCNC: 34 MMOL/L — HIGH (ref 17–32)
CO2 SERPL-SCNC: 36 MMOL/L — HIGH (ref 17–32)
CO2 SERPL-SCNC: 37 MMOL/L — HIGH (ref 17–32)
CO2 SERPL-SCNC: 40 MMOL/L — HIGH (ref 17–32)
COLOR SPEC: YELLOW — SIGNIFICANT CHANGE UP
COLOR SPEC: YELLOW — SIGNIFICANT CHANGE UP
CREAT SERPL-MCNC: 0.5 MG/DL — LOW (ref 0.7–1.5)
CREAT SERPL-MCNC: 0.6 MG/DL — LOW (ref 0.7–1.5)
CREAT SERPL-MCNC: 0.6 MG/DL — LOW (ref 0.7–1.5)
CREAT SERPL-MCNC: 0.7 MG/DL — SIGNIFICANT CHANGE UP (ref 0.7–1.5)
CREAT SERPL-MCNC: 0.8 MG/DL — SIGNIFICANT CHANGE UP (ref 0.7–1.5)
CREAT SERPL-MCNC: 0.9 MG/DL — SIGNIFICANT CHANGE UP (ref 0.7–1.5)
CREAT SERPL-MCNC: <0.5 MG/DL — LOW (ref 0.7–1.5)
CREAT SERPL-MCNC: <0.5 MG/DL — LOW (ref 0.7–1.5)
CULTURE RESULTS: SIGNIFICANT CHANGE UP
CULTURE RESULTS: SIGNIFICANT CHANGE UP
D DIMER BLD IA.RAPID-MCNC: 164 NG/ML DDU — SIGNIFICANT CHANGE UP (ref 0–230)
D DIMER BLD IA.RAPID-MCNC: 289 NG/ML DDU — HIGH (ref 0–230)
D DIMER BLD IA.RAPID-MCNC: 295 NG/ML DDU — HIGH (ref 0–230)
DEPRECATED S PNEUM 1 IGG SER-MCNC: 4 MCG/ML — SIGNIFICANT CHANGE UP
DEPRECATED S PNEUM12 IGG SER-MCNC: <0.4 MCG/ML — SIGNIFICANT CHANGE UP
DEPRECATED S PNEUM14 IGG SER-MCNC: 7.3 MCG/ML — SIGNIFICANT CHANGE UP
DEPRECATED S PNEUM17 IGG SER-MCNC: 0.8 MCG/ML — SIGNIFICANT CHANGE UP
DEPRECATED S PNEUM19 IGG SER-MCNC: 1.4 MCG/ML — SIGNIFICANT CHANGE UP
DEPRECATED S PNEUM19 IGG SER-MCNC: 17.7 MCG/ML — SIGNIFICANT CHANGE UP
DEPRECATED S PNEUM2 IGG SER-MCNC: 1.3 MCG/ML — SIGNIFICANT CHANGE UP
DEPRECATED S PNEUM20 IGG SER-MCNC: 5.2 MCG/ML — SIGNIFICANT CHANGE UP
DEPRECATED S PNEUM22 IGG SER-MCNC: 5 MCG/ML — SIGNIFICANT CHANGE UP
DEPRECATED S PNEUM23 IGG SER-MCNC: 5.9 MCG/ML — SIGNIFICANT CHANGE UP
DEPRECATED S PNEUM3 IGG SER-MCNC: 18.3 MCG/ML — SIGNIFICANT CHANGE UP
DEPRECATED S PNEUM4 IGG SER-MCNC: 10.8 MCG/ML — SIGNIFICANT CHANGE UP
DEPRECATED S PNEUM5 IGG SER-MCNC: 19.7 MCG/ML — SIGNIFICANT CHANGE UP
DEPRECATED S PNEUM8 IGG SER-MCNC: 1 MCG/ML — SIGNIFICANT CHANGE UP
DEPRECATED S PNEUM9 IGG SER-MCNC: 50.1 MCG/ML — SIGNIFICANT CHANGE UP
DEPRECATED S PNEUM9 IGG SER-MCNC: SIGNIFICANT CHANGE UP MCG/ML
DIFF PNL FLD: ABNORMAL
DIFF PNL FLD: NEGATIVE — SIGNIFICANT CHANGE UP
EGFR: 100 ML/MIN/1.73M2 — SIGNIFICANT CHANGE UP
EGFR: 104 ML/MIN/1.73M2 — SIGNIFICANT CHANGE UP
EGFR: 104 ML/MIN/1.73M2 — SIGNIFICANT CHANGE UP
EGFR: 110 ML/MIN/1.73M2 — SIGNIFICANT CHANGE UP
EGFR: 117 ML/MIN/1.73M2 — SIGNIFICANT CHANGE UP
EGFR: 128 ML/MIN/1.73M2 — SIGNIFICANT CHANGE UP
EGFR: 92 ML/MIN/1.73M2 — SIGNIFICANT CHANGE UP
EGFR: 95 ML/MIN/1.73M2 — SIGNIFICANT CHANGE UP
EGFR: 96 ML/MIN/1.73M2 — SIGNIFICANT CHANGE UP
EGFR: 96 ML/MIN/1.73M2 — SIGNIFICANT CHANGE UP
EGFR: 99 ML/MIN/1.73M2 — SIGNIFICANT CHANGE UP
EOSINOPHIL # BLD AUTO: 0 K/UL — SIGNIFICANT CHANGE UP (ref 0–0.7)
EOSINOPHIL # BLD AUTO: 0.01 K/UL — SIGNIFICANT CHANGE UP (ref 0–0.7)
EOSINOPHIL # BLD AUTO: 0.02 K/UL — SIGNIFICANT CHANGE UP (ref 0–0.7)
EOSINOPHIL # BLD AUTO: 0.03 K/UL — SIGNIFICANT CHANGE UP (ref 0–0.7)
EOSINOPHIL # BLD AUTO: 0.04 K/UL — SIGNIFICANT CHANGE UP (ref 0–0.7)
EOSINOPHIL # BLD AUTO: 0.42 K/UL — SIGNIFICANT CHANGE UP (ref 0–0.7)
EOSINOPHIL # BLD AUTO: 0.63 K/UL — SIGNIFICANT CHANGE UP (ref 0–0.7)
EOSINOPHIL # BLD AUTO: 3.58 K/UL — HIGH (ref 0–0.7)
EOSINOPHIL NFR BLD AUTO: 0 % — SIGNIFICANT CHANGE UP (ref 0–8)
EOSINOPHIL NFR BLD AUTO: 0.1 % — SIGNIFICANT CHANGE UP (ref 0–8)
EOSINOPHIL NFR BLD AUTO: 0.3 % — SIGNIFICANT CHANGE UP (ref 0–8)
EOSINOPHIL NFR BLD AUTO: 0.4 % — SIGNIFICANT CHANGE UP (ref 0–8)
EOSINOPHIL NFR BLD AUTO: 16.5 % — HIGH (ref 0–8)
EOSINOPHIL NFR BLD AUTO: 3.5 % — SIGNIFICANT CHANGE UP (ref 0–8)
EOSINOPHIL NFR BLD AUTO: 4.2 % — SIGNIFICANT CHANGE UP (ref 0–8)
EPI CELLS # UR: 1 /HPF — SIGNIFICANT CHANGE UP (ref 0–5)
ESTIMATED AVERAGE GLUCOSE: 140 MG/DL — HIGH (ref 68–114)
ESTIMATED AVERAGE GLUCOSE: 163 MG/DL — HIGH (ref 68–114)
FOLATE SERPL-MCNC: 10.8 NG/ML — SIGNIFICANT CHANGE UP
GAS PNL BLDA: SIGNIFICANT CHANGE UP
GAS PNL BLDA: SIGNIFICANT CHANGE UP
GAS PNL BLDV: 129 MMOL/L — LOW (ref 136–145)
GAS PNL BLDV: SIGNIFICANT CHANGE UP
GIANT PLATELETS BLD QL SMEAR: PRESENT — SIGNIFICANT CHANGE UP
GLUCOSE BLDC GLUCOMTR-MCNC: 100 MG/DL — HIGH (ref 70–99)
GLUCOSE BLDC GLUCOMTR-MCNC: 102 MG/DL — HIGH (ref 70–99)
GLUCOSE BLDC GLUCOMTR-MCNC: 109 MG/DL — HIGH (ref 70–99)
GLUCOSE BLDC GLUCOMTR-MCNC: 113 MG/DL — HIGH (ref 70–99)
GLUCOSE BLDC GLUCOMTR-MCNC: 114 MG/DL — HIGH (ref 70–99)
GLUCOSE BLDC GLUCOMTR-MCNC: 115 MG/DL — HIGH (ref 70–99)
GLUCOSE BLDC GLUCOMTR-MCNC: 118 MG/DL — HIGH (ref 70–99)
GLUCOSE BLDC GLUCOMTR-MCNC: 118 MG/DL — HIGH (ref 70–99)
GLUCOSE BLDC GLUCOMTR-MCNC: 119 MG/DL — HIGH (ref 70–99)
GLUCOSE BLDC GLUCOMTR-MCNC: 123 MG/DL — HIGH (ref 70–99)
GLUCOSE BLDC GLUCOMTR-MCNC: 123 MG/DL — HIGH (ref 70–99)
GLUCOSE BLDC GLUCOMTR-MCNC: 125 MG/DL — HIGH (ref 70–99)
GLUCOSE BLDC GLUCOMTR-MCNC: 125 MG/DL — HIGH (ref 70–99)
GLUCOSE BLDC GLUCOMTR-MCNC: 126 MG/DL — HIGH (ref 70–99)
GLUCOSE BLDC GLUCOMTR-MCNC: 127 MG/DL — HIGH (ref 70–99)
GLUCOSE BLDC GLUCOMTR-MCNC: 131 MG/DL — HIGH (ref 70–99)
GLUCOSE BLDC GLUCOMTR-MCNC: 132 MG/DL — HIGH (ref 70–99)
GLUCOSE BLDC GLUCOMTR-MCNC: 133 MG/DL — HIGH (ref 70–99)
GLUCOSE BLDC GLUCOMTR-MCNC: 133 MG/DL — HIGH (ref 70–99)
GLUCOSE BLDC GLUCOMTR-MCNC: 135 MG/DL — HIGH (ref 70–99)
GLUCOSE BLDC GLUCOMTR-MCNC: 137 MG/DL — HIGH (ref 70–99)
GLUCOSE BLDC GLUCOMTR-MCNC: 139 MG/DL — HIGH (ref 70–99)
GLUCOSE BLDC GLUCOMTR-MCNC: 139 MG/DL — HIGH (ref 70–99)
GLUCOSE BLDC GLUCOMTR-MCNC: 141 MG/DL — HIGH (ref 70–99)
GLUCOSE BLDC GLUCOMTR-MCNC: 141 MG/DL — HIGH (ref 70–99)
GLUCOSE BLDC GLUCOMTR-MCNC: 142 MG/DL — HIGH (ref 70–99)
GLUCOSE BLDC GLUCOMTR-MCNC: 145 MG/DL — HIGH (ref 70–99)
GLUCOSE BLDC GLUCOMTR-MCNC: 146 MG/DL — HIGH (ref 70–99)
GLUCOSE BLDC GLUCOMTR-MCNC: 146 MG/DL — HIGH (ref 70–99)
GLUCOSE BLDC GLUCOMTR-MCNC: 147 MG/DL — HIGH (ref 70–99)
GLUCOSE BLDC GLUCOMTR-MCNC: 148 MG/DL — HIGH (ref 70–99)
GLUCOSE BLDC GLUCOMTR-MCNC: 150 MG/DL — HIGH (ref 70–99)
GLUCOSE BLDC GLUCOMTR-MCNC: 150 MG/DL — HIGH (ref 70–99)
GLUCOSE BLDC GLUCOMTR-MCNC: 151 MG/DL — HIGH (ref 70–99)
GLUCOSE BLDC GLUCOMTR-MCNC: 151 MG/DL — HIGH (ref 70–99)
GLUCOSE BLDC GLUCOMTR-MCNC: 152 MG/DL — HIGH (ref 70–99)
GLUCOSE BLDC GLUCOMTR-MCNC: 152 MG/DL — HIGH (ref 70–99)
GLUCOSE BLDC GLUCOMTR-MCNC: 155 MG/DL — HIGH (ref 70–99)
GLUCOSE BLDC GLUCOMTR-MCNC: 156 MG/DL — HIGH (ref 70–99)
GLUCOSE BLDC GLUCOMTR-MCNC: 158 MG/DL — HIGH (ref 70–99)
GLUCOSE BLDC GLUCOMTR-MCNC: 159 MG/DL — HIGH (ref 70–99)
GLUCOSE BLDC GLUCOMTR-MCNC: 159 MG/DL — HIGH (ref 70–99)
GLUCOSE BLDC GLUCOMTR-MCNC: 160 MG/DL — HIGH (ref 70–99)
GLUCOSE BLDC GLUCOMTR-MCNC: 161 MG/DL — HIGH (ref 70–99)
GLUCOSE BLDC GLUCOMTR-MCNC: 161 MG/DL — HIGH (ref 70–99)
GLUCOSE BLDC GLUCOMTR-MCNC: 162 MG/DL — HIGH (ref 70–99)
GLUCOSE BLDC GLUCOMTR-MCNC: 163 MG/DL — HIGH (ref 70–99)
GLUCOSE BLDC GLUCOMTR-MCNC: 164 MG/DL — HIGH (ref 70–99)
GLUCOSE BLDC GLUCOMTR-MCNC: 164 MG/DL — HIGH (ref 70–99)
GLUCOSE BLDC GLUCOMTR-MCNC: 171 MG/DL — HIGH (ref 70–99)
GLUCOSE BLDC GLUCOMTR-MCNC: 172 MG/DL — HIGH (ref 70–99)
GLUCOSE BLDC GLUCOMTR-MCNC: 174 MG/DL — HIGH (ref 70–99)
GLUCOSE BLDC GLUCOMTR-MCNC: 174 MG/DL — HIGH (ref 70–99)
GLUCOSE BLDC GLUCOMTR-MCNC: 177 MG/DL — HIGH (ref 70–99)
GLUCOSE BLDC GLUCOMTR-MCNC: 177 MG/DL — HIGH (ref 70–99)
GLUCOSE BLDC GLUCOMTR-MCNC: 178 MG/DL — HIGH (ref 70–99)
GLUCOSE BLDC GLUCOMTR-MCNC: 180 MG/DL — HIGH (ref 70–99)
GLUCOSE BLDC GLUCOMTR-MCNC: 181 MG/DL — HIGH (ref 70–99)
GLUCOSE BLDC GLUCOMTR-MCNC: 182 MG/DL — HIGH (ref 70–99)
GLUCOSE BLDC GLUCOMTR-MCNC: 183 MG/DL — HIGH (ref 70–99)
GLUCOSE BLDC GLUCOMTR-MCNC: 184 MG/DL — HIGH (ref 70–99)
GLUCOSE BLDC GLUCOMTR-MCNC: 185 MG/DL — HIGH (ref 70–99)
GLUCOSE BLDC GLUCOMTR-MCNC: 186 MG/DL — HIGH (ref 70–99)
GLUCOSE BLDC GLUCOMTR-MCNC: 187 MG/DL — HIGH (ref 70–99)
GLUCOSE BLDC GLUCOMTR-MCNC: 188 MG/DL — HIGH (ref 70–99)
GLUCOSE BLDC GLUCOMTR-MCNC: 188 MG/DL — HIGH (ref 70–99)
GLUCOSE BLDC GLUCOMTR-MCNC: 194 MG/DL — HIGH (ref 70–99)
GLUCOSE BLDC GLUCOMTR-MCNC: 197 MG/DL — HIGH (ref 70–99)
GLUCOSE BLDC GLUCOMTR-MCNC: 204 MG/DL — HIGH (ref 70–99)
GLUCOSE BLDC GLUCOMTR-MCNC: 215 MG/DL — HIGH (ref 70–99)
GLUCOSE BLDC GLUCOMTR-MCNC: 219 MG/DL — HIGH (ref 70–99)
GLUCOSE BLDC GLUCOMTR-MCNC: 223 MG/DL — HIGH (ref 70–99)
GLUCOSE BLDC GLUCOMTR-MCNC: 224 MG/DL — HIGH (ref 70–99)
GLUCOSE BLDC GLUCOMTR-MCNC: 239 MG/DL — HIGH (ref 70–99)
GLUCOSE BLDC GLUCOMTR-MCNC: 247 MG/DL — HIGH (ref 70–99)
GLUCOSE BLDC GLUCOMTR-MCNC: 248 MG/DL — HIGH (ref 70–99)
GLUCOSE BLDC GLUCOMTR-MCNC: 265 MG/DL — HIGH (ref 70–99)
GLUCOSE BLDC GLUCOMTR-MCNC: 271 MG/DL — HIGH (ref 70–99)
GLUCOSE BLDC GLUCOMTR-MCNC: 283 MG/DL — HIGH (ref 70–99)
GLUCOSE BLDC GLUCOMTR-MCNC: 72 MG/DL — SIGNIFICANT CHANGE UP (ref 70–99)
GLUCOSE BLDC GLUCOMTR-MCNC: 78 MG/DL — SIGNIFICANT CHANGE UP (ref 70–99)
GLUCOSE SERPL-MCNC: 102 MG/DL — HIGH (ref 70–99)
GLUCOSE SERPL-MCNC: 105 MG/DL — HIGH (ref 70–99)
GLUCOSE SERPL-MCNC: 108 MG/DL — HIGH (ref 70–99)
GLUCOSE SERPL-MCNC: 115 MG/DL — HIGH (ref 70–99)
GLUCOSE SERPL-MCNC: 119 MG/DL — HIGH (ref 70–99)
GLUCOSE SERPL-MCNC: 119 MG/DL — HIGH (ref 70–99)
GLUCOSE SERPL-MCNC: 120 MG/DL — HIGH (ref 70–99)
GLUCOSE SERPL-MCNC: 131 MG/DL — HIGH (ref 70–99)
GLUCOSE SERPL-MCNC: 138 MG/DL — HIGH (ref 70–99)
GLUCOSE SERPL-MCNC: 143 MG/DL — HIGH (ref 70–99)
GLUCOSE SERPL-MCNC: 145 MG/DL — HIGH (ref 70–99)
GLUCOSE SERPL-MCNC: 147 MG/DL — HIGH (ref 70–99)
GLUCOSE SERPL-MCNC: 148 MG/DL — HIGH (ref 70–99)
GLUCOSE SERPL-MCNC: 155 MG/DL — HIGH (ref 70–99)
GLUCOSE SERPL-MCNC: 156 MG/DL — HIGH (ref 70–99)
GLUCOSE SERPL-MCNC: 159 MG/DL — HIGH (ref 70–99)
GLUCOSE SERPL-MCNC: 163 MG/DL — HIGH (ref 70–99)
GLUCOSE SERPL-MCNC: 164 MG/DL — HIGH (ref 70–99)
GLUCOSE SERPL-MCNC: 166 MG/DL — HIGH (ref 70–99)
GLUCOSE SERPL-MCNC: 170 MG/DL — HIGH (ref 70–99)
GLUCOSE SERPL-MCNC: 173 MG/DL — HIGH (ref 70–99)
GLUCOSE SERPL-MCNC: 175 MG/DL — HIGH (ref 70–99)
GLUCOSE SERPL-MCNC: 198 MG/DL — HIGH (ref 70–99)
GLUCOSE SERPL-MCNC: 233 MG/DL — HIGH (ref 70–99)
GLUCOSE SERPL-MCNC: 80 MG/DL — SIGNIFICANT CHANGE UP (ref 70–99)
GLUCOSE SERPL-MCNC: 93 MG/DL — SIGNIFICANT CHANGE UP (ref 70–99)
GLUCOSE SERPL-MCNC: 98 MG/DL — SIGNIFICANT CHANGE UP (ref 70–99)
GLUCOSE UR QL: ABNORMAL
GLUCOSE UR QL: SIGNIFICANT CHANGE UP
HCO3 BLDA-SCNC: 25 MMOL/L — SIGNIFICANT CHANGE UP (ref 21–28)
HCO3 BLDA-SCNC: 28 MMOL/L — SIGNIFICANT CHANGE UP (ref 21–28)
HCO3 BLDV-SCNC: 29 MMOL/L — SIGNIFICANT CHANGE UP (ref 22–29)
HCO3 BLDV-SCNC: 32 MMOL/L — HIGH (ref 22–29)
HCT VFR BLD CALC: 29.9 % — LOW (ref 42–52)
HCT VFR BLD CALC: 33.5 % — LOW (ref 42–52)
HCT VFR BLD CALC: 35.1 % — LOW (ref 42–52)
HCT VFR BLD CALC: 41.2 % — LOW (ref 42–52)
HCT VFR BLD CALC: 42.3 % — SIGNIFICANT CHANGE UP (ref 42–52)
HCT VFR BLD CALC: 43.6 % — SIGNIFICANT CHANGE UP (ref 42–52)
HCT VFR BLD CALC: 43.7 % — SIGNIFICANT CHANGE UP (ref 42–52)
HCT VFR BLD CALC: 44.1 % — SIGNIFICANT CHANGE UP (ref 42–52)
HCT VFR BLD CALC: 44.5 % — SIGNIFICANT CHANGE UP (ref 42–52)
HCT VFR BLD CALC: 44.6 % — SIGNIFICANT CHANGE UP (ref 42–52)
HCT VFR BLD CALC: 44.9 % — SIGNIFICANT CHANGE UP (ref 42–52)
HCT VFR BLD CALC: 45.3 % — SIGNIFICANT CHANGE UP (ref 42–52)
HCT VFR BLD CALC: 46.1 % — SIGNIFICANT CHANGE UP (ref 42–52)
HCT VFR BLD CALC: 47.2 % — SIGNIFICANT CHANGE UP (ref 42–52)
HCT VFR BLD CALC: 47.3 % — SIGNIFICANT CHANGE UP (ref 42–52)
HCT VFR BLD CALC: 48.6 % — SIGNIFICANT CHANGE UP (ref 42–52)
HCT VFR BLD CALC: 49.2 % — SIGNIFICANT CHANGE UP (ref 42–52)
HCT VFR BLD CALC: 49.7 % — SIGNIFICANT CHANGE UP (ref 42–52)
HCT VFR BLD CALC: 50.1 % — SIGNIFICANT CHANGE UP (ref 42–52)
HCT VFR BLD CALC: 50.1 % — SIGNIFICANT CHANGE UP (ref 42–52)
HCT VFR BLD CALC: 50.4 % — SIGNIFICANT CHANGE UP (ref 42–52)
HCT VFR BLD CALC: 50.5 % — SIGNIFICANT CHANGE UP (ref 42–52)
HCT VFR BLD CALC: 50.6 % — SIGNIFICANT CHANGE UP (ref 42–52)
HCT VFR BLD CALC: 51 % — SIGNIFICANT CHANGE UP (ref 42–52)
HCT VFR BLDA CALC: 41 % — SIGNIFICANT CHANGE UP (ref 39–51)
HCV AB S/CO SERPL IA: 0.03 COI — SIGNIFICANT CHANGE UP
HCV AB SERPL-IMP: SIGNIFICANT CHANGE UP
HDLC SERPL-MCNC: 57 MG/DL — SIGNIFICANT CHANGE UP
HDLC SERPL-MCNC: 66 MG/DL — SIGNIFICANT CHANGE UP
HGB BLD CALC-MCNC: 13.8 G/DL — SIGNIFICANT CHANGE UP (ref 12.6–17.4)
HGB BLD-MCNC: 11.3 G/DL — LOW (ref 14–18)
HGB BLD-MCNC: 11.9 G/DL — LOW (ref 14–18)
HGB BLD-MCNC: 13.7 G/DL — LOW (ref 14–18)
HGB BLD-MCNC: 14.2 G/DL — SIGNIFICANT CHANGE UP (ref 14–18)
HGB BLD-MCNC: 14.2 G/DL — SIGNIFICANT CHANGE UP (ref 14–18)
HGB BLD-MCNC: 14.3 G/DL — SIGNIFICANT CHANGE UP (ref 14–18)
HGB BLD-MCNC: 14.7 G/DL — SIGNIFICANT CHANGE UP (ref 14–18)
HGB BLD-MCNC: 14.8 G/DL — SIGNIFICANT CHANGE UP (ref 14–18)
HGB BLD-MCNC: 14.9 G/DL — SIGNIFICANT CHANGE UP (ref 14–18)
HGB BLD-MCNC: 15.2 G/DL — SIGNIFICANT CHANGE UP (ref 14–18)
HGB BLD-MCNC: 15.2 G/DL — SIGNIFICANT CHANGE UP (ref 14–18)
HGB BLD-MCNC: 15.3 G/DL — SIGNIFICANT CHANGE UP (ref 14–18)
HGB BLD-MCNC: 15.5 G/DL — SIGNIFICANT CHANGE UP (ref 14–18)
HGB BLD-MCNC: 15.6 G/DL — SIGNIFICANT CHANGE UP (ref 14–18)
HGB BLD-MCNC: 15.9 G/DL — SIGNIFICANT CHANGE UP (ref 14–18)
HGB BLD-MCNC: 16.1 G/DL — SIGNIFICANT CHANGE UP (ref 14–18)
HGB BLD-MCNC: 16.2 G/DL — SIGNIFICANT CHANGE UP (ref 14–18)
HGB BLD-MCNC: 16.3 G/DL — SIGNIFICANT CHANGE UP (ref 14–18)
HGB BLD-MCNC: 16.4 G/DL — SIGNIFICANT CHANGE UP (ref 14–18)
HGB BLD-MCNC: 16.4 G/DL — SIGNIFICANT CHANGE UP (ref 14–18)
HGB BLD-MCNC: 16.5 G/DL — SIGNIFICANT CHANGE UP (ref 14–18)
HGB BLD-MCNC: 16.8 G/DL — SIGNIFICANT CHANGE UP (ref 14–18)
HGB BLD-MCNC: 17 G/DL — SIGNIFICANT CHANGE UP (ref 14–18)
HGB BLD-MCNC: 9.4 G/DL — LOW (ref 14–18)
HIV 1+2 AB+HIV1 P24 AG SERPL QL IA: SIGNIFICANT CHANGE UP
HYALINE CASTS # UR AUTO: 2 /LPF — SIGNIFICANT CHANGE UP (ref 0–7)
IMM GRANULOCYTES NFR BLD AUTO: 0.4 % — HIGH (ref 0.1–0.3)
IMM GRANULOCYTES NFR BLD AUTO: 0.5 % — HIGH (ref 0.1–0.3)
IMM GRANULOCYTES NFR BLD AUTO: 0.5 % — HIGH (ref 0.1–0.3)
IMM GRANULOCYTES NFR BLD AUTO: 0.6 % — HIGH (ref 0.1–0.3)
IMM GRANULOCYTES NFR BLD AUTO: 0.7 % — HIGH (ref 0.1–0.3)
IMM GRANULOCYTES NFR BLD AUTO: 0.8 % — HIGH (ref 0.1–0.3)
IMM GRANULOCYTES NFR BLD AUTO: 1.1 % — HIGH (ref 0.1–0.3)
IMM GRANULOCYTES NFR BLD AUTO: 1.2 % — HIGH (ref 0.1–0.3)
IMM GRANULOCYTES NFR BLD AUTO: 1.3 % — HIGH (ref 0.1–0.3)
IMM GRANULOCYTES NFR BLD AUTO: 1.4 % — HIGH (ref 0.1–0.3)
IMM GRANULOCYTES NFR BLD AUTO: 2 % — HIGH (ref 0.1–0.3)
IMM GRANULOCYTES NFR BLD AUTO: 2.1 % — HIGH (ref 0.1–0.3)
IMM GRANULOCYTES NFR BLD AUTO: 2.2 % — HIGH (ref 0.1–0.3)
IMM GRANULOCYTES NFR BLD AUTO: 3 % — HIGH (ref 0.1–0.3)
IMM GRANULOCYTES NFR BLD AUTO: 3.2 % — HIGH (ref 0.1–0.3)
INR BLD: 0.96 RATIO — SIGNIFICANT CHANGE UP (ref 0.65–1.3)
KETONES UR-MCNC: ABNORMAL
KETONES UR-MCNC: ABNORMAL
LACTATE BLDV-MCNC: 1.2 MMOL/L — SIGNIFICANT CHANGE UP (ref 0.5–2)
LACTATE BLDV-MCNC: 2.2 MMOL/L — HIGH (ref 0.5–2)
LACTATE SERPL-SCNC: 1.3 MMOL/L — SIGNIFICANT CHANGE UP (ref 0.7–2)
LEUKOCYTE ESTERASE UR-ACNC: NEGATIVE — SIGNIFICANT CHANGE UP
LEUKOCYTE ESTERASE UR-ACNC: NEGATIVE — SIGNIFICANT CHANGE UP
LIPID PNL WITH DIRECT LDL SERPL: 129 MG/DL — HIGH
LIPID PNL WITH DIRECT LDL SERPL: 60 MG/DL — SIGNIFICANT CHANGE UP
LYMPHOCYTES # BLD AUTO: 0.18 K/UL — LOW (ref 1.2–3.4)
LYMPHOCYTES # BLD AUTO: 0.21 K/UL — LOW (ref 1.2–3.4)
LYMPHOCYTES # BLD AUTO: 0.22 K/UL — LOW (ref 1.2–3.4)
LYMPHOCYTES # BLD AUTO: 0.23 K/UL — LOW (ref 1.2–3.4)
LYMPHOCYTES # BLD AUTO: 0.26 K/UL — LOW (ref 1.2–3.4)
LYMPHOCYTES # BLD AUTO: 0.32 K/UL — LOW (ref 1.2–3.4)
LYMPHOCYTES # BLD AUTO: 0.33 K/UL — LOW (ref 1.2–3.4)
LYMPHOCYTES # BLD AUTO: 0.37 K/UL — LOW (ref 1.2–3.4)
LYMPHOCYTES # BLD AUTO: 0.37 K/UL — LOW (ref 1.2–3.4)
LYMPHOCYTES # BLD AUTO: 0.39 K/UL — LOW (ref 1.2–3.4)
LYMPHOCYTES # BLD AUTO: 0.47 K/UL — LOW (ref 1.2–3.4)
LYMPHOCYTES # BLD AUTO: 0.48 K/UL — LOW (ref 1.2–3.4)
LYMPHOCYTES # BLD AUTO: 0.48 K/UL — LOW (ref 1.2–3.4)
LYMPHOCYTES # BLD AUTO: 0.51 K/UL — LOW (ref 1.2–3.4)
LYMPHOCYTES # BLD AUTO: 0.57 K/UL — LOW (ref 1.2–3.4)
LYMPHOCYTES # BLD AUTO: 0.76 K/UL — LOW (ref 1.2–3.4)
LYMPHOCYTES # BLD AUTO: 0.87 K/UL — LOW (ref 1.2–3.4)
LYMPHOCYTES # BLD AUTO: 0.9 % — LOW (ref 20.5–51.1)
LYMPHOCYTES # BLD AUTO: 0.94 K/UL — LOW (ref 1.2–3.4)
LYMPHOCYTES # BLD AUTO: 0.96 K/UL — LOW (ref 1.2–3.4)
LYMPHOCYTES # BLD AUTO: 1.05 K/UL — LOW (ref 1.2–3.4)
LYMPHOCYTES # BLD AUTO: 1.1 % — LOW (ref 20.5–51.1)
LYMPHOCYTES # BLD AUTO: 1.3 % — LOW (ref 20.5–51.1)
LYMPHOCYTES # BLD AUTO: 1.4 % — LOW (ref 20.5–51.1)
LYMPHOCYTES # BLD AUTO: 1.5 % — LOW (ref 20.5–51.1)
LYMPHOCYTES # BLD AUTO: 1.5 K/UL — SIGNIFICANT CHANGE UP (ref 1.2–3.4)
LYMPHOCYTES # BLD AUTO: 1.7 % — LOW (ref 20.5–51.1)
LYMPHOCYTES # BLD AUTO: 1.8 % — LOW (ref 20.5–51.1)
LYMPHOCYTES # BLD AUTO: 1.9 % — LOW (ref 20.5–51.1)
LYMPHOCYTES # BLD AUTO: 2.3 % — LOW (ref 20.5–51.1)
LYMPHOCYTES # BLD AUTO: 2.3 % — LOW (ref 20.5–51.1)
LYMPHOCYTES # BLD AUTO: 2.5 % — LOW (ref 20.5–51.1)
LYMPHOCYTES # BLD AUTO: 2.6 % — LOW (ref 20.5–51.1)
LYMPHOCYTES # BLD AUTO: 2.7 % — LOW (ref 20.5–51.1)
LYMPHOCYTES # BLD AUTO: 3.2 % — LOW (ref 20.5–51.1)
LYMPHOCYTES # BLD AUTO: 3.5 % — LOW (ref 20.5–51.1)
LYMPHOCYTES # BLD AUTO: 3.7 % — LOW (ref 20.5–51.1)
LYMPHOCYTES # BLD AUTO: 5.1 % — LOW (ref 20.5–51.1)
LYMPHOCYTES # BLD AUTO: 5.8 % — LOW (ref 20.5–51.1)
LYMPHOCYTES # BLD AUTO: 6 % — LOW (ref 20.5–51.1)
LYMPHOCYTES # BLD AUTO: 6.9 % — LOW (ref 20.5–51.1)
LYMPHOCYTES # BLD AUTO: 7.6 % — LOW (ref 20.5–51.1)
MAGNESIUM SERPL-MCNC: 1.8 MG/DL — SIGNIFICANT CHANGE UP (ref 1.8–2.4)
MAGNESIUM SERPL-MCNC: 2 MG/DL — SIGNIFICANT CHANGE UP (ref 1.8–2.4)
MAGNESIUM SERPL-MCNC: 2.1 MG/DL — SIGNIFICANT CHANGE UP (ref 1.8–2.4)
MAGNESIUM SERPL-MCNC: 2.2 MG/DL — SIGNIFICANT CHANGE UP (ref 1.8–2.4)
MAGNESIUM SERPL-MCNC: 2.3 MG/DL — SIGNIFICANT CHANGE UP (ref 1.8–2.4)
MAGNESIUM SERPL-MCNC: 2.4 MG/DL — SIGNIFICANT CHANGE UP (ref 1.8–2.4)
MANUAL SMEAR VERIFICATION: SIGNIFICANT CHANGE UP
MANUAL SMEAR VERIFICATION: SIGNIFICANT CHANGE UP
MCHC RBC-ENTMCNC: 28.7 PG — SIGNIFICANT CHANGE UP (ref 27–31)
MCHC RBC-ENTMCNC: 28.7 PG — SIGNIFICANT CHANGE UP (ref 27–31)
MCHC RBC-ENTMCNC: 28.8 PG — SIGNIFICANT CHANGE UP (ref 27–31)
MCHC RBC-ENTMCNC: 28.9 PG — SIGNIFICANT CHANGE UP (ref 27–31)
MCHC RBC-ENTMCNC: 28.9 PG — SIGNIFICANT CHANGE UP (ref 27–31)
MCHC RBC-ENTMCNC: 29 PG — SIGNIFICANT CHANGE UP (ref 27–31)
MCHC RBC-ENTMCNC: 29.1 PG — SIGNIFICANT CHANGE UP (ref 27–31)
MCHC RBC-ENTMCNC: 29.1 PG — SIGNIFICANT CHANGE UP (ref 27–31)
MCHC RBC-ENTMCNC: 29.2 PG — SIGNIFICANT CHANGE UP (ref 27–31)
MCHC RBC-ENTMCNC: 29.2 PG — SIGNIFICANT CHANGE UP (ref 27–31)
MCHC RBC-ENTMCNC: 29.3 PG — SIGNIFICANT CHANGE UP (ref 27–31)
MCHC RBC-ENTMCNC: 29.4 PG — SIGNIFICANT CHANGE UP (ref 27–31)
MCHC RBC-ENTMCNC: 29.4 PG — SIGNIFICANT CHANGE UP (ref 27–31)
MCHC RBC-ENTMCNC: 29.5 PG — SIGNIFICANT CHANGE UP (ref 27–31)
MCHC RBC-ENTMCNC: 29.7 PG — SIGNIFICANT CHANGE UP (ref 27–31)
MCHC RBC-ENTMCNC: 29.8 PG — SIGNIFICANT CHANGE UP (ref 27–31)
MCHC RBC-ENTMCNC: 29.8 PG — SIGNIFICANT CHANGE UP (ref 27–31)
MCHC RBC-ENTMCNC: 30.1 PG — SIGNIFICANT CHANGE UP (ref 27–31)
MCHC RBC-ENTMCNC: 31.4 G/DL — LOW (ref 32–37)
MCHC RBC-ENTMCNC: 32.4 G/DL — SIGNIFICANT CHANGE UP (ref 32–37)
MCHC RBC-ENTMCNC: 32.5 G/DL — SIGNIFICANT CHANGE UP (ref 32–37)
MCHC RBC-ENTMCNC: 32.6 G/DL — SIGNIFICANT CHANGE UP (ref 32–37)
MCHC RBC-ENTMCNC: 32.7 G/DL — SIGNIFICANT CHANGE UP (ref 32–37)
MCHC RBC-ENTMCNC: 32.8 G/DL — SIGNIFICANT CHANGE UP (ref 32–37)
MCHC RBC-ENTMCNC: 32.9 G/DL — SIGNIFICANT CHANGE UP (ref 32–37)
MCHC RBC-ENTMCNC: 32.9 G/DL — SIGNIFICANT CHANGE UP (ref 32–37)
MCHC RBC-ENTMCNC: 33 G/DL — SIGNIFICANT CHANGE UP (ref 32–37)
MCHC RBC-ENTMCNC: 33.1 G/DL — SIGNIFICANT CHANGE UP (ref 32–37)
MCHC RBC-ENTMCNC: 33.2 G/DL — SIGNIFICANT CHANGE UP (ref 32–37)
MCHC RBC-ENTMCNC: 33.3 G/DL — SIGNIFICANT CHANGE UP (ref 32–37)
MCHC RBC-ENTMCNC: 33.6 G/DL — SIGNIFICANT CHANGE UP (ref 32–37)
MCHC RBC-ENTMCNC: 33.6 G/DL — SIGNIFICANT CHANGE UP (ref 32–37)
MCHC RBC-ENTMCNC: 33.7 G/DL — SIGNIFICANT CHANGE UP (ref 32–37)
MCHC RBC-ENTMCNC: 33.8 G/DL — SIGNIFICANT CHANGE UP (ref 32–37)
MCHC RBC-ENTMCNC: 33.9 G/DL — SIGNIFICANT CHANGE UP (ref 32–37)
MCHC RBC-ENTMCNC: 34.3 G/DL — SIGNIFICANT CHANGE UP (ref 32–37)
MCV RBC AUTO: 86.8 FL — SIGNIFICANT CHANGE UP (ref 80–94)
MCV RBC AUTO: 87.5 FL — SIGNIFICANT CHANGE UP (ref 80–94)
MCV RBC AUTO: 87.5 FL — SIGNIFICANT CHANGE UP (ref 80–94)
MCV RBC AUTO: 87.6 FL — SIGNIFICANT CHANGE UP (ref 80–94)
MCV RBC AUTO: 87.8 FL — SIGNIFICANT CHANGE UP (ref 80–94)
MCV RBC AUTO: 87.9 FL — SIGNIFICANT CHANGE UP (ref 80–94)
MCV RBC AUTO: 87.9 FL — SIGNIFICANT CHANGE UP (ref 80–94)
MCV RBC AUTO: 88.1 FL — SIGNIFICANT CHANGE UP (ref 80–94)
MCV RBC AUTO: 88.2 FL — SIGNIFICANT CHANGE UP (ref 80–94)
MCV RBC AUTO: 88.4 FL — SIGNIFICANT CHANGE UP (ref 80–94)
MCV RBC AUTO: 88.4 FL — SIGNIFICANT CHANGE UP (ref 80–94)
MCV RBC AUTO: 88.6 FL — SIGNIFICANT CHANGE UP (ref 80–94)
MCV RBC AUTO: 88.6 FL — SIGNIFICANT CHANGE UP (ref 80–94)
MCV RBC AUTO: 89.1 FL — SIGNIFICANT CHANGE UP (ref 80–94)
MCV RBC AUTO: 89.2 FL — SIGNIFICANT CHANGE UP (ref 80–94)
MCV RBC AUTO: 89.3 FL — SIGNIFICANT CHANGE UP (ref 80–94)
MCV RBC AUTO: 89.3 FL — SIGNIFICANT CHANGE UP (ref 80–94)
MCV RBC AUTO: 89.5 FL — SIGNIFICANT CHANGE UP (ref 80–94)
MCV RBC AUTO: 89.5 FL — SIGNIFICANT CHANGE UP (ref 80–94)
MCV RBC AUTO: 90.1 FL — SIGNIFICANT CHANGE UP (ref 80–94)
MCV RBC AUTO: 90.5 FL — SIGNIFICANT CHANGE UP (ref 80–94)
MCV RBC AUTO: 92 FL — SIGNIFICANT CHANGE UP (ref 80–94)
METAMYELOCYTES # FLD: 0.9 % — HIGH (ref 0–0)
MONOCYTES # BLD AUTO: 0.2 K/UL — SIGNIFICANT CHANGE UP (ref 0.1–0.6)
MONOCYTES # BLD AUTO: 0.3 K/UL — SIGNIFICANT CHANGE UP (ref 0.1–0.6)
MONOCYTES # BLD AUTO: 0.3 K/UL — SIGNIFICANT CHANGE UP (ref 0.1–0.6)
MONOCYTES # BLD AUTO: 0.47 K/UL — SIGNIFICANT CHANGE UP (ref 0.1–0.6)
MONOCYTES # BLD AUTO: 0.55 K/UL — SIGNIFICANT CHANGE UP (ref 0.1–0.6)
MONOCYTES # BLD AUTO: 0.57 K/UL — SIGNIFICANT CHANGE UP (ref 0.1–0.6)
MONOCYTES # BLD AUTO: 0.62 K/UL — HIGH (ref 0.1–0.6)
MONOCYTES # BLD AUTO: 0.64 K/UL — HIGH (ref 0.1–0.6)
MONOCYTES # BLD AUTO: 0.72 K/UL — HIGH (ref 0.1–0.6)
MONOCYTES # BLD AUTO: 0.74 K/UL — HIGH (ref 0.1–0.6)
MONOCYTES # BLD AUTO: 0.84 K/UL — HIGH (ref 0.1–0.6)
MONOCYTES # BLD AUTO: 0.9 K/UL — HIGH (ref 0.1–0.6)
MONOCYTES # BLD AUTO: 0.92 K/UL — HIGH (ref 0.1–0.6)
MONOCYTES # BLD AUTO: 1 K/UL — HIGH (ref 0.1–0.6)
MONOCYTES # BLD AUTO: 1.07 K/UL — HIGH (ref 0.1–0.6)
MONOCYTES # BLD AUTO: 1.3 K/UL — HIGH (ref 0.1–0.6)
MONOCYTES # BLD AUTO: 1.49 K/UL — HIGH (ref 0.1–0.6)
MONOCYTES # BLD AUTO: 1.7 K/UL — HIGH (ref 0.1–0.6)
MONOCYTES # BLD AUTO: 1.79 K/UL — HIGH (ref 0.1–0.6)
MONOCYTES # BLD AUTO: 1.83 K/UL — HIGH (ref 0.1–0.6)
MONOCYTES # BLD AUTO: 1.89 K/UL — HIGH (ref 0.1–0.6)
MONOCYTES NFR BLD AUTO: 1.7 % — SIGNIFICANT CHANGE UP (ref 1.7–9.3)
MONOCYTES NFR BLD AUTO: 11.5 % — HIGH (ref 1.7–9.3)
MONOCYTES NFR BLD AUTO: 2 % — SIGNIFICANT CHANGE UP (ref 1.7–9.3)
MONOCYTES NFR BLD AUTO: 2.3 % — SIGNIFICANT CHANGE UP (ref 1.7–9.3)
MONOCYTES NFR BLD AUTO: 2.7 % — SIGNIFICANT CHANGE UP (ref 1.7–9.3)
MONOCYTES NFR BLD AUTO: 3 % — SIGNIFICANT CHANGE UP (ref 1.7–9.3)
MONOCYTES NFR BLD AUTO: 3.2 % — SIGNIFICANT CHANGE UP (ref 1.7–9.3)
MONOCYTES NFR BLD AUTO: 3.3 % — SIGNIFICANT CHANGE UP (ref 1.7–9.3)
MONOCYTES NFR BLD AUTO: 3.6 % — SIGNIFICANT CHANGE UP (ref 1.7–9.3)
MONOCYTES NFR BLD AUTO: 4.2 % — SIGNIFICANT CHANGE UP (ref 1.7–9.3)
MONOCYTES NFR BLD AUTO: 4.3 % — SIGNIFICANT CHANGE UP (ref 1.7–9.3)
MONOCYTES NFR BLD AUTO: 4.7 % — SIGNIFICANT CHANGE UP (ref 1.7–9.3)
MONOCYTES NFR BLD AUTO: 4.9 % — SIGNIFICANT CHANGE UP (ref 1.7–9.3)
MONOCYTES NFR BLD AUTO: 5.3 % — SIGNIFICANT CHANGE UP (ref 1.7–9.3)
MONOCYTES NFR BLD AUTO: 5.7 % — SIGNIFICANT CHANGE UP (ref 1.7–9.3)
MONOCYTES NFR BLD AUTO: 5.8 % — SIGNIFICANT CHANGE UP (ref 1.7–9.3)
MONOCYTES NFR BLD AUTO: 6.2 % — SIGNIFICANT CHANGE UP (ref 1.7–9.3)
MONOCYTES NFR BLD AUTO: 6.3 % — SIGNIFICANT CHANGE UP (ref 1.7–9.3)
MONOCYTES NFR BLD AUTO: 8.3 % — SIGNIFICANT CHANGE UP (ref 1.7–9.3)
MONOCYTES NFR BLD AUTO: 8.7 % — SIGNIFICANT CHANGE UP (ref 1.7–9.3)
MONOCYTES NFR BLD AUTO: 9.9 % — HIGH (ref 1.7–9.3)
NEUTROPHILS # BLD AUTO: 12.37 K/UL — HIGH (ref 1.4–6.5)
NEUTROPHILS # BLD AUTO: 12.45 K/UL — HIGH (ref 1.4–6.5)
NEUTROPHILS # BLD AUTO: 12.82 K/UL — HIGH (ref 1.4–6.5)
NEUTROPHILS # BLD AUTO: 13 K/UL — HIGH (ref 1.4–6.5)
NEUTROPHILS # BLD AUTO: 14.46 K/UL — HIGH (ref 1.4–6.5)
NEUTROPHILS # BLD AUTO: 14.53 K/UL — HIGH (ref 1.4–6.5)
NEUTROPHILS # BLD AUTO: 15.02 K/UL — HIGH (ref 1.4–6.5)
NEUTROPHILS # BLD AUTO: 15.19 K/UL — HIGH (ref 1.4–6.5)
NEUTROPHILS # BLD AUTO: 15.8 K/UL — HIGH (ref 1.4–6.5)
NEUTROPHILS # BLD AUTO: 16.49 K/UL — HIGH (ref 1.4–6.5)
NEUTROPHILS # BLD AUTO: 20.44 K/UL — HIGH (ref 1.4–6.5)
NEUTROPHILS # BLD AUTO: 20.7 K/UL — HIGH (ref 1.4–6.5)
NEUTROPHILS # BLD AUTO: 22.37 K/UL — HIGH (ref 1.4–6.5)
NEUTROPHILS # BLD AUTO: 22.45 K/UL — HIGH (ref 1.4–6.5)
NEUTROPHILS # BLD AUTO: 22.71 K/UL — HIGH (ref 1.4–6.5)
NEUTROPHILS # BLD AUTO: 22.91 K/UL — HIGH (ref 1.4–6.5)
NEUTROPHILS # BLD AUTO: 23.32 K/UL — HIGH (ref 1.4–6.5)
NEUTROPHILS # BLD AUTO: 24.14 K/UL — HIGH (ref 1.4–6.5)
NEUTROPHILS # BLD AUTO: 8.54 K/UL — HIGH (ref 1.4–6.5)
NEUTROPHILS # BLD AUTO: 8.56 K/UL — HIGH (ref 1.4–6.5)
NEUTROPHILS # BLD AUTO: 8.92 K/UL — HIGH (ref 1.4–6.5)
NEUTROPHILS NFR BLD AUTO: 66.1 % — SIGNIFICANT CHANGE UP (ref 42.2–75.2)
NEUTROPHILS NFR BLD AUTO: 80.8 % — HIGH (ref 42.2–75.2)
NEUTROPHILS NFR BLD AUTO: 82.8 % — HIGH (ref 42.2–75.2)
NEUTROPHILS NFR BLD AUTO: 85.1 % — HIGH (ref 42.2–75.2)
NEUTROPHILS NFR BLD AUTO: 86.9 % — HIGH (ref 42.2–75.2)
NEUTROPHILS NFR BLD AUTO: 87.7 % — HIGH (ref 42.2–75.2)
NEUTROPHILS NFR BLD AUTO: 88.1 % — HIGH (ref 42.2–75.2)
NEUTROPHILS NFR BLD AUTO: 88.8 % — HIGH (ref 42.2–75.2)
NEUTROPHILS NFR BLD AUTO: 89.3 % — HIGH (ref 42.2–75.2)
NEUTROPHILS NFR BLD AUTO: 90.2 % — HIGH (ref 42.2–75.2)
NEUTROPHILS NFR BLD AUTO: 91.3 % — HIGH (ref 42.2–75.2)
NEUTROPHILS NFR BLD AUTO: 91.6 % — HIGH (ref 42.2–75.2)
NEUTROPHILS NFR BLD AUTO: 91.8 % — HIGH (ref 42.2–75.2)
NEUTROPHILS NFR BLD AUTO: 92 % — HIGH (ref 42.2–75.2)
NEUTROPHILS NFR BLD AUTO: 92.7 % — HIGH (ref 42.2–75.2)
NEUTROPHILS NFR BLD AUTO: 93 % — HIGH (ref 42.2–75.2)
NEUTROPHILS NFR BLD AUTO: 93 % — HIGH (ref 42.2–75.2)
NEUTROPHILS NFR BLD AUTO: 93.4 % — HIGH (ref 42.2–75.2)
NEUTROPHILS NFR BLD AUTO: 93.9 % — HIGH (ref 42.2–75.2)
NEUTROPHILS NFR BLD AUTO: 94.3 % — HIGH (ref 42.2–75.2)
NEUTROPHILS NFR BLD AUTO: 94.4 % — HIGH (ref 42.2–75.2)
NEUTS BAND # BLD: 0.9 % — SIGNIFICANT CHANGE UP (ref 0–6)
NEUTS BAND # BLD: 0.9 % — SIGNIFICANT CHANGE UP (ref 0–6)
NITRITE UR-MCNC: NEGATIVE — SIGNIFICANT CHANGE UP
NITRITE UR-MCNC: NEGATIVE — SIGNIFICANT CHANGE UP
NON HDL CHOLESTEROL: 140 MG/DL — HIGH
NON HDL CHOLESTEROL: 76 MG/DL — SIGNIFICANT CHANGE UP
NRBC # BLD: 0 /100 WBCS — SIGNIFICANT CHANGE UP (ref 0–0)
NT-PROBNP SERPL-SCNC: 69 PG/ML — SIGNIFICANT CHANGE UP (ref 0–300)
NT-PROBNP SERPL-SCNC: 829 PG/ML — HIGH (ref 0–300)
PCO2 BLDA: 50 MMHG — HIGH (ref 35–48)
PCO2 BLDA: 50 MMHG — HIGH (ref 35–48)
PCO2 BLDV: 56 MMHG — HIGH (ref 42–55)
PCO2 BLDV: 61 MMHG — HIGH (ref 42–55)
PH BLDA: 7.3 — LOW (ref 7.35–7.45)
PH BLDA: 7.36 — SIGNIFICANT CHANGE UP (ref 7.35–7.45)
PH BLDV: 7.29 — LOW (ref 7.32–7.43)
PH BLDV: 7.37 — SIGNIFICANT CHANGE UP (ref 7.32–7.43)
PH UR: 6 — SIGNIFICANT CHANGE UP (ref 5–8)
PH UR: 6.5 — SIGNIFICANT CHANGE UP (ref 5–8)
PHOSPHATE SERPL-MCNC: 3 MG/DL — SIGNIFICANT CHANGE UP (ref 2.1–4.9)
PHOSPHATE SERPL-MCNC: 3.3 MG/DL — SIGNIFICANT CHANGE UP (ref 2.1–4.9)
PHOSPHATE SERPL-MCNC: 4.2 MG/DL — SIGNIFICANT CHANGE UP (ref 2.1–4.9)
PLAT MORPH BLD: NORMAL — SIGNIFICANT CHANGE UP
PLAT MORPH BLD: NORMAL — SIGNIFICANT CHANGE UP
PLATELET # BLD AUTO: 201 K/UL — SIGNIFICANT CHANGE UP (ref 130–400)
PLATELET # BLD AUTO: 215 K/UL — SIGNIFICANT CHANGE UP (ref 130–400)
PLATELET # BLD AUTO: 254 K/UL — SIGNIFICANT CHANGE UP (ref 130–400)
PLATELET # BLD AUTO: 285 K/UL — SIGNIFICANT CHANGE UP (ref 130–400)
PLATELET # BLD AUTO: 288 K/UL — SIGNIFICANT CHANGE UP (ref 130–400)
PLATELET # BLD AUTO: 301 K/UL — SIGNIFICANT CHANGE UP (ref 130–400)
PLATELET # BLD AUTO: 314 K/UL — SIGNIFICANT CHANGE UP (ref 130–400)
PLATELET # BLD AUTO: 315 K/UL — SIGNIFICANT CHANGE UP (ref 130–400)
PLATELET # BLD AUTO: 352 K/UL — SIGNIFICANT CHANGE UP (ref 130–400)
PLATELET # BLD AUTO: 354 K/UL — SIGNIFICANT CHANGE UP (ref 130–400)
PLATELET # BLD AUTO: 355 K/UL — SIGNIFICANT CHANGE UP (ref 130–400)
PLATELET # BLD AUTO: 359 K/UL — SIGNIFICANT CHANGE UP (ref 130–400)
PLATELET # BLD AUTO: 362 K/UL — SIGNIFICANT CHANGE UP (ref 130–400)
PLATELET # BLD AUTO: 373 K/UL — SIGNIFICANT CHANGE UP (ref 130–400)
PLATELET # BLD AUTO: 378 K/UL — SIGNIFICANT CHANGE UP (ref 130–400)
PLATELET # BLD AUTO: 379 K/UL — SIGNIFICANT CHANGE UP (ref 130–400)
PLATELET # BLD AUTO: 387 K/UL — SIGNIFICANT CHANGE UP (ref 130–400)
PLATELET # BLD AUTO: 404 K/UL — HIGH (ref 130–400)
PLATELET # BLD AUTO: 408 K/UL — HIGH (ref 130–400)
PLATELET # BLD AUTO: 409 K/UL — HIGH (ref 130–400)
PLATELET # BLD AUTO: 415 K/UL — HIGH (ref 130–400)
PLATELET # BLD AUTO: 435 K/UL — HIGH (ref 130–400)
PLATELET # BLD AUTO: 436 K/UL — HIGH (ref 130–400)
PLATELET # BLD AUTO: 452 K/UL — HIGH (ref 130–400)
PO2 BLDA: 111 MMHG — HIGH (ref 83–108)
PO2 BLDA: 176 MMHG — HIGH (ref 83–108)
PO2 BLDV: 37 MMHG — SIGNIFICANT CHANGE UP
PO2 BLDV: 41 MMHG — SIGNIFICANT CHANGE UP
POLYCHROMASIA BLD QL SMEAR: SLIGHT — SIGNIFICANT CHANGE UP
POTASSIUM BLDV-SCNC: 3.6 MMOL/L — SIGNIFICANT CHANGE UP (ref 3.5–5.1)
POTASSIUM SERPL-MCNC: 3.6 MMOL/L — SIGNIFICANT CHANGE UP (ref 3.5–5)
POTASSIUM SERPL-MCNC: 3.9 MMOL/L — SIGNIFICANT CHANGE UP (ref 3.5–5)
POTASSIUM SERPL-MCNC: 4.2 MMOL/L — SIGNIFICANT CHANGE UP (ref 3.5–5)
POTASSIUM SERPL-MCNC: 4.2 MMOL/L — SIGNIFICANT CHANGE UP (ref 3.5–5)
POTASSIUM SERPL-MCNC: 4.3 MMOL/L — SIGNIFICANT CHANGE UP (ref 3.5–5)
POTASSIUM SERPL-MCNC: 4.4 MMOL/L — SIGNIFICANT CHANGE UP (ref 3.5–5)
POTASSIUM SERPL-MCNC: 4.5 MMOL/L — SIGNIFICANT CHANGE UP (ref 3.5–5)
POTASSIUM SERPL-MCNC: 4.6 MMOL/L — SIGNIFICANT CHANGE UP (ref 3.5–5)
POTASSIUM SERPL-MCNC: 4.7 MMOL/L — SIGNIFICANT CHANGE UP (ref 3.5–5)
POTASSIUM SERPL-MCNC: 4.7 MMOL/L — SIGNIFICANT CHANGE UP (ref 3.5–5)
POTASSIUM SERPL-MCNC: 4.8 MMOL/L — SIGNIFICANT CHANGE UP (ref 3.5–5)
POTASSIUM SERPL-MCNC: 4.9 MMOL/L — SIGNIFICANT CHANGE UP (ref 3.5–5)
POTASSIUM SERPL-MCNC: 5 MMOL/L — SIGNIFICANT CHANGE UP (ref 3.5–5)
POTASSIUM SERPL-MCNC: 5.1 MMOL/L — HIGH (ref 3.5–5)
POTASSIUM SERPL-MCNC: 5.1 MMOL/L — HIGH (ref 3.5–5)
POTASSIUM SERPL-MCNC: 5.2 MMOL/L — HIGH (ref 3.5–5)
POTASSIUM SERPL-MCNC: 5.2 MMOL/L — HIGH (ref 3.5–5)
POTASSIUM SERPL-MCNC: 5.3 MMOL/L — HIGH (ref 3.5–5)
POTASSIUM SERPL-MCNC: 5.4 MMOL/L — HIGH (ref 3.5–5)
POTASSIUM SERPL-MCNC: 5.5 MMOL/L — HIGH (ref 3.5–5)
POTASSIUM SERPL-MCNC: 5.8 MMOL/L — HIGH (ref 3.5–5)
POTASSIUM SERPL-SCNC: 3.6 MMOL/L — SIGNIFICANT CHANGE UP (ref 3.5–5)
POTASSIUM SERPL-SCNC: 3.9 MMOL/L — SIGNIFICANT CHANGE UP (ref 3.5–5)
POTASSIUM SERPL-SCNC: 4.2 MMOL/L — SIGNIFICANT CHANGE UP (ref 3.5–5)
POTASSIUM SERPL-SCNC: 4.2 MMOL/L — SIGNIFICANT CHANGE UP (ref 3.5–5)
POTASSIUM SERPL-SCNC: 4.3 MMOL/L — SIGNIFICANT CHANGE UP (ref 3.5–5)
POTASSIUM SERPL-SCNC: 4.4 MMOL/L — SIGNIFICANT CHANGE UP (ref 3.5–5)
POTASSIUM SERPL-SCNC: 4.5 MMOL/L — SIGNIFICANT CHANGE UP (ref 3.5–5)
POTASSIUM SERPL-SCNC: 4.6 MMOL/L — SIGNIFICANT CHANGE UP (ref 3.5–5)
POTASSIUM SERPL-SCNC: 4.7 MMOL/L — SIGNIFICANT CHANGE UP (ref 3.5–5)
POTASSIUM SERPL-SCNC: 4.7 MMOL/L — SIGNIFICANT CHANGE UP (ref 3.5–5)
POTASSIUM SERPL-SCNC: 4.8 MMOL/L — SIGNIFICANT CHANGE UP (ref 3.5–5)
POTASSIUM SERPL-SCNC: 4.9 MMOL/L — SIGNIFICANT CHANGE UP (ref 3.5–5)
POTASSIUM SERPL-SCNC: 5 MMOL/L — SIGNIFICANT CHANGE UP (ref 3.5–5)
POTASSIUM SERPL-SCNC: 5.1 MMOL/L — HIGH (ref 3.5–5)
POTASSIUM SERPL-SCNC: 5.1 MMOL/L — HIGH (ref 3.5–5)
POTASSIUM SERPL-SCNC: 5.2 MMOL/L — HIGH (ref 3.5–5)
POTASSIUM SERPL-SCNC: 5.2 MMOL/L — HIGH (ref 3.5–5)
POTASSIUM SERPL-SCNC: 5.3 MMOL/L — HIGH (ref 3.5–5)
POTASSIUM SERPL-SCNC: 5.4 MMOL/L — HIGH (ref 3.5–5)
POTASSIUM SERPL-SCNC: 5.5 MMOL/L — HIGH (ref 3.5–5)
POTASSIUM SERPL-SCNC: 5.8 MMOL/L — HIGH (ref 3.5–5)
PROCALCITONIN SERPL-MCNC: 0.05 NG/ML — SIGNIFICANT CHANGE UP (ref 0.02–0.1)
PROCALCITONIN SERPL-MCNC: 0.37 NG/ML — HIGH (ref 0.02–0.1)
PROCALCITONIN SERPL-MCNC: 1.04 NG/ML — HIGH (ref 0.02–0.1)
PROT SERPL-MCNC: 4.7 G/DL — LOW (ref 6–8)
PROT SERPL-MCNC: 4.9 G/DL — LOW (ref 6–8)
PROT SERPL-MCNC: 5.2 G/DL — LOW (ref 6–8)
PROT SERPL-MCNC: 5.4 G/DL — LOW (ref 6–8)
PROT SERPL-MCNC: 5.4 G/DL — LOW (ref 6–8)
PROT SERPL-MCNC: 5.6 G/DL — LOW (ref 6–8)
PROT SERPL-MCNC: 5.6 G/DL — LOW (ref 6–8)
PROT SERPL-MCNC: 5.7 G/DL — LOW (ref 6–8)
PROT SERPL-MCNC: 5.8 G/DL — LOW (ref 6–8)
PROT SERPL-MCNC: 5.9 G/DL — LOW (ref 6–8)
PROT SERPL-MCNC: 6 G/DL — SIGNIFICANT CHANGE UP (ref 6–8)
PROT SERPL-MCNC: 6.1 G/DL — SIGNIFICANT CHANGE UP (ref 6–8)
PROT SERPL-MCNC: 6.1 G/DL — SIGNIFICANT CHANGE UP (ref 6–8)
PROT SERPL-MCNC: 6.2 G/DL — SIGNIFICANT CHANGE UP (ref 6–8)
PROT SERPL-MCNC: 6.3 G/DL — SIGNIFICANT CHANGE UP (ref 6–8)
PROT SERPL-MCNC: 6.6 G/DL — SIGNIFICANT CHANGE UP (ref 6–8)
PROT SERPL-MCNC: 6.7 G/DL — SIGNIFICANT CHANGE UP (ref 6–8)
PROT SERPL-MCNC: 7 G/DL — SIGNIFICANT CHANGE UP (ref 6–8)
PROT UR-MCNC: ABNORMAL
PROT UR-MCNC: SIGNIFICANT CHANGE UP
PROTHROM AB SERPL-ACNC: 11.1 SEC — SIGNIFICANT CHANGE UP (ref 9.95–12.87)
RAPID RVP RESULT: SIGNIFICANT CHANGE UP
RBC # BLD: 3.25 M/UL — LOW (ref 4.7–6.1)
RBC # BLD: 3.86 M/UL — LOW (ref 4.7–6.1)
RBC # BLD: 4.01 M/UL — LOW (ref 4.7–6.1)
RBC # BLD: 4.67 M/UL — LOW (ref 4.7–6.1)
RBC # BLD: 4.83 M/UL — SIGNIFICANT CHANGE UP (ref 4.7–6.1)
RBC # BLD: 4.85 M/UL — SIGNIFICANT CHANGE UP (ref 4.7–6.1)
RBC # BLD: 4.88 M/UL — SIGNIFICANT CHANGE UP (ref 4.7–6.1)
RBC # BLD: 5.03 M/UL — SIGNIFICANT CHANGE UP (ref 4.7–6.1)
RBC # BLD: 5.06 M/UL — SIGNIFICANT CHANGE UP (ref 4.7–6.1)
RBC # BLD: 5.08 M/UL — SIGNIFICANT CHANGE UP (ref 4.7–6.1)
RBC # BLD: 5.14 M/UL — SIGNIFICANT CHANGE UP (ref 4.7–6.1)
RBC # BLD: 5.16 M/UL — SIGNIFICANT CHANGE UP (ref 4.7–6.1)
RBC # BLD: 5.27 M/UL — SIGNIFICANT CHANGE UP (ref 4.7–6.1)
RBC # BLD: 5.34 M/UL — SIGNIFICANT CHANGE UP (ref 4.7–6.1)
RBC # BLD: 5.36 M/UL — SIGNIFICANT CHANGE UP (ref 4.7–6.1)
RBC # BLD: 5.5 M/UL — SIGNIFICANT CHANGE UP (ref 4.7–6.1)
RBC # BLD: 5.53 M/UL — SIGNIFICANT CHANGE UP (ref 4.7–6.1)
RBC # BLD: 5.57 M/UL — SIGNIFICANT CHANGE UP (ref 4.7–6.1)
RBC # BLD: 5.59 M/UL — SIGNIFICANT CHANGE UP (ref 4.7–6.1)
RBC # BLD: 5.62 M/UL — SIGNIFICANT CHANGE UP (ref 4.7–6.1)
RBC # BLD: 5.67 M/UL — SIGNIFICANT CHANGE UP (ref 4.7–6.1)
RBC # BLD: 5.69 M/UL — SIGNIFICANT CHANGE UP (ref 4.7–6.1)
RBC # BLD: 5.7 M/UL — SIGNIFICANT CHANGE UP (ref 4.7–6.1)
RBC # BLD: 5.71 M/UL — SIGNIFICANT CHANGE UP (ref 4.7–6.1)
RBC # FLD: 13.8 % — SIGNIFICANT CHANGE UP (ref 11.5–14.5)
RBC # FLD: 13.8 % — SIGNIFICANT CHANGE UP (ref 11.5–14.5)
RBC # FLD: 14 % — SIGNIFICANT CHANGE UP (ref 11.5–14.5)
RBC # FLD: 14 % — SIGNIFICANT CHANGE UP (ref 11.5–14.5)
RBC # FLD: 14.1 % — SIGNIFICANT CHANGE UP (ref 11.5–14.5)
RBC # FLD: 14.2 % — SIGNIFICANT CHANGE UP (ref 11.5–14.5)
RBC # FLD: 14.3 % — SIGNIFICANT CHANGE UP (ref 11.5–14.5)
RBC # FLD: 14.4 % — SIGNIFICANT CHANGE UP (ref 11.5–14.5)
RBC # FLD: 14.8 % — HIGH (ref 11.5–14.5)
RBC # FLD: 14.8 % — HIGH (ref 11.5–14.5)
RBC # FLD: 14.9 % — HIGH (ref 11.5–14.5)
RBC # FLD: 15.3 % — HIGH (ref 11.5–14.5)
RBC BLD AUTO: NORMAL — SIGNIFICANT CHANGE UP
RBC BLD AUTO: NORMAL — SIGNIFICANT CHANGE UP
RBC CASTS # UR COMP ASSIST: SIGNIFICANT CHANGE UP /HPF (ref 0–4)
S PNEUM SEROTYPE IGG SER-IMP: 0.9 MCG/ML — SIGNIFICANT CHANGE UP
S PNEUM SEROTYPE IGG SER-IMP: 1.8 MCG/ML — SIGNIFICANT CHANGE UP
S PNEUM SEROTYPE IGG SER-IMP: 10.7 MCG/ML — SIGNIFICANT CHANGE UP
S PNEUM SEROTYPE IGG SER-IMP: 2.3 MCG/ML — SIGNIFICANT CHANGE UP
S PNEUM SEROTYPE IGG SER-IMP: 2.6 MCG/ML — SIGNIFICANT CHANGE UP
S PNEUM SEROTYPE IGG SER-IMP: 4.1 MCG/ML — SIGNIFICANT CHANGE UP
S PNEUM SEROTYPE IGG SER-IMP: 4.9 MCG/ML — SIGNIFICANT CHANGE UP
SAO2 % BLDA: 98.9 % — HIGH (ref 94–98)
SAO2 % BLDA: 99.3 % — HIGH (ref 94–98)
SAO2 % BLDV: 67.5 % — SIGNIFICANT CHANGE UP
SARS-COV-2 RNA SPEC QL NAA+PROBE: SIGNIFICANT CHANGE UP
SODIUM SERPL-SCNC: 132 MMOL/L — LOW (ref 135–146)
SODIUM SERPL-SCNC: 134 MMOL/L — LOW (ref 135–146)
SODIUM SERPL-SCNC: 134 MMOL/L — LOW (ref 135–146)
SODIUM SERPL-SCNC: 135 MMOL/L — SIGNIFICANT CHANGE UP (ref 135–146)
SODIUM SERPL-SCNC: 136 MMOL/L — SIGNIFICANT CHANGE UP (ref 135–146)
SODIUM SERPL-SCNC: 137 MMOL/L — SIGNIFICANT CHANGE UP (ref 135–146)
SODIUM SERPL-SCNC: 138 MMOL/L — SIGNIFICANT CHANGE UP (ref 135–146)
SODIUM SERPL-SCNC: 139 MMOL/L — SIGNIFICANT CHANGE UP (ref 135–146)
SODIUM SERPL-SCNC: 139 MMOL/L — SIGNIFICANT CHANGE UP (ref 135–146)
SODIUM SERPL-SCNC: 140 MMOL/L — SIGNIFICANT CHANGE UP (ref 135–146)
SODIUM SERPL-SCNC: 141 MMOL/L — SIGNIFICANT CHANGE UP (ref 135–146)
SODIUM SERPL-SCNC: 141 MMOL/L — SIGNIFICANT CHANGE UP (ref 135–146)
SODIUM SERPL-SCNC: 142 MMOL/L — SIGNIFICANT CHANGE UP (ref 135–146)
SODIUM SERPL-SCNC: 143 MMOL/L — SIGNIFICANT CHANGE UP (ref 135–146)
SODIUM SERPL-SCNC: 146 MMOL/L — SIGNIFICANT CHANGE UP (ref 135–146)
SP GR SPEC: >1.05 (ref 1.01–1.03)
SP GR SPEC: >1.05 (ref 1.01–1.03)
SPECIMEN SOURCE: SIGNIFICANT CHANGE UP
SPECIMEN SOURCE: SIGNIFICANT CHANGE UP
T3 SERPL-MCNC: 31 NG/DL — LOW (ref 80–200)
T4 AB SER-ACNC: 4.7 UG/DL — SIGNIFICANT CHANGE UP (ref 4.6–12)
TRIGL SERPL-MCNC: 58 MG/DL — SIGNIFICANT CHANGE UP
TRIGL SERPL-MCNC: 81 MG/DL — SIGNIFICANT CHANGE UP
TROPONIN T SERPL-MCNC: 0.03 NG/ML — CRITICAL HIGH
TROPONIN T SERPL-MCNC: 0.04 NG/ML — CRITICAL HIGH
TROPONIN T SERPL-MCNC: 0.06 NG/ML — CRITICAL HIGH
TROPONIN T SERPL-MCNC: <0.01 NG/ML — SIGNIFICANT CHANGE UP
TROPONIN T SERPL-MCNC: <0.01 NG/ML — SIGNIFICANT CHANGE UP
TRYPTASE SERPL-MCNC: 3.7 UG/L — SIGNIFICANT CHANGE UP (ref 2.2–13.2)
TSH SERPL-MCNC: 0.3 UIU/ML — SIGNIFICANT CHANGE UP (ref 0.27–4.2)
UROBILINOGEN FLD QL: SIGNIFICANT CHANGE UP
UROBILINOGEN FLD QL: SIGNIFICANT CHANGE UP
VIT B12 SERPL-MCNC: 373 PG/ML — SIGNIFICANT CHANGE UP (ref 232–1245)
VIT B12 SERPL-MCNC: 515 PG/ML — SIGNIFICANT CHANGE UP (ref 232–1245)
WBC # BLD: 10.03 K/UL — SIGNIFICANT CHANGE UP (ref 4.8–10.8)
WBC # BLD: 13.18 K/UL — HIGH (ref 4.8–10.8)
WBC # BLD: 13.22 K/UL — HIGH (ref 4.8–10.8)
WBC # BLD: 13.28 K/UL — HIGH (ref 4.8–10.8)
WBC # BLD: 14.63 K/UL — HIGH (ref 4.8–10.8)
WBC # BLD: 15.8 K/UL — HIGH (ref 4.8–10.8)
WBC # BLD: 15.88 K/UL — HIGH (ref 4.8–10.8)
WBC # BLD: 17.22 K/UL — HIGH (ref 4.8–10.8)
WBC # BLD: 17.86 K/UL — HIGH (ref 4.8–10.8)
WBC # BLD: 17.89 K/UL — HIGH (ref 4.8–10.8)
WBC # BLD: 17.98 K/UL — HIGH (ref 4.8–10.8)
WBC # BLD: 18.12 K/UL — HIGH (ref 4.8–10.8)
WBC # BLD: 21.69 K/UL — HIGH (ref 4.8–10.8)
WBC # BLD: 21.98 K/UL — HIGH (ref 4.8–10.8)
WBC # BLD: 22.51 K/UL — HIGH (ref 4.8–10.8)
WBC # BLD: 23.81 K/UL — HIGH (ref 4.8–10.8)
WBC # BLD: 24.03 K/UL — HIGH (ref 4.8–10.8)
WBC # BLD: 24.27 K/UL — HIGH (ref 4.8–10.8)
WBC # BLD: 24.46 K/UL — HIGH (ref 4.8–10.8)
WBC # BLD: 25.15 K/UL — HIGH (ref 4.8–10.8)
WBC # BLD: 26.31 K/UL — HIGH (ref 4.8–10.8)
WBC # BLD: 26.85 K/UL — HIGH (ref 4.8–10.8)
WBC # BLD: 9.24 K/UL — SIGNIFICANT CHANGE UP (ref 4.8–10.8)
WBC # BLD: 9.99 K/UL — SIGNIFICANT CHANGE UP (ref 4.8–10.8)
WBC # FLD AUTO: 10.03 K/UL — SIGNIFICANT CHANGE UP (ref 4.8–10.8)
WBC # FLD AUTO: 13.18 K/UL — HIGH (ref 4.8–10.8)
WBC # FLD AUTO: 13.22 K/UL — HIGH (ref 4.8–10.8)
WBC # FLD AUTO: 13.28 K/UL — HIGH (ref 4.8–10.8)
WBC # FLD AUTO: 14.63 K/UL — HIGH (ref 4.8–10.8)
WBC # FLD AUTO: 15.8 K/UL — HIGH (ref 4.8–10.8)
WBC # FLD AUTO: 15.88 K/UL — HIGH (ref 4.8–10.8)
WBC # FLD AUTO: 17.22 K/UL — HIGH (ref 4.8–10.8)
WBC # FLD AUTO: 17.86 K/UL — HIGH (ref 4.8–10.8)
WBC # FLD AUTO: 17.89 K/UL — HIGH (ref 4.8–10.8)
WBC # FLD AUTO: 17.98 K/UL — HIGH (ref 4.8–10.8)
WBC # FLD AUTO: 18.12 K/UL — HIGH (ref 4.8–10.8)
WBC # FLD AUTO: 21.69 K/UL — HIGH (ref 4.8–10.8)
WBC # FLD AUTO: 21.98 K/UL — HIGH (ref 4.8–10.8)
WBC # FLD AUTO: 22.51 K/UL — HIGH (ref 4.8–10.8)
WBC # FLD AUTO: 23.81 K/UL — HIGH (ref 4.8–10.8)
WBC # FLD AUTO: 24.03 K/UL — HIGH (ref 4.8–10.8)
WBC # FLD AUTO: 24.27 K/UL — HIGH (ref 4.8–10.8)
WBC # FLD AUTO: 24.46 K/UL — HIGH (ref 4.8–10.8)
WBC # FLD AUTO: 25.15 K/UL — HIGH (ref 4.8–10.8)
WBC # FLD AUTO: 26.31 K/UL — HIGH (ref 4.8–10.8)
WBC # FLD AUTO: 26.85 K/UL — HIGH (ref 4.8–10.8)
WBC # FLD AUTO: 9.24 K/UL — SIGNIFICANT CHANGE UP (ref 4.8–10.8)
WBC # FLD AUTO: 9.99 K/UL — SIGNIFICANT CHANGE UP (ref 4.8–10.8)
WBC UR QL: 1 /HPF — SIGNIFICANT CHANGE UP (ref 0–5)

## 2022-01-01 PROCEDURE — 99233 SBSQ HOSP IP/OBS HIGH 50: CPT

## 2022-01-01 PROCEDURE — 99223 1ST HOSP IP/OBS HIGH 75: CPT

## 2022-01-01 PROCEDURE — 93010 ELECTROCARDIOGRAM REPORT: CPT | Mod: 77

## 2022-01-01 PROCEDURE — 93010 ELECTROCARDIOGRAM REPORT: CPT

## 2022-01-01 PROCEDURE — 76705 ECHO EXAM OF ABDOMEN: CPT | Mod: 26

## 2022-01-01 PROCEDURE — 71045 X-RAY EXAM CHEST 1 VIEW: CPT | Mod: 26

## 2022-01-01 PROCEDURE — 93010 ELECTROCARDIOGRAM REPORT: CPT | Mod: 76

## 2022-01-01 PROCEDURE — 99232 SBSQ HOSP IP/OBS MODERATE 35: CPT

## 2022-01-01 PROCEDURE — 99291 CRITICAL CARE FIRST HOUR: CPT

## 2022-01-01 PROCEDURE — 99497 ADVNCD CARE PLAN 30 MIN: CPT

## 2022-01-01 PROCEDURE — 99497 ADVNCD CARE PLAN 30 MIN: CPT | Mod: 25

## 2022-01-01 PROCEDURE — 99222 1ST HOSP IP/OBS MODERATE 55: CPT

## 2022-01-01 PROCEDURE — 99238 HOSP IP/OBS DSCHRG MGMT 30/<: CPT

## 2022-01-01 PROCEDURE — 71275 CT ANGIOGRAPHY CHEST: CPT | Mod: 26,MA

## 2022-01-01 PROCEDURE — 99285 EMERGENCY DEPT VISIT HI MDM: CPT

## 2022-01-01 PROCEDURE — 99348 HOME/RES VST EST LOW MDM 30: CPT | Mod: 95

## 2022-01-01 PROCEDURE — 93970 EXTREMITY STUDY: CPT | Mod: 26

## 2022-01-01 PROCEDURE — 99498 ADVNCD CARE PLAN ADDL 30 MIN: CPT

## 2022-01-01 RX ORDER — DEXTROSE 50 % IN WATER 50 %
25 SYRINGE (ML) INTRAVENOUS ONCE
Refills: 0 | Status: DISCONTINUED | OUTPATIENT
Start: 2022-01-01 | End: 2022-01-01

## 2022-01-01 RX ORDER — ALBUTEROL 90 UG/1
2.5 AEROSOL, METERED ORAL EVERY 6 HOURS
Refills: 0 | Status: DISCONTINUED | OUTPATIENT
Start: 2022-01-01 | End: 2022-01-01

## 2022-01-01 RX ORDER — NOREPINEPHRINE BITARTRATE/D5W 8 MG/250ML
0.5 PLASTIC BAG, INJECTION (ML) INTRAVENOUS
Qty: 8 | Refills: 0 | Status: DISCONTINUED | OUTPATIENT
Start: 2022-01-01 | End: 2022-01-01

## 2022-01-01 RX ORDER — PANTOPRAZOLE SODIUM 20 MG/1
1 TABLET, DELAYED RELEASE ORAL
Qty: 0 | Refills: 0 | DISCHARGE

## 2022-01-01 RX ORDER — DILTIAZEM HCL 120 MG
1 CAPSULE, EXT RELEASE 24 HR ORAL
Qty: 30 | Refills: 0
Start: 2022-01-01 | End: 2022-09-17

## 2022-01-01 RX ORDER — ALBUTEROL 90 UG/1
2.5 AEROSOL, METERED ORAL ONCE
Refills: 0 | Status: COMPLETED | OUTPATIENT
Start: 2022-01-01 | End: 2022-01-01

## 2022-01-01 RX ORDER — MONTELUKAST 4 MG/1
10 TABLET, CHEWABLE ORAL DAILY
Refills: 0 | Status: DISCONTINUED | OUTPATIENT
Start: 2022-01-01 | End: 2022-01-01

## 2022-01-01 RX ORDER — SODIUM CHLORIDE 9 MG/ML
1000 INJECTION, SOLUTION INTRAVENOUS
Refills: 0 | Status: DISCONTINUED | OUTPATIENT
Start: 2022-01-01 | End: 2022-01-01

## 2022-01-01 RX ORDER — SODIUM CHLORIDE 9 MG/ML
500 INJECTION INTRAMUSCULAR; INTRAVENOUS; SUBCUTANEOUS ONCE
Refills: 0 | Status: COMPLETED | OUTPATIENT
Start: 2022-01-01 | End: 2022-01-01

## 2022-01-01 RX ORDER — SODIUM CHLORIDE 9 MG/ML
1800 INJECTION, SOLUTION INTRAVENOUS ONCE
Refills: 0 | Status: DISCONTINUED | OUTPATIENT
Start: 2022-01-01 | End: 2022-01-01

## 2022-01-01 RX ORDER — SODIUM ZIRCONIUM CYCLOSILICATE 10 G/10G
5 POWDER, FOR SUSPENSION ORAL ONCE
Refills: 0 | Status: DISCONTINUED | OUTPATIENT
Start: 2022-01-01 | End: 2022-01-01

## 2022-01-01 RX ORDER — CEFEPIME 1 G/1
2000 INJECTION, POWDER, FOR SOLUTION INTRAMUSCULAR; INTRAVENOUS EVERY 8 HOURS
Refills: 0 | Status: COMPLETED | OUTPATIENT
Start: 2022-01-01 | End: 2022-01-01

## 2022-01-01 RX ORDER — NOREPINEPHRINE BITARTRATE/D5W 8 MG/250ML
0.8 PLASTIC BAG, INJECTION (ML) INTRAVENOUS
Qty: 8 | Refills: 0 | Status: DISCONTINUED | OUTPATIENT
Start: 2022-01-01 | End: 2022-01-01

## 2022-01-01 RX ORDER — MORPHINE SULFATE 50 MG/1
4 CAPSULE, EXTENDED RELEASE ORAL
Refills: 0 | Status: DISCONTINUED | OUTPATIENT
Start: 2022-01-01 | End: 2022-01-01

## 2022-01-01 RX ORDER — TIOTROPIUM BROMIDE 18 UG/1
1 CAPSULE ORAL; RESPIRATORY (INHALATION)
Qty: 0 | Refills: 0 | DISCHARGE

## 2022-01-01 RX ORDER — NIFEDIPINE 30 MG
30 TABLET, EXTENDED RELEASE 24 HR ORAL DAILY
Refills: 0 | Status: DISCONTINUED | OUTPATIENT
Start: 2022-01-01 | End: 2022-01-01

## 2022-01-01 RX ORDER — IPRATROPIUM/ALBUTEROL SULFATE 18-103MCG
3 AEROSOL WITH ADAPTER (GRAM) INHALATION ONCE
Refills: 0 | Status: COMPLETED | OUTPATIENT
Start: 2022-01-01 | End: 2022-01-01

## 2022-01-01 RX ORDER — SODIUM ZIRCONIUM CYCLOSILICATE 10 G/10G
10 POWDER, FOR SUSPENSION ORAL ONCE
Refills: 0 | Status: DISCONTINUED | OUTPATIENT
Start: 2022-01-01 | End: 2022-01-01

## 2022-01-01 RX ORDER — DILTIAZEM HYDROCHLORIDE 120 MG/1
120 CAPSULE, COATED, EXTENDED RELEASE ORAL DAILY
Refills: 0 | Status: ACTIVE | COMMUNITY

## 2022-01-01 RX ORDER — DEXTROSE 50 % IN WATER 50 %
12.5 SYRINGE (ML) INTRAVENOUS ONCE
Refills: 0 | Status: DISCONTINUED | OUTPATIENT
Start: 2022-01-01 | End: 2022-01-01

## 2022-01-01 RX ORDER — SENNA PLUS 8.6 MG/1
2 TABLET ORAL
Qty: 56 | Refills: 0
Start: 2022-01-01 | End: 2022-09-15

## 2022-01-01 RX ORDER — BUDESONIDE AND FORMOTEROL FUMARATE DIHYDRATE 160; 4.5 UG/1; UG/1
2 AEROSOL RESPIRATORY (INHALATION)
Qty: 2 | Refills: 0
Start: 2022-01-01 | End: 2022-09-15

## 2022-01-01 RX ORDER — ROBINUL 0.2 MG/ML
0.4 INJECTION INTRAMUSCULAR; INTRAVENOUS
Refills: 0 | Status: DISCONTINUED | OUTPATIENT
Start: 2022-01-01 | End: 2022-01-01

## 2022-01-01 RX ORDER — IPRATROPIUM/ALBUTEROL SULFATE 18-103MCG
3 AEROSOL WITH ADAPTER (GRAM) INHALATION ONCE
Refills: 0 | Status: DISCONTINUED | OUTPATIENT
Start: 2022-01-01 | End: 2022-01-01

## 2022-01-01 RX ORDER — DEXTROSE 50 % IN WATER 50 %
15 SYRINGE (ML) INTRAVENOUS ONCE
Refills: 0 | Status: DISCONTINUED | OUTPATIENT
Start: 2022-01-01 | End: 2022-01-01

## 2022-01-01 RX ORDER — MONTELUKAST SODIUM 10 MG/1
10 TABLET, FILM COATED ORAL
Refills: 0 | Status: ACTIVE | COMMUNITY

## 2022-01-01 RX ORDER — IPRATROPIUM/ALBUTEROL SULFATE 18-103MCG
3 AEROSOL WITH ADAPTER (GRAM) INHALATION EVERY 6 HOURS
Refills: 0 | Status: DISCONTINUED | OUTPATIENT
Start: 2022-01-01 | End: 2022-01-01

## 2022-01-01 RX ORDER — BUDESONIDE AND FORMOTEROL FUMARATE DIHYDRATE 160; 4.5 UG/1; UG/1
2 AEROSOL RESPIRATORY (INHALATION)
Qty: 0 | Refills: 0 | DISCHARGE

## 2022-01-01 RX ORDER — VANCOMYCIN HCL 1 G
VIAL (EA) INTRAVENOUS
Refills: 0 | Status: DISCONTINUED | OUTPATIENT
Start: 2022-01-01 | End: 2022-01-01

## 2022-01-01 RX ORDER — ALBUTEROL 90 UG/1
2.5 AEROSOL, METERED ORAL EVERY 4 HOURS
Refills: 0 | Status: DISCONTINUED | OUTPATIENT
Start: 2022-01-01 | End: 2022-01-01

## 2022-01-01 RX ORDER — POLYETHYLENE GLYCOL 3350 17 G/17G
17 POWDER, FOR SOLUTION ORAL
Refills: 0 | Status: DISCONTINUED | OUTPATIENT
Start: 2022-01-01 | End: 2022-01-01

## 2022-01-01 RX ORDER — CEFEPIME 1 G/1
INJECTION, POWDER, FOR SOLUTION INTRAMUSCULAR; INTRAVENOUS
Refills: 0 | Status: COMPLETED | OUTPATIENT
Start: 2022-01-01 | End: 2022-01-01

## 2022-01-01 RX ORDER — SODIUM ZIRCONIUM CYCLOSILICATE 10 G/10G
5 POWDER, FOR SUSPENSION ORAL ONCE
Refills: 0 | Status: COMPLETED | OUTPATIENT
Start: 2022-01-01 | End: 2022-01-01

## 2022-01-01 RX ORDER — ALBUTEROL 90 UG/1
1 AEROSOL, METERED ORAL EVERY 4 HOURS
Refills: 0 | Status: DISCONTINUED | OUTPATIENT
Start: 2022-01-01 | End: 2022-01-01

## 2022-01-01 RX ORDER — CALCIUM GLUCONATE 100 MG/ML
2 VIAL (ML) INTRAVENOUS ONCE
Refills: 0 | Status: COMPLETED | OUTPATIENT
Start: 2022-01-01 | End: 2022-01-01

## 2022-01-01 RX ORDER — MONTELUKAST 4 MG/1
1 TABLET, CHEWABLE ORAL
Qty: 0 | Refills: 0 | DISCHARGE

## 2022-01-01 RX ORDER — MORPHINE SULFATE 50 MG/1
4 CAPSULE, EXTENDED RELEASE ORAL EVERY 4 HOURS
Refills: 0 | Status: DISCONTINUED | OUTPATIENT
Start: 2022-01-01 | End: 2022-01-01

## 2022-01-01 RX ORDER — LORATADINE 10 MG/1
10 TABLET ORAL DAILY
Refills: 0 | Status: DISCONTINUED | OUTPATIENT
Start: 2022-01-01 | End: 2022-01-01

## 2022-01-01 RX ORDER — TIOTROPIUM BROMIDE 18 UG/1
1 CAPSULE ORAL; RESPIRATORY (INHALATION) DAILY
Refills: 0 | Status: DISCONTINUED | OUTPATIENT
Start: 2022-01-01 | End: 2022-01-01

## 2022-01-01 RX ORDER — ACETAMINOPHEN 500 MG
650 TABLET ORAL ONCE
Refills: 0 | Status: COMPLETED | OUTPATIENT
Start: 2022-01-01 | End: 2022-01-01

## 2022-01-01 RX ORDER — PIPERACILLIN AND TAZOBACTAM 4; .5 G/20ML; G/20ML
3.38 INJECTION, POWDER, LYOPHILIZED, FOR SOLUTION INTRAVENOUS EVERY 8 HOURS
Refills: 0 | Status: DISCONTINUED | OUTPATIENT
Start: 2022-01-01 | End: 2022-01-01

## 2022-01-01 RX ORDER — CEFTRIAXONE 500 MG/1
1000 INJECTION, POWDER, FOR SOLUTION INTRAMUSCULAR; INTRAVENOUS ONCE
Refills: 0 | Status: COMPLETED | OUTPATIENT
Start: 2022-01-01 | End: 2022-01-01

## 2022-01-01 RX ORDER — LANOLIN ALCOHOL/MO/W.PET/CERES
3 CREAM (GRAM) TOPICAL AT BEDTIME
Refills: 0 | Status: DISCONTINUED | OUTPATIENT
Start: 2022-01-01 | End: 2022-01-01

## 2022-01-01 RX ORDER — ACETAMINOPHEN 500 MG
2 TABLET ORAL
Qty: 0 | Refills: 0 | DISCHARGE
Start: 2022-01-01

## 2022-01-01 RX ORDER — GLUCAGON INJECTION, SOLUTION 0.5 MG/.1ML
1 INJECTION, SOLUTION SUBCUTANEOUS ONCE
Refills: 0 | Status: DISCONTINUED | OUTPATIENT
Start: 2022-01-01 | End: 2022-01-01

## 2022-01-01 RX ORDER — MAGNESIUM SULFATE 500 MG/ML
2 VIAL (ML) INJECTION ONCE
Refills: 0 | Status: COMPLETED | OUTPATIENT
Start: 2022-01-01 | End: 2022-01-01

## 2022-01-01 RX ORDER — SODIUM CHLORIDE 9 MG/ML
1000 INJECTION INTRAMUSCULAR; INTRAVENOUS; SUBCUTANEOUS
Refills: 0 | Status: DISCONTINUED | OUTPATIENT
Start: 2022-01-01 | End: 2022-01-01

## 2022-01-01 RX ORDER — ALBUTEROL 90 UG/1
5 AEROSOL, METERED ORAL
Qty: 100 | Refills: 0 | Status: DISCONTINUED | OUTPATIENT
Start: 2022-01-01 | End: 2022-01-01

## 2022-01-01 RX ORDER — NYSTATIN 500MM UNIT
5 POWDER (EA) MISCELLANEOUS
Qty: 0 | Refills: 0 | DISCHARGE
Start: 2022-01-01

## 2022-01-01 RX ORDER — IPRATROPIUM/ALBUTEROL SULFATE 18-103MCG
3 AEROSOL WITH ADAPTER (GRAM) INHALATION EVERY 4 HOURS
Refills: 0 | Status: DISCONTINUED | OUTPATIENT
Start: 2022-01-01 | End: 2022-01-01

## 2022-01-01 RX ORDER — POLYETHYLENE GLYCOL 3350 17 G/17G
17 POWDER, FOR SOLUTION ORAL
Qty: 0 | Refills: 0 | DISCHARGE
Start: 2022-01-01

## 2022-01-01 RX ORDER — FLUTICASONE PROPIONATE 50 MCG
2 SPRAY, SUSPENSION NASAL
Qty: 0 | Refills: 0 | DISCHARGE
Start: 2022-01-01

## 2022-01-01 RX ORDER — PANTOPRAZOLE SODIUM 20 MG/1
40 TABLET, DELAYED RELEASE ORAL
Refills: 0 | Status: DISCONTINUED | OUTPATIENT
Start: 2022-01-01 | End: 2022-01-01

## 2022-01-01 RX ORDER — ENOXAPARIN SODIUM 100 MG/ML
40 INJECTION SUBCUTANEOUS EVERY 24 HOURS
Refills: 0 | Status: DISCONTINUED | OUTPATIENT
Start: 2022-01-01 | End: 2022-01-01

## 2022-01-01 RX ORDER — FUROSEMIDE 40 MG
20 TABLET ORAL ONCE
Refills: 0 | Status: COMPLETED | OUTPATIENT
Start: 2022-01-01 | End: 2022-01-01

## 2022-01-01 RX ORDER — ALBUTEROL 90 UG/1
2 AEROSOL, METERED ORAL
Qty: 0 | Refills: 0 | DISCHARGE

## 2022-01-01 RX ORDER — NIFEDIPINE 30 MG
1 TABLET, EXTENDED RELEASE 24 HR ORAL
Qty: 0 | Refills: 0 | DISCHARGE

## 2022-01-01 RX ORDER — HALOPERIDOL DECANOATE 100 MG/ML
0.5 INJECTION INTRAMUSCULAR ONCE
Refills: 0 | Status: COMPLETED | OUTPATIENT
Start: 2022-01-01 | End: 2022-01-01

## 2022-01-01 RX ORDER — GUAIFENESIN/DEXTROMETHORPHAN 600MG-30MG
10 TABLET, EXTENDED RELEASE 12 HR ORAL EVERY 6 HOURS
Refills: 0 | Status: DISCONTINUED | OUTPATIENT
Start: 2022-01-01 | End: 2022-01-01

## 2022-01-01 RX ORDER — NOREPINEPHRINE BITARTRATE/D5W 8 MG/250ML
1.1 PLASTIC BAG, INJECTION (ML) INTRAVENOUS
Qty: 8 | Refills: 0 | Status: DISCONTINUED | OUTPATIENT
Start: 2022-01-01 | End: 2022-01-01

## 2022-01-01 RX ORDER — ALBUTEROL 90 UG/1
2.5 AEROSOL, METERED ORAL
Refills: 0 | Status: DISCONTINUED | OUTPATIENT
Start: 2022-01-01 | End: 2022-01-01

## 2022-01-01 RX ORDER — CEFEPIME 1 G/1
2000 INJECTION, POWDER, FOR SOLUTION INTRAMUSCULAR; INTRAVENOUS ONCE
Refills: 0 | Status: COMPLETED | OUTPATIENT
Start: 2022-01-01 | End: 2022-01-01

## 2022-01-01 RX ORDER — NYSTATIN 500MM UNIT
500000 POWDER (EA) MISCELLANEOUS
Refills: 0 | Status: DISCONTINUED | OUTPATIENT
Start: 2022-01-01 | End: 2022-01-01

## 2022-01-01 RX ORDER — VANCOMYCIN HCL 1 G
1000 VIAL (EA) INTRAVENOUS ONCE
Refills: 0 | Status: COMPLETED | OUTPATIENT
Start: 2022-01-01 | End: 2022-01-01

## 2022-01-01 RX ORDER — INSULIN LISPRO 100/ML
VIAL (ML) SUBCUTANEOUS
Refills: 0 | Status: DISCONTINUED | OUTPATIENT
Start: 2022-01-01 | End: 2022-01-01

## 2022-01-01 RX ORDER — ONDANSETRON 8 MG/1
4 TABLET, FILM COATED ORAL EVERY 8 HOURS
Refills: 0 | Status: DISCONTINUED | OUTPATIENT
Start: 2022-01-01 | End: 2022-01-01

## 2022-01-01 RX ORDER — AZITHROMYCIN 500 MG/1
500 TABLET, FILM COATED ORAL ONCE
Refills: 0 | Status: COMPLETED | OUTPATIENT
Start: 2022-01-01 | End: 2022-01-01

## 2022-01-01 RX ORDER — SODIUM CHLORIDE 9 MG/ML
1000 INJECTION, SOLUTION INTRAVENOUS
Refills: 0 | Status: COMPLETED | OUTPATIENT
Start: 2022-01-01 | End: 2022-01-01

## 2022-01-01 RX ORDER — DILTIAZEM HCL 120 MG
30 CAPSULE, EXT RELEASE 24 HR ORAL ONCE
Refills: 0 | Status: COMPLETED | OUTPATIENT
Start: 2022-01-01 | End: 2022-01-01

## 2022-01-01 RX ORDER — ACETAMINOPHEN 500 MG
650 TABLET ORAL EVERY 6 HOURS
Refills: 0 | Status: DISCONTINUED | OUTPATIENT
Start: 2022-01-01 | End: 2022-01-01

## 2022-01-01 RX ORDER — NYSTATIN 500MM UNIT
5 POWDER (EA) MISCELLANEOUS
Qty: 0 | Refills: 0 | DISCHARGE
Start: 2022-01-01 | End: 2022-01-01

## 2022-01-01 RX ORDER — DILTIAZEM HCL 120 MG
30 CAPSULE, EXT RELEASE 24 HR ORAL THREE TIMES A DAY
Refills: 0 | Status: DISCONTINUED | OUTPATIENT
Start: 2022-01-01 | End: 2022-01-01

## 2022-01-01 RX ORDER — IPRATROPIUM/ALBUTEROL SULFATE 18-103MCG
3 AEROSOL WITH ADAPTER (GRAM) INHALATION
Qty: 0 | Refills: 0 | DISCHARGE

## 2022-01-01 RX ORDER — SODIUM CHLORIDE 9 MG/ML
1000 INJECTION, SOLUTION INTRAVENOUS ONCE
Refills: 0 | Status: COMPLETED | OUTPATIENT
Start: 2022-01-01 | End: 2022-01-01

## 2022-01-01 RX ORDER — ALBUTEROL 90 UG/1
2 AEROSOL, METERED ORAL EVERY 4 HOURS
Refills: 0 | Status: DISCONTINUED | OUTPATIENT
Start: 2022-01-01 | End: 2022-01-01

## 2022-01-01 RX ORDER — INSULIN HUMAN 100 [IU]/ML
10 INJECTION, SOLUTION SUBCUTANEOUS ONCE
Refills: 0 | Status: COMPLETED | OUTPATIENT
Start: 2022-01-01 | End: 2022-01-01

## 2022-01-01 RX ORDER — DOXYCYCLINE HYCLATE 100 MG/1
100 TABLET ORAL
Qty: 10 | Refills: 1 | Status: DISCONTINUED | COMMUNITY
Start: 2017-04-18 | End: 2022-01-01

## 2022-01-01 RX ORDER — FLUTICASONE PROPIONATE 50 MCG
2 SPRAY, SUSPENSION NASAL
Refills: 0 | Status: DISCONTINUED | OUTPATIENT
Start: 2022-01-01 | End: 2022-01-01

## 2022-01-01 RX ORDER — LORATADINE 10 MG/1
1 TABLET ORAL
Qty: 0 | Refills: 0 | DISCHARGE

## 2022-01-01 RX ORDER — FLUTICASONE PROPIONATE 50 MCG
2 SPRAY, SUSPENSION NASAL
Qty: 2 | Refills: 0
Start: 2022-01-01 | End: 2022-01-01

## 2022-01-01 RX ORDER — SENNA PLUS 8.6 MG/1
2 TABLET ORAL AT BEDTIME
Refills: 0 | Status: DISCONTINUED | OUTPATIENT
Start: 2022-01-01 | End: 2022-01-01

## 2022-01-01 RX ORDER — LANOLIN ALCOHOL/MO/W.PET/CERES
1 CREAM (GRAM) TOPICAL
Qty: 0 | Refills: 0 | DISCHARGE
Start: 2022-01-01

## 2022-01-01 RX ORDER — BUDESONIDE AND FORMOTEROL FUMARATE DIHYDRATE 160; 4.5 UG/1; UG/1
2 AEROSOL RESPIRATORY (INHALATION)
Qty: 0 | Refills: 0 | DISCHARGE
Start: 2022-01-01

## 2022-01-01 RX ORDER — NYSTATIN 500MM UNIT
5 POWDER (EA) MISCELLANEOUS
Qty: 80 | Refills: 0
Start: 2022-01-01 | End: 2022-01-01

## 2022-01-01 RX ORDER — SENNA PLUS 8.6 MG/1
2 TABLET ORAL
Qty: 0 | Refills: 0 | DISCHARGE
Start: 2022-01-01

## 2022-01-01 RX ORDER — DILTIAZEM HCL 120 MG
120 CAPSULE, EXT RELEASE 24 HR ORAL DAILY
Refills: 0 | Status: DISCONTINUED | OUTPATIENT
Start: 2022-01-01 | End: 2022-01-01

## 2022-01-01 RX ORDER — SODIUM ZIRCONIUM CYCLOSILICATE 10 G/10G
5 POWDER, FOR SUSPENSION ORAL
Refills: 0 | Status: COMPLETED | OUTPATIENT
Start: 2022-01-01 | End: 2022-01-01

## 2022-01-01 RX ORDER — BUDESONIDE AND FORMOTEROL FUMARATE DIHYDRATE 160; 4.5 UG/1; UG/1
2 AEROSOL RESPIRATORY (INHALATION)
Refills: 0 | Status: DISCONTINUED | OUTPATIENT
Start: 2022-01-01 | End: 2022-01-01

## 2022-01-01 RX ORDER — LANOLIN ALCOHOL/MO/W.PET/CERES
1 CREAM (GRAM) TOPICAL
Qty: 28 | Refills: 0
Start: 2022-01-01 | End: 2022-09-15

## 2022-01-01 RX ORDER — NYSTATIN 100000 [USP'U]/ML
100000 SUSPENSION ORAL
Refills: 0 | Status: ACTIVE | COMMUNITY

## 2022-01-01 RX ORDER — ALPRAZOLAM 0.25 MG
0.5 TABLET ORAL ONCE
Refills: 0 | Status: DISCONTINUED | OUTPATIENT
Start: 2022-01-01 | End: 2022-01-01

## 2022-01-01 RX ORDER — BENZONATATE 100 MG/1
100 CAPSULE ORAL 3 TIMES DAILY
Refills: 0 | Status: ACTIVE | COMMUNITY

## 2022-01-01 RX ORDER — POLYETHYLENE GLYCOL 3350 17 G/17G
17 POWDER, FOR SOLUTION ORAL
Qty: 952 | Refills: 0
Start: 2022-01-01 | End: 2022-09-15

## 2022-01-01 RX ORDER — DEXTROSE 50 % IN WATER 50 %
50 SYRINGE (ML) INTRAVENOUS ONCE
Refills: 0 | Status: COMPLETED | OUTPATIENT
Start: 2022-01-01 | End: 2022-01-01

## 2022-01-01 RX ADMIN — CEFTRIAXONE 100 MILLIGRAM(S): 500 INJECTION, POWDER, FOR SOLUTION INTRAMUSCULAR; INTRAVENOUS at 12:32

## 2022-01-01 RX ADMIN — Medication 30 MILLIGRAM(S): at 05:37

## 2022-01-01 RX ADMIN — Medication 60 MILLIGRAM(S): at 08:52

## 2022-01-01 RX ADMIN — TIOTROPIUM BROMIDE 1 CAPSULE(S): 18 CAPSULE ORAL; RESPIRATORY (INHALATION) at 08:38

## 2022-01-01 RX ADMIN — Medication 60 MILLIGRAM(S): at 12:23

## 2022-01-01 RX ADMIN — ALBUTEROL 2.5 MILLIGRAM(S): 90 AEROSOL, METERED ORAL at 15:27

## 2022-01-01 RX ADMIN — Medication 30 MILLIGRAM(S): at 05:45

## 2022-01-01 RX ADMIN — Medication 500000 UNIT(S): at 12:17

## 2022-01-01 RX ADMIN — PANTOPRAZOLE SODIUM 40 MILLIGRAM(S): 20 TABLET, DELAYED RELEASE ORAL at 06:17

## 2022-01-01 RX ADMIN — ENOXAPARIN SODIUM 40 MILLIGRAM(S): 100 INJECTION SUBCUTANEOUS at 22:59

## 2022-01-01 RX ADMIN — SODIUM CHLORIDE 75 MILLILITER(S): 9 INJECTION, SOLUTION INTRAVENOUS at 07:00

## 2022-01-01 RX ADMIN — Medication 110 MILLIGRAM(S): at 05:53

## 2022-01-01 RX ADMIN — Medication 3 MILLILITER(S): at 20:13

## 2022-01-01 RX ADMIN — ALBUTEROL 2.5 MILLIGRAM(S): 90 AEROSOL, METERED ORAL at 14:52

## 2022-01-01 RX ADMIN — Medication 500000 UNIT(S): at 05:56

## 2022-01-01 RX ADMIN — LORATADINE 10 MILLIGRAM(S): 10 TABLET ORAL at 12:19

## 2022-01-01 RX ADMIN — MORPHINE SULFATE 4 MILLIGRAM(S): 50 CAPSULE, EXTENDED RELEASE ORAL at 00:07

## 2022-01-01 RX ADMIN — ALBUTEROL 2.5 MILLIGRAM(S): 90 AEROSOL, METERED ORAL at 16:26

## 2022-01-01 RX ADMIN — BUDESONIDE AND FORMOTEROL FUMARATE DIHYDRATE 2 PUFF(S): 160; 4.5 AEROSOL RESPIRATORY (INHALATION) at 09:00

## 2022-01-01 RX ADMIN — Medication 3: at 17:28

## 2022-01-01 RX ADMIN — ALBUTEROL 2.5 MILLIGRAM(S): 90 AEROSOL, METERED ORAL at 16:00

## 2022-01-01 RX ADMIN — SODIUM CHLORIDE 60 MILLILITER(S): 9 INJECTION, SOLUTION INTRAVENOUS at 04:36

## 2022-01-01 RX ADMIN — Medication 60 MILLIGRAM(S): at 17:01

## 2022-01-01 RX ADMIN — TIOTROPIUM BROMIDE 1 CAPSULE(S): 18 CAPSULE ORAL; RESPIRATORY (INHALATION) at 11:14

## 2022-01-01 RX ADMIN — MONTELUKAST 10 MILLIGRAM(S): 4 TABLET, CHEWABLE ORAL at 12:01

## 2022-01-01 RX ADMIN — Medication 3 MILLILITER(S): at 03:04

## 2022-01-01 RX ADMIN — ENOXAPARIN SODIUM 40 MILLIGRAM(S): 100 INJECTION SUBCUTANEOUS at 21:39

## 2022-01-01 RX ADMIN — SODIUM CHLORIDE 75 MILLILITER(S): 9 INJECTION, SOLUTION INTRAVENOUS at 22:17

## 2022-01-01 RX ADMIN — LORATADINE 10 MILLIGRAM(S): 10 TABLET ORAL at 11:35

## 2022-01-01 RX ADMIN — SODIUM CHLORIDE 75 MILLILITER(S): 9 INJECTION, SOLUTION INTRAVENOUS at 18:06

## 2022-01-01 RX ADMIN — ALBUTEROL 2.5 MILLIGRAM(S): 90 AEROSOL, METERED ORAL at 10:50

## 2022-01-01 RX ADMIN — LORATADINE 10 MILLIGRAM(S): 10 TABLET ORAL at 11:49

## 2022-01-01 RX ADMIN — POLYETHYLENE GLYCOL 3350 17 GRAM(S): 17 POWDER, FOR SOLUTION ORAL at 05:32

## 2022-01-01 RX ADMIN — ALBUTEROL 2.5 MILLIGRAM(S): 90 AEROSOL, METERED ORAL at 09:10

## 2022-01-01 RX ADMIN — Medication 500000 UNIT(S): at 23:07

## 2022-01-01 RX ADMIN — ALBUTEROL 2.5 MILLIGRAM(S): 90 AEROSOL, METERED ORAL at 23:42

## 2022-01-01 RX ADMIN — POLYETHYLENE GLYCOL 3350 17 GRAM(S): 17 POWDER, FOR SOLUTION ORAL at 18:50

## 2022-01-01 RX ADMIN — PANTOPRAZOLE SODIUM 40 MILLIGRAM(S): 20 TABLET, DELAYED RELEASE ORAL at 05:18

## 2022-01-01 RX ADMIN — ALBUTEROL 2.5 MILLIGRAM(S): 90 AEROSOL, METERED ORAL at 20:22

## 2022-01-01 RX ADMIN — ALBUTEROL 2.5 MILLIGRAM(S): 90 AEROSOL, METERED ORAL at 00:25

## 2022-01-01 RX ADMIN — Medication 110 MILLIGRAM(S): at 23:01

## 2022-01-01 RX ADMIN — BUDESONIDE AND FORMOTEROL FUMARATE DIHYDRATE 2 PUFF(S): 160; 4.5 AEROSOL RESPIRATORY (INHALATION) at 20:51

## 2022-01-01 RX ADMIN — ALBUTEROL 2.5 MILLIGRAM(S): 90 AEROSOL, METERED ORAL at 00:48

## 2022-01-01 RX ADMIN — SODIUM CHLORIDE 75 MILLILITER(S): 9 INJECTION, SOLUTION INTRAVENOUS at 05:27

## 2022-01-01 RX ADMIN — ALBUTEROL 2.5 MILLIGRAM(S): 90 AEROSOL, METERED ORAL at 08:18

## 2022-01-01 RX ADMIN — Medication 500000 UNIT(S): at 00:32

## 2022-01-01 RX ADMIN — Medication 1: at 11:39

## 2022-01-01 RX ADMIN — ALBUTEROL 2.5 MILLIGRAM(S): 90 AEROSOL, METERED ORAL at 19:59

## 2022-01-01 RX ADMIN — Medication 500000 UNIT(S): at 05:18

## 2022-01-01 RX ADMIN — Medication 3 MILLILITER(S): at 13:52

## 2022-01-01 RX ADMIN — Medication 30 MILLIGRAM(S): at 13:55

## 2022-01-01 RX ADMIN — ENOXAPARIN SODIUM 40 MILLIGRAM(S): 100 INJECTION SUBCUTANEOUS at 06:23

## 2022-01-01 RX ADMIN — Medication 2: at 07:46

## 2022-01-01 RX ADMIN — TIOTROPIUM BROMIDE 1 CAPSULE(S): 18 CAPSULE ORAL; RESPIRATORY (INHALATION) at 08:10

## 2022-01-01 RX ADMIN — Medication 10 MILLILITER(S): at 18:33

## 2022-01-01 RX ADMIN — ALBUTEROL 2.5 MILLIGRAM(S): 90 AEROSOL, METERED ORAL at 05:37

## 2022-01-01 RX ADMIN — ALBUTEROL 2.5 MILLIGRAM(S): 90 AEROSOL, METERED ORAL at 01:21

## 2022-01-01 RX ADMIN — Medication 30 MILLIGRAM(S): at 11:13

## 2022-01-01 RX ADMIN — Medication 500000 UNIT(S): at 05:32

## 2022-01-01 RX ADMIN — Medication 650 MILLIGRAM(S): at 12:41

## 2022-01-01 RX ADMIN — Medication 3 MILLILITER(S): at 07:53

## 2022-01-01 RX ADMIN — Medication 110 MILLIGRAM(S): at 06:47

## 2022-01-01 RX ADMIN — Medication 60 MILLIGRAM(S): at 05:44

## 2022-01-01 RX ADMIN — Medication 110 MILLIGRAM(S): at 12:01

## 2022-01-01 RX ADMIN — Medication 1: at 17:37

## 2022-01-01 RX ADMIN — MONTELUKAST 10 MILLIGRAM(S): 4 TABLET, CHEWABLE ORAL at 12:18

## 2022-01-01 RX ADMIN — MONTELUKAST 10 MILLIGRAM(S): 4 TABLET, CHEWABLE ORAL at 12:33

## 2022-01-01 RX ADMIN — ALBUTEROL 2.5 MILLIGRAM(S): 90 AEROSOL, METERED ORAL at 07:59

## 2022-01-01 RX ADMIN — Medication 110 MILLIGRAM(S): at 05:46

## 2022-01-01 RX ADMIN — ALBUTEROL 2.5 MILLIGRAM(S): 90 AEROSOL, METERED ORAL at 12:01

## 2022-01-01 RX ADMIN — ALBUTEROL 2.5 MILLIGRAM(S): 90 AEROSOL, METERED ORAL at 20:40

## 2022-01-01 RX ADMIN — ALBUTEROL 2.5 MILLIGRAM(S): 90 AEROSOL, METERED ORAL at 20:52

## 2022-01-01 RX ADMIN — POLYETHYLENE GLYCOL 3350 17 GRAM(S): 17 POWDER, FOR SOLUTION ORAL at 18:38

## 2022-01-01 RX ADMIN — TIOTROPIUM BROMIDE 1 CAPSULE(S): 18 CAPSULE ORAL; RESPIRATORY (INHALATION) at 08:40

## 2022-01-01 RX ADMIN — Medication 1: at 12:14

## 2022-01-01 RX ADMIN — MORPHINE SULFATE 4 MILLIGRAM(S): 50 CAPSULE, EXTENDED RELEASE ORAL at 21:13

## 2022-01-01 RX ADMIN — Medication 3 MILLILITER(S): at 11:24

## 2022-01-01 RX ADMIN — Medication 60 MILLIGRAM(S): at 17:14

## 2022-01-01 RX ADMIN — Medication 60 MILLIGRAM(S): at 05:37

## 2022-01-01 RX ADMIN — BUDESONIDE AND FORMOTEROL FUMARATE DIHYDRATE 2 PUFF(S): 160; 4.5 AEROSOL RESPIRATORY (INHALATION) at 09:40

## 2022-01-01 RX ADMIN — MORPHINE SULFATE 4 MILLIGRAM(S): 50 CAPSULE, EXTENDED RELEASE ORAL at 04:36

## 2022-01-01 RX ADMIN — MONTELUKAST 10 MILLIGRAM(S): 4 TABLET, CHEWABLE ORAL at 13:32

## 2022-01-01 RX ADMIN — Medication 30 MILLIGRAM(S): at 05:16

## 2022-01-01 RX ADMIN — Medication 3 MILLILITER(S): at 10:42

## 2022-01-01 RX ADMIN — Medication 30 MILLIGRAM(S): at 05:51

## 2022-01-01 RX ADMIN — LORATADINE 10 MILLIGRAM(S): 10 TABLET ORAL at 12:03

## 2022-01-01 RX ADMIN — Medication 40 MILLIGRAM(S): at 06:11

## 2022-01-01 RX ADMIN — Medication 1: at 16:34

## 2022-01-01 RX ADMIN — MORPHINE SULFATE 4 MILLIGRAM(S): 50 CAPSULE, EXTENDED RELEASE ORAL at 12:20

## 2022-01-01 RX ADMIN — Medication 1: at 11:44

## 2022-01-01 RX ADMIN — Medication 3 MILLILITER(S): at 11:55

## 2022-01-01 RX ADMIN — CEFEPIME 100 MILLIGRAM(S): 1 INJECTION, POWDER, FOR SOLUTION INTRAMUSCULAR; INTRAVENOUS at 21:19

## 2022-01-01 RX ADMIN — Medication 1: at 17:06

## 2022-01-01 RX ADMIN — POLYETHYLENE GLYCOL 3350 17 GRAM(S): 17 POWDER, FOR SOLUTION ORAL at 05:37

## 2022-01-01 RX ADMIN — Medication 110 MILLIGRAM(S): at 05:16

## 2022-01-01 RX ADMIN — MONTELUKAST 10 MILLIGRAM(S): 4 TABLET, CHEWABLE ORAL at 12:14

## 2022-01-01 RX ADMIN — Medication 3 MILLILITER(S): at 14:12

## 2022-01-01 RX ADMIN — SODIUM ZIRCONIUM CYCLOSILICATE 5 GRAM(S): 10 POWDER, FOR SUSPENSION ORAL at 06:31

## 2022-01-01 RX ADMIN — SENNA PLUS 2 TABLET(S): 8.6 TABLET ORAL at 22:34

## 2022-01-01 RX ADMIN — Medication 60 MILLIGRAM(S): at 18:02

## 2022-01-01 RX ADMIN — Medication 30 MILLIGRAM(S): at 14:05

## 2022-01-01 RX ADMIN — Medication 110 MILLIGRAM(S): at 17:13

## 2022-01-01 RX ADMIN — ALBUTEROL 2.5 MILLIGRAM(S): 90 AEROSOL, METERED ORAL at 20:57

## 2022-01-01 RX ADMIN — PANTOPRAZOLE SODIUM 40 MILLIGRAM(S): 20 TABLET, DELAYED RELEASE ORAL at 07:00

## 2022-01-01 RX ADMIN — BUDESONIDE AND FORMOTEROL FUMARATE DIHYDRATE 2 PUFF(S): 160; 4.5 AEROSOL RESPIRATORY (INHALATION) at 10:42

## 2022-01-01 RX ADMIN — MONTELUKAST 10 MILLIGRAM(S): 4 TABLET, CHEWABLE ORAL at 11:13

## 2022-01-01 RX ADMIN — MORPHINE SULFATE 4 MILLIGRAM(S): 50 CAPSULE, EXTENDED RELEASE ORAL at 10:54

## 2022-01-01 RX ADMIN — PANTOPRAZOLE SODIUM 40 MILLIGRAM(S): 20 TABLET, DELAYED RELEASE ORAL at 08:24

## 2022-01-01 RX ADMIN — ENOXAPARIN SODIUM 40 MILLIGRAM(S): 100 INJECTION SUBCUTANEOUS at 21:03

## 2022-01-01 RX ADMIN — Medication 3 MILLILITER(S): at 15:14

## 2022-01-01 RX ADMIN — Medication 1: at 18:01

## 2022-01-01 RX ADMIN — Medication 40 MILLIGRAM(S): at 17:39

## 2022-01-01 RX ADMIN — Medication 60 MILLIGRAM(S): at 17:18

## 2022-01-01 RX ADMIN — LORATADINE 10 MILLIGRAM(S): 10 TABLET ORAL at 12:52

## 2022-01-01 RX ADMIN — ALBUTEROL 2.5 MILLIGRAM(S): 90 AEROSOL, METERED ORAL at 08:38

## 2022-01-01 RX ADMIN — Medication 110 MILLIGRAM(S): at 18:32

## 2022-01-01 RX ADMIN — Medication 3 MILLILITER(S): at 13:36

## 2022-01-01 RX ADMIN — ALBUTEROL 2.5 MILLIGRAM(S): 90 AEROSOL, METERED ORAL at 12:48

## 2022-01-01 RX ADMIN — SODIUM CHLORIDE 500 MILLILITER(S): 9 INJECTION INTRAMUSCULAR; INTRAVENOUS; SUBCUTANEOUS at 17:18

## 2022-01-01 RX ADMIN — Medication 125 MILLIGRAM(S): at 18:34

## 2022-01-01 RX ADMIN — SODIUM CHLORIDE 500 MILLILITER(S): 9 INJECTION INTRAMUSCULAR; INTRAVENOUS; SUBCUTANEOUS at 10:21

## 2022-01-01 RX ADMIN — ALBUTEROL 2.5 MILLIGRAM(S): 90 AEROSOL, METERED ORAL at 22:28

## 2022-01-01 RX ADMIN — Medication 3 MILLILITER(S): at 21:10

## 2022-01-01 RX ADMIN — ALBUTEROL 2.5 MILLIGRAM(S): 90 AEROSOL, METERED ORAL at 20:38

## 2022-01-01 RX ADMIN — Medication 250 MILLIGRAM(S): at 15:57

## 2022-01-01 RX ADMIN — ENOXAPARIN SODIUM 40 MILLIGRAM(S): 100 INJECTION SUBCUTANEOUS at 22:39

## 2022-01-01 RX ADMIN — Medication 30 MILLIGRAM(S): at 05:47

## 2022-01-01 RX ADMIN — POLYETHYLENE GLYCOL 3350 17 GRAM(S): 17 POWDER, FOR SOLUTION ORAL at 17:40

## 2022-01-01 RX ADMIN — MONTELUKAST 10 MILLIGRAM(S): 4 TABLET, CHEWABLE ORAL at 13:00

## 2022-01-01 RX ADMIN — Medication 3 MILLILITER(S): at 10:20

## 2022-01-01 RX ADMIN — Medication 120 MILLIGRAM(S): at 05:33

## 2022-01-01 RX ADMIN — Medication 2: at 17:21

## 2022-01-01 RX ADMIN — Medication 1: at 11:54

## 2022-01-01 RX ADMIN — Medication 120 MILLIGRAM(S): at 05:18

## 2022-01-01 RX ADMIN — Medication 500000 UNIT(S): at 12:30

## 2022-01-01 RX ADMIN — Medication 500000 UNIT(S): at 12:32

## 2022-01-01 RX ADMIN — LORATADINE 10 MILLIGRAM(S): 10 TABLET ORAL at 11:37

## 2022-01-01 RX ADMIN — ALBUTEROL 2.5 MILLIGRAM(S): 90 AEROSOL, METERED ORAL at 07:37

## 2022-01-01 RX ADMIN — ALBUTEROL 2.5 MILLIGRAM(S): 90 AEROSOL, METERED ORAL at 20:30

## 2022-01-01 RX ADMIN — ENOXAPARIN SODIUM 40 MILLIGRAM(S): 100 INJECTION SUBCUTANEOUS at 22:34

## 2022-01-01 RX ADMIN — Medication 4: at 12:00

## 2022-01-01 RX ADMIN — Medication 2: at 12:22

## 2022-01-01 RX ADMIN — ENOXAPARIN SODIUM 40 MILLIGRAM(S): 100 INJECTION SUBCUTANEOUS at 21:08

## 2022-01-01 RX ADMIN — PANTOPRAZOLE SODIUM 40 MILLIGRAM(S): 20 TABLET, DELAYED RELEASE ORAL at 05:55

## 2022-01-01 RX ADMIN — POLYETHYLENE GLYCOL 3350 17 GRAM(S): 17 POWDER, FOR SOLUTION ORAL at 18:09

## 2022-01-01 RX ADMIN — SODIUM CHLORIDE 60 MILLILITER(S): 9 INJECTION, SOLUTION INTRAVENOUS at 06:48

## 2022-01-01 RX ADMIN — SENNA PLUS 2 TABLET(S): 8.6 TABLET ORAL at 21:28

## 2022-01-01 RX ADMIN — LORATADINE 10 MILLIGRAM(S): 10 TABLET ORAL at 12:32

## 2022-01-01 RX ADMIN — LORATADINE 10 MILLIGRAM(S): 10 TABLET ORAL at 12:18

## 2022-01-01 RX ADMIN — Medication 500000 UNIT(S): at 18:53

## 2022-01-01 RX ADMIN — Medication 60 MILLIGRAM(S): at 05:41

## 2022-01-01 RX ADMIN — Medication 30 MILLIGRAM(S): at 22:33

## 2022-01-01 RX ADMIN — Medication 110 MILLIGRAM(S): at 17:44

## 2022-01-01 RX ADMIN — AZITHROMYCIN 255 MILLIGRAM(S): 500 TABLET, FILM COATED ORAL at 12:32

## 2022-01-01 RX ADMIN — Medication 3 MILLILITER(S): at 19:56

## 2022-01-01 RX ADMIN — BUDESONIDE AND FORMOTEROL FUMARATE DIHYDRATE 2 PUFF(S): 160; 4.5 AEROSOL RESPIRATORY (INHALATION) at 07:26

## 2022-01-01 RX ADMIN — ALBUTEROL 2.5 MILLIGRAM(S): 90 AEROSOL, METERED ORAL at 16:10

## 2022-01-01 RX ADMIN — MORPHINE SULFATE 4 MILLIGRAM(S): 50 CAPSULE, EXTENDED RELEASE ORAL at 12:30

## 2022-01-01 RX ADMIN — PANTOPRAZOLE SODIUM 40 MILLIGRAM(S): 20 TABLET, DELAYED RELEASE ORAL at 05:32

## 2022-01-01 RX ADMIN — TIOTROPIUM BROMIDE 1 CAPSULE(S): 18 CAPSULE ORAL; RESPIRATORY (INHALATION) at 11:00

## 2022-01-01 RX ADMIN — Medication 3 MILLILITER(S): at 09:15

## 2022-01-01 RX ADMIN — SODIUM CHLORIDE 60 MILLILITER(S): 9 INJECTION, SOLUTION INTRAVENOUS at 18:35

## 2022-01-01 RX ADMIN — LORATADINE 10 MILLIGRAM(S): 10 TABLET ORAL at 12:15

## 2022-01-01 RX ADMIN — Medication 30 MILLIGRAM(S): at 14:42

## 2022-01-01 RX ADMIN — POLYETHYLENE GLYCOL 3350 17 GRAM(S): 17 POWDER, FOR SOLUTION ORAL at 17:31

## 2022-01-01 RX ADMIN — Medication 60 MILLIGRAM(S): at 05:16

## 2022-01-01 RX ADMIN — Medication 60 MILLIGRAM(S): at 07:17

## 2022-01-01 RX ADMIN — TIOTROPIUM BROMIDE 1 CAPSULE(S): 18 CAPSULE ORAL; RESPIRATORY (INHALATION) at 12:55

## 2022-01-01 RX ADMIN — ENOXAPARIN SODIUM 40 MILLIGRAM(S): 100 INJECTION SUBCUTANEOUS at 21:55

## 2022-01-01 RX ADMIN — Medication 500000 UNIT(S): at 23:44

## 2022-01-01 RX ADMIN — MORPHINE SULFATE 4 MILLIGRAM(S): 50 CAPSULE, EXTENDED RELEASE ORAL at 13:00

## 2022-01-01 RX ADMIN — Medication 60 MILLIGRAM(S): at 05:53

## 2022-01-01 RX ADMIN — MONTELUKAST 10 MILLIGRAM(S): 4 TABLET, CHEWABLE ORAL at 12:52

## 2022-01-01 RX ADMIN — Medication 2: at 07:15

## 2022-01-01 RX ADMIN — BUDESONIDE AND FORMOTEROL FUMARATE DIHYDRATE 2 PUFF(S): 160; 4.5 AEROSOL RESPIRATORY (INHALATION) at 07:37

## 2022-01-01 RX ADMIN — MORPHINE SULFATE 4 MILLIGRAM(S): 50 CAPSULE, EXTENDED RELEASE ORAL at 12:59

## 2022-01-01 RX ADMIN — POLYETHYLENE GLYCOL 3350 17 GRAM(S): 17 POWDER, FOR SOLUTION ORAL at 05:56

## 2022-01-01 RX ADMIN — SENNA PLUS 2 TABLET(S): 8.6 TABLET ORAL at 21:41

## 2022-01-01 RX ADMIN — LORATADINE 10 MILLIGRAM(S): 10 TABLET ORAL at 11:44

## 2022-01-01 RX ADMIN — Medication 60 MILLIGRAM(S): at 05:40

## 2022-01-01 RX ADMIN — POLYETHYLENE GLYCOL 3350 17 GRAM(S): 17 POWDER, FOR SOLUTION ORAL at 06:11

## 2022-01-01 RX ADMIN — Medication 3 MILLILITER(S): at 08:00

## 2022-01-01 RX ADMIN — Medication 1: at 07:44

## 2022-01-01 RX ADMIN — ALBUTEROL 2.5 MILLIGRAM(S): 90 AEROSOL, METERED ORAL at 15:10

## 2022-01-01 RX ADMIN — ENOXAPARIN SODIUM 40 MILLIGRAM(S): 100 INJECTION SUBCUTANEOUS at 21:41

## 2022-01-01 RX ADMIN — ENOXAPARIN SODIUM 40 MILLIGRAM(S): 100 INJECTION SUBCUTANEOUS at 05:27

## 2022-01-01 RX ADMIN — Medication 2 MILLIGRAM(S): at 00:48

## 2022-01-01 RX ADMIN — ALBUTEROL 2.5 MILLIGRAM(S): 90 AEROSOL, METERED ORAL at 19:48

## 2022-01-01 RX ADMIN — Medication 3 MILLILITER(S): at 13:38

## 2022-01-01 RX ADMIN — Medication 60 MILLIGRAM(S): at 00:00

## 2022-01-01 RX ADMIN — Medication 1: at 12:26

## 2022-01-01 RX ADMIN — Medication 110 MILLIGRAM(S): at 17:05

## 2022-01-01 RX ADMIN — ALBUTEROL 2.5 MILLIGRAM(S): 90 AEROSOL, METERED ORAL at 23:24

## 2022-01-01 RX ADMIN — Medication 60 MILLIGRAM(S): at 06:32

## 2022-01-01 RX ADMIN — ENOXAPARIN SODIUM 40 MILLIGRAM(S): 100 INJECTION SUBCUTANEOUS at 21:23

## 2022-01-01 RX ADMIN — Medication 60 MILLIGRAM(S): at 13:55

## 2022-01-01 RX ADMIN — Medication 2: at 10:35

## 2022-01-01 RX ADMIN — Medication 60 MILLIGRAM(S): at 18:49

## 2022-01-01 RX ADMIN — Medication 3 MILLILITER(S): at 07:47

## 2022-01-01 RX ADMIN — Medication 125 MILLIGRAM(S): at 12:29

## 2022-01-01 RX ADMIN — ALBUTEROL 2.5 MILLIGRAM(S): 90 AEROSOL, METERED ORAL at 11:53

## 2022-01-01 RX ADMIN — PANTOPRAZOLE SODIUM 40 MILLIGRAM(S): 20 TABLET, DELAYED RELEASE ORAL at 05:47

## 2022-01-01 RX ADMIN — Medication 2: at 19:08

## 2022-01-01 RX ADMIN — PANTOPRAZOLE SODIUM 40 MILLIGRAM(S): 20 TABLET, DELAYED RELEASE ORAL at 05:46

## 2022-01-01 RX ADMIN — Medication 1 MILLIGRAM(S): at 07:28

## 2022-01-01 RX ADMIN — Medication 110 MILLIGRAM(S): at 05:25

## 2022-01-01 RX ADMIN — ALBUTEROL 2.5 MILLIGRAM(S): 90 AEROSOL, METERED ORAL at 08:10

## 2022-01-01 RX ADMIN — Medication 60 MILLIGRAM(S): at 06:48

## 2022-01-01 RX ADMIN — POLYETHYLENE GLYCOL 3350 17 GRAM(S): 17 POWDER, FOR SOLUTION ORAL at 17:07

## 2022-01-01 RX ADMIN — SODIUM ZIRCONIUM CYCLOSILICATE 5 GRAM(S): 10 POWDER, FOR SUSPENSION ORAL at 11:54

## 2022-01-01 RX ADMIN — ALBUTEROL 2.5 MILLIGRAM(S): 90 AEROSOL, METERED ORAL at 11:50

## 2022-01-01 RX ADMIN — Medication 3 MILLILITER(S): at 03:32

## 2022-01-01 RX ADMIN — MONTELUKAST 10 MILLIGRAM(S): 4 TABLET, CHEWABLE ORAL at 11:44

## 2022-01-01 RX ADMIN — Medication 60 MILLIGRAM(S): at 18:37

## 2022-01-01 RX ADMIN — SENNA PLUS 2 TABLET(S): 8.6 TABLET ORAL at 21:49

## 2022-01-01 RX ADMIN — SODIUM CHLORIDE 75 MILLILITER(S): 9 INJECTION, SOLUTION INTRAVENOUS at 09:11

## 2022-01-01 RX ADMIN — Medication 30 MILLIGRAM(S): at 21:39

## 2022-01-01 RX ADMIN — Medication 3 MILLILITER(S): at 04:27

## 2022-01-01 RX ADMIN — PANTOPRAZOLE SODIUM 40 MILLIGRAM(S): 20 TABLET, DELAYED RELEASE ORAL at 05:26

## 2022-01-01 RX ADMIN — HALOPERIDOL DECANOATE 0.5 MILLIGRAM(S): 100 INJECTION INTRAMUSCULAR at 11:38

## 2022-01-01 RX ADMIN — MORPHINE SULFATE 4 MILLIGRAM(S): 50 CAPSULE, EXTENDED RELEASE ORAL at 13:18

## 2022-01-01 RX ADMIN — MONTELUKAST 10 MILLIGRAM(S): 4 TABLET, CHEWABLE ORAL at 11:54

## 2022-01-01 RX ADMIN — PANTOPRAZOLE SODIUM 40 MILLIGRAM(S): 20 TABLET, DELAYED RELEASE ORAL at 05:40

## 2022-01-01 RX ADMIN — SENNA PLUS 2 TABLET(S): 8.6 TABLET ORAL at 21:57

## 2022-01-01 RX ADMIN — ENOXAPARIN SODIUM 40 MILLIGRAM(S): 100 INJECTION SUBCUTANEOUS at 21:28

## 2022-01-01 RX ADMIN — Medication 2 SPRAY(S): at 22:39

## 2022-01-01 RX ADMIN — LORATADINE 10 MILLIGRAM(S): 10 TABLET ORAL at 12:01

## 2022-01-01 RX ADMIN — Medication 60 MILLIGRAM(S): at 19:09

## 2022-01-01 RX ADMIN — ALBUTEROL 2.5 MILLIGRAM(S): 90 AEROSOL, METERED ORAL at 12:00

## 2022-01-01 RX ADMIN — Medication 110 MILLIGRAM(S): at 17:10

## 2022-01-01 RX ADMIN — Medication 30 MILLIGRAM(S): at 05:44

## 2022-01-01 RX ADMIN — ENOXAPARIN SODIUM 40 MILLIGRAM(S): 100 INJECTION SUBCUTANEOUS at 23:21

## 2022-01-01 RX ADMIN — ALBUTEROL 2.5 MILLIGRAM(S): 90 AEROSOL, METERED ORAL at 04:32

## 2022-01-01 RX ADMIN — Medication 20 MILLIGRAM(S): at 08:40

## 2022-01-01 RX ADMIN — BUDESONIDE AND FORMOTEROL FUMARATE DIHYDRATE 2 PUFF(S): 160; 4.5 AEROSOL RESPIRATORY (INHALATION) at 10:12

## 2022-01-01 RX ADMIN — ALBUTEROL 2.5 MILLIGRAM(S): 90 AEROSOL, METERED ORAL at 09:27

## 2022-01-01 RX ADMIN — Medication 60 MILLIGRAM(S): at 17:17

## 2022-01-01 RX ADMIN — LORATADINE 10 MILLIGRAM(S): 10 TABLET ORAL at 12:23

## 2022-01-01 RX ADMIN — ALBUTEROL 2.5 MILLIGRAM(S): 90 AEROSOL, METERED ORAL at 21:37

## 2022-01-01 RX ADMIN — POLYETHYLENE GLYCOL 3350 17 GRAM(S): 17 POWDER, FOR SOLUTION ORAL at 05:47

## 2022-01-01 RX ADMIN — MORPHINE SULFATE 4 MILLIGRAM(S): 50 CAPSULE, EXTENDED RELEASE ORAL at 12:05

## 2022-01-01 RX ADMIN — Medication 50 MILLILITER(S): at 08:41

## 2022-01-01 RX ADMIN — ALBUTEROL 2.5 MILLIGRAM(S): 90 AEROSOL, METERED ORAL at 12:32

## 2022-01-01 RX ADMIN — CEFEPIME 100 MILLIGRAM(S): 1 INJECTION, POWDER, FOR SOLUTION INTRAMUSCULAR; INTRAVENOUS at 14:52

## 2022-01-01 RX ADMIN — Medication 60 MILLIGRAM(S): at 05:25

## 2022-01-01 RX ADMIN — MORPHINE SULFATE 4 MILLIGRAM(S): 50 CAPSULE, EXTENDED RELEASE ORAL at 12:14

## 2022-01-01 RX ADMIN — Medication 60 MILLIGRAM(S): at 17:36

## 2022-01-01 RX ADMIN — MONTELUKAST 10 MILLIGRAM(S): 4 TABLET, CHEWABLE ORAL at 12:53

## 2022-01-01 RX ADMIN — ALBUTEROL 2.5 MILLIGRAM(S): 90 AEROSOL, METERED ORAL at 23:55

## 2022-01-01 RX ADMIN — Medication 2: at 07:23

## 2022-01-01 RX ADMIN — Medication 30 MILLIGRAM(S): at 13:04

## 2022-01-01 RX ADMIN — Medication 60 MILLIGRAM(S): at 14:12

## 2022-01-01 RX ADMIN — LORATADINE 10 MILLIGRAM(S): 10 TABLET ORAL at 11:13

## 2022-01-01 RX ADMIN — ENOXAPARIN SODIUM 40 MILLIGRAM(S): 100 INJECTION SUBCUTANEOUS at 05:22

## 2022-01-01 RX ADMIN — Medication 650 MILLIGRAM(S): at 20:49

## 2022-01-01 RX ADMIN — ALBUTEROL 2.5 MILLIGRAM(S): 90 AEROSOL, METERED ORAL at 11:18

## 2022-01-01 RX ADMIN — Medication 10 MILLILITER(S): at 21:27

## 2022-01-01 RX ADMIN — ALBUTEROL 2.5 MILLIGRAM(S): 90 AEROSOL, METERED ORAL at 07:56

## 2022-01-01 RX ADMIN — Medication 30 MILLIGRAM(S): at 22:06

## 2022-01-01 RX ADMIN — POLYETHYLENE GLYCOL 3350 17 GRAM(S): 17 POWDER, FOR SOLUTION ORAL at 05:18

## 2022-01-01 RX ADMIN — ENOXAPARIN SODIUM 40 MILLIGRAM(S): 100 INJECTION SUBCUTANEOUS at 20:44

## 2022-01-01 RX ADMIN — Medication 125 MILLIGRAM(S): at 06:47

## 2022-01-01 RX ADMIN — Medication 500000 UNIT(S): at 05:33

## 2022-01-01 RX ADMIN — ENOXAPARIN SODIUM 40 MILLIGRAM(S): 100 INJECTION SUBCUTANEOUS at 22:07

## 2022-01-01 RX ADMIN — Medication 60 MILLIGRAM(S): at 06:55

## 2022-01-01 RX ADMIN — ALBUTEROL 2.5 MILLIGRAM(S): 90 AEROSOL, METERED ORAL at 12:29

## 2022-01-01 RX ADMIN — Medication 3 MILLILITER(S): at 07:26

## 2022-01-01 RX ADMIN — Medication 500000 UNIT(S): at 17:19

## 2022-01-01 RX ADMIN — Medication 30 MILLIGRAM(S): at 14:41

## 2022-01-01 RX ADMIN — Medication 10 MILLILITER(S): at 01:09

## 2022-01-01 RX ADMIN — PANTOPRAZOLE SODIUM 40 MILLIGRAM(S): 20 TABLET, DELAYED RELEASE ORAL at 05:53

## 2022-01-01 RX ADMIN — Medication 30 MILLIGRAM(S): at 21:43

## 2022-01-01 RX ADMIN — Medication 4: at 09:13

## 2022-01-01 RX ADMIN — POLYETHYLENE GLYCOL 3350 17 GRAM(S): 17 POWDER, FOR SOLUTION ORAL at 17:04

## 2022-01-01 RX ADMIN — LORATADINE 10 MILLIGRAM(S): 10 TABLET ORAL at 13:00

## 2022-01-01 RX ADMIN — Medication 2 SPRAY(S): at 05:42

## 2022-01-01 RX ADMIN — PANTOPRAZOLE SODIUM 40 MILLIGRAM(S): 20 TABLET, DELAYED RELEASE ORAL at 06:48

## 2022-01-01 RX ADMIN — Medication 3: at 07:48

## 2022-01-01 RX ADMIN — Medication 3 MILLILITER(S): at 22:58

## 2022-01-01 RX ADMIN — ALBUTEROL 2.5 MILLIGRAM(S): 90 AEROSOL, METERED ORAL at 08:00

## 2022-01-01 RX ADMIN — MONTELUKAST 10 MILLIGRAM(S): 4 TABLET, CHEWABLE ORAL at 12:19

## 2022-01-01 RX ADMIN — LORATADINE 10 MILLIGRAM(S): 10 TABLET ORAL at 11:54

## 2022-01-01 RX ADMIN — Medication 4: at 16:21

## 2022-01-01 RX ADMIN — ALBUTEROL 2.5 MILLIGRAM(S): 90 AEROSOL, METERED ORAL at 12:36

## 2022-01-01 RX ADMIN — BUDESONIDE AND FORMOTEROL FUMARATE DIHYDRATE 2 PUFF(S): 160; 4.5 AEROSOL RESPIRATORY (INHALATION) at 21:00

## 2022-01-01 RX ADMIN — Medication 60 MILLIGRAM(S): at 17:51

## 2022-01-01 RX ADMIN — ALBUTEROL 2.5 MILLIGRAM(S): 90 AEROSOL, METERED ORAL at 23:46

## 2022-01-01 RX ADMIN — ALBUTEROL 2.5 MILLIGRAM(S): 90 AEROSOL, METERED ORAL at 12:14

## 2022-01-01 RX ADMIN — Medication 110 MILLIGRAM(S): at 05:39

## 2022-01-01 RX ADMIN — MONTELUKAST 10 MILLIGRAM(S): 4 TABLET, CHEWABLE ORAL at 11:35

## 2022-01-01 RX ADMIN — ALBUTEROL 2.5 MILLIGRAM(S): 90 AEROSOL, METERED ORAL at 03:59

## 2022-01-01 RX ADMIN — BUDESONIDE AND FORMOTEROL FUMARATE DIHYDRATE 2 PUFF(S): 160; 4.5 AEROSOL RESPIRATORY (INHALATION) at 11:12

## 2022-01-01 RX ADMIN — MORPHINE SULFATE 4 MILLIGRAM(S): 50 CAPSULE, EXTENDED RELEASE ORAL at 09:54

## 2022-01-01 RX ADMIN — Medication 30 MILLIGRAM(S): at 05:26

## 2022-01-01 RX ADMIN — Medication 60 MILLIGRAM(S): at 06:19

## 2022-01-01 RX ADMIN — Medication 1: at 17:44

## 2022-01-01 RX ADMIN — SENNA PLUS 2 TABLET(S): 8.6 TABLET ORAL at 21:23

## 2022-01-01 RX ADMIN — Medication 30 MILLIGRAM(S): at 06:48

## 2022-01-01 RX ADMIN — Medication 500000 UNIT(S): at 06:11

## 2022-01-01 RX ADMIN — Medication 60 MILLIGRAM(S): at 21:02

## 2022-01-01 RX ADMIN — Medication 30 MILLIGRAM(S): at 05:39

## 2022-01-01 RX ADMIN — TIOTROPIUM BROMIDE 1 CAPSULE(S): 18 CAPSULE ORAL; RESPIRATORY (INHALATION) at 07:26

## 2022-01-01 RX ADMIN — Medication 500000 UNIT(S): at 18:09

## 2022-01-01 RX ADMIN — Medication 3 MILLILITER(S): at 07:46

## 2022-01-01 RX ADMIN — Medication 60 MILLIGRAM(S): at 05:46

## 2022-01-01 RX ADMIN — Medication 1: at 08:04

## 2022-01-01 RX ADMIN — Medication 30 MILLIGRAM(S): at 23:21

## 2022-01-01 RX ADMIN — Medication 60 MILLIGRAM(S): at 17:20

## 2022-01-01 RX ADMIN — Medication 3 MILLILITER(S): at 15:41

## 2022-01-01 RX ADMIN — Medication 30 MILLIGRAM(S): at 06:31

## 2022-01-01 RX ADMIN — ALBUTEROL 2.5 MILLIGRAM(S): 90 AEROSOL, METERED ORAL at 18:51

## 2022-01-01 RX ADMIN — Medication 30 MILLIGRAM(S): at 06:54

## 2022-01-01 RX ADMIN — ALBUTEROL 2.5 MILLIGRAM(S): 90 AEROSOL, METERED ORAL at 23:06

## 2022-01-01 RX ADMIN — Medication 500000 UNIT(S): at 11:54

## 2022-01-01 RX ADMIN — ALBUTEROL 2.5 MILLIGRAM(S): 90 AEROSOL, METERED ORAL at 15:30

## 2022-01-01 RX ADMIN — Medication 500000 UNIT(S): at 12:24

## 2022-01-01 RX ADMIN — Medication 500000 UNIT(S): at 17:06

## 2022-01-01 RX ADMIN — TIOTROPIUM BROMIDE 1 CAPSULE(S): 18 CAPSULE ORAL; RESPIRATORY (INHALATION) at 08:50

## 2022-01-01 RX ADMIN — Medication 60 MILLIGRAM(S): at 23:00

## 2022-01-01 RX ADMIN — Medication 3 MILLILITER(S): at 20:41

## 2022-01-01 RX ADMIN — Medication 120 MILLIGRAM(S): at 05:56

## 2022-01-01 RX ADMIN — Medication 60 MILLIGRAM(S): at 23:30

## 2022-01-01 RX ADMIN — PANTOPRAZOLE SODIUM 40 MILLIGRAM(S): 20 TABLET, DELAYED RELEASE ORAL at 05:33

## 2022-01-01 RX ADMIN — Medication 650 MILLIGRAM(S): at 13:20

## 2022-01-01 RX ADMIN — ENOXAPARIN SODIUM 40 MILLIGRAM(S): 100 INJECTION SUBCUTANEOUS at 21:15

## 2022-01-01 RX ADMIN — Medication 110 MILLIGRAM(S): at 18:02

## 2022-01-01 RX ADMIN — Medication 500000 UNIT(S): at 23:35

## 2022-01-01 RX ADMIN — Medication 1: at 08:21

## 2022-01-01 RX ADMIN — Medication 1: at 08:25

## 2022-01-01 RX ADMIN — BUDESONIDE AND FORMOTEROL FUMARATE DIHYDRATE 2 PUFF(S): 160; 4.5 AEROSOL RESPIRATORY (INHALATION) at 08:26

## 2022-01-01 RX ADMIN — BUDESONIDE AND FORMOTEROL FUMARATE DIHYDRATE 2 PUFF(S): 160; 4.5 AEROSOL RESPIRATORY (INHALATION) at 22:59

## 2022-01-01 RX ADMIN — ALBUTEROL 2.5 MILLIGRAM(S): 90 AEROSOL, METERED ORAL at 08:50

## 2022-01-01 RX ADMIN — MONTELUKAST 10 MILLIGRAM(S): 4 TABLET, CHEWABLE ORAL at 12:23

## 2022-01-01 RX ADMIN — ENOXAPARIN SODIUM 40 MILLIGRAM(S): 100 INJECTION SUBCUTANEOUS at 00:00

## 2022-01-01 RX ADMIN — PANTOPRAZOLE SODIUM 40 MILLIGRAM(S): 20 TABLET, DELAYED RELEASE ORAL at 05:45

## 2022-01-01 RX ADMIN — ALBUTEROL 2.5 MILLIGRAM(S): 90 AEROSOL, METERED ORAL at 12:24

## 2022-01-01 RX ADMIN — Medication 500000 UNIT(S): at 14:05

## 2022-01-01 RX ADMIN — TIOTROPIUM BROMIDE 1 CAPSULE(S): 18 CAPSULE ORAL; RESPIRATORY (INHALATION) at 14:57

## 2022-01-01 RX ADMIN — ALBUTEROL 2.5 MILLIGRAM(S): 90 AEROSOL, METERED ORAL at 05:30

## 2022-01-01 RX ADMIN — Medication 200 GRAM(S): at 08:40

## 2022-01-01 RX ADMIN — Medication 650 MILLIGRAM(S): at 12:20

## 2022-01-01 RX ADMIN — ALBUTEROL 2.5 MILLIGRAM(S): 90 AEROSOL, METERED ORAL at 00:24

## 2022-01-01 RX ADMIN — Medication 40 MILLIGRAM(S): at 05:33

## 2022-01-01 RX ADMIN — MONTELUKAST 10 MILLIGRAM(S): 4 TABLET, CHEWABLE ORAL at 11:37

## 2022-01-01 RX ADMIN — Medication 60 MILLIGRAM(S): at 05:32

## 2022-01-01 RX ADMIN — Medication 60 MILLIGRAM(S): at 13:34

## 2022-01-01 RX ADMIN — ALBUTEROL 2.5 MILLIGRAM(S): 90 AEROSOL, METERED ORAL at 14:00

## 2022-01-01 RX ADMIN — Medication 3 MILLILITER(S): at 20:09

## 2022-01-01 RX ADMIN — SODIUM ZIRCONIUM CYCLOSILICATE 5 GRAM(S): 10 POWDER, FOR SUSPENSION ORAL at 14:30

## 2022-01-01 RX ADMIN — Medication 60 MILLIGRAM(S): at 06:23

## 2022-01-01 RX ADMIN — MORPHINE SULFATE 4 MILLIGRAM(S): 50 CAPSULE, EXTENDED RELEASE ORAL at 11:33

## 2022-01-01 RX ADMIN — Medication 1: at 09:13

## 2022-01-01 RX ADMIN — Medication 40 MILLIGRAM(S): at 11:48

## 2022-01-01 RX ADMIN — Medication 30 MILLIGRAM(S): at 07:07

## 2022-01-01 RX ADMIN — ENOXAPARIN SODIUM 40 MILLIGRAM(S): 100 INJECTION SUBCUTANEOUS at 23:38

## 2022-01-01 RX ADMIN — ALBUTEROL 2.5 MILLIGRAM(S): 90 AEROSOL, METERED ORAL at 12:27

## 2022-01-01 RX ADMIN — Medication 500000 UNIT(S): at 17:39

## 2022-01-01 RX ADMIN — PANTOPRAZOLE SODIUM 40 MILLIGRAM(S): 20 TABLET, DELAYED RELEASE ORAL at 06:31

## 2022-01-01 RX ADMIN — TIOTROPIUM BROMIDE 1 CAPSULE(S): 18 CAPSULE ORAL; RESPIRATORY (INHALATION) at 07:54

## 2022-01-01 RX ADMIN — INSULIN HUMAN 10 UNIT(S): 100 INJECTION, SOLUTION SUBCUTANEOUS at 08:41

## 2022-01-01 RX ADMIN — Medication 60 MILLIGRAM(S): at 18:04

## 2022-01-01 RX ADMIN — PANTOPRAZOLE SODIUM 40 MILLIGRAM(S): 20 TABLET, DELAYED RELEASE ORAL at 06:11

## 2022-01-01 RX ADMIN — Medication 30 MILLIGRAM(S): at 14:26

## 2022-01-01 RX ADMIN — ROBINUL 0.4 MILLIGRAM(S): 0.2 INJECTION INTRAMUSCULAR; INTRAVENOUS at 00:44

## 2022-01-01 RX ADMIN — ENOXAPARIN SODIUM 40 MILLIGRAM(S): 100 INJECTION SUBCUTANEOUS at 21:42

## 2022-01-01 RX ADMIN — ALBUTEROL 2.5 MILLIGRAM(S): 90 AEROSOL, METERED ORAL at 08:03

## 2022-01-01 RX ADMIN — Medication 30 MILLIGRAM(S): at 22:25

## 2022-01-01 RX ADMIN — Medication 110 MILLIGRAM(S): at 06:19

## 2022-01-01 RX ADMIN — Medication 500000 UNIT(S): at 11:48

## 2022-01-01 RX ADMIN — MONTELUKAST 10 MILLIGRAM(S): 4 TABLET, CHEWABLE ORAL at 11:48

## 2022-01-01 RX ADMIN — ALBUTEROL 2.5 MILLIGRAM(S): 90 AEROSOL, METERED ORAL at 12:04

## 2022-01-01 RX ADMIN — Medication 2: at 12:16

## 2022-01-01 RX ADMIN — Medication 25 GRAM(S): at 22:55

## 2022-01-01 RX ADMIN — Medication 120 MILLIGRAM(S): at 06:11

## 2022-01-01 RX ADMIN — ALBUTEROL 2.5 MILLIGRAM(S): 90 AEROSOL, METERED ORAL at 16:03

## 2022-01-01 RX ADMIN — Medication 1: at 18:31

## 2022-01-01 RX ADMIN — SODIUM ZIRCONIUM CYCLOSILICATE 5 GRAM(S): 10 POWDER, FOR SUSPENSION ORAL at 17:18

## 2022-01-01 RX ADMIN — Medication 60 MILLIGRAM(S): at 15:48

## 2022-01-01 RX ADMIN — ALBUTEROL 2.5 MILLIGRAM(S): 90 AEROSOL, METERED ORAL at 12:33

## 2022-01-01 RX ADMIN — ALBUTEROL 2.5 MILLIGRAM(S): 90 AEROSOL, METERED ORAL at 15:26

## 2022-01-01 RX ADMIN — SENNA PLUS 2 TABLET(S): 8.6 TABLET ORAL at 23:20

## 2022-01-01 RX ADMIN — Medication 1: at 08:42

## 2022-01-01 RX ADMIN — Medication 110 MILLIGRAM(S): at 17:17

## 2022-01-01 RX ADMIN — LORATADINE 10 MILLIGRAM(S): 10 TABLET ORAL at 12:53

## 2022-01-01 RX ADMIN — LORATADINE 10 MILLIGRAM(S): 10 TABLET ORAL at 13:32

## 2022-01-01 RX ADMIN — SODIUM CHLORIDE 1000 MILLILITER(S): 9 INJECTION, SOLUTION INTRAVENOUS at 12:30

## 2022-01-01 RX ADMIN — BUDESONIDE AND FORMOTEROL FUMARATE DIHYDRATE 2 PUFF(S): 160; 4.5 AEROSOL RESPIRATORY (INHALATION) at 19:50

## 2022-01-01 RX ADMIN — PANTOPRAZOLE SODIUM 40 MILLIGRAM(S): 20 TABLET, DELAYED RELEASE ORAL at 06:54

## 2022-01-01 RX ADMIN — Medication 500000 UNIT(S): at 18:37

## 2022-01-01 RX ADMIN — ALBUTEROL 2.5 MILLIGRAM(S): 90 AEROSOL, METERED ORAL at 08:16

## 2022-01-01 RX ADMIN — Medication 30 MILLIGRAM(S): at 05:41

## 2022-01-01 RX ADMIN — ALBUTEROL 2.5 MILLIGRAM(S): 90 AEROSOL, METERED ORAL at 03:05

## 2022-01-01 RX ADMIN — Medication 60 MILLIGRAM(S): at 18:32

## 2022-01-01 RX ADMIN — BUDESONIDE AND FORMOTEROL FUMARATE DIHYDRATE 2 PUFF(S): 160; 4.5 AEROSOL RESPIRATORY (INHALATION) at 10:52

## 2022-01-01 RX ADMIN — Medication 110 MILLIGRAM(S): at 05:41

## 2022-01-01 RX ADMIN — Medication 60 MILLIGRAM(S): at 22:38

## 2022-01-01 RX ADMIN — Medication 60 MILLIGRAM(S): at 17:23

## 2022-01-01 RX ADMIN — Medication 40 MILLIGRAM(S): at 05:56

## 2022-01-01 RX ADMIN — ENOXAPARIN SODIUM 40 MILLIGRAM(S): 100 INJECTION SUBCUTANEOUS at 01:07

## 2022-01-01 RX ADMIN — ALBUTEROL 2.5 MILLIGRAM(S): 90 AEROSOL, METERED ORAL at 20:37

## 2022-01-01 RX ADMIN — Medication 60 MILLIGRAM(S): at 00:31

## 2022-07-30 NOTE — H&P ADULT - NSHPLABSRESULTS_GEN_ALL_CORE
17.0   9.99  )-----------( 355      ( 31 Jul 2022 05:52 )             51.0       07-31    138  |  101  |  20  ----------------------------<  143<H>  5.0   |  24  |  0.9    Ca    9.3      31 Jul 2022 05:52    TPro  7.0  /  Alb  4.4  /  TBili  1.0  /  DBili  0.2  /  AST  60<H>  /  ALT  42<H>  /  AlkPhos  94  07-31          ABG - ( 30 Jul 2022 17:49 )  pH, Arterial: 7.30  pH, Blood: x     /  pCO2: 50    /  pO2: 176   / HCO3: 25    / Base Excess: -2.4  /  SaO2: 99.3          Urinalysis Basic - ( 30 Jul 2022 21:51 )    Color: Yellow / Appearance: Clear / SG: >1.050 / pH: x  Gluc: x / Ketone: Large  / Bili: Negative / Urobili: <2 mg/dL   Blood: x / Protein: Trace / Nitrite: Negative   Leuk Esterase: Negative / RBC: x / WBC x   Sq Epi: x / Non Sq Epi: x / Bacteria: x        PT/INR - ( 31 Jul 2022 05:52 )   PT: 11.10 sec;   INR: 0.96 ratio         PTT - ( 31 Jul 2022 05:52 )  PTT:40.1 sec    Lactate Trend      CARDIAC MARKERS ( 31 Jul 2022 05:37 )  x     / <0.01 ng/mL / x     / x     / x      CARDIAC MARKERS ( 30 Jul 2022 11:09 )  x     / <0.01 ng/mL / x     / x     / x            CAPILLARY BLOOD GLUCOSE      POCT Blood Glucose.: 161 mg/dL (31 Jul 2022 08:53)

## 2022-07-30 NOTE — H&P ADULT - CONVERSATION DETAILS
Patient would like to be FULL code, would opt in for elective intubation if worsening respiratory status, opts in for CPR if needed

## 2022-07-30 NOTE — ED PROVIDER NOTE - CLINICAL SUMMARY MEDICAL DECISION MAKING FREE TEXT BOX
/afcm I have fully discussed the medical management and delivery of care with the patient. I have discussed any available labs, imaging and treatment options with the patient.  Pt admitted for further care & management.

## 2022-07-30 NOTE — H&P ADULT - NSICDXPASTMEDICALHX_GEN_ALL_CORE_FT
PAST MEDICAL HISTORY:  COPD (chronic obstructive pulmonary disease)     Dyslipidemia     Essential hypertension

## 2022-07-30 NOTE — H&P ADULT - ASSESSMENT
71-year-old male with hx of COPD on 2L home O2 & CPAP (unclear setting), Hx COPD Exacerbation w/ Intubation x 3 d in 2004, Asthma, DM s/p right transmetatarsal amputation following infection. Patient presented for COPD Exacerbation.    # COPD Exacerbation  # Atelectasis  - Hx COPD Exacerbation w/ Intubation x 3 d in 2004  - BiPAP 10/5  - pH 7.19/81/31 on admission  - CTA -ve for PE and active inflammation  - Repeat ABG  - Consider elective intubation in case of respiratory fatigue [patient agreeable]  - s/p steroids and azithromycin (completed course) prior to admission  - Leukocytosis likely reactive to outpatient prednisone  - Trop < 0.01  - Pro-BNP 69  - ECG: Sinus Tachycardia  - covid -ve  - Check RVP  - Solu-medrol 125 bid  - Start Spiriva  - c/w Duoneb  - Hold off Antibiotics (no fever, sputum, or imaging consistent w/ active bacterial infection)  - f/u procal  - Hold off Azithro (completed course prior to admission)  - Patient probably follow w/ Dr Coates  - s/p covid vaccine Moderna x 2, last taken > 6 months ago as per patient (vaccine card not available)  - s/p pneumonia vaccine 3 weeks ago (takes it every 5 years).  - PFTs 3/2017: FEV1/FVC 43% [34% of predicted]  - 120 pack-year smoking hx, stopped in 2015  - Was previously referred to Lung Transplant at [place] (patient does not recall, unclear what happened)    # Eosinophelia  - AEC 3.58   - f/u Smear  - Check ANCA, UA (r/o Churg-Stauss)  - f/u Tryptase & B12 (r/o malignancy)  - f/u HIV  - Denies diarrhea  - Trop < 0.01  - Consider HRCT    # Asthma  - c/w Duoneb  - c/w loratadine  - c/w montelukast    # DM  - last a1c 6.8 (2018)  - f/u a1c  - SSI  - Insulin requirements may increase given steroids & IVF contain D5    # Decreased PO intake  - IV D5 0.45% NaCl @ 60 ml/hr    # Hyperbilirubinemia  - f/u bili differential    # HTN  - c/w Nifedipine    # DL  - f/u Lipid profile    # GERD  - c/w Protonix    # Bochdalek hernia  - Seen on CTA Chest  - outpatient f/u    # Osteopenia  - Seen on CTA Chest  - consider Ca & Vit D supplement    # Hepatic Nodule  - 1.1 cm focal enhancement of the right hepatic lobe on CTA chest  - Likely hemangioma  - Nonemergent RUQ US    # Nodular opacities seen on CTA Chest  # Hx  10 x 8 Lung nodule (2017) -> -ve CT PET  - outpatient follow up  - Radiology recommended CT in 6 months and lung cancer screening    # CODE: FULL    # DVT PPx: Lovenox    # Diet: Regular, CC, low salt, DASH, TLC

## 2022-07-30 NOTE — H&P ADULT - HISTORY OF PRESENT ILLNESS
71-year-old male with hx of COPD on 2L home O2 & CPAP (unclear setting), Hx COPD Exacerbation w/ Intubation x 3 d in 2004, Asthma, DM s/p right transmetatarsal amputation following infection. Patient presented for 2 days of shortness of breath, tachypnea, labored breathing. Patient endorses pleuritic chest tightness, but no increased O2 requirements @ home, increased sputum frequency or worsening quality. No fever, chills, or vomiting. The patient does report night sweats. No weight loss.    Patient recently completed course of azithromycin @ home & took 1 week of Prednisone 20 mg QD (finished few days prior to presentation)    Reports decreased feeding due to dry throat and nausea    In the ED, patient was provided w/ Duoneb and BiPAP, reports improvement of shortness of breath.  pH 7.19/81/31 on admission  s/p ceftriaxone & Azithromycin in the ED    CTA -ve for PE and active inflammation. Positive for severe Emphyesema and atelectasis  Covid -ve.  s/p covid vaccine Moderna x 2, last taken > 6 months ago as per patient (vaccine card not available)  s/p pneumonia vaccine 3 weeks ago (takes it every 5 years).    PFTs 3/2017: FEV1/FVC 43% [34% of predicted]  120 pack-year smoking hx, stopped in 2015  Was previously referred to Lung Transplant at [place] (patient does not recall, unclear what happened)    In the ED, VS: T 37.8, , /80, RR 28, SpO2 100% on BiPAP

## 2022-07-30 NOTE — H&P ADULT - ATTENDING COMMENTS
Pt seen and examined this morning with his son and daughter present. Pt received ativan shortly before our interaction and was somnolent. Family provided history. Pt developed SOB day prior to admission. Pt thought to have COPD exacerbation ABG showed PCO2 50 with ph 7.3 suggesting chronic respiratory acidosis . O2 on RA not documented on admission, unclear if acute on chronic hypoxic respiratory failure, hypercapnia appears chronic. Pt was on continous bipap. Housestaff to follow up abg after weaning off bipap. Family aware of current plan. Pt developed chest pain this morning. troponin normal, EKG with sinus tachy. PE ruled out on admission. Sinus tachy likely due to SOB. HR now controlled. Pt seen and examined this morning with his son and daughter present. Pt received ativan shortly before our interaction and was somnolent. Family provided history. Pt developed SOB day prior to admission. Pt thought to have COPD exacerbation ABG showed PCO2 50 with ph 7.3 suggesting chronic respiratory acidosis . O2 on RA not documented on admission, but spsected acute on chronic hypoxic respiratory failure as pt spo2 at one point was 93% on bipap., hypercapnia appears chronic. Pt was on continuos bipap. Housestaff to follow up abg after weaning off bipap. Family aware of current plan. Pt developed chest pain this morning. troponin normal, EKG with sinus tachy. PE ruled out on admission. Sinus tachy likely due to hypoxia. HR now controlled. Pt can receive seroquel for agitation. No focal deficits noted but when more calm can check CTH to r/o intracranial pathology. Avoid benzo/sedation given respiratory distress. Family aware depending on pulse ox, abg, and if pt winds up using accessory muscles he may need to be intubated.

## 2022-07-30 NOTE — ED PROVIDER NOTE - ATTENDING CONTRIBUTION TO CARE
71-year-old male past medical history of COPD presents with shortness of breath for the past 3 days according to family he has been unable to speak because he is so dyspneic.  Did not complain of any fevers, viral syndrome or other symptoms prior to shortness of breath.  Has a history of COPD and has been on BiPAP in the past.  Denies any leg swelling or recent travel.  Chest x-ray shows no pneumonia, no pneumothorax, cover empirically with antibiotics, placed on BiPAP with moderate improvement and labs otherwise unremarkable.  pH noted to 7.19 with retention.  Consistent with COPD exacerbation.

## 2022-07-30 NOTE — ED ADULT NURSE REASSESSMENT NOTE - NS ED NURSE REASSESS COMMENT FT1
Patient assessed bedside.  Pt A/O x 4.  Pt daughter bedside.  No acute stress at this time.  Pt maintained on BiPap and cardiac monitoring, Pt sating 97%.  Pt safety and comfort maintained.

## 2022-07-30 NOTE — H&P ADULT - NSHPPHYSICALEXAM_GEN_ALL_CORE
VITAL SIGNS: AFebrile, tachycardia, tachypnea  CONSTITUTIONAL: Tripod position, using accessory muscles [neck, chest, & abdomen]  SKIN: Skin exam is warm and dry, no acute rash. Back rash from "coining" the patient did after symptoms started [he reported "coining" helped]  HEAD: Normocephalic; atraumatic.  EYES: Pupils equal round reactive to light, Extraocular movements intact; conjunctiva and sclera clear.  ENT: No nasal discharge; airway clear. Could not assess mucous membranes due to BiPAP  NECK: Supple; non tender. No rigidity  CARD: Tachycardic and regular rhythm. Normal S1, S2; no murmurs, gallops, or rubs.  RESP: Decreased breath sounds, basal wheezes  ABD: Abdomen soft; non-tender; non-distended;  no hepatosplenomegaly. No costovertebral angle tenderness.   EXT: Normal ROM. No clubbing, cyanosis or edema. No calf tenderness to palpation. Amputation of RLE (transmetatarsal)  NEURO: Alert and oriented x 3. No focal deficits.  PSYCH: Cooperative, appropriate.

## 2022-07-30 NOTE — ED PROVIDER NOTE - NSCAREINITIATED _GEN_ER
Dexter Quintero(Resident) Lisinopril       Last Written Prescription Date: 04/28/2017  Last Fill Quantity: 90, # refills: 1  [place] [facility] [place] [facility] or  Health prescribing provider: 10/06/2017  Next 5 appointments (look out 90 days)     Nov 08, 2017  1:00 PM CST   Office Visit with Ilene Tristan MD   [facility]    [address] [address]   [phone]                   Potassium   Date Value Ref Range Status   10/06/2017 4.3 3.4 - 5.3 mmol/L Final     Creatinine   Date Value Ref Range Status   10/06/2017 0.94 0.52 - 1.04 mg/dL Final     BP Readings from Last 3 Encounters:   10/06/17 110/60   10/04/17 125/82   09/12/17 90/46     Lantus          Last Written Prescription Date: 05/23/2017  Last Fill Quantity: 15ml, # refills: 1  Last Office Visit with [facility] or  Health prescribing provider:  10/06/2017   Next 5 appointments (look out 90 days)     Nov 08, 2017  1:00 PM CST   Office Visit with Ilene Tristan MD   [facility]    [address]  [place] [address]   [phone]                   BP Readings from Last 3 Encounters:   10/06/17 110/60   10/04/17 125/82   09/12/17 90/46     Lab Results   Component Value Date    MICROL 14 10/06/2017     Lab Results   Component Value Date    UMALCR 29.65 10/06/2017     Creatinine   Date Value Ref Range Status   10/06/2017 0.94 0.52 - 1.04 mg/dL Final   ]  GFR Estimate   Date Value Ref Range Status   10/06/2017 58 (L) >60 mL/min/1.7m2 Final     Comment:     Non  GFR Calc   08/30/2017 62 >60 mL/min/1.7m2 Final     Comment:     Non  GFR Calc   08/01/2017 61 >60 mL/min/1.7m2 Final     Comment:     Non  GFR Calc     GFR Estimate If Black   Date Value Ref Range Status   10/06/2017 70 >60 mL/min/1.7m2 Final     Comment:      GFR Calc   08/30/2017 75 >60 mL/min/1.7m2 Final     Comment:       American GFR Calc   08/01/2017 74 >60 mL/min/1.7m2 Final     Comment:      GFR Calc     Lab Results   Component Value Date    CHOL 101 06/22/2017     Lab Results   Component Value Date    HDL 62 06/22/2017     Lab Results   Component Value Date    LDL 13 06/22/2017     Lab Results   Component Value Date    TRIG 132 06/22/2017     Lab Results   Component Value Date    CHOLHDLRATIO 2.5 08/06/2014     Lab Results   Component Value Date    AST 37 10/06/2017     Lab Results   Component Value Date    ALT 43 10/06/2017     Lab Results   Component Value Date    A1C 7.3 10/06/2017    A1C 6.4 07/05/2017    A1C 14.3 04/04/2017    A1C 9.4 01/24/2017    A1C 7.0 09/02/2016     Potassium   Date Value Ref Range Status   10/06/2017 4.3 3.4 - 5.3 mmol/L Final

## 2022-07-30 NOTE — ED PROVIDER NOTE - PHYSICAL EXAMINATION
CONSTITUTIONAL: NAD  SKIN: Warm dry  HEAD: NCAT  EYES: NL inspection  ENT: MMM  NECK: Supple; non tender.  CARD: RRR  RESP: +retractions, tachypneic.   ABD: S/NT no R/G  EXT: no pedal edema  NEURO: Grossly unremarkable  PSYCH: Cooperative, appropriate.

## 2022-07-30 NOTE — ED PROVIDER NOTE - OBJECTIVE STATEMENT
71 y m, pmh of copd on home O2 2L, pw sob. Started two days ago, endorses mild at first, worse today, +increased work of breathing, on cpap on route, +mild cp as well, no fevers. Denies abd pain, n/v,  dysuria.

## 2022-07-31 NOTE — ED ADULT NURSE REASSESSMENT NOTE - NS ED NURSE REASSESS COMMENT FT1
Pt received from previous RN, noted to be very anxious climbing out of bed. MD called- ativan given as per MD orders. Family present at bedside.

## 2022-07-31 NOTE — ED ADULT NURSE REASSESSMENT NOTE - NS ED NURSE REASSESS COMMENT FT1
PT c/o of SOB. Vitals signs stable at this time.  MD Arita notified x [phone] to assess patient bedside.  Awaiting MD orders.

## 2022-07-31 NOTE — CHART NOTE - NSCHARTNOTEFT_GEN_A_CORE
Patient was short of breath and had mild substernal chest pain, EKG ordered which did not demonstrate ST segment elevation, troponin ordered, f/u in AM.  Chest x-ray ordered, f/u AM.

## 2022-07-31 NOTE — CONSULT NOTE ADULT - SUBJECTIVE AND OBJECTIVE BOX
Patient is a 71y old  Male who presents with a chief complaint of COPD Exacerbation (30 Jul 2022 17:02)      HPI:  71-year-old male with hx of COPD on 2L home O2 & CPAP (unclear setting), Hx COPD Exacerbation w/ Intubation x 3 d in 2004, Asthma, DM s/p right transmetatarsal amputation following infection. Patient presented for 2 days of shortness of breath, tachypnea, labored breathing. Patient endorses pleuritic chest tightness, but no increased O2 requirements @ home, increased sputum frequency or worsening quality. No fever, chills, or vomiting. The patient does report night sweats. No weight loss.    Patient recently completed course of azithromycin @ home & took 1 week of Prednisone 20 mg QD (finished few days prior to presentation)    Reports decreased feeding due to dry throat and nausea    In the ED, patient was provided w/ Duoneb and BiPAP, reports improvement of shortness of breath.  pH 7.19/81/31 on admission  s/p ceftriaxone & Azithromycin in the ED    CTA -ve for PE and active inflammation. Positive for severe Emphysema and atelectasis  Covid -ve.  s/p covid vaccine Moderna x 2, last taken > 6 months ago as per patient (vaccine card not available)  s/p pneumonia vaccine 3 weeks ago (takes it every 5 years).    PFTs 3/2017: FEV1/FVC 43% [34% of predicted]  120 pack-year smoking hx, stopped in 2015  Was previously referred to Lung Transplant at [facility] (patient does not recall, unclear what happened)    In the ED, VS: T 37.8, , /80, RR 28, SpO2 100% on BiPAP (30 Jul 2022 17:02)      PAST MEDICAL & SURGICAL HISTORY:  COPD (chronic obstructive pulmonary disease)      Essential hypertension      Dyslipidemia      S/P transmetatarsal amputation of foot, right          SOCIAL HX:   Smoking +    FAMILY HISTORY:  No pertinent family history in first degree relatives        REVIEW OF SYSTEMS see hpi    Allergies    No Known Allergies    Intolerances        acetaminophen     Tablet .. 650 milliGRAM(s) Oral every 6 hours PRN  albuterol/ipratropium for Nebulization 3 milliLiter(s) Nebulizer every 6 hours  aluminum hydroxide/magnesium hydroxide/simethicone Suspension 30 milliLiter(s) Oral every 4 hours PRN  dextrose 5% + sodium chloride 0.45%. 1000 milliLiter(s) IV Continuous <Continuous>  dextrose 5%. 1000 milliLiter(s) IV Continuous <Continuous>  dextrose 5%. 1000 milliLiter(s) IV Continuous <Continuous>  dextrose 50% Injectable 25 Gram(s) IV Push once  dextrose 50% Injectable 12.5 Gram(s) IV Push once  dextrose 50% Injectable 25 Gram(s) IV Push once  dextrose Oral Gel 15 Gram(s) Oral once PRN  glucagon  Injectable 1 milliGRAM(s) IntraMuscular once  guaifenesin/dextromethorphan Oral Liquid 10 milliLiter(s) Oral every 6 hours PRN  insulin lispro (ADMELOG) corrective regimen sliding scale   SubCutaneous three times a day before meals  loratadine 10 milliGRAM(s) Oral daily  melatonin 3 milliGRAM(s) Oral at bedtime PRN  methylPREDNISolone sodium succinate Injectable 125 milliGRAM(s) IV Push two times a day  montelukast 10 milliGRAM(s) Oral daily  NIFEdipine XL 30 milliGRAM(s) Oral daily  ondansetron Injectable 4 milliGRAM(s) IV Push every 8 hours PRN  pantoprazole    Tablet 40 milliGRAM(s) Oral before breakfast  tiotropium 18 MICROgram(s) Capsule 1 Capsule(s) Inhalation daily  : Home Meds:      PHYSICAL EXAM    ICU Vital Signs Last 24 Hrs  T(C): 36.4 (31 Jul 2022 04:03), Max: 37.8 (30 Jul 2022 11:09)  T(F): 97.5 (31 Jul 2022 04:03), Max: 100.1 (30 Jul 2022 11:09)  HR: 115 (31 Jul 2022 08:56) (88 - 130)  BP: 145/88 (31 Jul 2022 08:56) (133/90 - 195/90)  BP(mean): --  ABP: --  ABP(mean): --  RR: 25 (31 Jul 2022 08:56) (23 - 28)  SpO2: 97% (31 Jul 2022 08:56) (93% - 100%)    O2 Parameters below as of 31 Jul 2022 08:56  Patient On (Oxygen Delivery Method): BiPAP/CPAP             General: ill looking, on BIPAP         Lymph Nodes: No cervical LN   Lungs: dec bs both bases  Cardiovascular: Regular  Abdomen: Soft, Positive BS  Extremities: No clubbing  Skin: Warm  Neurological: Non focal         LABS:                          17.0   9.99  )-----------( 355      ( 31 Jul 2022 05:52 )             51.0                                               07-31    138  |  101  |  20  ----------------------------<  143<H>  5.0   |  24  |  0.9    Ca    9.3      31 Jul 2022 05:52    TPro  7.0  /  Alb  4.4  /  TBili  1.0  /  DBili  0.2  /  AST  60<H>  /  ALT  42<H>  /  AlkPhos  94  07-31      PT/INR - ( 31 Jul 2022 05:52 )   PT: 11.10 sec;   INR: 0.96 ratio         PTT - ( 31 Jul 2022 05:52 )  PTT:40.1 sec                                       Urinalysis Basic - ( 30 Jul 2022 21:51 )    Color: Yellow / Appearance: Clear / SG: >1.050 / pH: x  Gluc: x / Ketone: Large  / Bili: Negative / Urobili: <2 mg/dL   Blood: x / Protein: Trace / Nitrite: Negative   Leuk Esterase: Negative / RBC: x / WBC x   Sq Epi: x / Non Sq Epi: x / Bacteria: x        CARDIAC MARKERS ( 31 Jul 2022 05:37 )  x     / <0.01 ng/mL / x     / x     / x      CARDIAC MARKERS ( 30 Jul 2022 11:09 )  x     / <0.01 ng/mL / x     / x     / x                                                LIVER FUNCTIONS - ( 31 Jul 2022 05:52 )  Alb: 4.4 g/dL / Pro: 7.0 g/dL / ALK PHOS: 94 U/L / ALT: 42 U/L / AST: 60 U/L / GGT: x                                                                                                                                   ABG - ( 30 Jul 2022 17:49 )  pH, Arterial: 7.30  pH, Blood: x     /  pCO2: 50    /  pO2: 176   / HCO3: 25    / Base Excess: -2.4  /  SaO2: 99.3                X-Rays                                                                                     ECHO    MEDICATIONS  (STANDING):  albuterol/ipratropium for Nebulization 3 milliLiter(s) Nebulizer every 6 hours  dextrose 5% + sodium chloride 0.45%. 1000 milliLiter(s) (60 mL/Hr) IV Continuous <Continuous>  dextrose 5%. 1000 milliLiter(s) (50 mL/Hr) IV Continuous <Continuous>  dextrose 5%. 1000 milliLiter(s) (100 mL/Hr) IV Continuous <Continuous>  dextrose 50% Injectable 25 Gram(s) IV Push once  dextrose 50% Injectable 12.5 Gram(s) IV Push once  dextrose 50% Injectable 25 Gram(s) IV Push once  glucagon  Injectable 1 milliGRAM(s) IntraMuscular once  insulin lispro (ADMELOG) corrective regimen sliding scale   SubCutaneous three times a day before meals  loratadine 10 milliGRAM(s) Oral daily  methylPREDNISolone sodium succinate Injectable 125 milliGRAM(s) IV Push two times a day  montelukast 10 milliGRAM(s) Oral daily  NIFEdipine XL 30 milliGRAM(s) Oral daily  pantoprazole    Tablet 40 milliGRAM(s) Oral before breakfast  tiotropium 18 MICROgram(s) Capsule 1 Capsule(s) Inhalation daily    MEDICATIONS  (PRN):  acetaminophen     Tablet .. 650 milliGRAM(s) Oral every 6 hours PRN Temp greater or equal to 38C (100.4F), Mild Pain (1 - 3)  aluminum hydroxide/magnesium hydroxide/simethicone Suspension 30 milliLiter(s) Oral every 4 hours PRN Dyspepsia  dextrose Oral Gel 15 Gram(s) Oral once PRN Blood Glucose LESS THAN 70 milliGRAM(s)/deciliter  guaifenesin/dextromethorphan Oral Liquid 10 milliLiter(s) Oral every 6 hours PRN Cough  melatonin 3 milliGRAM(s) Oral at bedtime PRN Insomnia  ondansetron Injectable 4 milliGRAM(s) IV Push every 8 hours PRN Nausea and/or Vomiting

## 2022-08-01 NOTE — PROGRESS NOTE ADULT - SUBJECTIVE AND OBJECTIVE BOX
NILE VALDEZ  71y Male    Patient is a 71y old  Male who presents with a chief complaint of SOB    INTERVAL HPI/OVERNIGHT EVENTS:    ROS: Pt admits to chest pain episode when he felt out of breath. Pt states that his SOB is stable on BIPAP but unable to tolerate when taken off BIPAP. Pt denies fever, chills, palpitations, nausea, vomiting, abdominal pain..    Overnight events: No acute events overnight.    Vital Signs Last 24 Hrs  T(C): 37.2 (01 Aug 2022 13:40), Max: 37.2 (01 Aug 2022 13:40)  T(F): 98.9 (01 Aug 2022 13:40), Max: 98.9 (01 Aug 2022 13:40)  HR: 98 (01 Aug 2022 18:17) (92 - 123)  BP: 139/87 (01 Aug 2022 18:17) (114/70 - 156/90)  BP(mean): 107 (01 Aug 2022 18:17) (106 - 113)  RR: 19 (01 Aug 2022 18:17) (18 - 21)  SpO2: 99% (01 Aug 2022 18:17) (97% - 99%)    Parameters below as of 01 Aug 2022 18:17  Patient On (Oxygen Delivery Method): BiPAP/CPAP    No Known Allergies    MEDICATIONS  (STANDING):  albuterol/ipratropium for Nebulization 3 milliLiter(s) Nebulizer every 6 hours  budesonide 160 MICROgram(s)/formoterol 4.5 MICROgram(s) Inhaler 2 Puff(s) Inhalation two times a day  dextrose 5% + sodium chloride 0.45%. 1000 milliLiter(s) (60 mL/Hr) IV Continuous <Continuous>  doxycycline IVPB      doxycycline IVPB 100 milliGRAM(s) IV Intermittent every 12 hours  enoxaparin Injectable 40 milliGRAM(s) SubCutaneous every 24 hours  glucagon  Injectable 1 milliGRAM(s) IntraMuscular once  insulin lispro (ADMELOG) corrective regimen sliding scale   SubCutaneous three times a day before meals  loratadine 10 milliGRAM(s) Oral daily  methylPREDNISolone sodium succinate Injectable 60 milliGRAM(s) IV Push every 8 hours  montelukast 10 milliGRAM(s) Oral daily  NIFEdipine XL 30 milliGRAM(s) Oral daily  pantoprazole    Tablet 40 milliGRAM(s) Oral before breakfast  tiotropium 18 MICROgram(s) Capsule 1 Capsule(s) Inhalation daily    MEDICATIONS  (PRN):  acetaminophen     Tablet .. 650 milliGRAM(s) Oral every 6 hours PRN Temp greater or equal to 38C (100.4F), Mild Pain (1 - 3)  aluminum hydroxide/magnesium hydroxide/simethicone Suspension 30 milliLiter(s) Oral every 4 hours PRN Dyspepsia  fluticasone propionate 50 MICROgram(s)/spray Nasal Spray 2 Spray(s) Both Nostrils two times a day PRN nasal congestion  guaifenesin/dextromethorphan Oral Liquid 10 milliLiter(s) Oral every 6 hours PRN Cough  melatonin 3 milliGRAM(s) Oral at bedtime PRN Insomnia  ondansetron Injectable 4 milliGRAM(s) IV Push every 8 hours PRN Nausea and/or Vomiting      PHYSICAL EXAM:  General Appearance: NAD, normal for age and gender. Cachetic. Remains on BIPAP.	  Neck: No neck mass.   Eyes: Conjunctiva clear, Extra Ocular muscles intact. No scleral icterus.  ENMT: Moist oral mucosa. No oral lesion.  Cardiovascular: Regular rate and rhythm S1 S2, No JVD, No murmurs.  Respiratory: Poor air entry bilaterally. No wheezes, rales or rhonchi.  Psychiatry: Alert and oriented x 3, Mood & affect appropriate.  Gastrointestinal:  Soft, Non-tender, Non-distended.  Neurologic: Non-focal deficits.  Musculoskeletal/ extremities: Normal range of motion, No clubbing, cyanosis or edema.  Vascular: Peripheral pulses palpable bilaterally.  Skin/Integumen: No rashes, No ecchymoses, No cyanosis.    LABS:                        15.5   15.80 )-----------( 354      ( 01 Aug 2022 06:43 )             47.3     08-01    138  |  98  |  29<H>  ----------------------------<  173<H>  5.0   |  29  |  0.9    Ca    9.2      01 Aug 2022 06:43  Mg     2.2     08-01    TPro  6.7  /  Alb  4.0  /  TBili  0.9  /  DBili  x   /  AST  48<H>  /  ALT  42<H>  /  AlkPhos  77  08-01    PT/INR - ( 31 Jul 2022 05:52 )   PT: 11.10 sec;   INR: 0.96 ratio         PTT - ( 31 Jul 2022 05:52 )  PTT:40.1 sec      RADIOLOGY & ADDITIONAL TESTS:

## 2022-08-01 NOTE — CONSULT NOTE ADULT - SUBJECTIVE AND OBJECTIVE BOX
NUTRITION SUPPORT CONSULTATION    71-year-old male with hx of COPD on 2L home O2 & CPAP (unclear setting), Hx COPD Exacerbation w/ Intubation x 3 d in 2004, Asthma, DM s/p right transmetatarsal amputation following infection. Patient presented for 2 days of shortness of breath, tachypnea, labored breathing. Patient endorses pleuritic chest tightness, but no increased O2 requirements @ home, increased sputum frequency or worsening quality. No fever, chills, or vomiting. The patient does report night sweats. No weight loss.    Patient recently completed course of azithromycin @ home & took 1 week of Prednisone 20 mg QD (finished few days prior to presentation)  Reports decreased feeding due to dry throat and nausea  In the ED, patient was provided w/ Duoneb and BiPAP, reports improvement of shortness of breath.  pH 7.19/81/31 on admission  s/p ceftriaxone & Azithromycin in the ED    CTA -ve for PE and active inflammation. Positive for severe Emphyesema and atelectasis  Covid -ve.  s/p covid vaccine Moderna x 2, last taken > 6 months ago as per patient (vaccine card not available)  s/p pneumonia vaccine 3 weeks ago (takes it every 5 years).    PFTs 3/2017: FEV1/FVC 43% [34% of predicted]  120 pack-year smoking hx, stopped in 2015  Was previously referred to Lung Transplant at [place] (patient does not recall, unclear what happened)    In the ED, VS: T 37.8, , /80, RR 28, SpO2 100% on BiPAP (30 Jul 2022 17:02)      PAST MEDICAL & SURGICAL HISTORY:  COPD (chronic obstructive pulmonary disease)  Essential hypertension  Dyslipidemia  S/P transmetatarsal amputation of foot, right  No Known Allergies    MEDICATIONS  (STANDING):  albuterol/ipratropium for Nebulization 3 milliLiter(s) Nebulizer every 6 hours  budesonide 160 MICROgram(s)/formoterol 4.5 MICROgram(s) Inhaler 2 Puff(s) Inhalation two times a day  dextrose 5% + sodium chloride 0.45%. 1000 milliLiter(s) (60 mL/Hr) IV Continuous <Continuous>  doxycycline IVPB      doxycycline IVPB 100 milliGRAM(s) IV Intermittent every 12 hours  enoxaparin Injectable 40 milliGRAM(s) SubCutaneous every 24 hours  glucagon  Injectable 1 milliGRAM(s) IntraMuscular once  insulin lispro (ADMELOG) corrective regimen sliding scale   SubCutaneous three times a day before meals  loratadine 10 milliGRAM(s) Oral daily  methylPREDNISolone sodium succinate Injectable 60 milliGRAM(s) IV Push every 8 hours  montelukast 10 milliGRAM(s) Oral daily  NIFEdipine XL 30 milliGRAM(s) Oral daily  pantoprazole    Tablet 40 milliGRAM(s) Oral before breakfast  tiotropium 18 MICROgram(s) Capsule 1 Capsule(s) Inhalation daily    MEDICATIONS  (PRN):  acetaminophen     Tablet .. 650 milliGRAM(s) Oral every 6 hours PRN Temp greater or equal to 38C (100.4F), Mild Pain (1 - 3)  aluminum hydroxide/magnesium hydroxide/simethicone Suspension 30 milliLiter(s) Oral every 4 hours PRN Dyspepsia  fluticasone propionate 50 MICROgram(s)/spray Nasal Spray 2 Spray(s) Both Nostrils two times a day PRN nasal congestion  guaifenesin/dextromethorphan Oral Liquid 10 milliLiter(s) Oral every 6 hours PRN Cough  melatonin 3 milliGRAM(s) Oral at bedtime PRN Insomnia  ondansetron Injectable 4 milliGRAM(s) IV Push every 8 hours PRN Nausea and/or Vomiting    ICU Vital Signs Last 24 Hrs  T(C): 37.2 (01 Aug 2022 13:40), Max: 37.2 (01 Aug 2022 13:40)  T(F): 98.9 (01 Aug 2022 13:40), Max: 98.9 (01 Aug 2022 13:40)  HR: 92 (01 Aug 2022 16:03) (92 - 123)  BP: 138/86 (01 Aug 2022 16:03) (114/70 - 156/90)  BP(mean): 106 (01 Aug 2022 16:03) (106 - 113)  ABP: --  ABP(mean): --  RR: 18 (01 Aug 2022 16:03) (18 - 21)  SpO2: 98% (01 Aug 2022 16:03) (97% - 99%)    O2 Parameters below as of 01 Aug 2022 16:03  Patient On (Oxygen Delivery Method): BiPAP/CPAP    Drug Dosing Weight  Height (cm): 162.6 (30 Jul 2022 11:09)  Weight (kg): 63.5 (30 Jul 2022 11:09)  BMI (kg/m2): 24 (30 Jul 2022 11:09)  BSA (m2): 1.68 (30 Jul 2022 11:09)  weak but alert when wakened  skin turgor good, mouth moist, currently comfortable on BIPAP  abd soft, ND, NT  ext warm, moves all extrem  periph iv access  no physical evidence of acute wt loss    LABS  08-01    138  |  98  |  29<H>  ----------------------------<  173<H>  5.0   |  29  |  0.9    Ca    9.2      01 Aug 2022 06:43  Mg     2.2     08-01    TPro  6.7  /  Alb  4.0  /  TBili  0.9  /  DBili  x   /  AST  48<H>  /  ALT  42<H>  /  AlkPhos  77  08-01  no phos level                     15.5   15.80 )-----------( 354      ( 01 Aug 2022 06:43 )             47.3       LIVER FUNCTIONS - ( 01 Aug 2022 06:43 )  Alb: 4.0 g/dL / Pro: 6.7 g/dL / ALK PHOS: 77 U/L / ALT: 42 U/L / AST: 48 U/L / GGT: x         A1C with Estimated Average Glucose (07.31.22 @ 05:52)   A1C with Estimated Average Glucose Result: 6.5:     POCT Blood Glucose.: 139 mg/dL (01 Aug 2022 11:05)  POCT Blood Glucose.: 172 mg/dL (01 Aug 2022 07:33)  POCT Blood Glucose.: 148 mg/dL (31 Jul 2022 22:07)  POCT Blood Glucose.: 158 mg/dL (31 Jul 2022 18:01)    < from: Xray Chest 1 View- PORTABLE-Urgent (Xray Chest 1 View- PORTABLE-Urgent .) (07.31.22 @ 06:00) >  Support devices: None.    Cardiac/mediastinum/hilum: No significant change    Lung parenchyma/Pleura: Stable bilateral opacities. No definite   pneumothorax.    < end of copied text >      Diet, NPO (07-31-22 @ 06:27)

## 2022-08-01 NOTE — PROGRESS NOTE ADULT - ASSESSMENT
71-year-old male with hx of COPD on 2L home O2 & CPAP (unclear setting), Hx COPD Exacerbation w/ Intubation x 3 d in 2004, Asthma, DM s/p right transmetatarsal amputation following infection. Patient presented for COPD Exacerbation.    # Acute COPD exacerbation  # End stage COPD with h/o multiple intubation in   - IV steriod   - BIPAP dependent    # Eosinophilia  - AEC 3.58   - f/u Smear  - Check ANCA, UA (r/o Churg-Stauss)  - f/u Tryptase & B12 (r/o malignancy)  - f/u HIV  - Denies diarrhea  - Trop < 0.01  - Consider HRCT    # Asthma  - c/w Duoneb  - c/w loratadine  - c/w montelukast    # DM      # Decreased PO intake  - IV D5 0.45% NaCl @ 60 ml/hr    # Hyperbilirubinemia  - f/u bili differential    # HTN  - c/w Nifedipine    # DLD  - f/u Lipid profile    # GERD  - c/w Protonix    # Bochdalek hernia  - Seen on CTA Chest  - outpatient f/u    # Osteopenia  - Seen on CTA Chest  - consider Ca & Vit D supplement    # Hepatic Nodule  - 1.1 cm focal enhancement of the right hepatic lobe on CTA chest  - Likely hemangioma  - Nonemergent RUQ US    # Nodular opacities seen on CTA Chest  # Hx 10 x 8 Lung nodule (2017) -> -ve CT PET  - outpatient follow up  - Radiology recommended CT in 6 months and lung cancer screening    # CODE: FULL    # DVT PPx: Lovenox    # Diet: Regular, CC, low salt, DASH, TLC   71-year-old male with hx of COPD on 2L home O2 & CPAP (unclear setting), Hx COPD Exacerbation w/ Intubation x 3 d in 2004, Asthma, DM s/p right transmetatarsal amputation following infection. Patient presented for COPD Exacerbation.    # Acute COPD exacerbation  # End stage COPD with h/o multiple intubation in the past  - c/w IV steriod and IV doxycycline as per pulm team  - BIPAP dependent  - duoneb q6h and prn    # Asthma  - c/w loratadine, montelukast    # DM  - monitor fsg, no oral intake at this moment     # NPO status while on BIPAP  - IV D5+0.45% NaCl @ 60 ml/hr  - nutritional support team evaluation since pt has been NPO for 3 days    # HTN, stable  - c/w nifedipine    # GERD  - c/w Protonix    # Bochdalek hernia, pulmonary nodules  - seen on CTA Chest  - outpatient f/u    # Hepatic Nodule likely hemangioma  - 1.1 cm focal enhancement of the right hepatic lobe on CTA chest  - monitor for symptom  - further evaluation when stable from respirator status    # Code status: Full code  # DVT prophylaxis: on lovenox subcut

## 2022-08-01 NOTE — PROGRESS NOTE ADULT - ASSESSMENT
IMPRESSION:    End stage COPD currently now in exacerbation last FEV1/FVC 40%, FEV1 31% (<1L)  consistent with severe obstructive defect, Refused lung transplant evaluation/ chest ct reviewed on NIV      PLAN:    ·	Oxygen therapy to keep pulse ox 88 TO 92%  ·	C/w home inhalers, symbicort and spiriva, nebs prn  ·	NIV at night and as needed/ HHFNC / NC trial during day  ·	Solumedrol 60 q 8  ·	doxy 100 q 12  ·	Discussed goals of care - wishes to be full code  ·	DVT ppx  ·	very poor prognosis  ·	sdu

## 2022-08-01 NOTE — PROGRESS NOTE ADULT - SUBJECTIVE AND OBJECTIVE BOX
Over Night Events: events noted, on BIPAP, afebrile    PHYSICAL EXAM    ICU Vital Signs Last 24 Hrs  T(C): 35.4 (01 Aug 2022 04:30), Max: 36 (01 Aug 2022 00:30)  T(F): 95.7 (01 Aug 2022 04:30), Max: 96.8 (01 Aug 2022 00:30)  HR: 112 (01 Aug 2022 04:30) (94 - 130)  BP: 153/80 (01 Aug 2022 04:30) (104/72 - 195/90)  ABP(mean): --  RR: 20 (01 Aug 2022 04:30) (20 - 25)  SpO2: 98% (01 Aug 2022 04:30) (93% - 99%)    O2 Parameters below as of 01 Aug 2022 04:30  Patient On (Oxygen Delivery Method): BiPAP/CPAP            General: ill looking  Lungs: dec bs both bases  Cardiovascular: Regular   Abdomen: Soft, Positive BS  Extremities: No clubbing   Skin: Warm  Neurological: Non focal         LABS:                          17.0   9.99  )-----------( 355      ( 31 Jul 2022 05:52 )             51.0                                               07-31    138  |  101  |  20  ----------------------------<  143<H>  5.0   |  24  |  0.9    Ca    9.3      31 Jul 2022 05:52    TPro  7.0  /  Alb  4.4  /  TBili  1.0  /  DBili  0.2  /  AST  60<H>  /  ALT  42<H>  /  AlkPhos  94  07-31      PT/INR - ( 31 Jul 2022 05:52 )   PT: 11.10 sec;   INR: 0.96 ratio         PTT - ( 31 Jul 2022 05:52 )  PTT:40.1 sec                                       Urinalysis Basic - ( 30 Jul 2022 21:51 )    Color: Yellow / Appearance: Clear / SG: >1.050 / pH: x  Gluc: x / Ketone: Large  / Bili: Negative / Urobili: <2 mg/dL   Blood: x / Protein: Trace / Nitrite: Negative   Leuk Esterase: Negative / RBC: x / WBC x   Sq Epi: x / Non Sq Epi: x / Bacteria: x        CARDIAC MARKERS ( 31 Jul 2022 05:37 )  x     / <0.01 ng/mL / x     / x     / x      CARDIAC MARKERS ( 30 Jul 2022 11:09 )  x     / <0.01 ng/mL / x     / x     / x                                                LIVER FUNCTIONS - ( 31 Jul 2022 05:52 )  Alb: 4.4 g/dL / Pro: 7.0 g/dL / ALK PHOS: 94 U/L / ALT: 42 U/L / AST: 60 U/L / GGT: x                                                                                                                                   ABG - ( 31 Jul 2022 14:17 )  pH, Arterial: 7.36  pH, Blood: x     /  pCO2: 50    /  pO2: 111   / HCO3: 28    / Base Excess: 1.9   /  SaO2: 98.9                MEDICATIONS  (STANDING):  albuterol/ipratropium for Nebulization 3 milliLiter(s) Nebulizer every 6 hours  dextrose 5% + sodium chloride 0.45%. 1000 milliLiter(s) (60 mL/Hr) IV Continuous <Continuous>  doxycycline IVPB      doxycycline IVPB 100 milliGRAM(s) IV Intermittent every 12 hours  enoxaparin Injectable 40 milliGRAM(s) SubCutaneous every 24 hours  glucagon  Injectable 1 milliGRAM(s) IntraMuscular once  insulin lispro (ADMELOG) corrective regimen sliding scale   SubCutaneous three times a day before meals  loratadine 10 milliGRAM(s) Oral daily  methylPREDNISolone sodium succinate Injectable 60 milliGRAM(s) IV Push every 8 hours  montelukast 10 milliGRAM(s) Oral daily  NIFEdipine XL 30 milliGRAM(s) Oral daily  pantoprazole    Tablet 40 milliGRAM(s) Oral before breakfast  tiotropium 18 MICROgram(s) Capsule 1 Capsule(s) Inhalation daily    MEDICATIONS  (PRN):  acetaminophen     Tablet .. 650 milliGRAM(s) Oral every 6 hours PRN Temp greater or equal to 38C (100.4F), Mild Pain (1 - 3)  aluminum hydroxide/magnesium hydroxide/simethicone Suspension 30 milliLiter(s) Oral every 4 hours PRN Dyspepsia  fluticasone propionate 50 MICROgram(s)/spray Nasal Spray 2 Spray(s) Both Nostrils two times a day PRN nasal congestion  guaifenesin/dextromethorphan Oral Liquid 10 milliLiter(s) Oral every 6 hours PRN Cough  melatonin 3 milliGRAM(s) Oral at bedtime PRN Insomnia  ondansetron Injectable 4 milliGRAM(s) IV Push every 8 hours PRN Nausea and/or Vomiting      CXR reviewed

## 2022-08-02 NOTE — CONSULT NOTE ADULT - PROBLEM SELECTOR RECOMMENDATION 3
Full Code  Continue aggressive medical management  WIll FU re: GOC as appropriate   Will FU see above

## 2022-08-02 NOTE — CONSULT NOTE ADULT - CONVERSATION DETAILS
Spoke with dtr of patient on speakerphone at bedside- patient called daughters once I began answering questions. Introduced palliative care and explained role. She explains that the patient was doing overall pretty well at home prior to this without recent hospitalizations. Her family is hopeful he can be weaned from the Bipap and come back home. Discussed code status. She and her sister spoke with the patient and he wants intubation if needed as well as CPR for the time being. Unknown if ever smoked

## 2022-08-02 NOTE — PROGRESS NOTE ADULT - SUBJECTIVE AND OBJECTIVE BOX
Over Night Events: events noted, on BIPAP, afebrile    PHYSICAL EXAM    ICU Vital Signs Last 24 Hrs  T(C): 37.2 (01 Aug 2022 13:40), Max: 37.2 (01 Aug 2022 13:40)  T(F): 98.9 (01 Aug 2022 13:40), Max: 98.9 (01 Aug 2022 13:40)  HR: 117 (02 Aug 2022 02:37) (92 - 117)  BP: 170/100 (02 Aug 2022 02:37) (127/82 - 170/100)  BP(mean): 107 (01 Aug 2022 18:17) (106 - 113)  RR: 19 (02 Aug 2022 02:37) (18 - 21)  SpO2: 99% (02 Aug 2022 02:37) (98% - 99%)    O2 Parameters below as of 02 Aug 2022 02:37  Patient On (Oxygen Delivery Method): BiPAP/CPAP            General: ill looking  Lungs: dec bs both bases  Cardiovascular: IRVIN 2.6  Abdomen: Soft, Positive BS  Extremities: No clubbing   Neurological: Non focal         LABS:                          15.5   15.80 )-----------( 354      ( 01 Aug 2022 06:43 )             47.3                                               08-01    138  |  98  |  29<H>  ----------------------------<  173<H>  5.0   |  29  |  0.9    Ca    9.2      01 Aug 2022 06:43  Mg     2.2     08-01    TPro  6.7  /  Alb  4.0  /  TBili  0.9  /  DBili  x   /  AST  48<H>  /  ALT  42<H>  /  AlkPhos  77  08-01                                                                                           LIVER FUNCTIONS - ( 01 Aug 2022 06:43 )  Alb: 4.0 g/dL / Pro: 6.7 g/dL / ALK PHOS: 77 U/L / ALT: 42 U/L / AST: 48 U/L / GGT: x                                                                                                                                   ABG - ( 31 Jul 2022 14:17 )  pH, Arterial: 7.36  pH, Blood: x     /  pCO2: 50    /  pO2: 111   / HCO3: 28    / Base Excess: 1.9   /  SaO2: 98.9                MEDICATIONS  (STANDING):  albuterol/ipratropium for Nebulization 3 milliLiter(s) Nebulizer every 6 hours  budesonide 160 MICROgram(s)/formoterol 4.5 MICROgram(s) Inhaler 2 Puff(s) Inhalation two times a day  dextrose 5% + sodium chloride 0.45%. 1000 milliLiter(s) (60 mL/Hr) IV Continuous <Continuous>  doxycycline IVPB      doxycycline IVPB 100 milliGRAM(s) IV Intermittent every 12 hours  enoxaparin Injectable 40 milliGRAM(s) SubCutaneous every 24 hours  glucagon  Injectable 1 milliGRAM(s) IntraMuscular once  insulin lispro (ADMELOG) corrective regimen sliding scale   SubCutaneous three times a day before meals  loratadine 10 milliGRAM(s) Oral daily  methylPREDNISolone sodium succinate Injectable 60 milliGRAM(s) IV Push every 8 hours  montelukast 10 milliGRAM(s) Oral daily  NIFEdipine XL 30 milliGRAM(s) Oral daily  pantoprazole    Tablet 40 milliGRAM(s) Oral before breakfast  tiotropium 18 MICROgram(s) Capsule 1 Capsule(s) Inhalation daily    MEDICATIONS  (PRN):  acetaminophen     Tablet .. 650 milliGRAM(s) Oral every 6 hours PRN Temp greater or equal to 38C (100.4F), Mild Pain (1 - 3)  aluminum hydroxide/magnesium hydroxide/simethicone Suspension 30 milliLiter(s) Oral every 4 hours PRN Dyspepsia  fluticasone propionate 50 MICROgram(s)/spray Nasal Spray 2 Spray(s) Both Nostrils two times a day PRN nasal congestion  guaifenesin/dextromethorphan Oral Liquid 10 milliLiter(s) Oral every 6 hours PRN Cough  melatonin 3 milliGRAM(s) Oral at bedtime PRN Insomnia  ondansetron Injectable 4 milliGRAM(s) IV Push every 8 hours PRN Nausea and/or Vomiting

## 2022-08-02 NOTE — PROGRESS NOTE ADULT - SUBJECTIVE AND OBJECTIVE BOX
NILE VALDEZ  71y Male    Patient is a 71y old  Male who presents with a chief complaint of SOB    INTERVAL HPI/OVERNIGHT EVENTS:    ROS: Pt states that SOB has improved a little. Pt was able to come off BIPAP today and tolerate some po intake. Pt denies fever, chills, palpitations, nausea, vomiting, abdominal pain..    Overnight events: No acute events overnight.    Vital Signs Last 24 Hrs  T(F): 97.8 (08-02-22 @ 07:30), Max: 97.8 (08-02-22 @ 07:30)  HR: 101 (08-02-22 @ 14:30) (69 - 117)  BP: 133/90 (08-02-22 @ 14:30) (123/82 - 170/100)  RR: 24 (08-02-22 @ 14:30) (18 - 24)  SpO2: 99% (08-02-22 @ 11:30) (98% - 99%)    No Known Allergies    MEDICATIONS  (STANDING):  albuterol/ipratropium for Nebulization 3 milliLiter(s) Nebulizer every 6 hours  budesonide 160 MICROgram(s)/formoterol 4.5 MICROgram(s) Inhaler 2 Puff(s) Inhalation two times a day  dextrose 5% + sodium chloride 0.45%. 1000 milliLiter(s) (60 mL/Hr) IV Continuous <Continuous>  doxycycline IVPB      doxycycline IVPB 100 milliGRAM(s) IV Intermittent every 12 hours  enoxaparin Injectable 40 milliGRAM(s) SubCutaneous every 24 hours  glucagon  Injectable 1 milliGRAM(s) IntraMuscular once  insulin lispro (ADMELOG) corrective regimen sliding scale   SubCutaneous three times a day before meals  loratadine 10 milliGRAM(s) Oral daily  methylPREDNISolone sodium succinate Injectable 60 milliGRAM(s) IV Push every 8 hours  montelukast 10 milliGRAM(s) Oral daily  NIFEdipine XL 30 milliGRAM(s) Oral daily  pantoprazole    Tablet 40 milliGRAM(s) Oral before breakfast  tiotropium 18 MICROgram(s) Capsule 1 Capsule(s) Inhalation daily    MEDICATIONS  (PRN):  acetaminophen     Tablet .. 650 milliGRAM(s) Oral every 6 hours PRN Temp greater or equal to 38C (100.4F), Mild Pain (1 - 3)  aluminum hydroxide/magnesium hydroxide/simethicone Suspension 30 milliLiter(s) Oral every 4 hours PRN Dyspepsia  fluticasone propionate 50 MICROgram(s)/spray Nasal Spray 2 Spray(s) Both Nostrils two times a day PRN nasal congestion  guaifenesin/dextromethorphan Oral Liquid 10 milliLiter(s) Oral every 6 hours PRN Cough  melatonin 3 milliGRAM(s) Oral at bedtime PRN Insomnia  ondansetron Injectable 4 milliGRAM(s) IV Push every 8 hours PRN Nausea and/or Vomiting      PHYSICAL EXAM:  General Appearance: NAD, normal for age and gender. Cachetic on 4L NC.  Neck: No neck mass.   Eyes: Conjunctiva clear, Extra Ocular muscles intact. No scleral icterus.  ENMT: Moist oral mucosa. No oral lesion.  Cardiovascular: Regular rate and rhythm S1 S2, No JVD, No murmurs.  Respiratory: Poor air entry bilaterally. No wheezes, rales or rhonchi.  Psychiatry: Alert and oriented x 3, Mood & affect appropriate.  Gastrointestinal:  Soft, Non-tender, Non-distended.  Neurologic: Non-focal deficits.  Musculoskeletal/ extremities: Normal range of motion, No clubbing, cyanosis or edema.  Vascular: Peripheral pulses palpable bilaterally.  Skin/Integumen: No rashes, No ecchymoses, No cyanosis.    LABS:                        14.2   17.86 )-----------( 314      ( 02 Aug 2022 06:52 )             43.6     08-02    140  |  100  |  34<H>  ----------------------------<  147<H>  4.8   |  31  |  0.9    Ca    9.0      02 Aug 2022 06:52  Phos  3.3     08-02  Mg     2.4     08-02    TPro  5.9<L>  /  Alb  3.8  /  TBili  1.1  /  DBili  x   /  AST  31  /  ALT  35  /  AlkPhos  70  08-02    < from: Xray Chest 1 View- PORTABLE-Urgent (Xray Chest 1 View- PORTABLE-Urgent .) (07.31.22 @ 06:00) >  Impression:  Stable bilateral opacities. No definite pneumothorax.

## 2022-08-02 NOTE — CONSULT NOTE ADULT - PROBLEM SELECTOR RECOMMENDATION 9
end stage COPD  pulm following for recommendations  continue to wean from Bipap  pt wants intubation if needed end stage COPD  pulm following for recommendations  continue to wean from Bipap  c/w IV doxy, high risk, monitor counts, WBC elevated  pt wants intubation if needed

## 2022-08-02 NOTE — PROGRESS NOTE ADULT - ASSESSMENT
71-year-old male with hx of COPD on 2L home O2 & CPAP (unclear setting), Hx COPD Exacerbation w/ Intubation x 3 d in 2004, Asthma, DM s/p right transmetatarsal amputation following infection. Patient presented for COPD Exacerbation.    # Acute COPD exacerbation  # End stage COPD with h/o multiple intubation in the past  - c/w IV steriod and IV doxycycline as per pulm team  - BIPAP dependent  - duoneb q6h and prn  - will likely need BIPAP for home    # Asthma  - c/w loratadine, montelukast    # DM  - monitor fsg, no oral intake at this moment     # NPO status while on BIPAP  - IV D5+0.45% NaCl @ 60 ml/hr  - pt was able to take po intake today coming off BIPAP, will reassess po status depending on BIPAP dependence, otherwise, initially planned for PICC with TPN    # HTN, stable  - c/w nifedipine    # GERD  - c/w Protonix    # Bochdalek hernia, pulmonary nodules  - seen on CTA Chest  - outpatient f/u    # Hepatic Nodule likely hemangioma  - 1.1 cm focal enhancement of the right hepatic lobe on CTA chest  - monitor for symptom  - further evaluation when stable from respirator status    # Code status: Full code  # DVT prophylaxis: on lovenox subcut

## 2022-08-02 NOTE — PROGRESS NOTE ADULT - ASSESSMENT
IMPRESSION:    End stage COPD currently now in exacerbation on bipap  Acute hypoxemic resp failure      PLAN:    ·	Oxygen therapy to keep pulse ox 88 TO 92%  ·	C/w home inhalers, symbicort and spiriva, nebs prn  ·	NIV at night and as needed/ HHFNC / NC trial during day  ·	keep Solumedrol 60 q 8  ·	doxy 100 q 12  ·	Discussed goals of care - wishes to be full code  ·	DVT ppx  ·	very poor prognosis  ·	sdu  ·	palliative care eval

## 2022-08-02 NOTE — CONSULT NOTE ADULT - NS ATTEND AMEND GEN_ALL_CORE FT
Agree with above, c/w IV abx, BiPAP as needed, S&S eval, goals are for full care currently, but high intubation risk (previously intubated for COPD), will follow, f/u pulm recs  ______________  Jaun Rosales MD  Palliative Medicine  [facility]   [facility]  [phone]

## 2022-08-02 NOTE — CONSULT NOTE ADULT - SUBJECTIVE AND OBJECTIVE BOX
Pulses equal bilaterally, no edema present. HPI:    71-year-old male with hx of COPD on 2L home O2 & CPAP (unclear setting), Hx COPD Exacerbation w/ Intubation x 3 d in 2004, Asthma, DM s/p right transmetatarsal amputation following infection. Patient presented for 2 days of shortness of breath, tachypnea, labored breathing. Patient endorses pleuritic chest tightness, but no increased O2 requirements @ home, increased sputum frequency or worsening quality. No fever, chills, or vomiting. The patient does report night sweats. No weight loss.    Patient recently completed course of azithromycin @ home & took 1 week of Prednisone 20 mg QD (finished few days prior to presentation)    Reports decreased feeding due to dry throat and nausea    In the ED, patient was provided w/ Duoneb and BiPAP, reports improvement of shortness of breath.  pH 7.19/81/31 on admission  s/p ceftriaxone & Azithromycin in the ED    CTA -ve for PE and active inflammation. Positive for severe Emphyesema and atelectasis  Covid -ve.  s/p covid vaccine Moderna x 2, last taken > 6 months ago as per patient (vaccine card not available)  s/p pneumonia vaccine 3 weeks ago (takes it every 5 years).    PFTs 3/2017: FEV1/FVC 43% [34% of predicted]  120 pack-year smoking hx, stopped in 2015  Was previously referred to Lung Transplant at [place] (patient does not recall, unclear what happened)    In the ED, VS: T 37.8, , /80, RR 28, SpO2 100% on BiPAP (30 Jul 2022 17:02)    PERTINENT PM/SXH:   COPD (chronic obstructive pulmonary disease)    Essential hypertension    Dyslipidemia      S/P transmetatarsal amputation of foot, right      FAMILY HISTORY:  No pertinent family history in first degree relatives      ITEMS NOT CHECKED ARE NOT PRESENT    SOCIAL HISTORY:   Significant other/partner[ ]  Children[ X- Jane]  Orthodoxy/Spirituality:  Substance hx:  [ ]   Tobacco hx:  [ ]   Alcohol hx: [ ]   Living Situation: [X ]Home  [ ]Long term care  [ ]Rehab [ ]Other  Home Services: [X ] HHA [ ] Helene RN [ ] Hospice  Occupation:  Home Opioid hx:  [ ] Y [ ] N [ X] I-Stop Reference No: This report was requested by: Racheal Cox | Reference #: [id number]    ADVANCE DIRECTIVES:    MOLST  [ ]  Living Will  [ ]   DECISION MAKER(s): Patient   [ ] Health Care Proxy(s)  [ X] Surrogate(s)  [ ] Guardian           Name(s): Phone Number(s): Fletcher     BASELINE (I)ADL(s) (prior to admission):  [place]: [ ]Total  [X ] Moderate [ ]Dependent  Palliative Performance Status Version 2:         %    [email or web address]    Allergies    No Known Allergies    Intolerances    MEDICATIONS  (STANDING):  albuterol/ipratropium for Nebulization 3 milliLiter(s) Nebulizer every 6 hours  budesonide 160 MICROgram(s)/formoterol 4.5 MICROgram(s) Inhaler 2 Puff(s) Inhalation two times a day  dextrose 5% + sodium chloride 0.45%. 1000 milliLiter(s) (60 mL/Hr) IV Continuous <Continuous>  doxycycline IVPB      doxycycline IVPB 100 milliGRAM(s) IV Intermittent every 12 hours  enoxaparin Injectable 40 milliGRAM(s) SubCutaneous every 24 hours  glucagon  Injectable 1 milliGRAM(s) IntraMuscular once  insulin lispro (ADMELOG) corrective regimen sliding scale   SubCutaneous three times a day before meals  loratadine 10 milliGRAM(s) Oral daily  methylPREDNISolone sodium succinate Injectable 60 milliGRAM(s) IV Push every 8 hours  montelukast 10 milliGRAM(s) Oral daily  NIFEdipine XL 30 milliGRAM(s) Oral daily  pantoprazole    Tablet 40 milliGRAM(s) Oral before breakfast  tiotropium 18 MICROgram(s) Capsule 1 Capsule(s) Inhalation daily    MEDICATIONS  (PRN):  acetaminophen     Tablet .. 650 milliGRAM(s) Oral every 6 hours PRN Temp greater or equal to 38C (100.4F), Mild Pain (1 - 3)  aluminum hydroxide/magnesium hydroxide/simethicone Suspension 30 milliLiter(s) Oral every 4 hours PRN Dyspepsia  fluticasone propionate 50 MICROgram(s)/spray Nasal Spray 2 Spray(s) Both Nostrils two times a day PRN nasal congestion  guaifenesin/dextromethorphan Oral Liquid 10 milliLiter(s) Oral every 6 hours PRN Cough  melatonin 3 milliGRAM(s) Oral at bedtime PRN Insomnia  ondansetron Injectable 4 milliGRAM(s) IV Push every 8 hours PRN Nausea and/or Vomiting    PRESENT SYMPTOMS:   Pain: [ ]yes [ X]no  QOL impact -   Location -                    Aggravating factors -  Quality -  Radiation -  Timing-  Severity (0-10 scale):  Minimal acceptable level (0-10 scale):     CPOT:  0  https://www.[email or web address]/getattachment/[email or web address]/Critical-Care-Pain-Observation-Tool-(CPOT)    Dyspnea:                           [ ]Mild [ ]Moderate [ ]Severe  Anxiety:                             [ ]Mild [ ]Moderate [ ]Severe  Fatigue:                             [ ]Mild [ ]Moderate [ ]Severe  Nausea:                             [ ]Mild [ ]Moderate [ ]Severe  Loss of appetite:              [ ]Mild [ ]Moderate [ ]Severe  Constipation:                    [ ]Mild [ ]Moderate [ ]Severe    Other Symptoms:  [ X]All other review of systems negative     Palliative Performance Status Version 2:      30   %    [email or web address]    PHYSICAL EXAM:  Vital Signs Last 24 Hrs  T(C): 36.6 (02 Aug 2022 07:30), Max: 37.2 (01 Aug 2022 13:40)  T(F): 97.8 (02 Aug 2022 07:30), Max: 98.9 (01 Aug 2022 13:40)  HR: 69 (02 Aug 2022 07:30) (69 - 117)  BP: 143/82 (02 Aug 2022 07:30) (123/82 - 170/100)  BP(mean): 107 (01 Aug 2022 18:17) (106 - 111)  RR: 18 (02 Aug 2022 07:30) (18 - 21)  SpO2: 99% (02 Aug 2022 07:30) (98% - 99%)    GENERAL:  [X ]Alert  [ X]Oriented x  3 [ ]Lethargic  [ ]Cachexia  [ ]Unarousable  [ ]Verbal  [ ]Non-Verbal  Behavioral:   [ ] Anxiety  [ ] Delirium [ ] Agitation [ ] Other  HEENT:  [X ]Normal   [ ]Dry mouth   [ ]ET Tube/Trach  [ ]Oral lesions  PULMONARY:   [ ]Clear [ X]No Tachypnea  [ ]Audible excessive secretions   On Bipap 30%  CARDIOVASCULAR:    [X ]Regular [ ]Irregular [ ]Tachy  [ ]Arnaldo [ ]Murmur [ ]Other  GASTROINTESTINAL:  [X ]Soft  [ ]Distended   [ ]+BS  [ ]Non tender [ ]Tender  [ ]PEG [ ]OGT/ NGT  Last BM:   GENITOURINARY:  [ X]Normal [ ] Incontinent   [ ]Oliguria/Anuria   [ ]Gutierrez  MUSCULOSKELETAL:   [ ]Normal   [X ]Weakness  [ ]Bed/Wheelchair bound [ ]Edema  NEUROLOGIC:   [ X]No focal deficits  [ ]Cognitive impairment  [ ]Dysphagia [ ]Dysarthria [ ]Paresis [ ]Other   SKIN:   [X ]Normal    [ ]Rash  [ ]Pressure ulcer(s)       Present on admission [ ]y [ ]n    CRITICAL CARE:  [ ] Shock Present  [ ]Septic [ ]Cardiogenic [ ]Neurologic [ ]Hypovolemic  [ ]  Vasopressors [ ]  Inotropes   [ ]Respiratory failure present [ ]Mechanical ventilation [ X]Non-invasive ventilatory support [ ]High flow  [ ]Acute  [ ]Chronic [ ]Hypoxic  [ ]Hypercarbic [ ]Other  [ ]Other organ failure     LABS:                        14.2   17.86 )-----------( 314      ( 02 Aug 2022 06:52 )             43.6   08-02    140  |  100  |  34<H>  ----------------------------<  147<H>  4.8   |  31  |  0.9    Ca    9.0      02 Aug 2022 06:52  Phos  3.3     08-02  Mg     2.4     08-02    TPro  5.9<L>  /  Alb  3.8  /  TBili  1.1  /  DBili  x   /  AST  31  /  ALT  35  /  AlkPhos  70  08-02        RADIOLOGY & ADDITIONAL STUDIES:    < from: CT Angio Chest PE Protocol w/ IV Cont (07.30.22 @ 14:25) >    IMPRESSION:    No pulmonary embolus seen. No CT evidence of acute right heart strain.    Severe emphysema. Saber-sheath appearance of the trachea. Documented   flattening. Correlate for COPD.    New reticulonodular changes of the right upper lobe on series 4 image 94   measuring up to 5 mm in thickness.  Additional new branching nodular opacities in the left upper lobe on   series 4 image 84 with largest nodule measuring 5 mm.    Follow-up chest CT in 6 months is recommended.    If patient meets criteria, consider enrollment into a lung cancer   screening program with annual low-dose chest CT.    1.1 cm focal enhancement in the right hepatic lobe likely reflecting a   flash filling hemangioma. Nonemergent ultrasound suggested for   confirmation.    Partially imaged supraglottic right tracheal diverticulum.    --- End of Report ---    < end of copied text >   HPI:    71-year-old male with hx of COPD on 2L home O2 & CPAP (unclear setting), Hx COPD Exacerbation w/ Intubation x 3 d in 2004, Asthma, DM s/p right transmetatarsal amputation following infection. Patient presented for 2 days of shortness of breath, tachypnea, labored breathing. Patient endorses pleuritic chest tightness, but no increased O2 requirements @ home, increased sputum frequency or worsening quality. No fever, chills, or vomiting. The patient does report night sweats. No weight loss.    Patient recently completed course of azithromycin @ home & took 1 week of Prednisone 20 mg QD (finished few days prior to presentation)    Reports decreased feeding due to dry throat and nausea    In the ED, patient was provided w/ Duoneb and BiPAP, reports improvement of shortness of breath.  pH 7.19/81/31 on admission  s/p ceftriaxone & Azithromycin in the ED    CTA -ve for PE and active inflammation. Positive for severe Emphyesema and atelectasis  Covid -ve.  s/p covid vaccine Moderna x 2, last taken > 6 months ago as per patient (vaccine card not available)  s/p pneumonia vaccine 3 weeks ago (takes it every 5 years).    PFTs 3/2017: FEV1/FVC 43% [34% of predicted]  120 pack-year smoking hx, stopped in 2015  Was previously referred to Lung Transplant at [place] (patient does not recall, unclear what happened)    In the ED, VS: T 37.8, , /80, RR 28, SpO2 100% on BiPAP (30 Jul 2022 17:02)    PERTINENT PM/SXH:   COPD (chronic obstructive pulmonary disease)    Essential hypertension    Dyslipidemia      S/P transmetatarsal amputation of foot, right      FAMILY HISTORY:  No cardiovascular or pulmonary family history     ITEMS NOT CHECKED ARE NOT PRESENT    SOCIAL HISTORY:   Significant other/partner[ ]  Children[ X- Jane]  Sabianism/Spirituality:  Substance hx:  [ ]   Tobacco hx:  [ ]   Alcohol hx: [ ]   Living Situation: [X ]Home  [ ]Long term care  [ ]Rehab [ ]Other  Home Services: [X ] HHA [ ] Helene RN [ ] Hospice  Occupation:  Home Opioid hx:  [ ] Y [ ] N [ X] I-Stop Reference No: This report was requested by: Racheal Cox | Reference #: [id number]    ADVANCE DIRECTIVES:    MOLST  [ ]  Living Will  [ ]   DECISION MAKER(s): Patient   [ ] Health Care Proxy(s)  [ X] Surrogate(s)  [ ] Guardian           Name(s): Phone Number(s): Fletcher     BASELINE (I)ADL(s) (prior to admission):  [place]: [ ]Total  [X ] Moderate [ ]Dependent  Palliative Performance Status Version 2:         %    [email or web address]    Allergies    No Known Allergies    Intolerances    MEDICATIONS  (STANDING):  albuterol/ipratropium for Nebulization 3 milliLiter(s) Nebulizer every 6 hours  budesonide 160 MICROgram(s)/formoterol 4.5 MICROgram(s) Inhaler 2 Puff(s) Inhalation two times a day  dextrose 5% + sodium chloride 0.45%. 1000 milliLiter(s) (60 mL/Hr) IV Continuous <Continuous>  doxycycline IVPB      doxycycline IVPB 100 milliGRAM(s) IV Intermittent every 12 hours  enoxaparin Injectable 40 milliGRAM(s) SubCutaneous every 24 hours  glucagon  Injectable 1 milliGRAM(s) IntraMuscular once  insulin lispro (ADMELOG) corrective regimen sliding scale   SubCutaneous three times a day before meals  loratadine 10 milliGRAM(s) Oral daily  methylPREDNISolone sodium succinate Injectable 60 milliGRAM(s) IV Push every 8 hours  montelukast 10 milliGRAM(s) Oral daily  NIFEdipine XL 30 milliGRAM(s) Oral daily  pantoprazole    Tablet 40 milliGRAM(s) Oral before breakfast  tiotropium 18 MICROgram(s) Capsule 1 Capsule(s) Inhalation daily    MEDICATIONS  (PRN):  acetaminophen     Tablet .. 650 milliGRAM(s) Oral every 6 hours PRN Temp greater or equal to 38C (100.4F), Mild Pain (1 - 3)  aluminum hydroxide/magnesium hydroxide/simethicone Suspension 30 milliLiter(s) Oral every 4 hours PRN Dyspepsia  fluticasone propionate 50 MICROgram(s)/spray Nasal Spray 2 Spray(s) Both Nostrils two times a day PRN nasal congestion  guaifenesin/dextromethorphan Oral Liquid 10 milliLiter(s) Oral every 6 hours PRN Cough  melatonin 3 milliGRAM(s) Oral at bedtime PRN Insomnia  ondansetron Injectable 4 milliGRAM(s) IV Push every 8 hours PRN Nausea and/or Vomiting    PRESENT SYMPTOMS:   Pain: [ ]yes [ X]no  QOL impact -   Location -                    Aggravating factors -  Quality -  Radiation -  Timing-  Severity (0-10 scale):  Minimal acceptable level (0-10 scale):     CPOT:  0  https://[email or web address]/getattachment/[email or web address]/Critical-Care-Pain-Observation-Tool-(CPOT)    Dyspnea:                           [ ]Mild [ ]Moderate [ ]Severe  Anxiety:                             [ ]Mild [ ]Moderate [ ]Severe  Fatigue:                             [ ]Mild [ ]Moderate [ ]Severe  Nausea:                             [ ]Mild [ ]Moderate [ ]Severe  Loss of appetite:              [ ]Mild [ ]Moderate [ ]Severe  Constipation:                    [ ]Mild [ ]Moderate [ ]Severe    Other Symptoms:  [ X]All other review of systems negative     Palliative Performance Status Version 2:      30   %    [email or web address]    PHYSICAL EXAM:  Vital Signs Last 24 Hrs  T(C): 36.6 (02 Aug 2022 07:30), Max: 37.2 (01 Aug 2022 13:40)  T(F): 97.8 (02 Aug 2022 07:30), Max: 98.9 (01 Aug 2022 13:40)  HR: 69 (02 Aug 2022 07:30) (69 - 117)  BP: 143/82 (02 Aug 2022 07:30) (123/82 - 170/100)  BP(mean): 107 (01 Aug 2022 18:17) (106 - 111)  RR: 18 (02 Aug 2022 07:30) (18 - 21)  SpO2: 99% (02 Aug 2022 07:30) (98% - 99%)    GENERAL:  [X ]Alert  [ X]Oriented x  3 [ ]Lethargic  [ ]Cachexia  [ ]Unarousable  [ ]Verbal  [ ]Non-Verbal  Behavioral:   [ ] Anxiety  [ ] Delirium [ ] Agitation [ ] Other  HEENT:  [X ]Normal   [ ]Dry mouth   [ ]ET Tube/Trach  [ ]Oral lesions  PULMONARY:   [ ]Clear [ X]No Tachypnea  [ ]Audible excessive secretions   On Bipap 30%  CARDIOVASCULAR:    [X ]Regular [ ]Irregular [ ]Tachy  [ ]Arnaldo [ ]Murmur [ ]Other  GASTROINTESTINAL:  [X ]Soft  [ ]Distended   [ ]+BS  [ ]Non tender [ ]Tender  [ ]PEG [ ]OGT/ NGT  Last BM:   GENITOURINARY:  [ X]Normal [ ] Incontinent   [ ]Oliguria/Anuria   [ ]Gutierrez  MUSCULOSKELETAL:   [ ]Normal   [X ]Weakness  [ ]Bed/Wheelchair bound [ ]Edema  NEUROLOGIC:   [ X]No focal deficits  [ ]Cognitive impairment  [ ]Dysphagia [ ]Dysarthria [ ]Paresis [ ]Other   SKIN:   [X ]Normal    [ ]Rash  [ ]Pressure ulcer(s)       Present on admission [ ]y [ ]n    CRITICAL CARE:  [ ] Shock Present  [ ]Septic [ ]Cardiogenic [ ]Neurologic [ ]Hypovolemic  [ ]  Vasopressors [ ]  Inotropes   [ ]Respiratory failure present [ ]Mechanical ventilation [ X]Non-invasive ventilatory support [ ]High flow  [ ]Acute  [ ]Chronic [ ]Hypoxic  [ ]Hypercarbic [ ]Other  [ ]Other organ failure     LABS: reviewed, elevated WBC                        14.2   17.86 )-----------( 314      ( 02 Aug 2022 06:52 )             43.6   08-02    140  |  100  |  34<H>  ----------------------------<  147<H>  4.8   |  31  |  0.9    Ca    9.0      02 Aug 2022 06:52  Phos  3.3     08-02  Mg     2.4     08-02    TPro  5.9<L>  /  Alb  3.8  /  TBili  1.1  /  DBili  x   /  AST  31  /  ALT  35  /  AlkPhos  70  08-02        RADIOLOGY & ADDITIONAL STUDIES:    < from: CT Angio Chest PE Protocol w/ IV Cont (07.30.22 @ 14:25) >    IMPRESSION:    No pulmonary embolus seen. No CT evidence of acute right heart strain.    Severe emphysema. Saber-sheath appearance of the trachea. Documented   flattening. Correlate for COPD.    New reticulonodular changes of the right upper lobe on series 4 image 94   measuring up to 5 mm in thickness.  Additional new branching nodular opacities in the left upper lobe on   series 4 image 84 with largest nodule measuring 5 mm.    Follow-up chest CT in 6 months is recommended.    If patient meets criteria, consider enrollment into a lung cancer   screening program with annual low-dose chest CT.    1.1 cm focal enhancement in the right hepatic lobe likely reflecting a   flash filling hemangioma. Nonemergent ultrasound suggested for   confirmation.    Partially imaged supraglottic right tracheal diverticulum.    --- End of Report ---    < end of copied text >

## 2022-08-02 NOTE — CONSULT NOTE ADULT - PROBLEM SELECTOR RECOMMENDATION 2
unable to eat on Bipap  nutrition following for recommendations   continue aggressive medical management unable to eat on BiPAP, but is off BiPAP intermittently, recommend S&S eval  nutrition following for recommendations   continue aggressive medical management

## 2022-08-03 NOTE — ED ADULT NURSE REASSESSMENT NOTE - NS ED NURSE REASSESS COMMENT FT1
Patient alert, calm, polite, and is resting in bed at this time. VSS and patient compliant with treatment plan. Patient update don plan of care and verbalized understanding.

## 2022-08-03 NOTE — PROGRESS NOTE ADULT - ASSESSMENT
71yMale with PMH including COPD on 2L home O2 & CPAP (unclear setting), Hx COPD Exacerbation w/ Intubation x 3 d in 2004, Asthma, DM s/p right transmetatarsal amputation, admitted with COPD exacerbation, currently on Bipap, attempting to wean. For now, the patient and family want full aggressive medical management. They are open to palliative following along and re-addressing GOC as things progress.     Pt comfortable on exam.     MEDD (morphine equivalent daily dose): 0      See Recs below.    Please call [phone] with questions or concerns 24/7.   We will continue to follow.     Discussed with primary MD.

## 2022-08-03 NOTE — PROGRESS NOTE ADULT - SUBJECTIVE AND OBJECTIVE BOX
HPI: 71yMale with PMH including COPD on 2L home O2 & CPAP (unclear setting), Hx COPD Exacerbation w/ Intubation x 3 d in 2004, Asthma, DM s/p right transmetatarsal amputation, admitted with COPD exacerbation, currently on Bipap, attempting to wean. For now, the patient and family want full aggressive medical management. They are open to palliative following along and re-addressing GOC as things progress.     INTERVAL EVENTS:  8/3: patient comfortbale, on HFNC, is on diet    ADVANCE DIRECTIVES:    MOLST  [ ]  Living Will  [ ]   DECISION MAKER(s): Patient   [ ] Health Care Proxy(s)  [ X] Surrogate(s)  [ ] Guardian           Name(s): Phone Number(s): Fletcher     BASELINE (I)ADL(s) (prior to admission):  [place]: [ ]Total  [X ] Moderate [ ]Dependent  Palliative Performance Status Version 2:         %    [email or web address]    Allergies    No Known Allergies    Intolerances    MEDICATIONS  (STANDING):  albuterol/ipratropium for Nebulization 3 milliLiter(s) Nebulizer every 6 hours  budesonide 160 MICROgram(s)/formoterol 4.5 MICROgram(s) Inhaler 2 Puff(s) Inhalation two times a day  doxycycline IVPB      doxycycline IVPB 100 milliGRAM(s) IV Intermittent every 12 hours  enoxaparin Injectable 40 milliGRAM(s) SubCutaneous every 24 hours  glucagon  Injectable 1 milliGRAM(s) IntraMuscular once  insulin lispro (ADMELOG) corrective regimen sliding scale   SubCutaneous three times a day before meals  loratadine 10 milliGRAM(s) Oral daily  methylPREDNISolone sodium succinate Injectable 60 milliGRAM(s) IV Push every 8 hours  montelukast 10 milliGRAM(s) Oral daily  NIFEdipine XL 30 milliGRAM(s) Oral daily  pantoprazole    Tablet 40 milliGRAM(s) Oral before breakfast  tiotropium 18 MICROgram(s) Capsule 1 Capsule(s) Inhalation daily    MEDICATIONS  (PRN):  acetaminophen     Tablet .. 650 milliGRAM(s) Oral every 6 hours PRN Temp greater or equal to 38C (100.4F), Mild Pain (1 - 3)  aluminum hydroxide/magnesium hydroxide/simethicone Suspension 30 milliLiter(s) Oral every 4 hours PRN Dyspepsia  fluticasone propionate 50 MICROgram(s)/spray Nasal Spray 2 Spray(s) Both Nostrils two times a day PRN nasal congestion  guaifenesin/dextromethorphan Oral Liquid 10 milliLiter(s) Oral every 6 hours PRN Cough  melatonin 3 milliGRAM(s) Oral at bedtime PRN Insomnia  ondansetron Injectable 4 milliGRAM(s) IV Push every 8 hours PRN Nausea and/or Vomiting    PRESENT SYMPTOMS:   Pain: [ ]yes [ X]no  QOL impact -   Location -                    Aggravating factors -  Quality -  Radiation -  Timing-  Severity (0-10 scale):  Minimal acceptable level (0-10 scale):     CPOT:  0  [email or web address]-Care-Pain-Observation-Tool-(CPOT)    Dyspnea:                           [ ]Mild [ ]Moderate [ ]Severe  Anxiety:                             [ ]Mild [ ]Moderate [ ]Severe  Fatigue:                             [ ]Mild [ ]Moderate [ ]Severe  Nausea:                             [ ]Mild [ ]Moderate [ ]Severe  Loss of appetite:              [ ]Mild [ ]Moderate [ ]Severe  Constipation:                    [ ]Mild [ ]Moderate [ ]Severe    Other Symptoms:  [ X]All other review of systems negative     Palliative Performance Status Version 2:      30   %    http://[facility].org/files/news/palliative_performance_scale_[email or web address].pdf    PHYSICAL EXAM:  PHYSICAL EXAM:  Vital Signs Last 24 Hrs  T(C): 36.6 (03 Aug 2022 11:35), Max: 36.6 (03 Aug 2022 07:22)  T(F): 97.9 (03 Aug 2022 11:35), Max: 97.9 (03 Aug 2022 11:35)  HR: 102 (03 Aug 2022 15:35) (64 - 102)  BP: 151/85 (03 Aug 2022 15:35) (114/68 - 151/85)  BP(mean): 108 (03 Aug 2022 15:35) (100 - 108)  RR: 20 (03 Aug 2022 15:35) (17 - 23)  SpO2: 100% (03 Aug 2022 15:35) (98% - 100%)    Parameters below as of 03 Aug 2022 15:35  Patient On (Oxygen Delivery Method): nasal cannula, high flow     I&O's Summary    02 Aug 2022 07:01  -  03 Aug 2022 07:00  --------------------------------------------------------  IN: 180 mL / OUT: 0 mL / NET: 180 mL    03 Aug 2022 07:01  -  03 Aug 2022 16:52  --------------------------------------------------------  IN: 360 mL / OUT: 680 mL / NET: -320 mL      GENERAL: used Cantonese   [ ]Alert  [ ]Oriented x   [ ]Lethargic  [ ]Cachexia  [ ]Unarousable  [X ]Verbal  [ ]Non-Verbal  Behavioral:   [ ] Anxiety  [ ] Delirium [ ] Agitation [X ] Calm [ ] Other  HEENT:  [ ]Normal   [ ]Dry mouth   [X] HNFNC [ ]ET Tube/Trach  [ ]Oral lesions  PULMONARY:   [ ]Clear [ ]Tachypnea  [ ]Audible excessive secretions [X ] No labored breathing  [ ]Rhonchi        [ ]Right [ ]Left [ ]Bilateral  [ ]Crackles        [ ]Right [ ]Left [ ]Bilateral  [ ]Wheezing     [ ]Right [ ]Left [ ]Bilateral  [ ]Diminished breath sounds [ ]right [ ]left [ ]bilateral  CARDIOVASCULAR:    [ ]Regular [ ]Irregular [ ]Tachy  [ ]Arnaldo [ ]Murmur [ ]Other  GASTROINTESTINAL:  [ ]Soft  [ ]Distended  [X ] Not distended [ ]+BS  [ ]Non tender [ ]Tender  [ ]PEG [ ]OGT/ NGT  Last BM:   GENITOURINARY:  [X]Normal [ ] Incontinent   [ ]Oliguria/Anuria   [ ]Gutierrez  MUSCULOSKELETAL:   [ ]Normal   [ ]Weakness  [ ]Bed/Wheelchair bound [ ]Edema  NEUROLOGIC:   [X ]No focal deficits  [ ]Cognitive impairment  [ ]Dysphagia [ ]Dysarthria [ ]Paresis [ ]Other   SKIN:   [ ]Normal   [X ] Nonjaundiced [ ]Rash  [ ]Pressure ulcer(s)       Present on admission [ ]y [ ]n    CRITICAL CARE:  [ ] Shock Present  [ ]Septic [ ]Cardiogenic [ ]Neurologic [ ]Hypovolemic  [ ]  Vasopressors [ ]  Inotropes   [ ]Respiratory failure present [ ]Mechanical ventilation [ X]Non-invasive ventilatory support [ ]High flow  [ ]Acute  [ ]Chronic [ ]Hypoxic  [ ]Hypercarbic [ ]Other  [ ]Other organ failure     LABS: reviewed, elevated WBC                                   14.7   17.98 )-----------( 315      ( 03 Aug 2022 07:22 )             44.9     08-03    140  |  101  |  26<H>  ----------------------------<  108<H>  4.8   |  30  |  0.8    Ca    9.3      03 Aug 2022 07:22  Phos  3.3     08-02  Mg     2.3     08-03        RADIOLOGY & ADDITIONAL STUDIES:    < from: CT Angio Chest PE Protocol w/ IV Cont (07.30.22 @ 14:25) >    IMPRESSION:    No pulmonary embolus seen. No CT evidence of acute right heart strain.    Severe emphysema. Saber-sheath appearance of the trachea. Documented   flattening. Correlate for COPD.    New reticulonodular changes of the right upper lobe on series 4 image 94   measuring up to 5 mm in thickness.  Additional new branching nodular opacities in the left upper lobe on   series 4 image 84 with largest nodule measuring 5 mm.    Follow-up chest CT in 6 months is recommended.    If patient meets criteria, consider enrollment into a lung cancer   screening program with annual low-dose chest CT.    1.1 cm focal enhancement in the right hepatic lobe likely reflecting a   flash filling hemangioma. Nonemergent ultrasound suggested for   confirmation.    Partially imaged supraglottic right tracheal diverticulum.    --- End of Report ---    < end of copied text >

## 2022-08-03 NOTE — ED ADULT NURSE REASSESSMENT NOTE - NS ED NURSE REASSESS COMMENT FT1
Patient wishes to be placed back on bipap, approximately 3 hours after removal. Patient continues to saturate well but is concerned he will decline when he sleeps. Education provided and patient made to be comfortable in bed.

## 2022-08-03 NOTE — PROGRESS NOTE ADULT - SUBJECTIVE AND OBJECTIVE BOX
MON, NILE  71y, Male  Allergy: No Known Allergies    Hospital Day: 4d    Patient seen and examined. No acute events overnight    PMH/PSH:  PAST MEDICAL & SURGICAL HISTORY:  COPD (chronic obstructive pulmonary disease)      Essential hypertension      Dyslipidemia      S/P transmetatarsal amputation of foot, right      VITALS:  T(F): 97.9 (08-03-22 @ 11:35), Max: 97.9 (08-03-22 @ 11:35)  HR: 88 (08-03-22 @ 11:35)  BP: 133/64 (08-03-22 @ 11:35) (114/68 - 134/84)  RR: 20 (08-03-22 @ 11:35)  SpO2: 100% (08-03-22 @ 11:35)    TESTS & MEASUREMENTS:  Weight (Kg):   BMI (kg/m2): 24 (07-30)    08-01-22 @ 07:01  -  08-02-22 @ 07:00  --------------------------------------------------------  IN: 60 mL / OUT: 300 mL / NET: -240 mL    08-02-22 @ 07:01  -  08-03-22 @ 07:00  --------------------------------------------------------  IN: 180 mL / OUT: 0 mL / NET: 180 mL    08-03-22 @ 07:01  -  08-03-22 @ 13:51  --------------------------------------------------------  IN: 360 mL / OUT: 680 mL / NET: -320 mL                            14.7   17.98 )-----------( 315      ( 03 Aug 2022 07:22 )             44.9       08-03    140  |  101  |  26<H>  ----------------------------<  108<H>  4.8   |  30  |  0.8    Ca    9.3      03 Aug 2022 07:22  Phos  3.3     08-02  Mg     2.3     08-03    TPro  6.0  /  Alb  4.0  /  TBili  1.5<H>  /  DBili  x   /  AST  34  /  ALT  43<H>  /  AlkPhos  65  08-03    LIVER FUNCTIONS - ( 03 Aug 2022 07:22 )  Alb: 4.0 g/dL / Pro: 6.0 g/dL / ALK PHOS: 65 U/L / ALT: 43 U/L / AST: 34 U/L / GGT: x             RADIOLOGY & ADDITIONAL TESTS:    RECENT DIAGNOSTIC ORDERS:  Xray Chest 1 View- PORTABLE-Urgent: Urgent   Indication: COPD exacerbation  Transport: Portable  Exam Completed (08-03-22 @ 09:09)    MEDICATIONS:  MEDICATIONS  (STANDING):  albuterol/ipratropium for Nebulization 3 milliLiter(s) Nebulizer every 6 hours  budesonide 160 MICROgram(s)/formoterol 4.5 MICROgram(s) Inhaler 2 Puff(s) Inhalation two times a day  dextrose 5% + sodium chloride 0.45%. 1000 milliLiter(s) (60 mL/Hr) IV Continuous <Continuous>  doxycycline IVPB      doxycycline IVPB 100 milliGRAM(s) IV Intermittent every 12 hours  enoxaparin Injectable 40 milliGRAM(s) SubCutaneous every 24 hours  glucagon  Injectable 1 milliGRAM(s) IntraMuscular once  insulin lispro (ADMELOG) corrective regimen sliding scale   SubCutaneous three times a day before meals  loratadine 10 milliGRAM(s) Oral daily  methylPREDNISolone sodium succinate Injectable 60 milliGRAM(s) IV Push every 8 hours  montelukast 10 milliGRAM(s) Oral daily  NIFEdipine XL 30 milliGRAM(s) Oral daily  pantoprazole    Tablet 40 milliGRAM(s) Oral before breakfast  tiotropium 18 MICROgram(s) Capsule 1 Capsule(s) Inhalation daily    MEDICATIONS  (PRN):  acetaminophen     Tablet .. 650 milliGRAM(s) Oral every 6 hours PRN Temp greater or equal to 38C (100.4F), Mild Pain (1 - 3)  aluminum hydroxide/magnesium hydroxide/simethicone Suspension 30 milliLiter(s) Oral every 4 hours PRN Dyspepsia  fluticasone propionate 50 MICROgram(s)/spray Nasal Spray 2 Spray(s) Both Nostrils two times a day PRN nasal congestion  guaifenesin/dextromethorphan Oral Liquid 10 milliLiter(s) Oral every 6 hours PRN Cough  melatonin 3 milliGRAM(s) Oral at bedtime PRN Insomnia  ondansetron Injectable 4 milliGRAM(s) IV Push every 8 hours PRN Nausea and/or Vomitin    HOME MEDICATIONS:  benzonatate 200 mg oral capsule (07-30)  ipratropium-albuterol 0.5 mg-2.5 mg/3 mL inhalation solution (07-30)  loratadine 10 mg oral tablet (07-30)  montelukast 10 mg oral tablet (07-30)  NIFEdipine 30 mg oral tablet, extended release (07-30)  Protonix 40 mg oral delayed release tablet (07-30)  Ventolin HFA 90 mcg/inh inhalation aerosol (07-30)      REVIEW OF SYSTEMS:  All other review of systems is negative unless indicated above.     PHYSICAL EXAM:  PHYSICAL EXAM:  GENERAL: NAD, well-developed  HEAD:  Atraumatic, Normocephalic  NECK: Supple, No JVD  CHEST/LUNG: Clear to auscultation bilaterally; No wheeze  HEART: Regular rate and rhythm; No murmurs, rubs, or gallops  ABDOMEN: Soft, Nontender, Nondistended; Bowel sounds present  EXTREMITIES:  2+ Peripheral Pulses, No clubbing, cyanosis, or edema  SKIN: No rashes or lesions         MON, NILE  71y, Male  Allergy: No Known Allergies    Hospital Day: 4d    Patient seen and examined. No acute events overnight    PMH/PSH:  PAST MEDICAL & SURGICAL HISTORY:  COPD (chronic obstructive pulmonary disease)      Essential hypertension      Dyslipidemia      S/P transmetatarsal amputation of foot, right      VITALS:  T(F): 97.9 (08-03-22 @ 11:35), Max: 97.9 (08-03-22 @ 11:35)  HR: 88 (08-03-22 @ 11:35)  BP: 133/64 (08-03-22 @ 11:35) (114/68 - 134/84)  RR: 20 (08-03-22 @ 11:35)  SpO2: 100% (08-03-22 @ 11:35)    TESTS & MEASUREMENTS:  Weight (Kg):   BMI (kg/m2): 24 (07-30)    08-01-22 @ 07:01  -  08-02-22 @ 07:00  --------------------------------------------------------  IN: 60 mL / OUT: 300 mL / NET: -240 mL    08-02-22 @ 07:01  -  08-03-22 @ 07:00  --------------------------------------------------------  IN: 180 mL / OUT: 0 mL / NET: 180 mL    08-03-22 @ 07:01  -  08-03-22 @ 13:51  --------------------------------------------------------  IN: 360 mL / OUT: 680 mL / NET: -320 mL                            14.7   17.98 )-----------( 315      ( 03 Aug 2022 07:22 )             44.9       08-03    140  |  101  |  26<H>  ----------------------------<  108<H>  4.8   |  30  |  0.8    Ca    9.3      03 Aug 2022 07:22  Phos  3.3     08-02  Mg     2.3     08-03    TPro  6.0  /  Alb  4.0  /  TBili  1.5<H>  /  DBili  x   /  AST  34  /  ALT  43<H>  /  AlkPhos  65  08-03    LIVER FUNCTIONS - ( 03 Aug 2022 07:22 )  Alb: 4.0 g/dL / Pro: 6.0 g/dL / ALK PHOS: 65 U/L / ALT: 43 U/L / AST: 34 U/L / GGT: x             RADIOLOGY & ADDITIONAL TESTS:    RECENT DIAGNOSTIC ORDERS:  Xray Chest 1 View- PORTABLE-Urgent: Urgent   Indication: COPD exacerbation  Transport: Portable  Exam Completed (08-03-22 @ 09:09)    MEDICATIONS:  MEDICATIONS  (STANDING):  albuterol/ipratropium for Nebulization 3 milliLiter(s) Nebulizer every 6 hours  budesonide 160 MICROgram(s)/formoterol 4.5 MICROgram(s) Inhaler 2 Puff(s) Inhalation two times a day  dextrose 5% + sodium chloride 0.45%. 1000 milliLiter(s) (60 mL/Hr) IV Continuous <Continuous>  doxycycline IVPB      doxycycline IVPB 100 milliGRAM(s) IV Intermittent every 12 hours  enoxaparin Injectable 40 milliGRAM(s) SubCutaneous every 24 hours  glucagon  Injectable 1 milliGRAM(s) IntraMuscular once  insulin lispro (ADMELOG) corrective regimen sliding scale   SubCutaneous three times a day before meals  loratadine 10 milliGRAM(s) Oral daily  methylPREDNISolone sodium succinate Injectable 60 milliGRAM(s) IV Push every 8 hours  montelukast 10 milliGRAM(s) Oral daily  NIFEdipine XL 30 milliGRAM(s) Oral daily  pantoprazole    Tablet 40 milliGRAM(s) Oral before breakfast  tiotropium 18 MICROgram(s) Capsule 1 Capsule(s) Inhalation daily    MEDICATIONS  (PRN):  acetaminophen     Tablet .. 650 milliGRAM(s) Oral every 6 hours PRN Temp greater or equal to 38C (100.4F), Mild Pain (1 - 3)  aluminum hydroxide/magnesium hydroxide/simethicone Suspension 30 milliLiter(s) Oral every 4 hours PRN Dyspepsia  fluticasone propionate 50 MICROgram(s)/spray Nasal Spray 2 Spray(s) Both Nostrils two times a day PRN nasal congestion  guaifenesin/dextromethorphan Oral Liquid 10 milliLiter(s) Oral every 6 hours PRN Cough  melatonin 3 milliGRAM(s) Oral at bedtime PRN Insomnia  ondansetron Injectable 4 milliGRAM(s) IV Push every 8 hours PRN Nausea and/or Vomitin    HOME MEDICATIONS:  benzonatate 200 mg oral capsule (07-30)  ipratropium-albuterol 0.5 mg-2.5 mg/3 mL inhalation solution (07-30)  loratadine 10 mg oral tablet (07-30)  montelukast 10 mg oral tablet (07-30)  NIFEdipine 30 mg oral tablet, extended release (07-30)  Protonix 40 mg oral delayed release tablet (07-30)  Ventolin HFA 90 mcg/inh inhalation aerosol (07-30)      REVIEW OF SYSTEMS:  All other review of systems is negative unless indicated above.     PHYSICAL EXAM:  PHYSICAL EXAM:  GENERAL: NAD, well-developed  HEAD:  Atraumatic, Normocephalic  NECK: Supple, No JVD  CHEST/LUNG: Mild b/l expiratory wheeze  HEART: Regular rate and rhythm; No murmurs, rubs, or gallops  ABDOMEN: Soft, Nontender, Nondistended; Bowel sounds present  EXTREMITIES:  2+ Peripheral Pulses, No clubbing, cyanosis, or edema  SKIN: No rashes or lesions

## 2022-08-03 NOTE — PROGRESS NOTE ADULT - ASSESSMENT
IMPRESSION:    End stage COPD currently now in exacerbation on bipap  Acute hypoxemic resp failure      PLAN:    ·	Oxygen therapy to keep pulse ox 88 TO 92%  ·	C/w home inhalers, symbicort and spiriva, nebs prn  ·	NIV at night and as needed/ HHFNC / NC trial repeat CXR  ·	keep Solumedrol 60 q 8  ·	doxy 100 q 12  ·	Discussed goals of care - wishes to be full code  ·	DVT ppx  ·	very poor prognosis  ·	sdu  ·	palliative care REVIEWED

## 2022-08-03 NOTE — PROGRESS NOTE ADULT - ASSESSMENT
71-year-old male with hx of COPD on 2L home O2 & CPAP (unclear setting), Hx COPD Exacerbation w/ Intubation x 3 d in 2004, Asthma, DM s/p right transmetatarsal amputation following infection. Patient presented for COPD Exacerbation.    #Acute on Chronic Hypoxic Respiratory Failure (On Home O2 2L and CPAP)  #COPD exacerbation  #ROMAINE  Was on BIPAP 30% FIO2 saturating %  - Cont IV solumedrol 60mg qhs  - duoneb q6h and prn  - Cont symbicort/spiriva  - Wean off BIPAP to HFNC or NC. Bipap PRN on standby  - Cont doxycycline 100mg BID  - PO feeding while off BIPAP  - f/u repeat CXR    # Asthma  - c/w loratadine, montelukast    # DM  - ISS    # HTN, stable  - c/w nifedipine    # GERD  - c/w Protonix    # Bochdalek hernia, pulmonary nodules  - seen on CTA Chest  - outpatient f/u    # Hepatic Nodule likely hemangioma  - 1.1 cm focal enhancement of the right hepatic lobe on CTA chest  - monitor for symptoms  - Can be followed up outpatient    DVT PPX, Lovenox    #Progress Note Handoff  Pending (specify): Wean off BIPAP, Cont IV Steroids/duonebs  Family discussion: d/w pt regarding tx for COPD exacerbation  Disposition: Home

## 2022-08-03 NOTE — PROGRESS NOTE ADULT - SUBJECTIVE AND OBJECTIVE BOX
Over Night Events: events noted, on BIPAP, palliative reviewed      PHYSICAL EXAM    ICU Vital Signs Last 24 Hrs  T(C): 36.6 (02 Aug 2022 07:30), Max: 36.6 (02 Aug 2022 07:30)  T(F): 97.8 (02 Aug 2022 07:30), Max: 97.8 (02 Aug 2022 07:30)  HR: 64 (03 Aug 2022 05:35) (64 - 101)  BP: 124/86 (03 Aug 2022 05:35) (114/68 - 143/82)  RR: 22 (03 Aug 2022 05:35) (17 - 24)  SpO2: 99% (03 Aug 2022 05:35) (98% - 99%)    O2 Parameters below as of 03 Aug 2022 05:35  Patient On (Oxygen Delivery Method): BiPAP/CPAP            General: ill looking  Lungs: dec bs both bases  Cardiovascular: Regular   Abdomen: Soft, Positive BS  Extremities: No clubbing   Skin: Warm  Neurological: Non focal       08-01-22 @ 07:01  -  08-02-22 @ 07:00  --------------------------------------------------------  IN:    dextrose 5% + sodium chloride 0.45%: 60 mL  Total IN: 60 mL    OUT:    Voided (mL): 300 mL  Total OUT: 300 mL    Total NET: -240 mL      08-02-22 @ 07:01  -  08-03-22 @ 06:00  --------------------------------------------------------  IN:    dextrose 5% + sodium chloride 0.45%: 120 mL  Total IN: 120 mL    OUT:  Total OUT: 0 mL    Total NET: 120 mL          LABS:                          14.2   17.86 )-----------( 314      ( 02 Aug 2022 06:52 )             43.6                                               08-02    140  |  100  |  34<H>  ----------------------------<  147<H>  4.8   |  31  |  0.9    Ca    9.0      02 Aug 2022 06:52  Phos  3.3     08-02  Mg     2.4     08-02    TPro  5.9<L>  /  Alb  3.8  /  TBili  1.1  /  DBili  x   /  AST  31  /  ALT  35  /  AlkPhos  70  08-02                                                                                           LIVER FUNCTIONS - ( 02 Aug 2022 06:52 )  Alb: 3.8 g/dL / Pro: 5.9 g/dL / ALK PHOS: 70 U/L / ALT: 35 U/L / AST: 31 U/L / GGT: x                                                                                                                                       MEDICATIONS  (STANDING):  albuterol/ipratropium for Nebulization 3 milliLiter(s) Nebulizer every 6 hours  budesonide 160 MICROgram(s)/formoterol 4.5 MICROgram(s) Inhaler 2 Puff(s) Inhalation two times a day  dextrose 5% + sodium chloride 0.45%. 1000 milliLiter(s) (60 mL/Hr) IV Continuous <Continuous>  doxycycline IVPB      doxycycline IVPB 100 milliGRAM(s) IV Intermittent every 12 hours  enoxaparin Injectable 40 milliGRAM(s) SubCutaneous every 24 hours  glucagon  Injectable 1 milliGRAM(s) IntraMuscular once  insulin lispro (ADMELOG) corrective regimen sliding scale   SubCutaneous three times a day before meals  loratadine 10 milliGRAM(s) Oral daily  methylPREDNISolone sodium succinate Injectable 60 milliGRAM(s) IV Push every 8 hours  montelukast 10 milliGRAM(s) Oral daily  NIFEdipine XL 30 milliGRAM(s) Oral daily  pantoprazole    Tablet 40 milliGRAM(s) Oral before breakfast  tiotropium 18 MICROgram(s) Capsule 1 Capsule(s) Inhalation daily    MEDICATIONS  (PRN):  acetaminophen     Tablet .. 650 milliGRAM(s) Oral every 6 hours PRN Temp greater or equal to 38C (100.4F), Mild Pain (1 - 3)  aluminum hydroxide/magnesium hydroxide/simethicone Suspension 30 milliLiter(s) Oral every 4 hours PRN Dyspepsia  fluticasone propionate 50 MICROgram(s)/spray Nasal Spray 2 Spray(s) Both Nostrils two times a day PRN nasal congestion  guaifenesin/dextromethorphan Oral Liquid 10 milliLiter(s) Oral every 6 hours PRN Cough  melatonin 3 milliGRAM(s) Oral at bedtime PRN Insomnia  ondansetron Injectable 4 milliGRAM(s) IV Push every 8 hours PRN Nausea and/or Vomiting

## 2022-08-04 NOTE — PROGRESS NOTE ADULT - PROBLEM SELECTOR PLAN 1
- c/w IV doxy, high risk, monitor counts; c/w nebs, c/w steroids  - c/w HFNC - c/w IV doxy, high risk, monitor counts; c/w nebs, c/w steroids  - c/w HFNC  - will start hycodan PRN cough

## 2022-08-04 NOTE — ED ADULT NURSE REASSESSMENT NOTE - NS ED NURSE REASSESS COMMENT FT1
pt noted sleeping throughout the night no complaints or issues noted. safety precautions maintained, all need attended

## 2022-08-04 NOTE — ED ADULT NURSE REASSESSMENT NOTE - NS ED NURSE REASSESS COMMENT FT1
pt received from previous shift  pt awake alert and interactive   remains on high flow o2, denies any complaints at this time  will continue to monitor

## 2022-08-04 NOTE — PROGRESS NOTE ADULT - ASSESSMENT
71yMale with PMH including COPD on 2L home O2 & CPAP (unclear setting), Hx COPD Exacerbation w/ Intubation x 3 d in 2004, Asthma, DM s/p right transmetatarsal amputation, admitted with COPD exacerbation, currently on Bipap, attempting to wean. For now, the patient and family want full aggressive medical management. They are open to palliative following along and re-addressing GOC as things progress.     Pt comfortable on exam except for chest pain- spoke with Dr. Lara on phone and she reports that the medicine team is aware, that they just spoke with them.   Otherwise, patient and family are hopeful he will wean of hi flow. For now they want Full Code, ongoing medical management.   Will sign off as goals are clear.     MEDD (morphine equivalent daily dose): 0      See Recs below.    Please call [phone] with questions or concerns 24/7.       Discussed with primary MD.

## 2022-08-04 NOTE — PROGRESS NOTE ADULT - SUBJECTIVE AND OBJECTIVE BOX
HPI: 71yMale with PMH including COPD on 2L home O2 & CPAP (unclear setting), Hx COPD Exacerbation w/ Intubation x 3 d in 2004, Asthma, DM s/p right transmetatarsal amputation, admitted with COPD exacerbation, currently on Bipap, attempting to wean. For now, the patient and family want full aggressive medical management. They are open to palliative following along and re-addressing GOC as things progress.     INTERVAL EVENTS:  8/3: patient comfortbale, on HFNC, is on diet    ADVANCE DIRECTIVES:    MOLST  [ ]  Living Will  [ ]   DECISION MAKER(s): Patient   [ ] Health Care Proxy(s)  [ X] Surrogate(s)  [ ] Guardian           Name(s): Phone Number(s): Fletcher     BASELINE (I)ADL(s) (prior to admission):  [place]: [ ]Total  [X ] Moderate [ ]Dependent  Palliative Performance Status Version 2:         %    [email or web address]/[email or web address]_performance_[email or web address]_[email or web address].pdf    Allergies    No Known Allergies    Intolerances    MEDICATIONS  (STANDING):  albuterol/ipratropium for Nebulization 3 milliLiter(s) Nebulizer every 6 hours  budesonide 160 MICROgram(s)/formoterol 4.5 MICROgram(s) Inhaler 2 Puff(s) Inhalation two times a day  doxycycline IVPB      doxycycline IVPB 100 milliGRAM(s) IV Intermittent every 12 hours  enoxaparin Injectable 40 milliGRAM(s) SubCutaneous every 24 hours  glucagon  Injectable 1 milliGRAM(s) IntraMuscular once  insulin lispro (ADMELOG) corrective regimen sliding scale   SubCutaneous three times a day before meals  loratadine 10 milliGRAM(s) Oral daily  methylPREDNISolone sodium succinate Injectable 60 milliGRAM(s) IV Push every 8 hours  montelukast 10 milliGRAM(s) Oral daily  NIFEdipine XL 30 milliGRAM(s) Oral daily  pantoprazole    Tablet 40 milliGRAM(s) Oral before breakfast  tiotropium 18 MICROgram(s) Capsule 1 Capsule(s) Inhalation daily    MEDICATIONS  (PRN):  acetaminophen     Tablet .. 650 milliGRAM(s) Oral every 6 hours PRN Temp greater or equal to 38C (100.4F), Mild Pain (1 - 3)  aluminum hydroxide/magnesium hydroxide/simethicone Suspension 30 milliLiter(s) Oral every 4 hours PRN Dyspepsia  fluticasone propionate 50 MICROgram(s)/spray Nasal Spray 2 Spray(s) Both Nostrils two times a day PRN nasal congestion  guaifenesin/dextromethorphan Oral Liquid 10 milliLiter(s) Oral every 6 hours PRN Cough  melatonin 3 milliGRAM(s) Oral at bedtime PRN Insomnia  ondansetron Injectable 4 milliGRAM(s) IV Push every 8 hours PRN Nausea and/or Vomiting    PRESENT SYMPTOMS:   Pain: [ X]yes [ ]no  QOL impact - new pain today  Location -         chest           Aggravating factors -  inhalation   Quality -   Radiation -  Timing-  Severity (0-10 scale):  Minimal acceptable level (0-10 scale):     CPOT:  0  https://[email or web address]/getattachment/[email or web address]/Critical-Care-Pain-Observation-Tool-(CPOT)    Dyspnea:                           [ ]Mild [ ]Moderate [ ]Severe - denies   Anxiety:                             [ ]Mild [ ]Moderate [ ]Severe  Fatigue:                             [ ]Mild [ ]Moderate [ ]Severe  Nausea:                             [ ]Mild [ ]Moderate [ ]Severe  Loss of appetite:              [ ]Mild [ ]Moderate [ ]Severe  Constipation:                    [ ]Mild [ ]Moderate [ ]Severe    Other Symptoms:  [ X]All other review of systems negative     Palliative Performance Status Version 2:      30   %    http://npcrc.org/files/news/palliative_performance_scale_ppsv2.pdf    PHYSICAL EXAM:  HR: 80 (04 Aug 2022 07:04) (76 - 110)  BP: 126/83 (04 Aug 2022 07:04) (126/74 - 151/85)  BP(mean): 108 (03 Aug 2022 15:35) (108 - 108)  RR: 20 (04 Aug 2022 07:04) (20 - 20)  SpO2: 100% (04 Aug 2022 07:04) (100% - 100%)    GENERAL: used Cantonese   [X ]Alert  [X ]Oriented x  3 [ ]Lethargic  [ ]Cachexia  [ ]Unarousable  [X ]Verbal  [ ]Non-Verbal  Behavioral:   [ ] Anxiety  [ ] Delirium [ ] Agitation [X ] Calm [ ] Other  HEENT:  [ ]Normal   [ ]Dry mouth   [X] HNFNC [ ]ET Tube/Trach  [ ]Oral lesions  PULMONARY:   [ ]Clear [ ]Tachypnea  [ ]Audible excessive secretions [X ] No labored breathing  [ ]Rhonchi        [ ]Right [ ]Left [ ]Bilateral  [ ]Crackles        [ ]Right [ ]Left [ ]Bilateral  [ ]Wheezing     [ ]Right [ ]Left [ ]Bilateral  [ ]Diminished breath sounds [ ]right [ ]left [ ]bilateral  CARDIOVASCULAR:    [ ]Regular [ ]Irregular [ ]Tachy  [ ]Arnaldo [ ]Murmur [ ]Other  GASTROINTESTINAL:  [ ]Soft  [ ]Distended  [X ] Not distended [ ]+BS  [ ]Non tender [ ]Tender  [ ]PEG [ ]OGT/ NGT  Last BM:   GENITOURINARY:  [X]Normal [ ] Incontinent   [ ]Oliguria/Anuria   [ ]Gutierrez  MUSCULOSKELETAL:   [ ]Normal   [ ]Weakness  [ ]Bed/Wheelchair bound [ ]Edema  NEUROLOGIC:   [X ]No focal deficits  [ ]Cognitive impairment  [ ]Dysphagia [ ]Dysarthria [ ]Paresis [ ]Other   SKIN:   [ ]Normal   [X ] Nonjaundiced [ ]Rash  [ ]Pressure ulcer(s)       Present on admission [ ]y [ ]n    CRITICAL CARE:  [ ] Shock Present  [ ]Septic [ ]Cardiogenic [ ]Neurologic [ ]Hypovolemic  [ ]  Vasopressors [ ]  Inotropes   [ ]Respiratory failure present [ ]Mechanical ventilation [ ]Non-invasive ventilatory support [X ]High flow  [ ]Acute  [ ]Chronic [ ]Hypoxic  [ ]Hypercarbic [ ]Other  [ ]Other organ failure     LABS: reviewed    08-04    139  |  101  |  26<H>  ----------------------------<  156<H>  4.3   |  29  |  0.7    Ca    9.0      04 Aug 2022 09:54  Mg     2.3     08-04    TPro  6.0  /  Alb  3.9  /  TBili  1.1  /  DBili  x   /  AST  44<H>  /  ALT  89<H>  /  AlkPhos  65  08-04                            14.8   13.18 )-----------( 288      ( 04 Aug 2022 09:54 )             44.5       RADIOLOGY & ADDITIONAL STUDIES:    < from: CT Angio Chest PE Protocol w/ IV Cont (07.30.22 @ 14:25) >    IMPRESSION:    No pulmonary embolus seen. No CT evidence of acute right heart strain.    Severe emphysema. Saber-sheath appearance of the trachea. Documented   flattening. Correlate for COPD.    New reticulonodular changes of the right upper lobe on series 4 image 94   measuring up to 5 mm in thickness.  Additional new branching nodular opacities in the left upper lobe on   series 4 image 84 with largest nodule measuring 5 mm.    Follow-up chest CT in 6 months is recommended.    If patient meets criteria, consider enrollment into a lung cancer   screening program with annual low-dose chest CT.    1.1 cm focal enhancement in the right hepatic lobe likely reflecting a   flash filling hemangioma. Nonemergent ultrasound suggested for   confirmation.    Partially imaged supraglottic right tracheal diverticulum.    --- End of Report ---    < end of copied text >   HPI: 71yMale with PMH including COPD on 2L home O2 & CPAP (unclear setting), Hx COPD Exacerbation w/ Intubation x 3 d in 2004, Asthma, DM s/p right transmetatarsal amputation, admitted with COPD exacerbation, currently on Bipap, attempting to wean. For now, the patient and family want full aggressive medical management. They are open to palliative following along and re-addressing GOC as things progress.     INTERVAL EVENTS:  8/3: patient comfortbale, on HFNC, is on diet  8/4: states having cough, used Catonese     ADVANCE DIRECTIVES:    MOLST  [ ]  Living Will  [ ]   DECISION MAKER(s): Patient   [ ] Health Care Proxy(s)  [ X] Surrogate(s)  [ ] Guardian           Name(s): Phone Number(s): Fletcher     BASELINE (I)ADL(s) (prior to admission):  [place]: [ ]Total  [X ] Moderate [ ]Dependent  Palliative Performance Status Version 2:         %    http://npcrc.org/files/news/palliative_performance_scale_[email or web address].pdf    Allergies    No Known Allergies    Intolerances    MEDICATIONS  (STANDING):  albuterol/ipratropium for Nebulization 3 milliLiter(s) Nebulizer every 6 hours  budesonide 160 MICROgram(s)/formoterol 4.5 MICROgram(s) Inhaler 2 Puff(s) Inhalation two times a day  doxycycline IVPB      doxycycline IVPB 100 milliGRAM(s) IV Intermittent every 12 hours  enoxaparin Injectable 40 milliGRAM(s) SubCutaneous every 24 hours  glucagon  Injectable 1 milliGRAM(s) IntraMuscular once  insulin lispro (ADMELOG) corrective regimen sliding scale   SubCutaneous three times a day before meals  loratadine 10 milliGRAM(s) Oral daily  methylPREDNISolone sodium succinate Injectable 60 milliGRAM(s) IV Push every 12 hours  montelukast 10 milliGRAM(s) Oral daily  NIFEdipine XL 30 milliGRAM(s) Oral daily  pantoprazole    Tablet 40 milliGRAM(s) Oral before breakfast  tiotropium 18 MICROgram(s) Capsule 1 Capsule(s) Inhalation daily    MEDICATIONS  (PRN):  acetaminophen     Tablet .. 650 milliGRAM(s) Oral every 6 hours PRN Temp greater or equal to 38C (100.4F), Mild Pain (1 - 3)  aluminum hydroxide/magnesium hydroxide/simethicone Suspension 30 milliLiter(s) Oral every 4 hours PRN Dyspepsia  fluticasone propionate 50 MICROgram(s)/spray Nasal Spray 2 Spray(s) Both Nostrils two times a day PRN nasal congestion  guaifenesin/dextromethorphan Oral Liquid 10 milliLiter(s) Oral every 6 hours PRN Cough  hydrocodone/homatropine Syrup 5 milliLiter(s) Oral every 8 hours PRN Cough  melatonin 3 milliGRAM(s) Oral at bedtime PRN Insomnia  ondansetron Injectable 4 milliGRAM(s) IV Push every 8 hours PRN Nausea and/or Vomiting    PRESENT SYMPTOMS:   Pain: [ X]yes [ ]no  QOL impact - new pain today  Location -         chest           Aggravating factors -  inhalation   Quality -   Radiation -  Timing-  Severity (0-10 scale):  Minimal acceptable level (0-10 scale):     CPOT:  0  https://www.[email or web address]/getattachment/[email or web address]/Critical-Care-Pain-Observation-Tool-(CPOT)    Dyspnea:                           [ ]Mild [ ]Moderate [ ]Severe - denies   Anxiety:                             [ ]Mild [ ]Moderate [ ]Severe  Fatigue:                             [ ]Mild [ ]Moderate [ ]Severe  Nausea:                             [ ]Mild [ ]Moderate [ ]Severe  Loss of appetite:              [ ]Mild [ ]Moderate [ ]Severe  Constipation:                    [ ]Mild [ ]Moderate [ ]Severe    Other Symptoms:  [ X]All other review of systems negative     Palliative Performance Status Version 2:      30   %    [email or web address]/news/palliative_performance_scale_[email or web address].pdf    PHYSICAL EXAM:  HR: 80 (04 Aug 2022 07:04) (76 - 110)  BP: 126/83 (04 Aug 2022 07:04) (126/74 - 151/85)  BP(mean): 108 (03 Aug 2022 15:35) (108 - 108)  RR: 20 (04 Aug 2022 07:04) (20 - 20)  SpO2: 100% (04 Aug 2022 07:04) (100% - 100%)    GENERAL: used Cantonese   [X ]Alert  [X ]Oriented x  3 [ ]Lethargic  [ ]Cachexia  [ ]Unarousable  [X ]Verbal  [ ]Non-Verbal  Behavioral:   [ ] Anxiety  [ ] Delirium [ ] Agitation [X ] Calm [ ] Other  HEENT:  [ ]Normal   [ ]Dry mouth   [X] HNFNC [ ]ET Tube/Trach  [ ]Oral lesions  PULMONARY:   [ ]Clear [ ]Tachypnea  [ ]Audible excessive secretions [X ] No labored breathing  [ ]Rhonchi        [ ]Right [ ]Left [ ]Bilateral  [ ]Crackles        [ ]Right [ ]Left [ ]Bilateral  [ ]Wheezing     [ ]Right [ ]Left [ ]Bilateral  [ ]Diminished breath sounds [ ]right [ ]left [ ]bilateral  CARDIOVASCULAR:    [ ]Regular [ ]Irregular [ ]Tachy  [ ]Arnaldo [ ]Murmur [ ]Other  GASTROINTESTINAL:  [ ]Soft  [ ]Distended  [X ] Not distended [ ]+BS  [ ]Non tender [ ]Tender  [ ]PEG [ ]OGT/ NGT  Last BM:   GENITOURINARY:  [X]Normal [ ] Incontinent   [ ]Oliguria/Anuria   [ ]Gutierrez  MUSCULOSKELETAL:   [ ]Normal   [ ]Weakness  [ ]Bed/Wheelchair bound [ ]Edema  NEUROLOGIC:   [X ]No focal deficits  [ ]Cognitive impairment  [ ]Dysphagia [ ]Dysarthria [ ]Paresis [ ]Other   SKIN:   [ ]Normal   [X ] Nonjaundiced [ ]Rash  [ ]Pressure ulcer(s)       Present on admission [ ]y [ ]n    CRITICAL CARE:  [ ] Shock Present  [ ]Septic [ ]Cardiogenic [ ]Neurologic [ ]Hypovolemic  [ ]  Vasopressors [ ]  Inotropes   [ ]Respiratory failure present [ ]Mechanical ventilation [ ]Non-invasive ventilatory support [X ]High flow  [ ]Acute  [ ]Chronic [ ]Hypoxic  [ ]Hypercarbic [ ]Other  [ ]Other organ failure     LABS: reviewed    08-04    139  |  101  |  26<H>  ----------------------------<  156<H>  4.3   |  29  |  0.7    Ca    9.0      04 Aug 2022 09:54  Mg     2.3     08-04    TPro  6.0  /  Alb  3.9  /  TBili  1.1  /  DBili  x   /  AST  44<H>  /  ALT  89<H>  /  AlkPhos  65  08-04                            14.8   13.18 )-----------( 288      ( 04 Aug 2022 09:54 )             44.5       RADIOLOGY & ADDITIONAL STUDIES:    < from: CT Angio Chest PE Protocol w/ IV Cont (07.30.22 @ 14:25) >    IMPRESSION:    No pulmonary embolus seen. No CT evidence of acute right heart strain.    Severe emphysema. Saber-sheath appearance of the trachea. Documented   flattening. Correlate for COPD.    New reticulonodular changes of the right upper lobe on series 4 image 94   measuring up to 5 mm in thickness.  Additional new branching nodular opacities in the left upper lobe on   series 4 image 84 with largest nodule measuring 5 mm.    Follow-up chest CT in 6 months is recommended.    If patient meets criteria, consider enrollment into a lung cancer   screening program with annual low-dose chest CT.    1.1 cm focal enhancement in the right hepatic lobe likely reflecting a   flash filling hemangioma. Nonemergent ultrasound suggested for   confirmation.    Partially imaged supraglottic right tracheal diverticulum.    --- End of Report ---    < end of copied text >

## 2022-08-04 NOTE — ED ADULT NURSE REASSESSMENT NOTE - NS ED NURSE REASSESS COMMENT FT1
pt is aaox3, nad, respirations easy and regular on hi cynthia, skin is warm, dry, and normal in color, pt is resting and comfortable, pt given dinner

## 2022-08-04 NOTE — PROGRESS NOTE ADULT - SUBJECTIVE AND OBJECTIVE BOX
MON, NILE  71y, Male  Allergy: No Known Allergies    Hospital Day: 5d    Patient seen and examined. No acute events overnight    PMH/PSH:  PAST MEDICAL & SURGICAL HISTORY:  COPD (chronic obstructive pulmonary disease)      Essential hypertension      Dyslipidemia      S/P transmetatarsal amputation of foot, right          VITALS:  T(F): --  HR: 80 (08-04-22 @ 07:04)  BP: 126/83 (08-04-22 @ 07:04) (126/74 - 151/85)  RR: 20 (08-04-22 @ 07:04)  SpO2: 100% (08-04-22 @ 07:04)    TESTS & MEASUREMENTS:  Weight (Kg):       08-02-22 @ 07:01  -  08-03-22 @ 07:00  --------------------------------------------------------  IN: 180 mL / OUT: 0 mL / NET: 180 mL    08-03-22 @ 07:01  -  08-04-22 @ 07:00  --------------------------------------------------------  IN: 420 mL / OUT: 680 mL / NET: -260 mL                            14.8   13.18 )-----------( 288      ( 04 Aug 2022 09:54 )             44.5       08-04    139  |  101  |  26<H>  ----------------------------<  156<H>  4.3   |  29  |  0.7    Ca    9.0      04 Aug 2022 09:54  Mg     2.3     08-04    TPro  6.0  /  Alb  3.9  /  TBili  1.1  /  DBili  x   /  AST  44<H>  /  ALT  89<H>  /  AlkPhos  65  08-04    LIVER FUNCTIONS - ( 04 Aug 2022 09:54 )  Alb: 3.9 g/dL / Pro: 6.0 g/dL / ALK PHOS: 65 U/L / ALT: 89 U/L / AST: 44 U/L / GGT: x                     RADIOLOGY & ADDITIONAL TESTS:    RECENT DIAGNOSTIC ORDERS:  Magnesium, Serum: AM Sched. Collection: 05-Aug-2022 04:30 (08-04-22 @ 10:55)  Comprehensive Metabolic Panel: AM Sched. Collection: 05-Aug-2022 04:30 (08-04-22 @ 10:55)  Complete Blood Count + Automated Diff: AM Sched. Collection: 05-Aug-2022 04:30 (08-04-22 @ 10:55)      MEDICATIONS:  MEDICATIONS  (STANDING):  albuterol/ipratropium for Nebulization 3 milliLiter(s) Nebulizer every 6 hours  budesonide 160 MICROgram(s)/formoterol 4.5 MICROgram(s) Inhaler 2 Puff(s) Inhalation two times a day  doxycycline IVPB      doxycycline IVPB 100 milliGRAM(s) IV Intermittent every 12 hours  enoxaparin Injectable 40 milliGRAM(s) SubCutaneous every 24 hours  glucagon  Injectable 1 milliGRAM(s) IntraMuscular once  insulin lispro (ADMELOG) corrective regimen sliding scale   SubCutaneous three times a day before meals  loratadine 10 milliGRAM(s) Oral daily  methylPREDNISolone sodium succinate Injectable 60 milliGRAM(s) IV Push every 12 hours  montelukast 10 milliGRAM(s) Oral daily  NIFEdipine XL 30 milliGRAM(s) Oral daily  pantoprazole    Tablet 40 milliGRAM(s) Oral before breakfast  tiotropium 18 MICROgram(s) Capsule 1 Capsule(s) Inhalation daily    MEDICATIONS  (PRN):  acetaminophen     Tablet .. 650 milliGRAM(s) Oral every 6 hours PRN Temp greater or equal to 38C (100.4F), Mild Pain (1 - 3)  aluminum hydroxide/magnesium hydroxide/simethicone Suspension 30 milliLiter(s) Oral every 4 hours PRN Dyspepsia  fluticasone propionate 50 MICROgram(s)/spray Nasal Spray 2 Spray(s) Both Nostrils two times a day PRN nasal congestion  guaifenesin/dextromethorphan Oral Liquid 10 milliLiter(s) Oral every 6 hours PRN Cough  melatonin 3 milliGRAM(s) Oral at bedtime PRN Insomnia  ondansetron Injectable 4 milliGRAM(s) IV Push every 8 hours PRN Nausea and/or Vomiting    HOME MEDICATIONS:  benzonatate 200 mg oral capsule (07-30)  ipratropium-albuterol 0.5 mg-2.5 mg/3 mL inhalation solution (07-30)  loratadine 10 mg oral tablet (07-30)  montelukast 10 mg oral tablet (07-30)  NIFEdipine 30 mg oral tablet, extended release (07-30)  Protonix 40 mg oral delayed release tablet (07-30)  Ventolin HFA 90 mcg/inh inhalation aerosol (07-30)    REVIEW OF SYSTEMS:  All other review of systems is negative unless indicated above.     PHYSICAL EXAM:  PHYSICAL EXAM:  GENERAL: NAD, well-developed  HEAD:  Atraumatic, Normocephalic  NECK: Supple, No JVD  CHEST/LUNG: Clear to auscultation bilaterally; No wheeze  HEART: Regular rate and rhythm; No murmurs, rubs, or gallops  ABDOMEN: Soft, Nontender, Nondistended; Bowel sounds present  EXTREMITIES:  2+ Peripheral Pulses, No clubbing, cyanosis, or edema  SKIN: No rashes or lesions

## 2022-08-04 NOTE — PROGRESS NOTE ADULT - SUBJECTIVE AND OBJECTIVE BOX
Over Night Events: events noted, [facility] 40/60    PHYSICAL EXAM    ICU Vital Signs Last 24 Hrs  T(C): 36.6 (03 Aug 2022 11:35), Max: 36.6 (03 Aug 2022 07:22)  T(F): 97.9 (03 Aug 2022 11:35), Max: 97.9 (03 Aug 2022 11:35)  HR: 94 (03 Aug 2022 22:00) (88 - 110)  BP: 131/91 (03 Aug 2022 22:00) (126/74 - 151/85)  BP(mean): 108 (03 Aug 2022 15:35) (100 - 108)  RR: 20 (04 Aug 2022 00:49) (20 - 20)  SpO2: 100% (04 Aug 2022 00:49) (98% - 100%)    O2 Parameters below as of 04 Aug 2022 00:49  Patient On (Oxygen Delivery Method): nasal cannula, high flow  O2 Flow (L/min): 40  O2 Concentration (%): 60        General: Ill looking  Lungs: dec bs both bases  Cardiovascular: Regular   Abdomen: Soft, Positive BS  Extremities: No clubbing   Skin: Warm  Neurological: Non focal       08-02-22 @ 07:01  -  08-03-22 @ 07:00  --------------------------------------------------------  IN:    dextrose 5% + sodium chloride 0.45%: 180 mL  Total IN: 180 mL    OUT:  Total OUT: 0 mL    Total NET: 180 mL      08-03-22 @ 07:01  -  08-04-22 @ 06:23  --------------------------------------------------------  IN:    dextrose 5% + sodium chloride 0.45%: 420 mL  Total IN: 420 mL    OUT:    Voided (mL): 680 mL  Total OUT: 680 mL    Total NET: -260 mL          LABS:                          14.7   17.98 )-----------( 315      ( 03 Aug 2022 07:22 )             44.9                                               08-03    140  |  101  |  26<H>  ----------------------------<  108<H>  4.8   |  30  |  0.8    Ca    9.3      03 Aug 2022 07:22  Phos  3.3     08-02  Mg     2.3     08-03    TPro  6.0  /  Alb  4.0  /  TBili  1.5<H>  /  DBili  x   /  AST  34  /  ALT  43<H>  /  AlkPhos  65  08-03                                                                                           LIVER FUNCTIONS - ( 03 Aug 2022 07:22 )  Alb: 4.0 g/dL / Pro: 6.0 g/dL / ALK PHOS: 65 U/L / ALT: 43 U/L / AST: 34 U/L / GGT: x                                                                                                                                       MEDICATIONS  (STANDING):  albuterol/ipratropium for Nebulization 3 milliLiter(s) Nebulizer every 6 hours  budesonide 160 MICROgram(s)/formoterol 4.5 MICROgram(s) Inhaler 2 Puff(s) Inhalation two times a day  doxycycline IVPB      doxycycline IVPB 100 milliGRAM(s) IV Intermittent every 12 hours  enoxaparin Injectable 40 milliGRAM(s) SubCutaneous every 24 hours  glucagon  Injectable 1 milliGRAM(s) IntraMuscular once  insulin lispro (ADMELOG) corrective regimen sliding scale   SubCutaneous three times a day before meals  loratadine 10 milliGRAM(s) Oral daily  methylPREDNISolone sodium succinate Injectable 60 milliGRAM(s) IV Push every 8 hours  montelukast 10 milliGRAM(s) Oral daily  NIFEdipine XL 30 milliGRAM(s) Oral daily  pantoprazole    Tablet 40 milliGRAM(s) Oral before breakfast  tiotropium 18 MICROgram(s) Capsule 1 Capsule(s) Inhalation daily    MEDICATIONS  (PRN):  acetaminophen     Tablet .. 650 milliGRAM(s) Oral every 6 hours PRN Temp greater or equal to 38C (100.4F), Mild Pain (1 - 3)  aluminum hydroxide/magnesium hydroxide/simethicone Suspension 30 milliLiter(s) Oral every 4 hours PRN Dyspepsia  fluticasone propionate 50 MICROgram(s)/spray Nasal Spray 2 Spray(s) Both Nostrils two times a day PRN nasal congestion  guaifenesin/dextromethorphan Oral Liquid 10 milliLiter(s) Oral every 6 hours PRN Cough  melatonin 3 milliGRAM(s) Oral at bedtime PRN Insomnia  ondansetron Injectable 4 milliGRAM(s) IV Push every 8 hours PRN Nausea and/or Vomiting      Xrays:                                                                                     ECHO

## 2022-08-04 NOTE — PROGRESS NOTE ADULT - ASSESSMENT
71-year-old male with hx of COPD on 2L home O2, Hx COPD Exacerbation w/ Intubation x 3 d in 2004, Asthma, DM s/p right transmetatarsal amputation following infection. Patient presented for COPD Exacerbation.    #Acute on Chronic Hypoxic Respiratory Failure (On Home O2 2L)  #COPD exacerbation  #ROMAINE  Currently on HFNC 40L/60% saturating %  CXR 08/03: No significant change in bilateral nodular opacities  Daughter states pt does not have CPAP at home, he will need sleep study referral. She was asking if it is possible to be prescribed on discharge, but I informed that is usually difficult to get through insurance.  - Decrease dose of IV solumedrol to 60 q12h  - duoneb q6h and prn  - Cont symbicort/spiriva  - Cont doxycycline 100mg BID  - Wean off HFNC as tolerated    # Asthma  - c/w loratadine, montelukast    # DM  - ISS    # HTN, stable  - c/w nifedipine    # GERD  - c/w Protonix    # [place]chdalek hernia, pulmonary nodules  - seen on CTA Chest  - outpatient f/u    # Hepatic Nodule likely hemangioma  - 1.1 cm focal enhancement of the right hepatic lobe on CTA chest  - monitor for symptoms  - Can be followed up outpatient    DVT PPX, Lovenox    #Progress Note Handoff  Pending (specify): Wean off HFNC, Cont IV Steroids/duonebs  Family discussion: d/w pt and daughter regarding tx for COPD exacerbation  Disposition: Home

## 2022-08-05 NOTE — PROGRESS NOTE ADULT - SUBJECTIVE AND OBJECTIVE BOX
Over Night Events: events noted, on HHFNC 60%, afebrile    PHYSICAL EXAM    ICU Vital Signs Last 24 Hrs  T(C): 36.4 (05 Aug 2022 01:30), Max: 36.7 (04 Aug 2022 20:00)  T(F): 97.6 (05 Aug 2022 01:30), Max: 98 (04 Aug 2022 20:00)  HR: 86 (05 Aug 2022 04:12) (80 - 108)  BP: 131/83 (05 Aug 2022 04:12) (112/80 - 131/83)  BP(mean): 95 (05 Aug 2022 04:12) (93 - 99)  RR: 16 (05 Aug 2022 04:12) (16 - 20)  SpO2: 100% (05 Aug 2022 04:12) (100% - 100%)    O2 Parameters below as of 05 Aug 2022 04:12  Patient On (Oxygen Delivery Method): nasal cannula, high flow  O2 Flow (L/min): 40  O2 Concentration (%): 60        General: ill looking  Lungs: dec bs both bases  Cardiovascular: IRVIN 2.6   Abdomen: Soft, Positive BS  Skin: Warm  Neurological: Non focal       08-03-22 @ 07:01  -  08-04-22 @ 07:00  --------------------------------------------------------  IN:    dextrose 5% + sodium chloride 0.45%: 420 mL  Total IN: 420 mL    OUT:    Voided (mL): 680 mL  Total OUT: 680 mL    Total NET: -260 mL      08-04-22 @ 07:01  -  08-05-22 @ 06:18  --------------------------------------------------------  IN:    IV PiggyBack: 100 mL  Total IN: 100 mL    OUT:  Total OUT: 0 mL    Total NET: 100 mL          LABS:                          14.8   13.18 )-----------( 288      ( 04 Aug 2022 09:54 )             44.5                                               08-04    139  |  101  |  26<H>  ----------------------------<  156<H>  4.3   |  29  |  0.7    Ca    9.0      04 Aug 2022 09:54  Mg     2.3     08-04    TPro  6.0  /  Alb  3.9  /  TBili  1.1  /  DBili  x   /  AST  44<H>  /  ALT  89<H>  /  AlkPhos  65  08-04                                                                                           LIVER FUNCTIONS - ( 04 Aug 2022 09:54 )  Alb: 3.9 g/dL / Pro: 6.0 g/dL / ALK PHOS: 65 U/L / ALT: 89 U/L / AST: 44 U/L / GGT: x                                                                                                                                       MEDICATIONS  (STANDING):  albuterol/ipratropium for Nebulization 3 milliLiter(s) Nebulizer every 6 hours  budesonide 160 MICROgram(s)/formoterol 4.5 MICROgram(s) Inhaler 2 Puff(s) Inhalation two times a day  doxycycline IVPB      doxycycline IVPB 100 milliGRAM(s) IV Intermittent every 12 hours  enoxaparin Injectable 40 milliGRAM(s) SubCutaneous every 24 hours  glucagon  Injectable 1 milliGRAM(s) IntraMuscular once  insulin lispro (ADMELOG) corrective regimen sliding scale   SubCutaneous three times a day before meals  loratadine 10 milliGRAM(s) Oral daily  methylPREDNISolone sodium succinate Injectable 60 milliGRAM(s) IV Push every 12 hours  montelukast 10 milliGRAM(s) Oral daily  NIFEdipine XL 30 milliGRAM(s) Oral daily  pantoprazole    Tablet 40 milliGRAM(s) Oral before breakfast  tiotropium 18 MICROgram(s) Capsule 1 Capsule(s) Inhalation daily    MEDICATIONS  (PRN):  acetaminophen     Tablet .. 650 milliGRAM(s) Oral every 6 hours PRN Temp greater or equal to 38C (100.4F), Mild Pain (1 - 3)  aluminum hydroxide/magnesium hydroxide/simethicone Suspension 30 milliLiter(s) Oral every 4 hours PRN Dyspepsia  fluticasone propionate 50 MICROgram(s)/spray Nasal Spray 2 Spray(s) Both Nostrils two times a day PRN nasal congestion  guaifenesin/dextromethorphan Oral Liquid 10 milliLiter(s) Oral every 6 hours PRN Cough  melatonin 3 milliGRAM(s) Oral at bedtime PRN Insomnia  ondansetron Injectable 4 milliGRAM(s) IV Push every 8 hours PRN Nausea and/or Vomiting

## 2022-08-05 NOTE — PROGRESS NOTE ADULT - ASSESSMENT
IMPRESSION:    End stage COPD now in exacerbation slowly improving  Acute hypoxemic resp failure      PLAN:    ·	Oxygen therapy to keep pulse ox 88 TO 92%  ·	C/w home inhalers, symbicort and spiriva, nebs prn  ·	NIV at night and as needed/ NC trial today  ·	Solumedrol 60 q 12  ·	doxy 100 q 12 7 DAYS  ·	DVT ppx  ·	OOBTC  ·	very poor prognosis  ·	sdu

## 2022-08-05 NOTE — PROGRESS NOTE ADULT - ASSESSMENT
71-year-old male with hx of COPD on 2L home O2, Hx COPD Exacerbation w/ Intubation x 3 d in 2004, Asthma, DM s/p right transmetatarsal amputation following infection. Patient presented for COPD Exacerbation.    #Acute on Chronic Hypoxic Respiratory Failure (On Home O2 2L)  #COPD exacerbation  #ROMAINE  Currently on HFNC 40L/40% saturating %  CXR 08/03: No significant change in bilateral nodular opacities  Daughter states pt does not have CPAP at home, he will need sleep study referral. She was asking if it is possible to be prescribed on discharge, but I informed that is usually difficult to get through insurance.  - Continue IV solumedrol to 60 q12h  - duoneb q6h and prn  - Cont symbicort/spiriva  - Cont doxycycline 100mg BID  - Wean off HFNC as tolerated    # Asthma  - c/w loratadine, montelukast    # DM  - ISS    # HTN, stable  - c/w nifedipine    # GERD  - c/w Protonix    # Bochdalek hernia, pulmonary nodules  - seen on CTA Chest  - outpatient f/u    # Hepatic Nodule likely hemangioma  - 1.1 cm focal enhancement of the right hepatic lobe on CTA chest  - monitor for symptoms  - Can be followed up outpatient    DVT PPX, Lovenox    #Progress Note Handoff  Pending (specify): Wean off HFNC, Cont IV Steroids/duonebs  Family discussion: d/w pt and daughter regarding tx for COPD exacerbation  Disposition: Home

## 2022-08-05 NOTE — PROGRESS NOTE ADULT - SUBJECTIVE AND OBJECTIVE BOX
MON, NILE  71y, Male  Allergy: No Known Allergies    Hospital Day: 6d    Patient seen and examined. No acute events overnight    PMH/PSH:  PAST MEDICAL & SURGICAL HISTORY:  COPD (chronic obstructive pulmonary disease)      Essential hypertension      Dyslipidemia      S/P transmetatarsal amputation of foot, right          VITALS:  T(F): 97 (08-05-22 @ 13:28), Max: 98 (08-04-22 @ 20:00)  HR: 105 (08-05-22 @ 13:28)  BP: 143/82 (08-05-22 @ 13:28) (117/77 - 152/89)  RR: 22 (08-05-22 @ 13:28)  SpO2: 99% (08-05-22 @ 13:28)    TESTS & MEASUREMENTS:  Weight (Kg):       08-03-22 @ 07:01  -  08-04-22 @ 07:00  --------------------------------------------------------  IN: 420 mL / OUT: 680 mL / NET: -260 mL    08-04-22 @ 07:01  -  08-05-22 @ 07:00  --------------------------------------------------------  IN: 200 mL / OUT: 500 mL / NET: -300 mL    08-05-22 @ 07:01  -  08-05-22 @ 16:37  --------------------------------------------------------  IN: 0 mL / OUT: 1000 mL / NET: -1000 mL                            14.3   13.28 )-----------( 285      ( 05 Aug 2022 08:07 )             43.7       08-05    137  |  100  |  28<H>  ----------------------------<  198<H>  4.2   |  27  |  0.7    Ca    8.6      05 Aug 2022 08:07  Mg     2.1     08-05    TPro  5.6<L>  /  Alb  3.6  /  TBili  1.1  /  DBili  x   /  AST  26  /  ALT  67<H>  /  AlkPhos  59  08-05    LIVER FUNCTIONS - ( 05 Aug 2022 08:07 )  Alb: 3.6 g/dL / Pro: 5.6 g/dL / ALK PHOS: 59 U/L / ALT: 67 U/L / AST: 26 U/L / GGT: x                     RADIOLOGY & ADDITIONAL TESTS:    RECENT DIAGNOSTIC ORDERS:  COVID-19 PCR: AM Sched. Collection: 06-Aug-2022 04:30  Specimen Source: Nasopharyngeal (08-05-22 @ 11:06)      MEDICATIONS:  MEDICATIONS  (STANDING):  albuterol/ipratropium for Nebulization 3 milliLiter(s) Nebulizer every 6 hours  budesonide 160 MICROgram(s)/formoterol 4.5 MICROgram(s) Inhaler 2 Puff(s) Inhalation two times a day  doxycycline IVPB      doxycycline IVPB 100 milliGRAM(s) IV Intermittent every 12 hours  enoxaparin Injectable 40 milliGRAM(s) SubCutaneous every 24 hours  glucagon  Injectable 1 milliGRAM(s) IntraMuscular once  insulin lispro (ADMELOG) corrective regimen sliding scale   SubCutaneous three times a day before meals  loratadine 10 milliGRAM(s) Oral daily  methylPREDNISolone sodium succinate Injectable 60 milliGRAM(s) IV Push every 12 hours  montelukast 10 milliGRAM(s) Oral daily  NIFEdipine XL 30 milliGRAM(s) Oral daily  pantoprazole    Tablet 40 milliGRAM(s) Oral before breakfast  tiotropium 18 MICROgram(s) Capsule 1 Capsule(s) Inhalation daily    MEDICATIONS  (PRN):  acetaminophen     Tablet .. 650 milliGRAM(s) Oral every 6 hours PRN Temp greater or equal to 38C (100.4F), Mild Pain (1 - 3)  aluminum hydroxide/magnesium hydroxide/simethicone Suspension 30 milliLiter(s) Oral every 4 hours PRN Dyspepsia  fluticasone propionate 50 MICROgram(s)/spray Nasal Spray 2 Spray(s) Both Nostrils two times a day PRN nasal congestion  guaifenesin/dextromethorphan Oral Liquid 10 milliLiter(s) Oral every 6 hours PRN Cough  melatonin 3 milliGRAM(s) Oral at bedtime PRN Insomnia  ondansetron Injectable 4 milliGRAM(s) IV Push every 8 hours PRN Nausea and/or Vomiting      HOME MEDICATIONS:  benzonatate 200 mg oral capsule (07-30)  ipratropium-albuterol 0.5 mg-2.5 mg/3 mL inhalation solution (07-30)  loratadine 10 mg oral tablet (07-30)  montelukast 10 mg oral tablet (07-30)  NIFEdipine 30 mg oral tablet, extended release (07-30)  Protonix 40 mg oral delayed release tablet (07-30)  Ventolin HFA 90 mcg/inh inhalation aerosol (07-30)      REVIEW OF SYSTEMS:  All other review of systems is negative unless indicated above.     PHYSICAL EXAM:  PHYSICAL EXAM:  GENERAL: NAD, well-developed  HEAD:  Atraumatic, Normocephalic  NECK: Supple, No JVD  CHEST/LUNG: Clear to auscultation bilaterally; No wheeze  HEART: Regular rate and rhythm; No murmurs, rubs, or gallops  ABDOMEN: Soft, Nontender, Nondistended; Bowel sounds present  EXTREMITIES:  2+ Peripheral Pulses, No clubbing, cyanosis, or edema  SKIN: No rashes or lesions

## 2022-08-06 NOTE — PROGRESS NOTE ADULT - SUBJECTIVE AND OBJECTIVE BOX
INTERVAL HPI/OVERNIGHT EVENTS:    SUBJECTIVE: Patient seen and examined at bedside.   cc: sob, now resolved  no cp, sob, abd pain, fever  no sob, orthopnea pnd, cough     OBJECTIVE:    VITAL SIGNS:  Vital Signs Last 24 Hrs  T(C): 36.1 (06 Aug 2022 12:00), Max: 36.1 (05 Aug 2022 23:56)  T(F): 97 (06 Aug 2022 12:00), Max: 97 (06 Aug 2022 12:00)  HR: 106 (06 Aug 2022 12:00) (85 - 110)  BP: 127/98 (06 Aug 2022 12:00) (115/77 - 142/90)  BP(mean): 109 (06 Aug 2022 12:00) (92 - 111)  RR: 20 (06 Aug 2022 12:00) (20 - 20)  SpO2: 99% (06 Aug 2022 12:00) (97% - 99%)    Parameters below as of 06 Aug 2022 12:00  Patient On (Oxygen Delivery Method): nasal cannula  O2 Flow (L/min): 3        PHYSICAL EXAM:    General: NAD  HEENT: NC/AT; PERRL, clear conjunctiva  Neck: supple  Respiratory: CTA b/l  Cardiovascular: +S1/S2; RRR  Abdomen: soft, NT/ND; +BS x4  Extremities: WWP, 2+ peripheral pulses b/l; no LE edema  Skin: normal color and turgor; no rash  Neurological:    MEDICATIONS:  MEDICATIONS  (STANDING):  albuterol/ipratropium for Nebulization 3 milliLiter(s) Nebulizer every 6 hours  budesonide 160 MICROgram(s)/formoterol 4.5 MICROgram(s) Inhaler 2 Puff(s) Inhalation two times a day  doxycycline IVPB      doxycycline IVPB 100 milliGRAM(s) IV Intermittent every 12 hours  enoxaparin Injectable 40 milliGRAM(s) SubCutaneous every 24 hours  glucagon  Injectable 1 milliGRAM(s) IntraMuscular once  insulin lispro (ADMELOG) corrective regimen sliding scale   SubCutaneous three times a day before meals  loratadine 10 milliGRAM(s) Oral daily  methylPREDNISolone sodium succinate Injectable 60 milliGRAM(s) IV Push every 12 hours  montelukast 10 milliGRAM(s) Oral daily  NIFEdipine XL 30 milliGRAM(s) Oral daily  pantoprazole    Tablet 40 milliGRAM(s) Oral before breakfast  tiotropium 18 MICROgram(s) Capsule 1 Capsule(s) Inhalation daily    MEDICATIONS  (PRN):  acetaminophen     Tablet .. 650 milliGRAM(s) Oral every 6 hours PRN Temp greater or equal to 38C (100.4F), Mild Pain (1 - 3)  aluminum hydroxide/magnesium hydroxide/simethicone Suspension 30 milliLiter(s) Oral every 4 hours PRN Dyspepsia  fluticasone propionate 50 MICROgram(s)/spray Nasal Spray 2 Spray(s) Both Nostrils two times a day PRN nasal congestion  guaifenesin/dextromethorphan Oral Liquid 10 milliLiter(s) Oral every 6 hours PRN Cough  melatonin 3 milliGRAM(s) Oral at bedtime PRN Insomnia  ondansetron Injectable 4 milliGRAM(s) IV Push every 8 hours PRN Nausea and/or Vomiting      ALLERGIES:  Allergies    No Known Allergies    Intolerances        LABS:                        14.3   13.28 )-----------( 285      ( 05 Aug 2022 08:07 )             43.7     Hemoglobin: 14.3 g/dL (08-05 @ 08:07)  Hemoglobin: 14.8 g/dL (08-04 @ 09:54)  Hemoglobin: 14.7 g/dL (08-03 @ 07:22)  Hemoglobin: 14.2 g/dL (08-02 @ 06:52)    CBC Full  -  ( 05 Aug 2022 08:07 )  WBC Count : 13.28 K/uL  RBC Count : 4.85 M/uL  Hemoglobin : 14.3 g/dL  Hematocrit : 43.7 %  Platelet Count - Automated : 285 K/uL  Mean Cell Volume : 90.1 fL  Mean Cell Hemoglobin : 29.5 pg  Mean Cell Hemoglobin Concentration : 32.7 g/dL  Auto Neutrophil # : 12.37 K/uL  Auto Lymphocyte # : 0.22 K/uL  Auto Monocyte # : 0.62 K/uL  Auto Eosinophil # : 0.01 K/uL  Auto Basophil # : 0.01 K/uL  Auto Neutrophil % : 93.0 %  Auto Lymphocyte % : 1.7 %  Auto Monocyte % : 4.7 %  Auto Eosinophil % : 0.1 %  Auto Basophil % : 0.1 %    08-05    137  |  100  |  28<H>  ----------------------------<  198<H>  4.2   |  27  |  0.7    Ca    8.6      05 Aug 2022 08:07  Mg     2.1     08-05    TPro  5.6<L>  /  Alb  3.6  /  TBili  1.1  /  DBili  x   /  AST  26  /  ALT  67<H>  /  AlkPhos  59  08-05    Creatinine Trend: 0.7<--, 0.7<--, 0.8<--, 0.9<--, 0.9<--, 0.9<--  LIVER FUNCTIONS - ( 05 Aug 2022 08:07 )  Alb: 3.6 g/dL / Pro: 5.6 g/dL / ALK PHOS: 59 U/L / ALT: 67 U/L / AST: 26 U/L / GGT: x               hs Troponin:              CSF:                      EKG:   MICROBIOLOGY:    IMAGING:      Labs, imaging, EKG personally reviewed    RADIOLOGY & ADDITIONAL TESTS: Reviewed.

## 2022-08-06 NOTE — PROGRESS NOTE ADULT - ASSESSMENT
IMPRESSION:    End stage COPD now in exacerbation slowly improving  Acute hypoxemic resp failure  Acute on chronic hypercapnic respiratory failure    PLAN:    ·	Oxygen therapy to keep pulse ox 88 TO 92%  ·	C/w home inhalers, symbicort and spiriva, montelukast, nebs q4hr sprn  ·	NIV 4 hrs on/4hrs off/ PRN and QHS. Wean O2   ·	Solumedrol 60 q 12  ·	doxy 100 q 12 7 DAYS  ·	DVT ppx  ·	OOBTC  ·	very poor prognosis  ·	sdu   IMPRESSION:  Severe COPD exacerbation   HO End stage COPD   Acute hypoxemic resp failure with hypercapnia       PLAN:    ·	Oxygen therapy to keep pulse ox 88 TO 92%  ·	Hold  home inhalers   ·	Nebs q4hr and PRN  ·	NIV 4 hrs on/4hrs off/ PRN and QHS. Wean O2   ·	Solumedrol 60 q 12  ·	Doxycycline 100 q 12 7 DAYS  ·	DVT ppx.    ·	Bed chair position  ·	very poor prognosis  ·	sdu  ·	OC and advance directive

## 2022-08-06 NOTE — PROGRESS NOTE ADULT - SUBJECTIVE AND OBJECTIVE BOX
Patient is a 71y old  Male who presents with a chief complaint of COPD Exacerbation (05 Aug 2022 16:37)        Over Night Events:    Sherie ramirezor petey id number [id number]  Patient still SOB, complains of dysphagia.    ROS:  See HPI    PHYSICAL EXAM    ICU Vital Signs Last 24 Hrs  T(C): 36.1 (05 Aug 2022 23:56), Max: 36.6 (05 Aug 2022 16:00)  T(F): 96.9 (05 Aug 2022 23:56), Max: 97.8 (05 Aug 2022 16:00)  HR: 85 (06 Aug 2022 04:00) (85 - 105)  BP: 130/77 (06 Aug 2022 04:00) (115/77 - 152/89)  BP(mean): 95 (06 Aug 2022 04:00) (92 - 108)  ABP: --  ABP(mean): --  RR: 20 (05 Aug 2022 15:47) (20 - 22)  SpO2: 98% (06 Aug 2022 04:00) (97% - 100%)    O2 Parameters below as of 05 Aug 2022 15:47  Patient On (Oxygen Delivery Method): nasal cannula  O2 Flow (L/min): 3  O2 Concentration (%): 32        08-05-22 @ 07:01  -  08-06-22 @ 07:00  --------------------------------------------------------  IN:  Total IN: 0 mL    OUT:    Voided (mL): 1000 mL  Total OUT: 1000 mL    Total NET: -1000 mL            CONSTITUTIONAL:  cachctic.     ENT:   Airway patent,   No thrush    EYES:   Clear bilaterally,   pupils equal,   round and reactive to light.    CARDIAC:   Normal rate,   regular rhythm.    no edema      CAROTID:   normal systolic impulse  no bruits    RESPIRATORY:   Tachypneic  increased expiratory time  mild diffuse wheezing  Normal chest expansion  + use of accessory muscles    GASTROINTESTINAL:  Abdomen soft,   non-tender,   no guarding,   + BS    MUSCULOSKELETAL:   range of motion is not limited,  no clubbing, cyanosis    NEUROLOGICAL:   Alert and oriented   no motor deficits.    SKIN:   Skin normal color for race,   No evidence of rash.    PSYCHIATRIC:   normal mood and affect.   no apparent risk to self or others.        LABS:                            14.3   13.28 )-----------( 285      ( 05 Aug 2022 08:07 )             43.7                                               08-05    137  |  100  |  28<H>  ----------------------------<  198<H>  4.2   |  27  |  0.7    Ca    8.6      05 Aug 2022 08:07  Mg     2.1     08-05    TPro  5.6<L>  /  Alb  3.6  /  TBili  1.1  /  DBili  x   /  AST  26  /  ALT  67<H>  /  AlkPhos  59  08-05                                                                                           LIVER FUNCTIONS - ( 05 Aug 2022 08:07 )  Alb: 3.6 g/dL / Pro: 5.6 g/dL / ALK PHOS: 59 U/L / ALT: 67 U/L / AST: 26 U/L / GGT: x                    Procalcitonin, Serum: 0.37 ng/mL (07-31-22 @ 05:52)                    POCT Blood Glucose.: 151 mg/dL (08-06-22 @ 07:59)  POCT Blood Glucose.: 162 mg/dL (08-05-22 @ 18:12)  POCT Blood Glucose.: 182 mg/dL (08-05-22 @ 11:39)                                                                                                           MEDICATIONS  (STANDING):  albuterol/ipratropium for Nebulization 3 milliLiter(s) Nebulizer every 6 hours  budesonide 160 MICROgram(s)/formoterol 4.5 MICROgram(s) Inhaler 2 Puff(s) Inhalation two times a day  doxycycline IVPB 100 milliGRAM(s) IV Intermittent every 12 hours  doxycycline IVPB      enoxaparin Injectable 40 milliGRAM(s) SubCutaneous every 24 hours  glucagon  Injectable 1 milliGRAM(s) IntraMuscular once  insulin lispro (ADMELOG) corrective regimen sliding scale   SubCutaneous three times a day before meals  loratadine 10 milliGRAM(s) Oral daily  methylPREDNISolone sodium succinate Injectable 60 milliGRAM(s) IV Push every 12 hours  montelukast 10 milliGRAM(s) Oral daily  NIFEdipine XL 30 milliGRAM(s) Oral daily  pantoprazole    Tablet 40 milliGRAM(s) Oral before breakfast  tiotropium 18 MICROgram(s) Capsule 1 Capsule(s) Inhalation daily    MEDICATIONS  (PRN):  acetaminophen     Tablet .. 650 milliGRAM(s) Oral every 6 hours PRN Temp greater or equal to 38C (100.4F), Mild Pain (1 - 3)  aluminum hydroxide/magnesium hydroxide/simethicone Suspension 30 milliLiter(s) Oral every 4 hours PRN Dyspepsia  fluticasone propionate 50 MICROgram(s)/spray Nasal Spray 2 Spray(s) Both Nostrils two times a day PRN nasal congestion  guaifenesin/dextromethorphan Oral Liquid 10 milliLiter(s) Oral every 6 hours PRN Cough  melatonin 3 milliGRAM(s) Oral at bedtime PRN Insomnia  ondansetron Injectable 4 milliGRAM(s) IV Push every 8 hours PRN Nausea and/or Vomiting      Xrays:                                                                                     ECHO

## 2022-08-07 NOTE — PROGRESS NOTE ADULT - SUBJECTIVE AND OBJECTIVE BOX
Patient is a 71y old  Male who presents with a chief complaint of COPD Exacerbation (06 Aug 2022 16:48)         Over Night Events:  Feels and looks better.  Still with sig SOB         ROS:     All ROS are negative except HPI         PHYSICAL EXAM    ICU Vital Signs Last 24 Hrs  T(C): 35.8 (07 Aug 2022 08:00), Max: 36.4 (06 Aug 2022 20:00)  T(F): 96.5 (07 Aug 2022 08:00), Max: 97.5 (06 Aug 2022 20:00)  HR: 101 (07 Aug 2022 08:00) (88 - 118)  BP: 168/78 (07 Aug 2022 08:00) (112/84 - 168/78)  BP(mean): 112 (07 Aug 2022 08:00) (95 - 112)  ABP: --  ABP(mean): --  RR: 20 (07 Aug 2022 08:00) (18 - 20)  SpO2: 98% (07 Aug 2022 08:00) (97% - 99%)    O2 Parameters below as of 07 Aug 2022 08:00  Patient On (Oxygen Delivery Method): nasal cannula  O2 Flow (L/min): 3          CONSTITUTIONAL:  Ill appearing in NAD    ENT:   Airway patent,   Mouth with normal mucosa.       EYES:   Pupils equal,   Round and reactive to light.    CARDIAC:   Tachycardic   Regular rhythm.        RESPIRATORY:   No wheezing  Bilateral BS  Normal chest expansion  Not tachypneic,  No use of accessory muscles    GASTROINTESTINAL:  Abdomen soft,   Non-tender,   No guarding,   + BS    MUSCULOSKELETAL:   Range of motion is not limited,  No clubbing, cyanosis    NEUROLOGICAL:   Alert and oriented   No motor  deficits.    SKIN:   Skin normal color for race,   No evidence of rash.    PSYCHIATRIC:   No apparent risk to self or others.        08-06-22 @ 07:01  -  08-07-22 @ 07:00  --------------------------------------------------------  IN:    IV PiggyBack: 100 mL    Oral Fluid: 80 mL  Total IN: 180 mL    OUT:    Voided (mL): 880 mL  Total OUT: 880 mL    Total NET: -700 mL          LABS:                            16.1   17.22 )-----------( 352      ( 07 Aug 2022 06:39 )             49.7                                               08-07    137  |  96<L>  |  28<H>  ----------------------------<  170<H>  4.6   |  28  |  0.8    Ca    9.0      07 Aug 2022 06:39  Mg     2.1     08-07    TPro  6.1  /  Alb  4.0  /  TBili  1.6<H>  /  DBili  x   /  AST  21  /  ALT  55<H>  /  AlkPhos  70  08-07                                                                                           LIVER FUNCTIONS - ( 07 Aug 2022 06:39 )  Alb: 4.0 g/dL / Pro: 6.1 g/dL / ALK PHOS: 70 U/L / ALT: 55 U/L / AST: 21 U/L / GGT: x                                                                                                                                       MEDICATIONS  (STANDING):  albuterol/ipratropium for Nebulization 3 milliLiter(s) Nebulizer every 4 hours  budesonide 160 MICROgram(s)/formoterol 4.5 MICROgram(s) Inhaler 2 Puff(s) Inhalation two times a day  doxycycline IVPB      doxycycline IVPB 100 milliGRAM(s) IV Intermittent every 12 hours  enoxaparin Injectable 40 milliGRAM(s) SubCutaneous every 24 hours  glucagon  Injectable 1 milliGRAM(s) IntraMuscular once  insulin lispro (ADMELOG) corrective regimen sliding scale   SubCutaneous three times a day before meals  loratadine 10 milliGRAM(s) Oral daily  methylPREDNISolone sodium succinate Injectable 60 milliGRAM(s) IV Push every 12 hours  montelukast 10 milliGRAM(s) Oral daily  NIFEdipine XL 30 milliGRAM(s) Oral daily  pantoprazole    Tablet 40 milliGRAM(s) Oral before breakfast    MEDICATIONS  (PRN):  acetaminophen     Tablet .. 650 milliGRAM(s) Oral every 6 hours PRN Temp greater or equal to 38C (100.4F), Mild Pain (1 - 3)  aluminum hydroxide/magnesium hydroxide/simethicone Suspension 30 milliLiter(s) Oral every 4 hours PRN Dyspepsia  fluticasone propionate 50 MICROgram(s)/spray Nasal Spray 2 Spray(s) Both Nostrils two times a day PRN nasal congestion  guaifenesin/dextromethorphan Oral Liquid 10 milliLiter(s) Oral every 6 hours PRN Cough  melatonin 3 milliGRAM(s) Oral at bedtime PRN Insomnia  ondansetron Injectable 4 milliGRAM(s) IV Push every 8 hours PRN Nausea and/or Vomiting      New X-rays reviewed:                                                                                  ECHO

## 2022-08-07 NOTE — PROGRESS NOTE ADULT - SUBJECTIVE AND OBJECTIVE BOX
INTERVAL HPI/OVERNIGHT EVENTS:    SUBJECTIVE: Patient seen and examined at bedside.   cc: sob  no cp, abd pain, fever  sob improving, no cough, orthopnea, pnd    OBJECTIVE:    VITAL SIGNS:  Vital Signs Last 24 Hrs  T(C): 35.8 (07 Aug 2022 08:00), Max: 36.4 (06 Aug 2022 20:00)  T(F): 96.5 (07 Aug 2022 08:00), Max: 97.5 (06 Aug 2022 20:00)  HR: 101 (07 Aug 2022 08:00) (88 - 118)  BP: 168/78 (07 Aug 2022 08:00) (112/84 - 168/78)  BP(mean): 112 (07 Aug 2022 08:00) (95 - 112)  RR: 20 (07 Aug 2022 08:00) (18 - 20)  SpO2: 98% (07 Aug 2022 08:00) (97% - 99%)    Parameters below as of 07 Aug 2022 08:00  Patient On (Oxygen Delivery Method): nasal cannula  O2 Flow (L/min): 3        PHYSICAL EXAM:    General: NAD  HEENT: NC/AT; PERRL, clear conjunctiva  Neck: supple  Respiratory: bl crackles  Cardiovascular: +S1/S2; RRR  Abdomen: soft, NT/ND; +BS x4  Extremities: WWP, 2+ peripheral pulses b/l; no LE edema  Skin: normal color and turgor; no rash  Neurological:    MEDICATIONS:  MEDICATIONS  (STANDING):  albuterol/ipratropium for Nebulization 3 milliLiter(s) Nebulizer every 4 hours  budesonide 160 MICROgram(s)/formoterol 4.5 MICROgram(s) Inhaler 2 Puff(s) Inhalation two times a day  doxycycline IVPB      doxycycline IVPB 100 milliGRAM(s) IV Intermittent every 12 hours  enoxaparin Injectable 40 milliGRAM(s) SubCutaneous every 24 hours  glucagon  Injectable 1 milliGRAM(s) IntraMuscular once  insulin lispro (ADMELOG) corrective regimen sliding scale   SubCutaneous three times a day before meals  loratadine 10 milliGRAM(s) Oral daily  methylPREDNISolone sodium succinate Injectable 60 milliGRAM(s) IV Push every 12 hours  montelukast 10 milliGRAM(s) Oral daily  NIFEdipine XL 30 milliGRAM(s) Oral daily  pantoprazole    Tablet 40 milliGRAM(s) Oral before breakfast    MEDICATIONS  (PRN):  acetaminophen     Tablet .. 650 milliGRAM(s) Oral every 6 hours PRN Temp greater or equal to 38C (100.4F), Mild Pain (1 - 3)  aluminum hydroxide/magnesium hydroxide/simethicone Suspension 30 milliLiter(s) Oral every 4 hours PRN Dyspepsia  fluticasone propionate 50 MICROgram(s)/spray Nasal Spray 2 Spray(s) Both Nostrils two times a day PRN nasal congestion  guaifenesin/dextromethorphan Oral Liquid 10 milliLiter(s) Oral every 6 hours PRN Cough  melatonin 3 milliGRAM(s) Oral at bedtime PRN Insomnia  ondansetron Injectable 4 milliGRAM(s) IV Push every 8 hours PRN Nausea and/or Vomiting      ALLERGIES:  Allergies    No Known Allergies    Intolerances        LABS:                        16.1   17.22 )-----------( 352      ( 07 Aug 2022 06:39 )             49.7     Hemoglobin: 16.1 g/dL (08-07 @ 06:39)  Hemoglobin: 14.3 g/dL (08-05 @ 08:07)  Hemoglobin: 14.8 g/dL (08-04 @ 09:54)  Hemoglobin: 14.7 g/dL (08-03 @ 07:22)    CBC Full  -  ( 07 Aug 2022 06:39 )  WBC Count : 17.22 K/uL  RBC Count : 5.57 M/uL  Hemoglobin : 16.1 g/dL  Hematocrit : 49.7 %  Platelet Count - Automated : 352 K/uL  Mean Cell Volume : 89.2 fL  Mean Cell Hemoglobin : 28.9 pg  Mean Cell Hemoglobin Concentration : 32.4 g/dL  Auto Neutrophil # : 15.19 K/uL  Auto Lymphocyte # : 0.87 K/uL  Auto Monocyte # : 1.00 K/uL  Auto Eosinophil # : 0.02 K/uL  Auto Basophil # : 0.01 K/uL  Auto Neutrophil % : 88.1 %  Auto Lymphocyte % : 5.1 %  Auto Monocyte % : 5.8 %  Auto Eosinophil % : 0.1 %  Auto Basophil % : 0.1 %    08-07    137  |  96<L>  |  28<H>  ----------------------------<  170<H>  4.6   |  28  |  0.8    Ca    9.0      07 Aug 2022 06:39  Mg     2.1     08-07    TPro  6.1  /  Alb  4.0  /  TBili  1.6<H>  /  DBili  x   /  AST  21  /  ALT  55<H>  /  AlkPhos  70  08-07    Creatinine Trend: 0.8<--, 0.7<--, 0.7<--, 0.8<--, 0.9<--, 0.9<--  LIVER FUNCTIONS - ( 07 Aug 2022 06:39 )  Alb: 4.0 g/dL / Pro: 6.1 g/dL / ALK PHOS: 70 U/L / ALT: 55 U/L / AST: 21 U/L / GGT: x               hs Troponin:              CSF:                      EKG:   MICROBIOLOGY:    IMAGING:      Labs, imaging, EKG personally reviewed    RADIOLOGY & ADDITIONAL TESTS: Reviewed.

## 2022-08-07 NOTE — PROGRESS NOTE ADULT - ASSESSMENT
IMPRESSION:  Severe COPD exacerbation   HO End stage COPD   Acute hypoxemic resp failure with hypercapnia       PLAN:    ·	Oxygen therapy to keep pulse ox 88 TO 92%  ·	Nebs q4hr and PRN  ·	NIV 4 hrs on/4hrs off/ PRN and QHS. Wean O2   ·	AAT level   ·	Continue Solumedrol 60 q 12  ·	Doxycycline 100 q 12 7 DAYS  ·	DVT ppx.    ·	Bed chair position  ·	very poor prognosis  ·	sdu  ·	GOC and advance directive

## 2022-08-08 NOTE — DIETITIAN INITIAL EVALUATION ADULT - OTHER CALCULATIONS
Estimated kcal/pro based on respiratory failure, hx COPD and PCM.  Estimated fluid based on PMHx and age.

## 2022-08-08 NOTE — PROGRESS NOTE ADULT - SUBJECTIVE AND OBJECTIVE BOX
Patient is a 71y old  Male who presents with a chief complaint of COPD Exacerbation (07 Aug 2022 10:36)        Over Night Events:  Remains SOB.  On NIV on and off.  Off pressors.          ROS:     All ROS are negative except HPI         PHYSICAL EXAM    ICU Vital Signs Last 24 Hrs  T(C): 35.9 (08 Aug 2022 07:00), Max: 36.7 (07 Aug 2022 19:32)  T(F): 96.6 (08 Aug 2022 07:00), Max: 98 (07 Aug 2022 19:32)  HR: 104 (08 Aug 2022 07:32) (99 - 112)  BP: 149/93 (08 Aug 2022 07:00) (117/77 - 149/93)  BP(mean): 112 (08 Aug 2022 07:00) (94 - 112)  ABP: --  ABP(mean): --  RR: 14 (08 Aug 2022 04:00) (14 - 20)  SpO2: 99% (08 Aug 2022 07:32) (97% - 99%)    O2 Parameters below as of 08 Aug 2022 07:00  Patient On (Oxygen Delivery Method): nasal cannula  O2 Flow (L/min): 3          CONSTITUTIONAL:  Ill appearing in moderate respiratory distress.      ENT:   Airway patent,   Mouth with normal mucosa.       EYES:   Pupils equal,   Round and reactive to light.    CARDIAC:   Tachycardic ,   Regular rhythm.        RESPIRATORY:   No wheezing  Bilateral BS  Normal chest expansion  Not tachypneic,  No use of accessory muscles    GASTROINTESTINAL:  Abdomen soft,   Non-tender,   No guarding,   + BS    MUSCULOSKELETAL:   Range of motion is not limited,  No clubbing, cyanosis    NEUROLOGICAL:   Alert and oriented   No motor  deficits.    SKIN:   Skin normal color for race,   No evidence of rash.    PSYCHIATRIC:   Normal mood and affect.   No apparent risk to self or others.        08-07-22 @ 07:01  -  08-08-22 @ 07:00  --------------------------------------------------------  IN:    IV PiggyBack: 100 mL    Oral Fluid: 120 mL  Total IN: 220 mL    OUT:    Voided (mL): 1150 mL  Total OUT: 1150 mL    Total NET: -930 mL          LABS:                            16.5   18.12 )-----------( 362      ( 08 Aug 2022 05:43 )             50.1                                               08-08    143  |  99  |  25<H>  ----------------------------<  105<H>  4.9   |  40<H>  |  0.7    Ca    9.7      08 Aug 2022 05:43  Mg     2.2     08-08    TPro  6.3  /  Alb  4.0  /  TBili  1.1  /  DBili  x   /  AST  29  /  ALT  75<H>  /  AlkPhos  69  08-08                                                                                           LIVER FUNCTIONS - ( 08 Aug 2022 05:43 )  Alb: 4.0 g/dL / Pro: 6.3 g/dL / ALK PHOS: 69 U/L / ALT: 75 U/L / AST: 29 U/L / GGT: x                                                                                                                                       MEDICATIONS  (STANDING):  albuterol/ipratropium for Nebulization 3 milliLiter(s) Nebulizer every 4 hours  budesonide 160 MICROgram(s)/formoterol 4.5 MICROgram(s) Inhaler 2 Puff(s) Inhalation two times a day  doxycycline IVPB      doxycycline IVPB 100 milliGRAM(s) IV Intermittent every 12 hours  enoxaparin Injectable 40 milliGRAM(s) SubCutaneous every 24 hours  glucagon  Injectable 1 milliGRAM(s) IntraMuscular once  insulin lispro (ADMELOG) corrective regimen sliding scale   SubCutaneous three times a day before meals  loratadine 10 milliGRAM(s) Oral daily  methylPREDNISolone sodium succinate Injectable 60 milliGRAM(s) IV Push every 12 hours  montelukast 10 milliGRAM(s) Oral daily  NIFEdipine XL 30 milliGRAM(s) Oral daily  pantoprazole    Tablet 40 milliGRAM(s) Oral before breakfast    MEDICATIONS  (PRN):  acetaminophen     Tablet .. 650 milliGRAM(s) Oral every 6 hours PRN Temp greater or equal to 38C (100.4F), Mild Pain (1 - 3)  aluminum hydroxide/magnesium hydroxide/simethicone Suspension 30 milliLiter(s) Oral every 4 hours PRN Dyspepsia  fluticasone propionate 50 MICROgram(s)/spray Nasal Spray 2 Spray(s) Both Nostrils two times a day PRN nasal congestion  guaifenesin/dextromethorphan Oral Liquid 10 milliLiter(s) Oral every 6 hours PRN Cough  melatonin 3 milliGRAM(s) Oral at bedtime PRN Insomnia  ondansetron Injectable 4 milliGRAM(s) IV Push every 8 hours PRN Nausea and/or Vomiting      New X-rays reviewed:                                                                                  ECHO

## 2022-08-08 NOTE — DIETITIAN INITIAL EVALUATION ADULT - ORAL NUTRITION SUPPLEMENTS
Add Ensure Enlive TID (350kcal, 20g protein each; more appropriate than Glucerna given pt needs both kcal and protein) Add Ensure Enlive TID (350kcal, 20g protein each; more appropriate than Glucerna given pt needs both kcal and protein). Please adjust medication to aid glycemic control.

## 2022-08-08 NOTE — DIETITIAN INITIAL EVALUATION ADULT - PERTINENT MEDS FT
MEDICATIONS  (STANDING):  ALBUTerol    0.083% 2.5 milliGRAM(s) Nebulizer every 6 hours  budesonide 160 MICROgram(s)/formoterol 4.5 MICROgram(s) Inhaler 2 Puff(s) Inhalation two times a day  doxycycline IVPB      doxycycline IVPB 100 milliGRAM(s) IV Intermittent every 12 hours  enoxaparin Injectable 40 milliGRAM(s) SubCutaneous every 24 hours  glucagon  Injectable 1 milliGRAM(s) IntraMuscular once  insulin lispro (ADMELOG) corrective regimen sliding scale   SubCutaneous three times a day before meals  loratadine 10 milliGRAM(s) Oral daily  methylPREDNISolone sodium succinate Injectable 60 milliGRAM(s) IV Push every 12 hours  montelukast 10 milliGRAM(s) Oral daily  NIFEdipine XL 30 milliGRAM(s) Oral daily  pantoprazole    Tablet 40 milliGRAM(s) Oral before breakfast  tiotropium 18 MICROgram(s) Capsule 1 Capsule(s) Inhalation daily    MEDICATIONS  (PRN):  acetaminophen     Tablet .. 650 milliGRAM(s) Oral every 6 hours PRN Temp greater or equal to 38C (100.4F), Mild Pain (1 - 3)  aluminum hydroxide/magnesium hydroxide/simethicone Suspension 30 milliLiter(s) Oral every 4 hours PRN Dyspepsia  fluticasone propionate 50 MICROgram(s)/spray Nasal Spray 2 Spray(s) Both Nostrils two times a day PRN nasal congestion  guaifenesin/dextromethorphan Oral Liquid 10 milliLiter(s) Oral every 6 hours PRN Cough  melatonin 3 milliGRAM(s) Oral at bedtime PRN Insomnia  ondansetron Injectable 4 milliGRAM(s) IV Push every 8 hours PRN Nausea and/or Vomiting   MEDICATIONS  (STANDING):  ALBUTerol    0.083% 2.5 milliGRAM(s) Nebulizer every 6 hours  budesonide 160 MICROgram(s)/formoterol 4.5 MICROgram(s) Inhaler 2 Puff(s) Inhalation two times a day  doxycycline IVPB      doxycycline IVPB 100 milliGRAM(s) IV Intermittent every 12 hours  enoxaparin Injectable 40 milliGRAM(s) SubCutaneous every 24 hours  glucagon  Injectable 1 milliGRAM(s) IntraMuscular once  insulin lispro (ADMELOG) corrective regimen sliding scale   SubCutaneous three times a day before meals  loratadine 10 milliGRAM(s) Oral daily  methylPREDNISolone sodium succinate Injectable 60 milliGRAM(s) IV Push every 12 hours  montelukast 10 milliGRAM(s) Oral daily  NIFEdipine XL 30 milliGRAM(s) Oral daily  pantoprazole    Tablet 40 milliGRAM(s) Oral before breakfast  tiotropium 18 MICROgram(s) Capsule 1 Capsule(s) Inhalation daily    MEDICATIONS  (PRN):  acetaminophen     Tablet .. 650 milliGRAM(s) Oral every 6 hours PRN Temp greater or equal to 38C (100.4F), Mild Pain (1 - 3)  aluminum hydroxide/magnesium hydroxide/simethicone Suspension 30 milliLiter(s) Oral every 4 hours PRN Dyspepsia  fluticasone propionate 50 MICROgram(s)/spray Nasal Spray 2 Spray(s) Both Nostrils two times a day PRN nasal congestion  guaifenesin/dextromethorphan Oral Liquid 10 milliLiter(s) Oral every 6 hours PRN Cough  melatonin 3 milliGRAM(s) Oral at bedtime PRN Insomnia  ondansetron Injectable 4 milliGRAM(s) IV Push every 8 hours PRN Nausea and/or Vomiting  -

## 2022-08-08 NOTE — PROGRESS NOTE ADULT - ASSESSMENT
IMPRESSION:  Severe COPD exacerbation   HO End stage COPD   Acute hypoxemic resp failure with hypercapnia       PLAN:    ·	Continue Oxygen therapy to keep pulse ox 88 TO 92%  ·	Nebs q4hr and PRN  ·	NIV 4 hrs on/4hrs off and QHS.  ·	AAT level   ·	Continue Solumedrol 60 q 12  ·	Doxycycline 100 q 12 5 DAYS  ·	DVT ppx.    ·	Bed chair position  ·	very poor prognosis  ·	sdu  ·	GOC and advance directive   ·	Overall prognosis poor

## 2022-08-08 NOTE — DIETITIAN INITIAL EVALUATION ADULT - WEIGHT FOR BMI (LBS)
Patient notified of pathology results and follow-up plan.     Patient stated the Charma Counter was 600.00 and would like an alternative  Patient stated she would only like a topical medication nothing oral  Electronically Signed by:    KIA Chapa 125

## 2022-08-08 NOTE — DIETITIAN INITIAL EVALUATION ADULT - ORAL INTAKE PTA/DIET HISTORY
Hx obtained from pt at bedside. Writer is a native speaker of pt's preferred language; interview conducted in Cantonese. Pt reports normal PO intake PTA. -130lbs and ht 170 cm. Reports throat closes up after eating black pepper and gets diarrhea with cow's milk. Pt has poor dentition with missing upper front teeth and  lower back teeth. Reports he eats minced meats and rice at home. No issues with fluids. No therapeutic diet or dietary restrictions. No supplement use.    Hx obtained from pt at bedside. Writer is a native speaker of pt's preferred language; interview conducted in Cantonese. Pt reports normal PO intake PTA. -130lbs and ht 170 cm. Reports throat closes up after eating black pepper and gets diarrhea with cow's milk. Pt has poor dentition with missing upper front teeth and  lower back teeth. Reports he eats minced meats and rice at home. No issues with fluids. No therapeutic diet or dietary restrictions. No supplement use.   -

## 2022-08-08 NOTE — PROGRESS NOTE ADULT - SUBJECTIVE AND OBJECTIVE BOX
INTERVAL HPI/OVERNIGHT EVENTS:    SUBJECTIVE: Patient seen and examined at bedside.   cc: sob  no cp, abd pain, fever  sob ongoing, no cough, orthopnea, pnd    OBJECTIVE:    VITAL SIGNS:  Vital Signs Last 24 Hrs  T(C): 35.9 (08 Aug 2022 07:00), Max: 36.7 (07 Aug 2022 19:32)  T(F): 96.6 (08 Aug 2022 07:00), Max: 98 (07 Aug 2022 19:32)  HR: 104 (08 Aug 2022 07:32) (99 - 112)  BP: 149/93 (08 Aug 2022 07:00) (117/77 - 149/93)  BP(mean): 112 (08 Aug 2022 07:00) (94 - 112)  RR: 14 (08 Aug 2022 04:00) (14 - 20)  SpO2: 99% (08 Aug 2022 07:32) (97% - 99%)    Parameters below as of 08 Aug 2022 07:00  Patient On (Oxygen Delivery Method): nasal cannula  O2 Flow (L/min): 3        PHYSICAL EXAM:    General: NAD  HEENT: NC/AT; PERRL, clear conjunctiva  Neck: supple  Respiratory: CTA b/l  Cardiovascular: +S1/S2; RRR  Abdomen: soft, NT/ND; +BS x4  Extremities: WWP, 2+ peripheral pulses b/l; no LE edema  Skin: normal color and turgor; no rash  Neurological:    MEDICATIONS:  MEDICATIONS  (STANDING):  ALBUTerol    0.083% 2.5 milliGRAM(s) Nebulizer every 6 hours  budesonide 160 MICROgram(s)/formoterol 4.5 MICROgram(s) Inhaler 2 Puff(s) Inhalation two times a day  doxycycline IVPB      doxycycline IVPB 100 milliGRAM(s) IV Intermittent every 12 hours  enoxaparin Injectable 40 milliGRAM(s) SubCutaneous every 24 hours  glucagon  Injectable 1 milliGRAM(s) IntraMuscular once  insulin lispro (ADMELOG) corrective regimen sliding scale   SubCutaneous three times a day before meals  loratadine 10 milliGRAM(s) Oral daily  methylPREDNISolone sodium succinate Injectable 60 milliGRAM(s) IV Push every 12 hours  montelukast 10 milliGRAM(s) Oral daily  NIFEdipine XL 30 milliGRAM(s) Oral daily  pantoprazole    Tablet 40 milliGRAM(s) Oral before breakfast  tiotropium 18 MICROgram(s) Capsule 1 Capsule(s) Inhalation daily    MEDICATIONS  (PRN):  acetaminophen     Tablet .. 650 milliGRAM(s) Oral every 6 hours PRN Temp greater or equal to 38C (100.4F), Mild Pain (1 - 3)  aluminum hydroxide/magnesium hydroxide/simethicone Suspension 30 milliLiter(s) Oral every 4 hours PRN Dyspepsia  fluticasone propionate 50 MICROgram(s)/spray Nasal Spray 2 Spray(s) Both Nostrils two times a day PRN nasal congestion  guaifenesin/dextromethorphan Oral Liquid 10 milliLiter(s) Oral every 6 hours PRN Cough  melatonin 3 milliGRAM(s) Oral at bedtime PRN Insomnia  ondansetron Injectable 4 milliGRAM(s) IV Push every 8 hours PRN Nausea and/or Vomiting      ALLERGIES:  Allergies    No Known Allergies    Intolerances        LABS:                        16.5   18.12 )-----------( 362      ( 08 Aug 2022 05:43 )             50.1     Hemoglobin: 16.5 g/dL (08-08 @ 05:43)  Hemoglobin: 16.1 g/dL (08-07 @ 06:39)  Hemoglobin: 14.3 g/dL (08-05 @ 08:07)  Hemoglobin: 14.8 g/dL (08-04 @ 09:54)    CBC Full  -  ( 08 Aug 2022 05:43 )  WBC Count : 18.12 K/uL  RBC Count : 5.62 M/uL  Hemoglobin : 16.5 g/dL  Hematocrit : 50.1 %  Platelet Count - Automated : 362 K/uL  Mean Cell Volume : 89.1 fL  Mean Cell Hemoglobin : 29.4 pg  Mean Cell Hemoglobin Concentration : 32.9 g/dL  Auto Neutrophil # : 15.02 K/uL  Auto Lymphocyte # : 1.05 K/uL  Auto Monocyte # : 1.79 K/uL  Auto Eosinophil # : 0.02 K/uL  Auto Basophil # : 0.03 K/uL  Auto Neutrophil % : 82.8 %  Auto Lymphocyte % : 5.8 %  Auto Monocyte % : 9.9 %  Auto Eosinophil % : 0.1 %  Auto Basophil % : 0.2 %    08-08    143  |  99  |  25<H>  ----------------------------<  105<H>  4.9   |  40<H>  |  0.7    Ca    9.7      08 Aug 2022 05:43  Mg     2.2     08-08    TPro  6.3  /  Alb  4.0  /  TBili  1.1  /  DBili  x   /  AST  29  /  ALT  75<H>  /  AlkPhos  69  08-08    Creatinine Trend: 0.7<--, 0.8<--, 0.7<--, 0.7<--, 0.8<--, 0.9<--  LIVER FUNCTIONS - ( 08 Aug 2022 05:43 )  Alb: 4.0 g/dL / Pro: 6.3 g/dL / ALK PHOS: 69 U/L / ALT: 75 U/L / AST: 29 U/L / GGT: x               hs Troponin:              CSF:                      EKG:   MICROBIOLOGY:    IMAGING:      Labs, imaging, EKG personally reviewed    RADIOLOGY & ADDITIONAL TESTS: Reviewed.

## 2022-08-08 NOTE — DIETITIAN INITIAL EVALUATION ADULT - ENERGY INTAKE
Include Location In Plan?: No
Detail Level: Generalized
Detail Level: Zone
Reports poor PO intake in-house with tray intake 0-50%. Denies n/v. Reports he has had 2 BMs since he was admitted. He would avoid making BMs due to needing assistance before and after.   -

## 2022-08-08 NOTE — DIETITIAN INITIAL EVALUATION ADULT - PERTINENT LABORATORY DATA
08-08    143  |  99  |  25<H>  ----------------------------<  105<H>  4.9   |  40<H>  |  0.7    Ca    9.7      08 Aug 2022 05:43  Mg     2.2     08-08    TPro  6.3  /  Alb  4.0  /  TBili  1.1  /  DBili  x   /  AST  29  /  ALT  75<H>  /  AlkPhos  69  08-08  POCT Blood Glucose.: 151 mg/dL (08-08-22 @ 11:58)  A1C with Estimated Average Glucose Result: 6.5 % (07-31-22 @ 05:52)   08-08    143  |  99  |  25<H>  ----------------------------<  105<H>  4.9   |  40<H>  |  0.7    Ca    9.7      08 Aug 2022 05:43  Mg     2.2     08-08    TPro  6.3  /  Alb  4.0  /  TBili  1.1  /  DBili  x   /  AST  29  /  ALT  75<H>  /  AlkPhos  69  08-08    CAPILLARY BLOOD GLUCOSE  POCT Blood Glucose.: 151 mg/dL (08 Aug 2022 11:58)  POCT Blood Glucose.: 113 mg/dL (08 Aug 2022 05:50)  POCT Blood Glucose.: 146 mg/dL (07 Aug 2022 16:12)    A1C with Estimated Average Glucose Result: 6.5 % (07-31-22 @ 05:52)  -

## 2022-08-08 NOTE — DIETITIAN INITIAL EVALUATION ADULT - NSFNSGIIOFT_GEN_A_CORE
-  I&O's Detail    07 Aug 2022 07:01  -  08 Aug 2022 07:00  --------------------------------------------------------  IN:    IV PiggyBack: 100 mL    Oral Fluid: 120 mL  Total IN: 220 mL    OUT:    Voided (mL): 1150 mL  Total OUT: 1150 mL    Total NET: -930 mL

## 2022-08-09 NOTE — PROGRESS NOTE ADULT - PROBLEM SELECTOR PLAN 2
- monitor on PO diet - plans have been for ongoing full care  - will address GOC with pt and family - on morphine 4mg IV PRN, no doses used, would consider morphine IV 2mg as patient appears opioid naive

## 2022-08-09 NOTE — PROGRESS NOTE ADULT - SUBJECTIVE AND OBJECTIVE BOX
HPI: 71yMale with PMH including COPD on 2L home O2 & CPAP (unclear setting), Hx COPD Exacerbation w/ Intubation x 3 d in 2004, Asthma, DM s/p right transmetatarsal amputation, admitted with COPD exacerbation, currently on Bipap, attempting to wean. For now, the patient and family want full aggressive medical management. They are open to palliative following along and re-addressing GOC as things progress.     INTERVAL EVENTS:  8/3: patient comfortbale, on HFNC, is on diet  8/4: states having cough- signed off as goals were clear  8/8: reconsulted for GOC. Pt remains on Bipap, poorly compliant.     ADVANCE DIRECTIVES:    MOLST  [ ]  Living Will  [ ]   DECISION MAKER(s): Patient   [ ] Health Care Proxy(s)  [ X] Surrogate(s)  [ ] Guardian           Name(s): Phone Number(s): Fletcher     BASELINE (I)ADL(s) (prior to admission):  [place]: [ ]Total  [X ] Moderate [ ]Dependent  Palliative Performance Status Version 2:         %    http://npcrc.org/files/news/palliative_performance_scale_ppsv2.pdf    Allergies    No Known Allergies    Intolerances    MEDICATIONS  (STANDING):  albuterol/ipratropium for Nebulization 3 milliLiter(s) Nebulizer every 6 hours  budesonide 160 MICROgram(s)/formoterol 4.5 MICROgram(s) Inhaler 2 Puff(s) Inhalation two times a day  doxycycline IVPB      doxycycline IVPB 100 milliGRAM(s) IV Intermittent every 12 hours  enoxaparin Injectable 40 milliGRAM(s) SubCutaneous every 24 hours  glucagon  Injectable 1 milliGRAM(s) IntraMuscular once  insulin lispro (ADMELOG) corrective regimen sliding scale   SubCutaneous three times a day before meals  loratadine 10 milliGRAM(s) Oral daily  methylPREDNISolone sodium succinate Injectable 60 milliGRAM(s) IV Push every 12 hours  montelukast 10 milliGRAM(s) Oral daily  NIFEdipine XL 30 milliGRAM(s) Oral daily  pantoprazole    Tablet 40 milliGRAM(s) Oral before breakfast  tiotropium 18 MICROgram(s) Capsule 1 Capsule(s) Inhalation daily    MEDICATIONS  (PRN):  acetaminophen     Tablet .. 650 milliGRAM(s) Oral every 6 hours PRN Temp greater or equal to 38C (100.4F), Mild Pain (1 - 3)  aluminum hydroxide/magnesium hydroxide/simethicone Suspension 30 milliLiter(s) Oral every 4 hours PRN Dyspepsia  fluticasone propionate 50 MICROgram(s)/spray Nasal Spray 2 Spray(s) Both Nostrils two times a day PRN nasal congestion  guaifenesin/dextromethorphan Oral Liquid 10 milliLiter(s) Oral every 6 hours PRN Cough  hydrocodone/homatropine Syrup 5 milliLiter(s) Oral every 8 hours PRN Cough  melatonin 3 milliGRAM(s) Oral at bedtime PRN Insomnia  ondansetron Injectable 4 milliGRAM(s) IV Push every 8 hours PRN Nausea and/or Vomiting    PRESENT SYMPTOMS:   Pain: [ X]yes [ ]no  QOL impact - new pain today  Location -         chest           Aggravating factors -  inhalation   Quality -   Radiation -  Timing-  Severity (0-10 scale):  Minimal acceptable level (0-10 scale):     CPOT:  0  https://www.[facility].org/getattachment/[email or web address]/Critical-Care-Pain-Observation-Tool-(CPOT)    Dyspnea:                           [ ]Mild [ ]Moderate [ ]Severe - denies   Anxiety:                             [ ]Mild [ ]Moderate [ ]Severe  Fatigue:                             [ ]Mild [ ]Moderate [ ]Severe  Nausea:                             [ ]Mild [ ]Moderate [ ]Severe  Loss of appetite:              [ ]Mild [ ]Moderate [ ]Severe  Constipation:                    [ ]Mild [ ]Moderate [ ]Severe    Other Symptoms:  [ X]All other review of systems negative     Palliative Performance Status Version 2:      30   %    http://npcrc.org/files/news/palliative_performance_scale_ppsv2.pdf    PHYSICAL EXAM:  HR: 80 (04 Aug 2022 07:04) (76 - 110)  BP: 126/83 (04 Aug 2022 07:04) (126/74 - 151/85)  BP(mean): 108 (03 Aug 2022 15:35) (108 - 108)  RR: 20 (04 Aug 2022 07:04) (20 - 20)  SpO2: 100% (04 Aug 2022 07:04) (100% - 100%)    GENERAL: used Cantonese   [X ]Alert  [X ]Oriented x  3 [ ]Lethargic  [ ]Cachexia  [ ]Unarousable  [X ]Verbal  [ ]Non-Verbal  Behavioral:   [ ] Anxiety  [ ] Delirium [ ] Agitation [X ] Calm [ ] Other  HEENT:  [ ]Normal   [ ]Dry mouth   [X] HNFNC [ ]ET Tube/Trach  [ ]Oral lesions  PULMONARY:   [ ]Clear [ ]Tachypnea  [ ]Audible excessive secretions [X ] No labored breathing  [ ]Rhonchi        [ ]Right [ ]Left [ ]Bilateral  [ ]Crackles        [ ]Right [ ]Left [ ]Bilateral  [ ]Wheezing     [ ]Right [ ]Left [ ]Bilateral  [ ]Diminished breath sounds [ ]right [ ]left [ ]bilateral  CARDIOVASCULAR:    [ ]Regular [ ]Irregular [ ]Tachy  [ ]Arnaldo [ ]Murmur [ ]Other  GASTROINTESTINAL:  [ ]Soft  [ ]Distended  [X ] Not distended [ ]+BS  [ ]Non tender [ ]Tender  [ ]PEG [ ]OGT/ NGT  Last BM:   GENITOURINARY:  [X]Normal [ ] Incontinent   [ ]Oliguria/Anuria   [ ]Gutierrez  MUSCULOSKELETAL:   [ ]Normal   [ ]Weakness  [ ]Bed/Wheelchair bound [ ]Edema  NEUROLOGIC:   [X ]No focal deficits  [ ]Cognitive impairment  [ ]Dysphagia [ ]Dysarthria [ ]Paresis [ ]Other   SKIN:   [ ]Normal   [X ] Nonjaundiced [ ]Rash  [ ]Pressure ulcer(s)       Present on admission [ ]y [ ]n    CRITICAL CARE:  [ ] Shock Present  [ ]Septic [ ]Cardiogenic [ ]Neurologic [ ]Hypovolemic  [ ]  Vasopressors [ ]  Inotropes   [ ]Respiratory failure present [ ]Mechanical ventilation [ ]Non-invasive ventilatory support [X ]High flow  [ ]Acute  [ ]Chronic [ ]Hypoxic  [ ]Hypercarbic [ ]Other  [ ]Other organ failure     LABS: reviewed    08-04    139  |  101  |  26<H>  ----------------------------<  156<H>  4.3   |  29  |  0.7    Ca    9.0      04 Aug 2022 09:54  Mg     2.3     08-04    TPro  6.0  /  Alb  3.9  /  TBili  1.1  /  DBili  x   /  AST  44<H>  /  ALT  89<H>  /  AlkPhos  65  08-04                            14.8   13.18 )-----------( 288      ( 04 Aug 2022 09:54 )             44.5       RADIOLOGY & ADDITIONAL STUDIES:    < from: CT Angio Chest PE Protocol w/ IV Cont (07.30.22 @ 14:25) >    IMPRESSION:    No pulmonary embolus seen. No CT evidence of acute right heart strain.    Severe emphysema. Saber-sheath appearance of the trachea. Documented   flattening. Correlate for COPD.    New reticulonodular changes of the right upper lobe on series 4 image 94   measuring up to 5 mm in thickness.  Additional new branching nodular opacities in the left upper lobe on   series 4 image 84 with largest nodule measuring 5 mm.    Follow-up chest CT in 6 months is recommended.    If patient meets criteria, consider enrollment into a lung cancer   screening program with annual low-dose chest CT.    1.1 cm focal enhancement in the right hepatic lobe likely reflecting a   flash filling hemangioma. Nonemergent ultrasound suggested for   confirmation.    Partially imaged supraglottic right tracheal diverticulum.    --- End of Report ---    < end of copied text >   HPI: 71yMale with PMH including COPD on 2L home O2 & CPAP (unclear setting), Hx COPD Exacerbation w/ Intubation x 3 d in 2004, Asthma, DM s/p right transmetatarsal amputation, admitted with COPD exacerbation, currently on Bipap, attempting to wean. For now, the patient and family want full aggressive medical management. They are open to palliative following along and re-addressing GOC as things progress.     INTERVAL EVENTS:  8/3: patient comfortbale, on HFNC, is on diet  8/4: states having cough- signed off as goals were clear  8/8: reconsulted for GOC. Pt remains on Bipap, poorly compliant.     ADVANCE DIRECTIVES:    MOLST  [ ]  Living Will  [ ]   DECISION MAKER(s): Patient   [ ] Health Care Proxy(s)  [ X] Surrogate(s)  [ ] Guardian           Name(s): Phone Number(s): Fletcher     BASELINE (I)ADL(s) (prior to admission):  [place]: [ ]Total  [X ] Moderate [ ]Dependent  Palliative Performance Status Version 2:         %    http://npcrc.org/files/news/palliative_performance_scale_ppsv2.pdf    Allergies    No Known Allergies    Intolerances    MEDICATIONS  (STANDING):  albuterol/ipratropium for Nebulization 3 milliLiter(s) Nebulizer every 6 hours  budesonide 160 MICROgram(s)/formoterol 4.5 MICROgram(s) Inhaler 2 Puff(s) Inhalation two times a day  doxycycline IVPB      doxycycline IVPB 100 milliGRAM(s) IV Intermittent every 12 hours  enoxaparin Injectable 40 milliGRAM(s) SubCutaneous every 24 hours  glucagon  Injectable 1 milliGRAM(s) IntraMuscular once  insulin lispro (ADMELOG) corrective regimen sliding scale   SubCutaneous three times a day before meals  loratadine 10 milliGRAM(s) Oral daily  methylPREDNISolone sodium succinate Injectable 60 milliGRAM(s) IV Push every 12 hours  montelukast 10 milliGRAM(s) Oral daily  NIFEdipine XL 30 milliGRAM(s) Oral daily  pantoprazole    Tablet 40 milliGRAM(s) Oral before breakfast  tiotropium 18 MICROgram(s) Capsule 1 Capsule(s) Inhalation daily    MEDICATIONS  (PRN):  acetaminophen     Tablet .. 650 milliGRAM(s) Oral every 6 hours PRN Temp greater or equal to 38C (100.4F), Mild Pain (1 - 3)  aluminum hydroxide/magnesium hydroxide/simethicone Suspension 30 milliLiter(s) Oral every 4 hours PRN Dyspepsia  fluticasone propionate 50 MICROgram(s)/spray Nasal Spray 2 Spray(s) Both Nostrils two times a day PRN nasal congestion  guaifenesin/dextromethorphan Oral Liquid 10 milliLiter(s) Oral every 6 hours PRN Cough  hydrocodone/homatropine Syrup 5 milliLiter(s) Oral every 8 hours PRN Cough  melatonin 3 milliGRAM(s) Oral at bedtime PRN Insomnia  ondansetron Injectable 4 milliGRAM(s) IV Push every 8 hours PRN Nausea and/or Vomiting    PRESENT SYMPTOMS:   Pain: [ X]yes [ ]no  QOL impact - new pain today  Location -         chest           Aggravating factors -  inhalation   Quality -   Radiation -  Timing-  Severity (0-10 scale):  Minimal acceptable level (0-10 scale):     CPOT:  0  https://www.[facility].org/getattachment/[email or web address]/Critical-Care-Pain-Observation-Tool-(CPOT)    Dyspnea:                           [ ]Mild [ ]Moderate [ ]Severe - denies   Anxiety:                             [ ]Mild [ ]Moderate [ ]Severe  Fatigue:                             [ ]Mild [ ]Moderate [ ]Severe  Nausea:                             [ ]Mild [ ]Moderate [ ]Severe  Loss of appetite:              [ ]Mild [ ]Moderate [ ]Severe  Constipation:                    [ ]Mild [ ]Moderate [ ]Severe    Other Symptoms:  [ X]All other review of systems negative     Palliative Performance Status Version 2:      30   %    http://npcrc.org/files/news/palliative_performance_scale_ppsv2.pdf    PHYSICAL EXAM:  ICU Vital Signs Last 24 Hrs  T(C): 36.3 (09 Aug 2022 08:00), Max: 36.6 (08 Aug 2022 23:48)  T(F): 97.3 (09 Aug 2022 08:00), Max: 97.9 (08 Aug 2022 23:48)  HR: 120 (09 Aug 2022 11:00) (85 - 120)  BP: 155/90 (09 Aug 2022 11:00) (109/83 - 155/90)  BP(mean): 116 (09 Aug 2022 11:00) (89 - 116)  ABP: --  ABP(mean): --  RR: 16 (09 Aug 2022 08:00) (16 - 20)  SpO2: 96% (09 Aug 2022 11:00) (93% - 98%)    GENERAL: used Cantonese   [X ]Alert  [X ]Oriented x  3 [ ]Lethargic  [ ]Cachexia  [ ]Unarousable  [X ]Verbal  [ ]Non-Verbal  Behavioral:   [ ] Anxiety  [ ] Delirium [ ] Agitation [X ] Calm [ ] Other  HEENT:  [ X]Normal   [ ]Dry mouth   [] HNFNC [ ]ET Tube/Trach  [ ]Oral lesions  PULMONARY:   [ ]Clear [ ]Tachypnea  [ ]Audible excessive secretions [X ] No labored breathing  On Bipap   CARDIOVASCULAR:    [ X]Regular [ ]Irregular [ ]Tachy  [ ]Arnaldo [ ]Murmur [ ]Other  GASTROINTESTINAL:  X[ ]Soft  [ ]Distended  [X ] Not distended [ ]+BS  [ ]Non tender [ ]Tender  [ ]PEG [ ]OGT/ NGT  Last BM:   GENITOURINARY:  [X]Normal [ ] Incontinent   [ ]Oliguria/Anuria   [ ]Gutierrez  MUSCULOSKELETAL:   [ ]Normal   [ ]Weakness  [ ]Bed/Wheelchair bound [ ]Edema  NEUROLOGIC:   [X ]No focal deficits  [ ]Cognitive impairment  [ ]Dysphagia [ ]Dysarthria [ ]Paresis [ ]Other   SKIN:   [ ]Normal   [X ] Nonjaundiced [ ]Rash  [ ]Pressure ulcer(s)       Present on admission [ ]y [ ]n    CRITICAL CARE:  [ ] Shock Present  [ ]Septic [ ]Cardiogenic [ ]Neurologic [ ]Hypovolemic  [ ]  Vasopressors [ ]  Inotropes   [ ]Respiratory failure present [ ]Mechanical ventilation [X ]Non-invasive ventilatory support [ ]High flow  [ ]Acute  [ ]Chronic [ ]Hypoxic  [ ]Hypercarbic [ ]Other  [ ]Other organ failure     LABS: reviewed      08-09    139  |  96<L>  |  28<H>  ----------------------------<  119<H>  5.5<H>   |  36<H>  |  0.9    Ca    9.6      09 Aug 2022 05:26  Mg     2.3     08-09    TPro  6.0  /  Alb  3.7  /  TBili  2.3<H>  /  DBili  x   /  AST  25  /  ALT  68<H>  /  AlkPhos  68  08-09                            16.4   15.88 )-----------( 379      ( 09 Aug 2022 05:26 )             50.5             RADIOLOGY & ADDITIONAL STUDIES:    < from: Xray Chest 1 View- PORTABLE-Urgent (Xray Chest 1 View- PORTABLE-Urgent .) (08.03.22 @ 10:36) >  Skeleton/soft tissues: Stable.    Impression:    1. No significant change in bilateral nodular opacities, better   appreciated on reference CT.        --- End of Report ---          < end of copied text >     HPI: 71yMale with PMH including COPD on 2L home O2 & CPAP (unclear setting), Hx COPD Exacerbation w/ Intubation x 3 d in 2004, Asthma, DM s/p right transmetatarsal amputation, admitted with COPD exacerbation, currently on Bipap, attempting to wean. For now, the patient and family want full aggressive medical management. They are open to palliative following along and re-addressing GOC as things progress.     INTERVAL EVENTS:  8/3: patient comfortbale, on HFNC, is on diet  8/4: states having cough- signed off as goals were clear  8/9: reconsulted for GOC. Pt remains on Bipap, poorly compliant.     ADVANCE DIRECTIVES:    MOLST  [ ]  Living Will  [ ]   DECISION MAKER(s): Patient   [ ] Health Care Proxy(s)  [ X] Surrogate(s)  [ ] Guardian           Name(s): Phone Number(s): Fletcher     BASELINE (I)ADL(s) (prior to admission):  [place]: [ ]Total  [X ] Moderate [ ]Dependent  Palliative Performance Status Version 2:         %    http://npcrc.org/files/news/palliative_performance_scale_ppsv2.pdf    Allergies    No Known Allergies    Intolerances    MEDICATIONS  (STANDING):  albuterol/ipratropium for Nebulization 3 milliLiter(s) Nebulizer every 6 hours  budesonide 160 MICROgram(s)/formoterol 4.5 MICROgram(s) Inhaler 2 Puff(s) Inhalation two times a day  doxycycline IVPB      doxycycline IVPB 100 milliGRAM(s) IV Intermittent every 12 hours  enoxaparin Injectable 40 milliGRAM(s) SubCutaneous every 24 hours  glucagon  Injectable 1 milliGRAM(s) IntraMuscular once  insulin lispro (ADMELOG) corrective regimen sliding scale   SubCutaneous three times a day before meals  loratadine 10 milliGRAM(s) Oral daily  methylPREDNISolone sodium succinate Injectable 60 milliGRAM(s) IV Push every 12 hours  montelukast 10 milliGRAM(s) Oral daily  NIFEdipine XL 30 milliGRAM(s) Oral daily  pantoprazole    Tablet 40 milliGRAM(s) Oral before breakfast  tiotropium 18 MICROgram(s) Capsule 1 Capsule(s) Inhalation daily    MEDICATIONS  (PRN):  acetaminophen     Tablet .. 650 milliGRAM(s) Oral every 6 hours PRN Temp greater or equal to 38C (100.4F), Mild Pain (1 - 3)  aluminum hydroxide/magnesium hydroxide/simethicone Suspension 30 milliLiter(s) Oral every 4 hours PRN Dyspepsia  fluticasone propionate 50 MICROgram(s)/spray Nasal Spray 2 Spray(s) Both Nostrils two times a day PRN nasal congestion  guaifenesin/dextromethorphan Oral Liquid 10 milliLiter(s) Oral every 6 hours PRN Cough  hydrocodone/homatropine Syrup 5 milliLiter(s) Oral every 8 hours PRN Cough  melatonin 3 milliGRAM(s) Oral at bedtime PRN Insomnia  ondansetron Injectable 4 milliGRAM(s) IV Push every 8 hours PRN Nausea and/or Vomiting    PRESENT SYMPTOMS:   Pain: [ X]yes [ ]no  QOL impact - new pain today  Location -         chest           Aggravating factors -  inhalation   Quality -   Radiation -  Timing-  Severity (0-10 scale):  Minimal acceptable level (0-10 scale):     CPOT:  0  https://www.[facility].org/getattachment/[email or web address]/Critical-Care-Pain-Observation-Tool-(CPOT)    Dyspnea:                           [ ]Mild [ ]Moderate [ ]Severe - denies   Anxiety:                             [ ]Mild [ ]Moderate [ ]Severe  Fatigue:                             [ ]Mild [ ]Moderate [ ]Severe  Nausea:                             [ ]Mild [ ]Moderate [ ]Severe  Loss of appetite:              [ ]Mild [ ]Moderate [ ]Severe  Constipation:                    [ ]Mild [ ]Moderate [ ]Severe    Other Symptoms:  [ X]All other review of systems negative     Palliative Performance Status Version 2:      30   %    http://npcrc.org/files/news/palliative_performance_scale_ppsv2.pdf    PHYSICAL EXAM:  ICU Vital Signs Last 24 Hrs  T(C): 36.3 (09 Aug 2022 08:00), Max: 36.6 (08 Aug 2022 23:48)  T(F): 97.3 (09 Aug 2022 08:00), Max: 97.9 (08 Aug 2022 23:48)  HR: 120 (09 Aug 2022 11:00) (85 - 120)  BP: 155/90 (09 Aug 2022 11:00) (109/83 - 155/90)  BP(mean): 116 (09 Aug 2022 11:00) (89 - 116)  ABP: --  ABP(mean): --  RR: 16 (09 Aug 2022 08:00) (16 - 20)  SpO2: 96% (09 Aug 2022 11:00) (93% - 98%)    GENERAL: used Cantonese   [X ]Alert  [X ]Oriented x  3 [ ]Lethargic  [ ]Cachexia  [ ]Unarousable  [X ]Verbal  [ ]Non-Verbal  Behavioral:   [ ] Anxiety  [ ] Delirium [ ] Agitation [X ] Calm [ ] Other  HEENT:  [ X]Normal   [ ]Dry mouth   [] HNFNC [ ]ET Tube/Trach  [ ]Oral lesions  PULMONARY:   [ ]Clear [ ]Tachypnea  [ ]Audible excessive secretions [X ] No labored breathing  On Bipap   CARDIOVASCULAR:    [ X]Regular [ ]Irregular [ ]Tachy  [ ]Arnaldo [ ]Murmur [ ]Other  GASTROINTESTINAL:  X[ ]Soft  [ ]Distended  [X ] Not distended [ ]+BS  [ ]Non tender [ ]Tender  [ ]PEG [ ]OGT/ NGT  Last BM:   GENITOURINARY:  [X]Normal [ ] Incontinent   [ ]Oliguria/Anuria   [ ]Gutierrez  MUSCULOSKELETAL:   [ ]Normal   [ ]Weakness  [ ]Bed/Wheelchair bound [ ]Edema  NEUROLOGIC:   [X ]No focal deficits  [ ]Cognitive impairment  [ ]Dysphagia [ ]Dysarthria [ ]Paresis [ ]Other   SKIN:   [ ]Normal   [X ] Nonjaundiced [ ]Rash  [ ]Pressure ulcer(s)       Present on admission [ ]y [ ]n    CRITICAL CARE:  [ ] Shock Present  [ ]Septic [ ]Cardiogenic [ ]Neurologic [ ]Hypovolemic  [ ]  Vasopressors [ ]  Inotropes   [ ]Respiratory failure present [ ]Mechanical ventilation [X ]Non-invasive ventilatory support [ ]High flow  [ ]Acute  [ ]Chronic [ ]Hypoxic  [ ]Hypercarbic [ ]Other  [ ]Other organ failure     LABS: reviewed      08-09    139  |  96<L>  |  28<H>  ----------------------------<  119<H>  5.5<H>   |  36<H>  |  0.9    Ca    9.6      09 Aug 2022 05:26  Mg     2.3     08-09    TPro  6.0  /  Alb  3.7  /  TBili  2.3<H>  /  DBili  x   /  AST  25  /  ALT  68<H>  /  AlkPhos  68  08-09                            16.4   15.88 )-----------( 379      ( 09 Aug 2022 05:26 )             50.5             RADIOLOGY & ADDITIONAL STUDIES:    < from: Xray Chest 1 View- PORTABLE-Urgent (Xray Chest 1 View- PORTABLE-Urgent .) (08.03.22 @ 10:36) >  Skeleton/soft tissues: Stable.    Impression:    1. No significant change in bilateral nodular opacities, better   appreciated on reference CT.        --- End of Report ---          < end of copied text >

## 2022-08-09 NOTE — PROGRESS NOTE ADULT - SUBJECTIVE AND OBJECTIVE BOX
INTERVAL HPI/OVERNIGHT EVENTS:    SUBJECTIVE: Patient seen and examined at bedside.     cc: sob  no cp, abd pain, fever  sob ongoing, no cough, orthopnea, pnd    OBJECTIVE:    VITAL SIGNS:  Vital Signs Last 24 Hrs  T(C): 36.3 (09 Aug 2022 08:00), Max: 36.6 (08 Aug 2022 23:48)  T(F): 97.3 (09 Aug 2022 08:00), Max: 97.9 (08 Aug 2022 23:48)  HR: 120 (09 Aug 2022 11:00) (62 - 120)  BP: 155/90 (09 Aug 2022 11:00) (109/83 - 155/90)  BP(mean): 116 (09 Aug 2022 11:00) (89 - 116)  RR: 16 (09 Aug 2022 08:00) (16 - 20)  SpO2: 96% (09 Aug 2022 11:00) (93% - 98%)    Parameters below as of 09 Aug 2022 08:00  Patient On (Oxygen Delivery Method): nasal cannula  O2 Flow (L/min): 3        PHYSICAL EXAM:    General: NAD  HEENT: NC/AT; PERRL, clear conjunctiva  Neck: supple  Respiratory: decreased bs at bases  Cardiovascular: +S1/S2; RRR  Abdomen: soft, NT/ND; +BS x4  Extremities: WWP, 2+ peripheral pulses b/l; no LE edema  Skin: normal color and turgor; no rash  Neurological:    MEDICATIONS:  MEDICATIONS  (STANDING):  ALBUTerol    0.083% 2.5 milliGRAM(s) Nebulizer every 4 hours  ALBUTerol    90 MICROgram(s) HFA Inhaler 2 Puff(s) Inhalation every 4 hours  budesonide 160 MICROgram(s)/formoterol 4.5 MICROgram(s) Inhaler 2 Puff(s) Inhalation two times a day  enoxaparin Injectable 40 milliGRAM(s) SubCutaneous every 24 hours  glucagon  Injectable 1 milliGRAM(s) IntraMuscular once  insulin lispro (ADMELOG) corrective regimen sliding scale   SubCutaneous three times a day before meals  loratadine 10 milliGRAM(s) Oral daily  methylPREDNISolone sodium succinate Injectable 60 milliGRAM(s) IV Push every 12 hours  montelukast 10 milliGRAM(s) Oral daily  NIFEdipine XL 30 milliGRAM(s) Oral daily  pantoprazole    Tablet 40 milliGRAM(s) Oral before breakfast  sodium zirconium cyclosilicate 5 Gram(s) Oral two times a day  tiotropium 18 MICROgram(s) Capsule 1 Capsule(s) Inhalation daily    MEDICATIONS  (PRN):  acetaminophen     Tablet .. 650 milliGRAM(s) Oral every 6 hours PRN Temp greater or equal to 38C (100.4F), Mild Pain (1 - 3)  aluminum hydroxide/magnesium hydroxide/simethicone Suspension 30 milliLiter(s) Oral every 4 hours PRN Dyspepsia  fluticasone propionate 50 MICROgram(s)/spray Nasal Spray 2 Spray(s) Both Nostrils two times a day PRN nasal congestion  guaifenesin/dextromethorphan Oral Liquid 10 milliLiter(s) Oral every 6 hours PRN Cough  melatonin 3 milliGRAM(s) Oral at bedtime PRN Insomnia  ondansetron Injectable 4 milliGRAM(s) IV Push every 8 hours PRN Nausea and/or Vomiting      ALLERGIES:  Allergies    Black pepper (Anaphylaxis)  No Known Drug Allergies    Intolerances        LABS:                        16.4   15.88 )-----------( 379      ( 09 Aug 2022 05:26 )             50.5     Hemoglobin: 16.4 g/dL (08-09 @ 05:26)  Hemoglobin: 16.5 g/dL (08-08 @ 05:43)  Hemoglobin: 16.1 g/dL (08-07 @ 06:39)  Hemoglobin: 14.3 g/dL (08-05 @ 08:07)    CBC Full  -  ( 09 Aug 2022 05:26 )  WBC Count : 15.88 K/uL  RBC Count : 5.71 M/uL  Hemoglobin : 16.4 g/dL  Hematocrit : 50.5 %  Platelet Count - Automated : 379 K/uL  Mean Cell Volume : 88.4 fL  Mean Cell Hemoglobin : 28.7 pg  Mean Cell Hemoglobin Concentration : 32.5 g/dL  Auto Neutrophil # : 12.82 K/uL  Auto Lymphocyte # : 0.96 K/uL  Auto Monocyte # : 1.83 K/uL  Auto Eosinophil # : 0.02 K/uL  Auto Basophil # : 0.03 K/uL  Auto Neutrophil % : 80.8 %  Auto Lymphocyte % : 6.0 %  Auto Monocyte % : 11.5 %  Auto Eosinophil % : 0.1 %  Auto Basophil % : 0.2 %    08-09    139  |  96<L>  |  28<H>  ----------------------------<  119<H>  5.5<H>   |  36<H>  |  0.9    Ca    9.6      09 Aug 2022 05:26  Mg     2.3     08-09    TPro  6.0  /  Alb  3.7  /  TBili  2.3<H>  /  DBili  x   /  AST  25  /  ALT  68<H>  /  AlkPhos  68  08-09    Creatinine Trend: 0.9<--, 0.7<--, 0.8<--, 0.7<--, 0.7<--, 0.8<--  LIVER FUNCTIONS - ( 09 Aug 2022 05:26 )  Alb: 3.7 g/dL / Pro: 6.0 g/dL / ALK PHOS: 68 U/L / ALT: 68 U/L / AST: 25 U/L / GGT: x               hs Troponin:    ABG - ( 08 Aug 2022 12:29 )  pH, Arterial: 7.46  pH, Blood: x     /  pCO2: 51    /  pO2: 98    / HCO3: 36    / Base Excess: 10.2  /  SaO2: 98.5                12:29 - ABG - pH: 7.46  |  pCO2: 51    |  pO2: 98    | Lactate:       | BE: 10.2         CSF:                      EKG:   MICROBIOLOGY:    IMAGING:      Labs, imaging, EKG personally reviewed    RADIOLOGY & ADDITIONAL TESTS: Reviewed.

## 2022-08-09 NOTE — PROGRESS NOTE ADULT - PROBLEM SELECTOR PLAN 4
Full code  Continue current medical management  Will sign off as goals are clear Full code  Continue current medical management  Will follow

## 2022-08-09 NOTE — PROGRESS NOTE ADULT - ASSESSMENT
IMPRESSION:  Severe COPD exacerbation   HO End stage COPD   Acute hypoxemic resp failure with hypercapnia       PLAN:    ·	Continue Oxygen therapy to keep pulse ox 88 TO 92%  ·	Nebs q4hr and PRN  ·	NIV 4 hrs on/4hrs off and QHS.  ·	AAT level   ·	Continue Solumedrol 60 q 12  ·	DC Nifedipine and change to Cardizem 30 mg Q8  ·	DVT ppx.    ·	Bed chair position  ·	very poor prognosis  ·	sdu  ·	GOC and advance directive   ·	Palliative care evaluation   ·	Overall prognosis poor

## 2022-08-09 NOTE — PROGRESS NOTE ADULT - ASSESSMENT
71yMale with PMH including COPD on 2L home O2 & CPAP (unclear setting), Hx COPD Exacerbation w/ Intubation x 3 d in 2004, Asthma, DM s/p right transmetatarsal amputation, admitted with COPD exacerbation, currently on Bipap, attempting to wean. For now, the patient and family want full aggressive medical management. They are open to palliative following along and re-addressing GOC as things progress.     Pt comfortable on exam except for chest pain- spoke with Dr. Lara on phone and she reports that the medicine team is aware, that they just spoke with them.   Otherwise, patient and family are hopeful he will wean of hi flow. For now they want Full Code, ongoing medical management.   Will sign off as goals are clear.     MEDD (morphine equivalent daily dose): 0      See Recs below.    Please call [phone] with questions or concerns 24/7.       Discussed with primary MD.   71yMale with PMH including COPD on 2L home O2 & CPAP (unclear setting), Hx COPD Exacerbation w/ Intubation x 3 d in 2004, Asthma, DM s/p right transmetatarsal amputation, admitted with COPD exacerbation, currently on Bipap, attempting to wean. For now, the patient and family want full aggressive medical management. They are open to palliative following along and re-addressing GOC as things progress.     Pt comfortable on exam.   Will speak with pt and family and re-address GOC.   Poor prognosis per pulm.     MEDD (morphine equivalent daily dose): 0      See Recs below.    Please call [phone] with questions or concerns 24/7.       Discussed with primary MD.

## 2022-08-09 NOTE — PROGRESS NOTE ADULT - PROBLEM SELECTOR PLAN 3
- plans have been for ongoing full care Full code  Continue current medical management  Will follow - plans have been for ongoing full care  - will address GOC with pt and family

## 2022-08-09 NOTE — PROGRESS NOTE ADULT - SUBJECTIVE AND OBJECTIVE BOX
Patient is a 71y old  Male who presents with a chief complaint of COPD Exacerbation (09 Aug 2022 11:37)        Over Night Events:  Remains significantly SOB.  Off pressors.  On NIV on and Off         ROS:     All ROS are negative except HPI         PHYSICAL EXAM    ICU Vital Signs Last 24 Hrs  T(C): 36.3 (09 Aug 2022 08:00), Max: 36.6 (08 Aug 2022 23:48)  T(F): 97.3 (09 Aug 2022 08:00), Max: 97.9 (08 Aug 2022 23:48)  HR: 120 (09 Aug 2022 11:00) (62 - 120)  BP: 155/90 (09 Aug 2022 11:00) (109/83 - 155/90)  BP(mean): 116 (09 Aug 2022 11:00) (89 - 116)  ABP: --  ABP(mean): --  RR: 16 (09 Aug 2022 08:00) (16 - 20)  SpO2: 96% (09 Aug 2022 11:00) (93% - 98%)    O2 Parameters below as of 09 Aug 2022 08:00  Patient On (Oxygen Delivery Method): nasal cannula  O2 Flow (L/min): 3          CONSTITUTIONAL:  Ill appearing in moderate distress     ENT:   Airway patent,   Mouth with normal mucosa.   TRach     EYES:   Pupils equal,   Round and reactive to light.    CARDIAC:   Normal rate,   Regular rhythm.      RESPIRATORY:   No wheezing  Bilateral BS   tachypneic,  use of accessory muscles    GASTROINTESTINAL:  Abdomen soft,   Non-tender,   No guarding,   + BS    MUSCULOSKELETAL:   Range of motion is not limited,  No clubbing, cyanosis    NEUROLOGICAL:   Alert  No motor  deficits.    SKIN:   Skin normal color for race,   No evidence of rash.    PSYCHIATRIC:   No apparent risk to self or others.        08-08-22 @ 07:01  -  08-09-22 @ 07:00  --------------------------------------------------------  IN:    Oral Fluid: 65 mL  Total IN: 65 mL    OUT:    Voided (mL): 350 mL  Total OUT: 350 mL    Total NET: -285 mL          LABS:                            16.4   15.88 )-----------( 379      ( 09 Aug 2022 05:26 )             50.5                                               08-09    139  |  96<L>  |  28<H>  ----------------------------<  119<H>  5.5<H>   |  36<H>  |  0.9    Ca    9.6      09 Aug 2022 05:26  Mg     2.3     08-09    TPro  6.0  /  Alb  3.7  /  TBili  2.3<H>  /  DBili  x   /  AST  25  /  ALT  68<H>  /  AlkPhos  68  08-09                                                                                           LIVER FUNCTIONS - ( 09 Aug 2022 05:26 )  Alb: 3.7 g/dL / Pro: 6.0 g/dL / ALK PHOS: 68 U/L / ALT: 68 U/L / AST: 25 U/L / GGT: x                                                                                                                                   ABG - ( 08 Aug 2022 12:29 )  pH, Arterial: 7.46  pH, Blood: x     /  pCO2: 51    /  pO2: 98    / HCO3: 36    / Base Excess: 10.2  /  SaO2: 98.5                MEDICATIONS  (STANDING):  ALBUTerol    0.083% 2.5 milliGRAM(s) Nebulizer every 4 hours  ALBUTerol    90 MICROgram(s) HFA Inhaler 2 Puff(s) Inhalation every 4 hours  budesonide 160 MICROgram(s)/formoterol 4.5 MICROgram(s) Inhaler 2 Puff(s) Inhalation two times a day  enoxaparin Injectable 40 milliGRAM(s) SubCutaneous every 24 hours  glucagon  Injectable 1 milliGRAM(s) IntraMuscular once  insulin lispro (ADMELOG) corrective regimen sliding scale   SubCutaneous three times a day before meals  loratadine 10 milliGRAM(s) Oral daily  methylPREDNISolone sodium succinate Injectable 60 milliGRAM(s) IV Push every 12 hours  montelukast 10 milliGRAM(s) Oral daily  NIFEdipine XL 30 milliGRAM(s) Oral daily  pantoprazole    Tablet 40 milliGRAM(s) Oral before breakfast  sodium zirconium cyclosilicate 5 Gram(s) Oral two times a day  tiotropium 18 MICROgram(s) Capsule 1 Capsule(s) Inhalation daily    MEDICATIONS  (PRN):  acetaminophen     Tablet .. 650 milliGRAM(s) Oral every 6 hours PRN Temp greater or equal to 38C (100.4F), Mild Pain (1 - 3)  aluminum hydroxide/magnesium hydroxide/simethicone Suspension 30 milliLiter(s) Oral every 4 hours PRN Dyspepsia  fluticasone propionate 50 MICROgram(s)/spray Nasal Spray 2 Spray(s) Both Nostrils two times a day PRN nasal congestion  guaifenesin/dextromethorphan Oral Liquid 10 milliLiter(s) Oral every 6 hours PRN Cough  melatonin 3 milliGRAM(s) Oral at bedtime PRN Insomnia  ondansetron Injectable 4 milliGRAM(s) IV Push every 8 hours PRN Nausea and/or Vomiting      New X-rays reviewed:                                                                                  ECHO

## 2022-08-09 NOTE — PROGRESS NOTE ADULT - PROBLEM SELECTOR PLAN 1
- c/w IV doxy, high risk, monitor counts; c/w nebs, c/w steroids  - c/w HFNC  - will start hycodan PRN cough continues to be Bipap dependent  retainin Co2  continue med mgmt per pulm   full code

## 2022-08-10 NOTE — PROGRESS NOTE ADULT - PROBLEM SELECTOR PLAN 3
- plans have been for ongoing full care  - will address GOC with pt and family, would benefit from interdiscplinary family meeting with pulmonary involvement, plan to d/w fellow

## 2022-08-10 NOTE — PROGRESS NOTE ADULT - PROBLEM SELECTOR PLAN 1
continues to be Bipap dependent, was dyspneic off BiPAP today after conversing  continue med mgmt per pulm   full code

## 2022-08-10 NOTE — PROGRESS NOTE ADULT - SUBJECTIVE AND OBJECTIVE BOX
Patient is a 71y old  Male who presents with a chief complaint of COPD Exacerbation (09 Aug 2022 15:30)        Over Night Events:        ROS:     All ROS are negative except HPI         PHYSICAL EXAM    ICU Vital Signs Last 24 Hrs  T(C): 37.1 (10 Aug 2022 00:00), Max: 37.1 (10 Aug 2022 00:00)  T(F): 98.7 (10 Aug 2022 00:00), Max: 98.7 (10 Aug 2022 00:00)  HR: 94 (10 Aug 2022 06:00) (78 - 135)  BP: 118/76 (10 Aug 2022 06:00) (99/66 - 155/90)  BP(mean): 92 (10 Aug 2022 06:00) (77 - 116)  ABP: --  ABP(mean): --  RR: 19 (10 Aug 2022 06:00) (16 - 26)  SpO2: 96% (10 Aug 2022 06:00) (93% - 98%)    O2 Parameters below as of 09 Aug 2022 20:00  Patient On (Oxygen Delivery Method): nasal cannula w/ humidification    O2 Concentration (%): 4        CONSTITUTIONAL:  Well nourished.  NAD    ENT:   Airway patent,   Mouth with normal mucosa.   No thrush    EYES:   Pupils equal,   Round and reactive to light.    CARDIAC:   Normal rate,   Regular rhythm.    No edema      Vascular:  Normal systolic impulse  No Carotid bruits    RESPIRATORY:   No wheezing  Bilateral BS  Normal chest expansion  Not tachypneic,  No use of accessory muscles    GASTROINTESTINAL:  Abdomen soft,   Non-tender,   No guarding,   + BS    MUSCULOSKELETAL:   Range of motion is not limited,  No clubbing, cyanosis    NEUROLOGICAL:   Alert and oriented   No motor  deficits.    SKIN:   Skin normal color for race,   Warm and dry and intact.   No evidence of rash.    PSYCHIATRIC:   Normal mood and affect.   No apparent risk to self or others.    HEMATOLOGICAL:  No cervical  lymphadenopathy.  no inguinal lymphadenopathy        LABS:                            16.4   14.63 )-----------( 378      ( 10 Aug 2022 06:38 )             50.4                                               08-09    139  |  96<L>  |  28<H>  ----------------------------<  119<H>  5.5<H>   |  36<H>  |  0.9    Ca    9.6      09 Aug 2022 05:26  Mg     2.3     08-09    TPro  6.0  /  Alb  3.7  /  TBili  2.3<H>  /  DBili  x   /  AST  25  /  ALT  68<H>  /  AlkPhos  68  08-09                                                                                           LIVER FUNCTIONS - ( 09 Aug 2022 05:26 )  Alb: 3.7 g/dL / Pro: 6.0 g/dL / ALK PHOS: 68 U/L / ALT: 68 U/L / AST: 25 U/L / GGT: x                                                                                                                                   ABG - ( 08 Aug 2022 12:29 )  pH, Arterial: 7.46  pH, Blood: x     /  pCO2: 51    /  pO2: 98    / HCO3: 36    / Base Excess: 10.2  /  SaO2: 98.5                MEDICATIONS  (STANDING):  ALBUTerol    0.083% 2.5 milliGRAM(s) Nebulizer every 4 hours  ALBUTerol    90 MICROgram(s) HFA Inhaler 2 Puff(s) Inhalation every 4 hours  budesonide 160 MICROgram(s)/formoterol 4.5 MICROgram(s) Inhaler 2 Puff(s) Inhalation two times a day  diltiazem    Tablet 30 milliGRAM(s) Oral three times a day  enoxaparin Injectable 40 milliGRAM(s) SubCutaneous every 24 hours  glucagon  Injectable 1 milliGRAM(s) IntraMuscular once  insulin lispro (ADMELOG) corrective regimen sliding scale   SubCutaneous three times a day before meals  loratadine 10 milliGRAM(s) Oral daily  methylPREDNISolone sodium succinate Injectable 60 milliGRAM(s) IV Push every 12 hours  montelukast 10 milliGRAM(s) Oral daily  pantoprazole    Tablet 40 milliGRAM(s) Oral before breakfast  tiotropium 18 MICROgram(s) Capsule 1 Capsule(s) Inhalation daily    MEDICATIONS  (PRN):  acetaminophen     Tablet .. 650 milliGRAM(s) Oral every 6 hours PRN Temp greater or equal to 38C (100.4F), Mild Pain (1 - 3)  aluminum hydroxide/magnesium hydroxide/simethicone Suspension 30 milliLiter(s) Oral every 4 hours PRN Dyspepsia  fluticasone propionate 50 MICROgram(s)/spray Nasal Spray 2 Spray(s) Both Nostrils two times a day PRN nasal congestion  guaifenesin/dextromethorphan Oral Liquid 10 milliLiter(s) Oral every 6 hours PRN Cough  melatonin 3 milliGRAM(s) Oral at bedtime PRN Insomnia  morphine  - Injectable 4 milliGRAM(s) IV Push four times a day PRN Moderate Pain (4 - 6)  ondansetron Injectable 4 milliGRAM(s) IV Push every 8 hours PRN Nausea and/or Vomiting      New X-rays reviewed:                                                                                  ECHO    CXR interpreted by me:       Patient is a 71y old  Male who presents with a chief complaint of COPD Exacerbation (09 Aug 2022 15:30)        Over Night Events:  Remains SOB.  Critically ill.          ROS:     All ROS are negative except HPI         PHYSICAL EXAM    ICU Vital Signs Last 24 Hrs  T(C): 37.1 (10 Aug 2022 00:00), Max: 37.1 (10 Aug 2022 00:00)  T(F): 98.7 (10 Aug 2022 00:00), Max: 98.7 (10 Aug 2022 00:00)  HR: 94 (10 Aug 2022 06:00) (78 - 135)  BP: 118/76 (10 Aug 2022 06:00) (99/66 - 155/90)  BP(mean): 92 (10 Aug 2022 06:00) (77 - 116)  ABP: --  ABP(mean): --  RR: 19 (10 Aug 2022 06:00) (16 - 26)  SpO2: 96% (10 Aug 2022 06:00) (93% - 98%)    O2 Parameters below as of 09 Aug 2022 20:00  Patient On (Oxygen Delivery Method): nasal cannula w/ humidification    O2 Concentration (%): 4        CONSTITUTIONAL:  Ill appearing in moderate distress    ENT:   Airway patent,   Mouth with normal mucosa.     EYES:   Pupils equal,   Round and reactive to light.    CARDIAC:   Tachycardic   Regular rhythm.          Vascular:  Normal systolic impulse  No Carotid bruits    RESPIRATORY:   No wheezing  Bilateral BS  Normal chest expansion   tachypneic,  use of accessory muscles    GASTROINTESTINAL:  Abdomen soft,   Non-tender,   No guarding,   + BS    MUSCULOSKELETAL:   Range of motion is not limited,  No clubbing, cyanosis    NEUROLOGICAL:   Alert and oriented   No motor  deficits.    SKIN:   Skin normal color for race,   No evidence of rash.    PSYCHIATRIC:    No apparent risk to self or others.        LABS:                            16.4   14.63 )-----------( 378      ( 10 Aug 2022 06:38 )             50.4                                               08-09    139  |  96<L>  |  28<H>  ----------------------------<  119<H>  5.5<H>   |  36<H>  |  0.9    Ca    9.6      09 Aug 2022 05:26  Mg     2.3     08-09    TPro  6.0  /  Alb  3.7  /  TBili  2.3<H>  /  DBili  x   /  AST  25  /  ALT  68<H>  /  AlkPhos  68  08-09                                                                                           LIVER FUNCTIONS - ( 09 Aug 2022 05:26 )  Alb: 3.7 g/dL / Pro: 6.0 g/dL / ALK PHOS: 68 U/L / ALT: 68 U/L / AST: 25 U/L / GGT: x                                                                                                                                   ABG - ( 08 Aug 2022 12:29 )  pH, Arterial: 7.46  pH, Blood: x     /  pCO2: 51    /  pO2: 98    / HCO3: 36    / Base Excess: 10.2  /  SaO2: 98.5                MEDICATIONS  (STANDING):  ALBUTerol    0.083% 2.5 milliGRAM(s) Nebulizer every 4 hours  ALBUTerol    90 MICROgram(s) HFA Inhaler 2 Puff(s) Inhalation every 4 hours  budesonide 160 MICROgram(s)/formoterol 4.5 MICROgram(s) Inhaler 2 Puff(s) Inhalation two times a day  diltiazem    Tablet 30 milliGRAM(s) Oral three times a day  enoxaparin Injectable 40 milliGRAM(s) SubCutaneous every 24 hours  glucagon  Injectable 1 milliGRAM(s) IntraMuscular once  insulin lispro (ADMELOG) corrective regimen sliding scale   SubCutaneous three times a day before meals  loratadine 10 milliGRAM(s) Oral daily  methylPREDNISolone sodium succinate Injectable 60 milliGRAM(s) IV Push every 12 hours  montelukast 10 milliGRAM(s) Oral daily  pantoprazole    Tablet 40 milliGRAM(s) Oral before breakfast  tiotropium 18 MICROgram(s) Capsule 1 Capsule(s) Inhalation daily    MEDICATIONS  (PRN):  acetaminophen     Tablet .. 650 milliGRAM(s) Oral every 6 hours PRN Temp greater or equal to 38C (100.4F), Mild Pain (1 - 3)  aluminum hydroxide/magnesium hydroxide/simethicone Suspension 30 milliLiter(s) Oral every 4 hours PRN Dyspepsia  fluticasone propionate 50 MICROgram(s)/spray Nasal Spray 2 Spray(s) Both Nostrils two times a day PRN nasal congestion  guaifenesin/dextromethorphan Oral Liquid 10 milliLiter(s) Oral every 6 hours PRN Cough  melatonin 3 milliGRAM(s) Oral at bedtime PRN Insomnia  morphine  - Injectable 4 milliGRAM(s) IV Push four times a day PRN Moderate Pain (4 - 6)  ondansetron Injectable 4 milliGRAM(s) IV Push every 8 hours PRN Nausea and/or Vomiting      New X-rays reviewed:                                                                                  ECHO    CXR interpreted by me:

## 2022-08-10 NOTE — CHART NOTE - NSCHARTNOTEFT_GEN_A_CORE
Spoke with daughter over phone ([phone]) today and explained to her about her father's condition and overall poor prognosis. Medical updates were given and all questions were answered. As per daughter, she states that the patient wishes to be full code and pursue full medical management.

## 2022-08-10 NOTE — PROGRESS NOTE ADULT - SUBJECTIVE AND OBJECTIVE BOX
HPI: 71yMale with PMH including COPD on 2L home O2 & CPAP (unclear setting), Hx COPD Exacerbation w/ Intubation x 3 d in 2004, Asthma, DM s/p right transmetatarsal amputation, admitted with COPD exacerbation, currently on Bipap, attempting to wean. For now, the patient and family want full aggressive medical management. They are open to palliative following along and re-addressing GOC as things progress.     INTERVAL EVENTS:  8/3: patient comfortbale, on HFNC, is on diet  8/4: states having cough- signed off as goals were clear  8/9: reconsulted for GOC. Pt remains on Bipap, poorly compliant.   8/10: patient dyspneic after extended conversation with our team and [employer]  ([id number]) while on NC; used no morphine PRN/24 hours    ADVANCE DIRECTIVES:    MOLST  [ ]  Living Will  [ ]   DECISION MAKER(s): Patient   [ ] Health Care Proxy(s)  [ X] Surrogate(s)  [ ] Guardian           Name(s): Phone Number(s): Fletcher     BASELINE (I)ADL(s) (prior to admission):  [place]: [ ]Total  [X ] Moderate [ ]Dependent  Palliative Performance Status Version 2:         %    http://npcrc.org/files/news/palliative_performance_scale_ppsv2.pdf    Allergies    No Known Allergies    Intolerances    MEDICATIONS  (STANDING):  ALBUTerol    0.083% 2.5 milliGRAM(s) Nebulizer every 4 hours  ALBUTerol    90 MICROgram(s) HFA Inhaler 2 Puff(s) Inhalation every 4 hours  budesonide 160 MICROgram(s)/formoterol 4.5 MICROgram(s) Inhaler 2 Puff(s) Inhalation two times a day  diltiazem    Tablet 30 milliGRAM(s) Oral three times a day  enoxaparin Injectable 40 milliGRAM(s) SubCutaneous every 24 hours  glucagon  Injectable 1 milliGRAM(s) IntraMuscular once  insulin lispro (ADMELOG) corrective regimen sliding scale   SubCutaneous three times a day before meals  loratadine 10 milliGRAM(s) Oral daily  methylPREDNISolone sodium succinate Injectable 60 milliGRAM(s) IV Push every 12 hours  montelukast 10 milliGRAM(s) Oral daily  pantoprazole    Tablet 40 milliGRAM(s) Oral before breakfast  tiotropium 18 MICROgram(s) Capsule 1 Capsule(s) Inhalation daily    MEDICATIONS  (PRN):  acetaminophen     Tablet .. 650 milliGRAM(s) Oral every 6 hours PRN Temp greater or equal to 38C (100.4F), Mild Pain (1 - 3)  aluminum hydroxide/magnesium hydroxide/simethicone Suspension 30 milliLiter(s) Oral every 4 hours PRN Dyspepsia  fluticasone propionate 50 MICROgram(s)/spray Nasal Spray 2 Spray(s) Both Nostrils two times a day PRN nasal congestion  guaifenesin/dextromethorphan Oral Liquid 10 milliLiter(s) Oral every 6 hours PRN Cough  melatonin 3 milliGRAM(s) Oral at bedtime PRN Insomnia  morphine  - Injectable 4 milliGRAM(s) IV Push four times a day PRN Moderate Pain (4 - 6)  ondansetron Injectable 4 milliGRAM(s) IV Push every 8 hours PRN Nausea and/or Vomiting    PRESENT SYMPTOMS:   Pain: [ X]yes [ ]no  QOL impact - new pain today  Location -         chest           Aggravating factors -  inhalation   Quality -   Radiation -  Timing-  Severity (0-10 scale):  Minimal acceptable level (0-10 scale):     CPOT:  0  https://[email or web address]/Critical-Care-Pain-Observation-Tool-(CPOT)    Dyspnea:                           [ ]Mild [ ]Moderate [ ]Severe - denies   Anxiety:                             [ ]Mild [ ]Moderate [ ]Severe  Fatigue:                             [ ]Mild [ ]Moderate [ ]Severe  Nausea:                             [ ]Mild [ ]Moderate [ ]Severe  Loss of appetite:              [ ]Mild [ ]Moderate [ ]Severe  Constipation:                    [ ]Mild [ ]Moderate [ ]Severe    Other Symptoms:  [ X]All other review of systems negative     Palliative Performance Status Version 2:      30   %    http://npcrc.org/files/news/palliative_performance_scale_ppsv2.pdf    PHYSICAL EXAM:  Vital Signs Last 24 Hrs  T(C): 35.8 (10 Aug 2022 07:00), Max: 37.1 (10 Aug 2022 00:00)  T(F): 96.5 (10 Aug 2022 07:00), Max: 98.7 (10 Aug 2022 00:00)  HR: 104 (10 Aug 2022 13:20) (78 - 118)  BP: 104/75 (10 Aug 2022 12:00) (99/66 - 154/86)  BP(mean): 86 (10 Aug 2022 12:00) (77 - 111)  RR: 25 (10 Aug 2022 13:20) (18 - 26)  SpO2: 97% (10 Aug 2022 13:20) (93% - 98%)    Parameters below as of 10 Aug 2022 13:20  Patient On (Oxygen Delivery Method): nasal cannula  O2 Flow (L/min): 5      GENERAL: used Cantonese   [X ]Alert  [X ]Oriented x  3 [ ]Lethargic  [ ]Cachexia  [ ]Unarousable  [X ]Verbal  [ ]Non-Verbal  Behavioral:   [ ] Anxiety  [ ] Delirium [ ] Agitation [X ] Calm [ ] Other  HEENT:  [ X]Normal   [ ]Dry mouth   [] HNFNC [ ]ET Tube/Trach  [ ]Oral lesions  PULMONARY:   [ ]Clear [ ]Tachypnea  [ ]Audible excessive secretions [X ] No labored breathing  On Bipap   CARDIOVASCULAR:    [ X]Regular [ ]Irregular [ ]Tachy  [ ]Arnaldo [ ]Murmur [ ]Other  GASTROINTESTINAL:  X[ ]Soft  [ ]Distended  [X ] Not distended [ ]+BS  [ ]Non tender [ ]Tender  [ ]PEG [ ]OGT/ NGT  Last BM:   GENITOURINARY:  [X]Normal [ ] Incontinent   [ ]Oliguria/Anuria   [ ]Ugtierrez  MUSCULOSKELETAL:   [ ]Normal   [ ]Weakness  [ ]Bed/Wheelchair bound [ ]Edema  NEUROLOGIC:   [X ]No focal deficits  [ ]Cognitive impairment  [ ]Dysphagia [ ]Dysarthria [ ]Paresis [ ]Other   SKIN:   [ ]Normal   [X ] Nonjaundiced [ ]Rash  [ ]Pressure ulcer(s)       Present on admission [ ]y [ ]n    CRITICAL CARE:  [ ] Shock Present  [ ]Septic [ ]Cardiogenic [ ]Neurologic [ ]Hypovolemic  [ ]  Vasopressors [ ]  Inotropes   [ ]Respiratory failure present [ ]Mechanical ventilation [X ]Non-invasive ventilatory support [ ]High flow  [ ]Acute  [ ]Chronic [ ]Hypoxic  [ ]Hypercarbic [ ]Other  [ ]Other organ failure     LABS: reviewed                          16.4   14.63 )-----------( 378      ( 10 Aug 2022 06:38 )             50.4     08-10    138  |  96<L>  |  30<H>  ----------------------------<  119<H>  5.1<H>   |  33<H>  |  0.7    Ca    9.3      10 Aug 2022 06:38  Mg     2.3     08-10          RADIOLOGY & ADDITIONAL STUDIES:    < from: Xray Chest 1 View- PORTABLE-Urgent (Xray Chest 1 View- PORTABLE-Urgent .) (08.03.22 @ 10:36) >  Skeleton/soft tissues: Stable.    Impression:    1. No significant change in bilateral nodular opacities, better   appreciated on reference CT.        --- End of Report ---          < end of copied text >

## 2022-08-10 NOTE — CHART NOTE - NSCHARTNOTEFT_GEN_A_CORE
PALLIATIVE MEDICINE INTERDISCIPLINARY TEAM NOTE    Provider:  [x   ]Social Work   [   ]          [  x ] Initial visit [   ] Follow up    Family or contact name / phone #   Met with: [ x  ] Patient  [ x  ] Family:  granddaughter  [   ] Other:    Primary Language: [   ] English [   ] Other*:       Cantonese               *Interpretation provided by:  granddaughter    SUPPORT DIAGNOSES            (Check all that apply)  [   ] Psychosocial spiritual assessment (PSSA)  [   ] EOL issues  [   ] Cultural / spiritual concerns  [  x ] Pain / suffering  [   ] Dementia / AMS  [   ] Other:  [ x  ] AD issues  [   ] Grief / loss / sadness  [   ] Discharge issues  [x   ] Distress / coping    PSYCHOSOCIAL ASSESSMENT OF PATIENT         (Check all that apply)  [ x  ] Initial Assessment            [   ] Reassessment          [   ] Not Applicable this visit    Pain/suffering acuity:  [x   ] None to mild (0-3)           [   ] Moderate (4-6)        [   ] High (7-10)    Mental Status:  [ x  ] Alert/oriented (x3)          [   ] Confused/Altered(x2/x1)         [   ] Non-resp    Functional status:  [   ] Independent w ADLs      [   ] Needs Assistance             [   x] Bedbound/Full Care    Coping:  [   ] Coping well                     [   ] Coping w/difficulty            [   ] Poor coping    Support system:  [ x  ] Strong                              [   ] Adequate                        [   ] Inadequate      Past history and medications for:   unknown    [ ] Anxiety       [ ] Depression    [ ] Sleep disorders     SPIRITUAL ASSESSMENT  Caodaism/Spiritual practice: ___________________________    Role of organized Christian:  [   ] Important                     [   ] Some (fam tradition, cultural)               [   ] None    Effects on medical care:  [   ] Yes, _____________________________________                         [   ] None    Cultural/Hoahaoism need:  [   ] Yes, _____________________________________                         [   ] None    Refer to Pastoral Care:  [   ] Yes           [   ] No, not at this time    SERVICE PROVIDED  [   ]PSSA                                                                             [   ]Discharge support / facilitation  [ x  ]AD / goals of care counseling                                  [   ]EOL / death / bereavement counseling  [x   ]Counseling / support                                                [   ] Family meeting  [   ]Prayer / sacrament / ritual                                      [   ] Referral   [   ]Other                                                                       NOTE and Plan of Care (PoC):    Patient is a 71 year old male.  Patient ws admitted on 7/30/22, dx:  COPD Exacerbation.  Patient has PMH of COPD on 2L O2 and CPAP, intubated in 2004 for days due to COPD exacerbation, asthma, DM s/p right transmetatarsal amputation.      Patient appeared comfortable and reported no pain.  Patient is frustrated with BIPAP.  Granddaughter was at bed side.  Provided information regarding role of Palliative Care.  Support rendered.

## 2022-08-10 NOTE — PROGRESS NOTE ADULT - ASSESSMENT
71yMale with PMH including COPD on 2L home O2 & CPAP (unclear setting), Hx COPD Exacerbation w/ Intubation x 3 d in 2004, Asthma, DM s/p right transmetatarsal amputation, admitted with COPD exacerbation, currently on Bipap, attempting to wean. For now, the patient and family want full aggressive medical management. They are open to palliative following along and re-addressing GOC as things progress.

## 2022-08-10 NOTE — PROGRESS NOTE ADULT - PROBLEM SELECTOR PLAN 4
Full code  Continue current medical management  Will follow  ______________  Jaun Rosales MD  Palliative Medicine  [facility]   [facility] Ger[facility]  [phone]

## 2022-08-10 NOTE — PROGRESS NOTE ADULT - PROBLEM SELECTOR PLAN 2
- on morphine 4mg IV PRN, no doses used, would consider morphine IV 2mg as patient appears opioid naive

## 2022-08-10 NOTE — PROGRESS NOTE ADULT - SUBJECTIVE AND OBJECTIVE BOX
Pt seen and examined at bedside. No CP. Pt was having SOB during encounter      ABG - ( 08 Aug 2022 12:29 )  pH: 7.46  /  pCO2: 51    /  pO2: 98    / HCO3: 36    / Base Excess: 10.2  /  SaO2: 98.5                PAST MEDICAL & SURGICAL HISTORY:  COPD (chronic obstructive pulmonary disease)    Essential hypertension    Dyslipidemia    S/P transmetatarsal amputation of foot, right        VITAL SIGNS (Last 24 hrs):  T(C): 35.8 (08-10-22 @ 07:00), Max: 37.1 (08-10-22 @ 00:00)  HR: 89 (08-10-22 @ 08:00) (78 - 135)  BP: 109/75 (08-10-22 @ 08:00) (99/66 - 154/86)  RR: 20 (08-10-22 @ 08:00) (18 - 26)  SpO2: 94% (08-10-22 @ 08:00) (93% - 98%)  Wt(kg): --  Daily     Daily     I&O's Summary      PHYSICAL EXAM:  GENERAL: NAD, well-developed  HEAD:  Atraumatic, Normocephalic  EYES: EOMI, PERRLA, conjunctiva and sclera clear  NECK: Supple, No JVD  CHEST/LUNG: Clear to auscultation bilaterally; No wheeze  HEART: Regular rate and rhythm; No murmurs, rubs, or gallops  ABDOMEN: Soft, Nontender, Nondistended; Bowel sounds present  EXTREMITIES:  2+ Peripheral Pulses, No clubbing, cyanosis, or edema  PSYCH: AAOx3  NEUROLOGY: non-focal  SKIN: No rashes or lesions    Labs Reviewed  Spoke to patient in regards to abnormal labs.    CBC Full  -  ( 10 Aug 2022 06:38 )  WBC Count : 14.63 K/uL  Hemoglobin : 16.4 g/dL  Hematocrit : 50.4 %  Platelet Count - Automated : 378 K/uL  Mean Cell Volume : 88.6 fL  Mean Cell Hemoglobin : 28.8 pg  Mean Cell Hemoglobin Concentration : 32.5 g/dL  Auto Neutrophil # : 13.00 K/uL  Auto Lymphocyte # : 0.39 K/uL  Auto Monocyte # : 0.90 K/uL  Auto Eosinophil # : 0.00 K/uL  Auto Basophil # : 0.03 K/uL  Auto Neutrophil % : 88.8 %  Auto Lymphocyte % : 2.7 %  Auto Monocyte % : 6.2 %  Auto Eosinophil % : 0.0 %  Auto Basophil % : 0.2 %    BMP:    08-10 @ 06:38    Blood Urea Nitrogen - 30  Calcium - 9.3  Carbond Dioxide - 33  Chloride - 96  Creatinine - 0.7  Glucose - 119  Potassium - 5.1  Sodium - 138      Hemoglobin A1c -     Urine Culture:        COVID Labs  CRP:    Procalcitonin, Serum: 0.37 ng/mL (07-31-22 @ 05:52)    D-Dimer:  295 ng/mL DDU (08-06-22 @ 19:05)  164 ng/mL DDU (08-06-22 @ 12:48)            Imaging reviewed independently and reviewed read        MEDICATIONS  (STANDING):  ALBUTerol    0.083% 2.5 milliGRAM(s) Nebulizer every 4 hours  ALBUTerol    90 MICROgram(s) HFA Inhaler 2 Puff(s) Inhalation every 4 hours  budesonide 160 MICROgram(s)/formoterol 4.5 MICROgram(s) Inhaler 2 Puff(s) Inhalation two times a day  diltiazem    Tablet 30 milliGRAM(s) Oral three times a day  enoxaparin Injectable 40 milliGRAM(s) SubCutaneous every 24 hours  glucagon  Injectable 1 milliGRAM(s) IntraMuscular once  insulin lispro (ADMELOG) corrective regimen sliding scale   SubCutaneous three times a day before meals  loratadine 10 milliGRAM(s) Oral daily  methylPREDNISolone sodium succinate Injectable 60 milliGRAM(s) IV Push every 12 hours  montelukast 10 milliGRAM(s) Oral daily  pantoprazole    Tablet 40 milliGRAM(s) Oral before breakfast  tiotropium 18 MICROgram(s) Capsule 1 Capsule(s) Inhalation daily    MEDICATIONS  (PRN):  acetaminophen     Tablet .. 650 milliGRAM(s) Oral every 6 hours PRN Temp greater or equal to 38C (100.4F), Mild Pain (1 - 3)  aluminum hydroxide/magnesium hydroxide/simethicone Suspension 30 milliLiter(s) Oral every 4 hours PRN Dyspepsia  fluticasone propionate 50 MICROgram(s)/spray Nasal Spray 2 Spray(s) Both Nostrils two times a day PRN nasal congestion  guaifenesin/dextromethorphan Oral Liquid 10 milliLiter(s) Oral every 6 hours PRN Cough  melatonin 3 milliGRAM(s) Oral at bedtime PRN Insomnia  morphine  - Injectable 4 milliGRAM(s) IV Push four times a day PRN Moderate Pain (4 - 6)  ondansetron Injectable 4 milliGRAM(s) IV Push every 8 hours PRN Nausea and/or Vomiting       Pt seen and examined at bedside. No CP. Pt was having SOB during encounter. Started on bIPAP. Used  phone       ABG - ( 08 Aug 2022 12:29 )  pH: 7.46  /  pCO2: 51    /  pO2: 98    / HCO3: 36    / Base Excess: 10.2  /  SaO2: 98.5          PAST MEDICAL & SURGICAL HISTORY:  COPD (chronic obstructive pulmonary disease)    Essential hypertension    Dyslipidemia    S/P transmetatarsal amputation of foot, right        VITAL SIGNS (Last 24 hrs):  T(C): 35.8 (08-10-22 @ 07:00), Max: 37.1 (08-10-22 @ 00:00)  HR: 89 (08-10-22 @ 08:00) (78 - 135)  BP: 109/75 (08-10-22 @ 08:00) (99/66 - 154/86)  RR: 20 (08-10-22 @ 08:00) (18 - 26)  SpO2: 94% (08-10-22 @ 08:00) (93% - 98%)  Wt(kg): --  Daily     Daily     I&O's Summary      PHYSICAL EXAM:  GENERAL: mild distress   HEAD:  Atraumatic, Normocephalic, pursing lips  EYES: EOMI, PERRLA, conjunctiva and sclera clear  NECK: Supple, No JVD  CHEST/LUNG: Clear to auscultation bilaterally; No wheeze  HEART: Regular rate and rhythm; No murmurs, rubs, or gallops  ABDOMEN: Soft, Nontender, Nondistended; Bowel sounds present  EXTREMITIES:  2+ Peripheral Pulses, No clubbing, cyanosis, or edema  PSYCH: AAOx3  NEUROLOGY: non-focal  SKIN: No rashes or lesions    Labs Reviewed  Spoke to patient in regards to abnormal labs.    CBC Full  -  ( 10 Aug 2022 06:38 )  WBC Count : 14.63 K/uL  Hemoglobin : 16.4 g/dL  Hematocrit : 50.4 %  Platelet Count - Automated : 378 K/uL  Mean Cell Volume : 88.6 fL  Mean Cell Hemoglobin : 28.8 pg  Mean Cell Hemoglobin Concentration : 32.5 g/dL  Auto Neutrophil # : 13.00 K/uL  Auto Lymphocyte # : 0.39 K/uL  Auto Monocyte # : 0.90 K/uL  Auto Eosinophil # : 0.00 K/uL  Auto Basophil # : 0.03 K/uL  Auto Neutrophil % : 88.8 %  Auto Lymphocyte % : 2.7 %  Auto Monocyte % : 6.2 %  Auto Eosinophil % : 0.0 %  Auto Basophil % : 0.2 %    BMP:    08-10 @ 06:38    Blood Urea Nitrogen - 30  Calcium - 9.3  Carbond Dioxide - 33  Chloride - 96  Creatinine - 0.7  Glucose - 119  Potassium - 5.1  Sodium - 138      Hemoglobin A1c -     Urine Culture:        COVID Labs  CRP:    Procalcitonin, Serum: 0.37 ng/mL (07-31-22 @ 05:52)    D-Dimer:  295 ng/mL DDU (08-06-22 @ 19:05)  164 ng/mL DDU (08-06-22 @ 12:48)            Imaging reviewed independently and reviewed read  < from: Xray Chest 1 View- PORTABLE-Routine (Xray Chest 1 View- PORTABLE-Routine in AM.) (08.10.22 @ 05:41) >    Impression:    Stable bilateral nodular opacities.  COPD.    < end of copied text >        MEDICATIONS  (STANDING):  ALBUTerol    0.083% 2.5 milliGRAM(s) Nebulizer every 4 hours  ALBUTerol    90 MICROgram(s) HFA Inhaler 2 Puff(s) Inhalation every 4 hours  budesonide 160 MICROgram(s)/formoterol 4.5 MICROgram(s) Inhaler 2 Puff(s) Inhalation two times a day  diltiazem    Tablet 30 milliGRAM(s) Oral three times a day  enoxaparin Injectable 40 milliGRAM(s) SubCutaneous every 24 hours  glucagon  Injectable 1 milliGRAM(s) IntraMuscular once  insulin lispro (ADMELOG) corrective regimen sliding scale   SubCutaneous three times a day before meals  loratadine 10 milliGRAM(s) Oral daily  methylPREDNISolone sodium succinate Injectable 60 milliGRAM(s) IV Push every 12 hours  montelukast 10 milliGRAM(s) Oral daily  pantoprazole    Tablet 40 milliGRAM(s) Oral before breakfast  tiotropium 18 MICROgram(s) Capsule 1 Capsule(s) Inhalation daily    MEDICATIONS  (PRN):  acetaminophen     Tablet .. 650 milliGRAM(s) Oral every 6 hours PRN Temp greater or equal to 38C (100.4F), Mild Pain (1 - 3)  aluminum hydroxide/magnesium hydroxide/simethicone Suspension 30 milliLiter(s) Oral every 4 hours PRN Dyspepsia  fluticasone propionate 50 MICROgram(s)/spray Nasal Spray 2 Spray(s) Both Nostrils two times a day PRN nasal congestion  guaifenesin/dextromethorphan Oral Liquid 10 milliLiter(s) Oral every 6 hours PRN Cough  melatonin 3 milliGRAM(s) Oral at bedtime PRN Insomnia  morphine  - Injectable 4 milliGRAM(s) IV Push four times a day PRN Moderate Pain (4 - 6)  ondansetron Injectable 4 milliGRAM(s) IV Push every 8 hours PRN Nausea and/or Vomiting

## 2022-08-10 NOTE — PROGRESS NOTE ADULT - ASSESSMENT
71M PMHx COPD on home o2, HTN, DM2 here with acute hypercapnic and hypoxic resp failure.    #Acute hypercapnic and hypoxic resp failure 2/2 copd exacerbation   ct with lung nodules, emphysema  blood gas with acute hypercapnia, now resolved  Pt sob today, restarted on bipap keep 4/4 off  bipap prn, qhs  requiring 3l nc  s/p course doxy  solumedrol 60 iv bid, continue  appreciate pulm  Symbicort  alberuol prn  spiriva  singulair 10    #Transaminitis, cholestatic  ct/ us with liver hemangioma  repeat us in one year  trend    #Hyperkalemia-->5.1  low k diet  check ekg  lokelma 5 bid x 1 day    #HTN  nifedipine 30--> dced started on cardizem recc pulm   BP stable     #DM2  ssi    #DVT ppx  lovenox    #Progress Note Handoff  Pending (specify):  BIPAP 4/4, copd managment  Disposition: home  Anticipated date: TBD, still SICU

## 2022-08-11 NOTE — PROGRESS NOTE ADULT - SUBJECTIVE AND OBJECTIVE BOX
Pt seen and examined at bedside. No CP or SOB. pt was ib bipap      PAST MEDICAL & SURGICAL HISTORY:  COPD (chronic obstructive pulmonary disease)    Essential hypertension    Dyslipidemia    S/P transmetatarsal amputation of foot, right        VITAL SIGNS (Last 24 hrs):  T(C): 36.8 (22 @ 08:00), Max: 36.8 (22 @ 08:00)  HR: 105 (22 @ 10:06) (87 - 106)  BP: 146/81 (22 @ 08:00) (101/62 - 149/91)  RR: 21 (22 @ 08:00) (19 - 42)  SpO2: 96% (22 @ 10:06) (95% - 98%)  Wt(kg): --  Daily     Daily Weight in k.2 (11 Aug 2022 06:00)    I&O's Summary    10 Aug 2022 07:01  -  11 Aug 2022 07:00  --------------------------------------------------------  IN: 0 mL / OUT: 1075 mL / NET: -1075 mL        PHYSICAL EXAM:  GENERAL: NAD, well-developed  HEAD:  Atraumatic, Normocephalic  EYES: EOMI, PERRLA, conjunctiva and sclera clear  NECK: Supple, No JVD  CHEST/LUNG: Clear to auscultation bilaterally; No wheeze  HEART: Regular rate and rhythm; No murmurs, rubs, or gallops  ABDOMEN: Soft, Nontender, Nondistended; Bowel sounds present  EXTREMITIES:  2+ Peripheral Pulses, No clubbing, cyanosis, or edema  PSYCH: AAOx3  NEUROLOGY: non-focal  SKIN: No rashes or lesions    Labs Reviewed  Spoke to patient in regards to abnormal labs.    CBC Full  -  ( 11 Aug 2022 06:23 )  WBC Count : 21.98 K/uL  Hemoglobin : 16.3 g/dL  Hematocrit : 50.1 %  Platelet Count - Automated : 436 K/uL  Mean Cell Volume : 88.4 fL  Mean Cell Hemoglobin : 28.7 pg  Mean Cell Hemoglobin Concentration : 32.5 g/dL  Auto Neutrophil # : 20.44 K/uL  Auto Lymphocyte # : 0.33 K/uL  Auto Monocyte # : 0.92 K/uL  Auto Eosinophil # : 0.00 K/uL  Auto Basophil # : 0.05 K/uL  Auto Neutrophil % : 93.0 %  Auto Lymphocyte % : 1.5 %  Auto Monocyte % : 4.2 %  Auto Eosinophil % : 0.0 %  Auto Basophil % : 0.2 %    BMP:     @ 06:23    Blood Urea Nitrogen - 35  Calcium - 9.6  Carbond Dioxide - 37  Chloride - 96  Creatinine - 0.7  Glucose - 166  Potassium - 4.7  Sodium - 140      Hemoglobin A1c -     Urine Culture:        COVID Labs  CRP:    Procalcitonin, Serum: 0.37 ng/mL (22 @ 05:52)    D-Dimer:  295 ng/mL DDU (22 @ 19:05)  164 ng/mL DDU (22 @ 12:48)      Imaging reviewed independently and reviewed read      < from: Xray Chest 1 View- PORTABLE-Routine (Xray Chest 1 View- PORTABLE-Routine in AM.) (08.10.22 @ 05:41) >  Impression:    Stable bilateral nodular opacities.  COPD.    < end of copied text >    MEDICATIONS  (STANDING):  ALBUTerol    0.083% 2.5 milliGRAM(s) Nebulizer every 4 hours  ALBUTerol    90 MICROgram(s) HFA Inhaler 2 Puff(s) Inhalation every 4 hours  budesonide 160 MICROgram(s)/formoterol 4.5 MICROgram(s) Inhaler 2 Puff(s) Inhalation two times a day  diltiazem    Tablet 30 milliGRAM(s) Oral three times a day  enoxaparin Injectable 40 milliGRAM(s) SubCutaneous every 24 hours  glucagon  Injectable 1 milliGRAM(s) IntraMuscular once  insulin lispro (ADMELOG) corrective regimen sliding scale   SubCutaneous three times a day before meals  loratadine 10 milliGRAM(s) Oral daily  methylPREDNISolone sodium succinate Injectable 60 milliGRAM(s) IV Push every 12 hours  montelukast 10 milliGRAM(s) Oral daily  pantoprazole    Tablet 40 milliGRAM(s) Oral before breakfast  tiotropium 18 MICROgram(s) Capsule 1 Capsule(s) Inhalation daily    MEDICATIONS  (PRN):  acetaminophen     Tablet .. 650 milliGRAM(s) Oral every 6 hours PRN Temp greater or equal to 38C (100.4F), Mild Pain (1 - 3)  aluminum hydroxide/magnesium hydroxide/simethicone Suspension 30 milliLiter(s) Oral every 4 hours PRN Dyspepsia  fluticasone propionate 50 MICROgram(s)/spray Nasal Spray 2 Spray(s) Both Nostrils two times a day PRN nasal congestion  guaifenesin/dextromethorphan Oral Liquid 10 milliLiter(s) Oral every 6 hours PRN Cough  melatonin 3 milliGRAM(s) Oral at bedtime PRN Insomnia  morphine  - Injectable 4 milliGRAM(s) IV Push four times a day PRN Moderate Pain (4 - 6)  ondansetron Injectable 4 milliGRAM(s) IV Push every 8 hours PRN Nausea and/or Vomiting

## 2022-08-11 NOTE — PROGRESS NOTE ADULT - SUBJECTIVE AND OBJECTIVE BOX
HPI: 71yMale with PMH including COPD on 2L home O2 & CPAP (unclear setting), Hx COPD Exacerbation w/ Intubation x 3 d in 2004, Asthma, DM s/p right transmetatarsal amputation, admitted with COPD exacerbation, currently on Bipap, attempting to wean. For now, the patient and family want full aggressive medical management. They are open to palliative following along and re-addressing GOC as things progress.     INTERVAL EVENTS:  8/3: patient comfortbale, on HFNC, is on diet  8/4: states having cough- signed off as goals were clear  8/9: reconsulted for GOC. Pt remains on Bipap, poorly compliant.   8/10: patient dyspneic after extended conversation with our team and [employer]  ([id number]) while on NC; used no morphine PRN/24 hours  8/11: pt remains dyspneic with exertion or even speaking, using significant respiratory accessorry muscles. off bipap on exam and able to eat. given some morphine for chest tightness. plan for family meeting today.     ADVANCE DIRECTIVES:    MOLST  [ ]  Living Will  [ ]   DECISION MAKER(s): Patient   [ ] Health Care Proxy(s)  [ X] Surrogate(s)  [ ] Guardian           Name(s): Phone Number(s): Fletcher Jacobo ( [phone])    BASELINE (I)ADL(s) (prior to admission):  [place]: [ ]Total  [X ] Moderate [ ]Dependent  Palliative Performance Status Version 2:         %    http://npcrc.org/files/news/palliative_performance_scale_ppsv2.pdf    Allergies    No Known Allergies    Intolerances    MEDICATIONS  (STANDING):  ALBUTerol    0.083% 2.5 milliGRAM(s) Nebulizer every 4 hours  ALBUTerol    90 MICROgram(s) HFA Inhaler 2 Puff(s) Inhalation every 4 hours  budesonide 160 MICROgram(s)/formoterol 4.5 MICROgram(s) Inhaler 2 Puff(s) Inhalation two times a day  diltiazem    Tablet 30 milliGRAM(s) Oral three times a day  enoxaparin Injectable 40 milliGRAM(s) SubCutaneous every 24 hours  glucagon  Injectable 1 milliGRAM(s) IntraMuscular once  insulin lispro (ADMELOG) corrective regimen sliding scale   SubCutaneous three times a day before meals  loratadine 10 milliGRAM(s) Oral daily  methylPREDNISolone sodium succinate Injectable 60 milliGRAM(s) IV Push every 12 hours  montelukast 10 milliGRAM(s) Oral daily  pantoprazole    Tablet 40 milliGRAM(s) Oral before breakfast  tiotropium 18 MICROgram(s) Capsule 1 Capsule(s) Inhalation daily    MEDICATIONS  (PRN):  acetaminophen     Tablet .. 650 milliGRAM(s) Oral every 6 hours PRN Temp greater or equal to 38C (100.4F), Mild Pain (1 - 3)  aluminum hydroxide/magnesium hydroxide/simethicone Suspension 30 milliLiter(s) Oral every 4 hours PRN Dyspepsia  fluticasone propionate 50 MICROgram(s)/spray Nasal Spray 2 Spray(s) Both Nostrils two times a day PRN nasal congestion  guaifenesin/dextromethorphan Oral Liquid 10 milliLiter(s) Oral every 6 hours PRN Cough  melatonin 3 milliGRAM(s) Oral at bedtime PRN Insomnia  morphine  - Injectable 4 milliGRAM(s) IV Push four times a day PRN Moderate Pain (4 - 6)  ondansetron Injectable 4 milliGRAM(s) IV Push every 8 hours PRN Nausea and/or Vomiting    PRESENT SYMPTOMS:   Pain: [ X]yes [ ]no  QOL impact - new pain today  Location -         chest           Aggravating factors -  inhalation   Quality -   Radiation -  Timing-  Severity (0-10 scale):  Minimal acceptable level (0-10 scale):     CPOT:  0  https://www.[facility].org/getattachment/[email or web address]/Critical-Care-Pain-Observation-Tool-(CPOT)    Dyspnea:                           [ ]Mild [ ]Moderate [ ]Severe   Anxiety:                             [ ]Mild [ ]Moderate [ ]Severe  Fatigue:                             [ ]Mild [ ]Moderate [ ]Severe  Nausea:                             [ ]Mild [ ]Moderate [ ]Severe  Loss of appetite:              [ ]Mild [ ]Moderate [ ]Severe - has appetite but some feelings of dysphagia   Constipation:                    [ ]Mild [ ]Moderate [ ]Severe    Other Symptoms:  [ X]All other review of systems negative     Palliative Performance Status Version 2:      30   %    http://[facility].org/files/news/palliative_performance_scale_ppsv2.pdf    PHYSICAL EXAM:  ICU Vital Signs Last 24 Hrs  T(C): 36.8 (11 Aug 2022 08:00), Max: 36.8 (11 Aug 2022 08:00)  T(F): 98.3 (11 Aug 2022 08:00), Max: 98.3 (11 Aug 2022 08:00)  HR: 109 (11 Aug 2022 12:00) (87 - 109)  BP: 158/88 (11 Aug 2022 12:00) (101/62 - 158/88)  BP(mean): 117 (11 Aug 2022 12:00) (77 - 117)  RR: 32 (11 Aug 2022 12:00) (19 - 42)  SpO2: 93% (11 Aug 2022 12:00) (93% - 98%)    GENERAL: used Cantonese   [X ]Alert  [X ]Oriented x  3 [ ]Lethargic  [ ]Cachexia  [ ]Unarousable  [X ]Verbal  [ ]Non-Verbal  Behavioral:   [ ] Anxiety  [ ] Delirium [ ] Agitation [X ] Calm [ ] Other  HEENT:  [ X]Normal   [ ]Dry mouth   [] HNFNC [ ]ET Tube/Trach  [ ]Oral lesions  PULMONARY:   [ ]Clear [ ]Tachypnea  [ ]Audible excessive secretions [X ] No labored breathing  On Bipap   CARDIOVASCULAR:    [ X]Regular [ ]Irregular [ ]Tachy  [ ]Arnaldo [ ]Murmur [ ]Other  GASTROINTESTINAL:  X[ ]Soft  [ ]Distended  [X ] Not distended [ ]+BS  [ ]Non tender [ ]Tender  [ ]PEG [ ]OGT/ NGT  Last BM:   GENITOURINARY:  [X]Normal [ ] Incontinent   [ ]Oliguria/Anuria   [ ]Gutierrez  MUSCULOSKELETAL:   [ ]Normal   [ ]Weakness  [ ]Bed/Wheelchair bound [ ]Edema  NEUROLOGIC:   [X ]No focal deficits  [ ]Cognitive impairment  [ ]Dysphagia [ ]Dysarthria [ ]Paresis [ ]Other   SKIN:   [ ]Normal   [X ] Nonjaundiced [ ]Rash  [ ]Pressure ulcer(s)       Present on admission [ ]y [ ]n      LABS: reviewed                          16.4   14.63 )-----------( 378      ( 10 Aug 2022 06:38 )             50.4     08-10    138  |  96<L>  |  30<H>  ----------------------------<  119<H>  5.1<H>   |  33<H>  |  0.7    Ca    9.3      10 Aug 2022 06:38  Mg     2.3     08-10          RADIOLOGY & ADDITIONAL STUDIES:    < from: Xray Chest 1 View- PORTABLE-Routine (Xray Chest 1 View- PORTABLE-Routine in AM.) (08.10.22 @ 05:41) >  Impression:    Stable bilateral nodular opacities.  COPD.    --- End of Report ---    < end of copied text >       HPI: 71yMale with PMH including COPD on 2L home O2 & CPAP (unclear setting), Hx COPD Exacerbation w/ Intubation x 3 d in 2004, Asthma, DM s/p right transmetatarsal amputation, admitted with COPD exacerbation, currently on Bipap, attempting to wean. For now, the patient and family want full aggressive medical management. They are open to palliative following along and re-addressing GOC as things progress.     INTERVAL EVENTS:  8/3: patient comfortbale, on HFNC, is on diet  8/4: states having cough- signed off as goals were clear  8/9: reconsulted for GOC. Pt remains on Bipap, poorly compliant.   8/10: patient dyspneic after extended conversation with our team and [employer]  ([id number]) while on NC; used no morphine PRN/24 hours  8/11: pt remains dyspneic with exertion or even speaking, using significant respiratory accessorry muscles. off bipap on exam and able to eat. given some morphine for chest tightness. plan for family meeting today    ADVANCE DIRECTIVES:    MOLST  [ ]  Living Will  [ ]   DECISION MAKER(s): Patient   [ ] Health Care Proxy(s)  [ X] Surrogate(s)  [ ] Guardian           Name(s): Phone Number(s): Fletcher Jacobo ( [phone])    BASELINE (I)ADL(s) (prior to admission):  [place]: [ ]Total  [X ] Moderate [ ]Dependent  Palliative Performance Status Version 2:         %    http://npcrc.org/files/news/palliative_performance_scale_ppsv2.pdf    Allergies    No Known Allergies    Intolerances    MEDICATIONS  (STANDING):  ALBUTerol    0.083% 2.5 milliGRAM(s) Nebulizer every 4 hours  ALBUTerol    90 MICROgram(s) HFA Inhaler 2 Puff(s) Inhalation every 4 hours  budesonide 160 MICROgram(s)/formoterol 4.5 MICROgram(s) Inhaler 2 Puff(s) Inhalation two times a day  diltiazem    Tablet 30 milliGRAM(s) Oral three times a day  enoxaparin Injectable 40 milliGRAM(s) SubCutaneous every 24 hours  glucagon  Injectable 1 milliGRAM(s) IntraMuscular once  insulin lispro (ADMELOG) corrective regimen sliding scale   SubCutaneous three times a day before meals  loratadine 10 milliGRAM(s) Oral daily  methylPREDNISolone sodium succinate Injectable 60 milliGRAM(s) IV Push every 12 hours  montelukast 10 milliGRAM(s) Oral daily  pantoprazole    Tablet 40 milliGRAM(s) Oral before breakfast  tiotropium 18 MICROgram(s) Capsule 1 Capsule(s) Inhalation daily    MEDICATIONS  (PRN):  acetaminophen     Tablet .. 650 milliGRAM(s) Oral every 6 hours PRN Temp greater or equal to 38C (100.4F), Mild Pain (1 - 3)  aluminum hydroxide/magnesium hydroxide/simethicone Suspension 30 milliLiter(s) Oral every 4 hours PRN Dyspepsia  fluticasone propionate 50 MICROgram(s)/spray Nasal Spray 2 Spray(s) Both Nostrils two times a day PRN nasal congestion  guaifenesin/dextromethorphan Oral Liquid 10 milliLiter(s) Oral every 6 hours PRN Cough  melatonin 3 milliGRAM(s) Oral at bedtime PRN Insomnia  morphine  - Injectable 4 milliGRAM(s) IV Push four times a day PRN Moderate Pain (4 - 6)  ondansetron Injectable 4 milliGRAM(s) IV Push every 8 hours PRN Nausea and/or Vomiting    PRESENT SYMPTOMS:   Pain: [ X]yes [ ]no  QOL impact - new pain today  Location -         chest           Aggravating factors -  inhalation   Quality -   Radiation -  Timing-  Severity (0-10 scale):  Minimal acceptable level (0-10 scale):     CPOT:  0  https://www.[facility].org/getattachment/[email or web address]/Critical-Care-Pain-Observation-Tool-(CPOT)    Dyspnea:                           [ ]Mild [ ]Moderate [ ]Severe   Anxiety:                             [ ]Mild [ ]Moderate [ ]Severe  Fatigue:                             [ ]Mild [ ]Moderate [ ]Severe  Nausea:                             [ ]Mild [ ]Moderate [ ]Severe  Loss of appetite:              [ ]Mild [ ]Moderate [ ]Severe - has appetite but some feelings of dysphagia   Constipation:                    [ ]Mild [ ]Moderate [ ]Severe    Other Symptoms:  [ X]All other review of systems negative     Palliative Performance Status Version 2:      30   %    http://[email or web address].org/files/news/palliative_performance_scale_ppsv2.pdf    PHYSICAL EXAM:  ICU Vital Signs Last 24 Hrs  T(C): 36.8 (11 Aug 2022 08:00), Max: 36.8 (11 Aug 2022 08:00)  T(F): 98.3 (11 Aug 2022 08:00), Max: 98.3 (11 Aug 2022 08:00)  HR: 109 (11 Aug 2022 12:00) (87 - 109)  BP: 158/88 (11 Aug 2022 12:00) (101/62 - 158/88)  BP(mean): 117 (11 Aug 2022 12:00) (77 - 117)  RR: 32 (11 Aug 2022 12:00) (19 - 42)  SpO2: 93% (11 Aug 2022 12:00) (93% - 98%)    GENERAL: used Cantonese   [X ]Alert  [X ]Oriented x  3 [ ]Lethargic  [ ]Cachexia  [ ]Unarousable  [X ]Verbal  [ ]Non-Verbal  Behavioral:   [ ] Anxiety  [ ] Delirium [ ] Agitation [X ] Calm [ ] Other  HEENT:  [ X]Normal   [ ]Dry mouth   [] HNFNC [ ]ET Tube/Trach  [ ]Oral lesions  PULMONARY:   [ ]Clear [ ]Tachypnea  [ ]Audible excessive secretions [X ] No labored breathing  On Bipap   CARDIOVASCULAR:    [ X]Regular [ ]Irregular [ ]Tachy  [ ]Arnaldo [ ]Murmur [ ]Other  GASTROINTESTINAL:  X[ ]Soft  [ ]Distended  [X ] Not distended [ ]+BS  [ ]Non tender [ ]Tender  [ ]PEG [ ]OGT/ NGT  Last BM:   GENITOURINARY:  [X]Normal [ ] Incontinent   [ ]Oliguria/Anuria   [ ]Gutierrez  MUSCULOSKELETAL:   [ ]Normal   [ ]Weakness  [ ]Bed/Wheelchair bound [ ]Edema  NEUROLOGIC:   [X ]No focal deficits  [ ]Cognitive impairment  [ ]Dysphagia [ ]Dysarthria [ ]Paresis [ ]Other   SKIN:   [ ]Normal   [X ] Nonjaundiced [ ]Rash  [ ]Pressure ulcer(s)       Present on admission [ ]y [ ]n      LABS: reviewed                          16.4   14.63 )-----------( 378      ( 10 Aug 2022 06:38 )             50.4     08-10    138  |  96<L>  |  30<H>  ----------------------------<  119<H>  5.1<H>   |  33<H>  |  0.7    Ca    9.3      10 Aug 2022 06:38  Mg     2.3     08-10          RADIOLOGY & ADDITIONAL STUDIES:    < from: Xray Chest 1 View- PORTABLE-Routine (Xray Chest 1 View- PORTABLE-Routine in AM.) (08.10.22 @ 05:41) >  Impression:    Stable bilateral nodular opacities.  COPD.    --- End of Report ---    < end of copied text >

## 2022-08-11 NOTE — PROGRESS NOTE ADULT - PROBLEM SELECTOR PLAN 1
continues to be Bipap dependent, was dyspneic off BiPAP today after conversing  continue med mgmt per pulm   pt and families goal is pulm rehab if possible   full code

## 2022-08-11 NOTE — PROGRESS NOTE ADULT - PROBLEM SELECTOR PLAN 3
- plans have been for ongoing full care  - will address GOC with pt and family as things progress  - family to think about code status  - continue aggressive medical management for now - plans have been for ongoing full care  - will address GOC with pt and family as things progress  - family to think about code status  - continue aggressive medical management for now    see above

## 2022-08-11 NOTE — PROGRESS NOTE ADULT - ASSESSMENT
71M PMHx COPD on home o2, HTN, DM2 here with acute hypercapnic and hypoxic resp failure.    #Acute hypercapnic and hypoxic resp failure 2/2 copd exacerbation   ct with lung nodules, emphysema  blood gas with acute hypercapnia, now resolved  Pt sob today, restarted on bipap keep 4/4 off  bipap prn, qhs  requiring 3l nc  s/p course doxy  solumedrol 60 iv bid--> solumedrol 60 intravenous daily now as per pulm   appreciate pulm  Symbicort  alberuol prn  spiriva  singulair 10  follow up palliative care today     #Transaminitis, cholestatic  ct/ us with liver hemangioma  repeat us in one year  trend    #leukocuytosis likely 2/2 steroids use, pt completed course of antibiotics, monitor    #Hyperkalemia-->5.1  low k diet  check ekg  lokelma 5 bid x 1 day    #HTN  nifedipine 30--> dced started on cardizem recc pulm   BP stable     #DM2  ssi    #DVT ppx  lovenox    #Progress Note Handoff  Pending (specify):  BIPAP 4/4, copd management  Disposition: home  Anticipated date: TBD, still SICU, posisble downgrade today as per pulm

## 2022-08-11 NOTE — PROGRESS NOTE ADULT - CONVERSATION DETAILS
Spoke with family at bedside. Discussed the patients overall poor condition and that his COPD is end stage. Medical update provided per pulm team. Explained options moving forward which include continued medical management in the hopes that the patient will be able to go home, then FU for pulmonary rehab outpatient. Per pulm, this may be feasible. Explained alternatively the option to focus more on comfort and quality of life if pulmonary rehab is no longer an option, or if the patient is not improving/declining. Discussed hospice at  home and comfort care.     Discussed also code status and that given his illness, CPR and Intubation would likely be more burdensome than beneficial to the patient. Jane explained that the patient always wanted to be full code. Discussed that he is in a different spot than he was back then, and that he may never come back to his current status should he be resuscitated or intubated. She will discuss this with her family.     Pt remains hopeful he will regain function.

## 2022-08-11 NOTE — CHART NOTE - NSCHARTNOTEFT_GEN_A_CORE
Notified that patient has chest pain. Patient seen and examined at bedside. Language Line Cantonese  used (ID #[id number], Kindra). Patient states he was taking a bite of his food during lunch and after a few seconds felt that the food got stuck in his chest and he was feeling discomfort. Patient denies any active chest pain or discomfort, but feels uneasiness in his stomach and feels some regurgitation. Patient is baseline short of breath due to COPD, but does not endorse any change in dyspnea. Denied any nausea, or vomiting. Patient states he has some difficulty swallowing food. Patient administered meds at bedside and was able to tolerated pills orally with few sips of ensure. Patient was given morphine for chest discomfort. will order Chest Xray and EKG.

## 2022-08-11 NOTE — CHART NOTE - NSCHARTNOTEFT_GEN_A_CORE
Writer used  via tablet with RN.  Patient c/o pain in chest.  RN to request x-ray.  Medication administered.   Support rendered.

## 2022-08-11 NOTE — PROGRESS NOTE ADULT - SUBJECTIVE AND OBJECTIVE BOX
Patient is a 71y old  Male who presents with a chief complaint of COPD Exacerbation (10 Aug 2022 15:45)        Over Night Events:  looks and feels better.  Less tachycardic and less SOB         ROS:     All ROS are negative except HPI         PHYSICAL EXAM    ICU Vital Signs Last 24 Hrs  T(C): 36.8 (11 Aug 2022 08:00), Max: 36.8 (11 Aug 2022 08:00)  T(F): 98.3 (11 Aug 2022 08:00), Max: 98.3 (11 Aug 2022 08:00)  HR: 87 (11 Aug 2022 08:00) (87 - 106)  BP: 146/81 (11 Aug 2022 08:00) (101/62 - 149/91)  BP(mean): 105 (11 Aug 2022 08:00) (77 - 115)  ABP: --  ABP(mean): --  RR: 21 (11 Aug 2022 08:00) (19 - 42)  SpO2: 97% (11 Aug 2022 08:00) (95% - 98%)    O2 Parameters below as of 11 Aug 2022 08:00  Patient On (Oxygen Delivery Method): nasal cannula  O2 Flow (L/min): 5          CONSTITUTIONAL:  Ill appearing in NAD    ENT:   Airway patent,   Mouth with normal mucosa.       EYES:   Pupils equal,   Round and reactive to light.    CARDIAC:   Normal rate,   Regular rhythm.          RESPIRATORY:   No wheezing  Dec BS bilaterally   Normal chest expansion  Not tachypneic,  No use of accessory muscles    GASTROINTESTINAL:  Abdomen soft,   Non-tender,   No guarding,   + BS    MUSCULOSKELETAL:   Range of motion is not limited,  No clubbing, cyanosis    NEUROLOGICAL:   Alert and oriented   No motor  deficits.    SKIN:   Skin normal color for race,   No evidence of rash.    PSYCHIATRIC:   Normal mood and affect.   No apparent risk to self or others.      08-10-22 @ 07:01  -  08-11-22 @ 07:00  --------------------------------------------------------  IN:  Total IN: 0 mL    OUT:    Voided (mL): 1075 mL  Total OUT: 1075 mL    Total NET: -1075 mL          LABS:                            16.3   21.98 )-----------( 436      ( 11 Aug 2022 06:23 )             50.1                                               08-11    140  |  96<L>  |  35<H>  ----------------------------<  166<H>  4.7   |  37<H>  |  0.7    Ca    9.6      11 Aug 2022 06:23  Mg     2.4     08-11    TPro  6.3  /  Alb  4.0  /  TBili  1.2  /  DBili  x   /  AST  24  /  ALT  73<H>  /  AlkPhos  73  08-11                                                                                           LIVER FUNCTIONS - ( 11 Aug 2022 06:23 )  Alb: 4.0 g/dL / Pro: 6.3 g/dL / ALK PHOS: 73 U/L / ALT: 73 U/L / AST: 24 U/L / GGT: x                                                                                                                                       MEDICATIONS  (STANDING):  ALBUTerol    0.083% 2.5 milliGRAM(s) Nebulizer every 4 hours  ALBUTerol    90 MICROgram(s) HFA Inhaler 2 Puff(s) Inhalation every 4 hours  budesonide 160 MICROgram(s)/formoterol 4.5 MICROgram(s) Inhaler 2 Puff(s) Inhalation two times a day  diltiazem    Tablet 30 milliGRAM(s) Oral three times a day  enoxaparin Injectable 40 milliGRAM(s) SubCutaneous every 24 hours  glucagon  Injectable 1 milliGRAM(s) IntraMuscular once  insulin lispro (ADMELOG) corrective regimen sliding scale   SubCutaneous three times a day before meals  loratadine 10 milliGRAM(s) Oral daily  methylPREDNISolone sodium succinate Injectable 60 milliGRAM(s) IV Push every 12 hours  montelukast 10 milliGRAM(s) Oral daily  pantoprazole    Tablet 40 milliGRAM(s) Oral before breakfast  tiotropium 18 MICROgram(s) Capsule 1 Capsule(s) Inhalation daily    MEDICATIONS  (PRN):  acetaminophen     Tablet .. 650 milliGRAM(s) Oral every 6 hours PRN Temp greater or equal to 38C (100.4F), Mild Pain (1 - 3)  aluminum hydroxide/magnesium hydroxide/simethicone Suspension 30 milliLiter(s) Oral every 4 hours PRN Dyspepsia  fluticasone propionate 50 MICROgram(s)/spray Nasal Spray 2 Spray(s) Both Nostrils two times a day PRN nasal congestion  guaifenesin/dextromethorphan Oral Liquid 10 milliLiter(s) Oral every 6 hours PRN Cough  melatonin 3 milliGRAM(s) Oral at bedtime PRN Insomnia  morphine  - Injectable 4 milliGRAM(s) IV Push four times a day PRN Moderate Pain (4 - 6)  ondansetron Injectable 4 milliGRAM(s) IV Push every 8 hours PRN Nausea and/or Vomiting      New X-rays reviewed:                                                                                  ECHO

## 2022-08-11 NOTE — PROGRESS NOTE ADULT - ASSESSMENT
71yMale with PMH including COPD on 2L home O2 & CPAP (unclear setting), Hx COPD Exacerbation w/ Intubation x 3 d in 2004, Asthma, DM s/p right transmetatarsal amputation, admitted with COPD exacerbation, currently on and off Bipap, with some dysphagia. Pt continues to be dyspneic, tachypneic, and tachycardic when off the Bipap.     Spoke with family and pt today at bedside alongside pulm team and SICU team.   Plan for now is ongoing med mgmt in hopes of pulmonary rehab on discharge. They are aware of hospice as an option.   Family will think about DNR/DNI- for now Full code.     MEDD (morphine equivalent daily dose): 12 mg      See Recs below.    Please call [phone] with questions or concerns 24/7.   We will continue to follow.     Discussed with primary MD.

## 2022-08-11 NOTE — PROGRESS NOTE ADULT - ASSESSMENT
IMPRESSION:  Severe COPD exacerbation improving slowly   HO End stage COPD   Acute hypoxemic resp failure with hypercapnia       PLAN:    ·	Continue Oxygen therapy to keep pulse ox 88 TO 92%  ·	Nebs q4hr and PRN  ·	NIV 4 hrs on/4hrs off and QHS.  ·	AAT level   ·	Continue Solumedrol 60 q 24  ·	Continue Cardizem 30 mg Q8  ·	DVT ppx.    ·	OO Bed to chair  ·	very poor prognosis  ·	Possible downgrade to floor pm if remains stable   ·	Overall prognosis poor

## 2022-08-12 NOTE — CHART NOTE - NSCHARTNOTEFT_GEN_A_CORE
Registered Dietitian Follow-Up     Patient Profile Reviewed                           Yes [X]   No []     Nutrition History Previously Obtained        Yes [X]  No []       Pertinent Subjective Information: pt seen at bedside. Reports PO intake improved and had eggs and yogurt from morning tray. Enjoys Vanilla Ensure. Has complaint of throat pain when eating which started today and constipation - had a hard BM this AM and was painful when it passed. Avoiding sour foods such as juice as they exacerbate pain at his throat.      Pertinent Medical Interventions:     Diet order:   Diet, Regular:   No Concentrated Potassium  Free Water Flush Instructions:  minced and moist entree, can get mixed consitencies and bread  Supplement Feeding Modality:  Oral  Ensure Enlive Cans or Servings Per Day:  1       Frequency:  Three Times a day (22 @ 11:34) [Active]    Anthropometrics:    IBW:     Daily Weight in k.2 (-)  % Weight Change    MEDICATIONS  (STANDING):  ALBUTerol    0.083% 2.5 milliGRAM(s) Nebulizer every 4 hours  ALBUTerol    90 MICROgram(s) HFA Inhaler 2 Puff(s) Inhalation every 4 hours  budesonide 160 MICROgram(s)/formoterol 4.5 MICROgram(s) Inhaler 2 Puff(s) Inhalation two times a day  diltiazem    Tablet 30 milliGRAM(s) Oral three times a day  enoxaparin Injectable 40 milliGRAM(s) SubCutaneous every 24 hours  glucagon  Injectable 1 milliGRAM(s) IntraMuscular once  insulin lispro (ADMELOG) corrective regimen sliding scale   SubCutaneous three times a day before meals  loratadine 10 milliGRAM(s) Oral daily  methylPREDNISolone sodium succinate Injectable 60 milliGRAM(s) IV Push every 12 hours  montelukast 10 milliGRAM(s) Oral daily  pantoprazole    Tablet 40 milliGRAM(s) Oral before breakfast  polyethylene glycol 3350 17 Gram(s) Oral two times a day  senna 2 Tablet(s) Oral at bedtime  tiotropium 18 MICROgram(s) Capsule 1 Capsule(s) Inhalation daily    MEDICATIONS  (PRN):  acetaminophen     Tablet .. 650 milliGRAM(s) Oral every 6 hours PRN Temp greater or equal to 38C (100.4F), Mild Pain (1 - 3)  aluminum hydroxide/magnesium hydroxide/simethicone Suspension 30 milliLiter(s) Oral every 4 hours PRN Dyspepsia  fluticasone propionate 50 MICROgram(s)/spray Nasal Spray 2 Spray(s) Both Nostrils two times a day PRN nasal congestion  guaifenesin/dextromethorphan Oral Liquid 10 milliLiter(s) Oral every 6 hours PRN Cough  melatonin 3 milliGRAM(s) Oral at bedtime PRN Insomnia  morphine  - Injectable 4 milliGRAM(s) IV Push four times a day PRN Moderate Pain (4 - 6)  ondansetron Injectable 4 milliGRAM(s) IV Push every 8 hours PRN Nausea and/or Vomiting    Pertinent Labs:  @ 05:32: Na 138, BUN 41<H>, Cr 0.9, <H>, K+ 5.2<H>, Phos --, Mg 2.4, Alk Phos 77, ALT/SGPT 72<H>, AST/SGOT 29, HbA1c --    Finger Sticks:  POCT Blood Glucose.: 119 mg/dL ( @ 11:31)  POCT Blood Glucose.: 146 mg/dL ( @ 16:53)  POCT Blood Glucose.: 119 mg/dL ( @ 11:46)    Physical Findings:  - Appearance:  - GI function:  - Tubes:  - Oral/Mouth cavity:  - Skin:     Nutrition Requirements:  Weight Used:     Estimated Energy Needs    Continue []  Adjust []  Adjusted Energy Recommendations:   kcal/day        Estimated Protein Needs    Continue []  Adjust []  Adjusted Protein Recommendations:   gm/day        Estimated Fluid Needs        Continue []  Adjust []  Adjusted Fluid Recommendations:   mL/day     Nutrient Intake:     [] Previous Nutrition Diagnosis:            [] Ongoing          [] Resolved    [] No active nutrition diagnosis identified at this time     Nutrition Intervention      Goal/Expected Outcome:      Indicator/Monitoring: diet order, kcal intake, body composition, NFPE, labs  (lytes, BG, renal indices)     Recommendation:    Patient assessed at high nutrition risk.  RD spectra [id number]. Registered Dietitian Follow-Up     Patient Profile Reviewed                           Yes [X]   No []     Nutrition History Previously Obtained        Yes [X]  No []       Pertinent Subjective Information: pt seen at bedside. Reports PO intake improved and had eggs and yogurt from morning tray. Enjoys Vanilla Ensure. Has complaint of throat pain when eating which started today and constipation - had a hard BM this AM and was painful when it passed. Avoiding sour foods such as juice as they exacerbate pain at his throat.      Pertinent Medical Interventions:     Diet order:   Diet, Regular:   No Concentrated Potassium  Free Water Flush Instructions:  minced and moist entree, can get mixed consitencies and bread  Supplement Feeding Modality:  Oral  Ensure Enlive Cans or Servings Per Day:  1       Frequency:  Three Times a day (22 @ 11:34) [Active]    Anthropometrics:  Height for BMI (CENTIMETERS)	170 Centimeter(s)  Weight for BMI (lbs)	125 lb  Weight for BMI (kg)	56.7 kg   Daily Weight in k.2 ()  Body Mass Index	18.4 based on current weght 53.2 kg  Weight Change: downtrending from admit weight 63.5kg to now 53.2 kg    MEDICATIONS  (STANDING):  ALBUTerol    0.083% 2.5 milliGRAM(s) Nebulizer every 4 hours  ALBUTerol    90 MICROgram(s) HFA Inhaler 2 Puff(s) Inhalation every 4 hours  budesonide 160 MICROgram(s)/formoterol 4.5 MICROgram(s) Inhaler 2 Puff(s) Inhalation two times a day  diltiazem    Tablet 30 milliGRAM(s) Oral three times a day  enoxaparin Injectable 40 milliGRAM(s) SubCutaneous every 24 hours  glucagon  Injectable 1 milliGRAM(s) IntraMuscular once  insulin lispro (ADMELOG) corrective regimen sliding scale   SubCutaneous three times a day before meals  loratadine 10 milliGRAM(s) Oral daily  methylPREDNISolone sodium succinate Injectable 60 milliGRAM(s) IV Push every 12 hours  montelukast 10 milliGRAM(s) Oral daily  pantoprazole    Tablet 40 milliGRAM(s) Oral before breakfast  polyethylene glycol 3350 17 Gram(s) Oral two times a day  senna 2 Tablet(s) Oral at bedtime  tiotropium 18 MICROgram(s) Capsule 1 Capsule(s) Inhalation daily    MEDICATIONS  (PRN):  acetaminophen     Tablet .. 650 milliGRAM(s) Oral every 6 hours PRN Temp greater or equal to 38C (100.4F), Mild Pain (1 - 3)  aluminum hydroxide/magnesium hydroxide/simethicone Suspension 30 milliLiter(s) Oral every 4 hours PRN Dyspepsia  fluticasone propionate 50 MICROgram(s)/spray Nasal Spray 2 Spray(s) Both Nostrils two times a day PRN nasal congestion  guaifenesin/dextromethorphan Oral Liquid 10 milliLiter(s) Oral every 6 hours PRN Cough  melatonin 3 milliGRAM(s) Oral at bedtime PRN Insomnia  morphine  - Injectable 4 milliGRAM(s) IV Push four times a day PRN Moderate Pain (4 - 6)  ondansetron Injectable 4 milliGRAM(s) IV Push every 8 hours PRN Nausea and/or Vomiting    Pertinent Labs:                         16.2   26.31 )-----------( 435      ( 12 Aug 2022 05:32 )             49.2      @ 05:32: Na 138, BUN 41<H>, Cr 0.9, <H>, K+ 5.2<H>, Phos --, Mg 2.4, Alk Phos 77, ALT/SGPT 72<H>, AST/SGOT 29, HbA1c --    Finger Sticks:  POCT Blood Glucose.: 119 mg/dL ( @ 11:31)  POCT Blood Glucose.: 146 mg/dL ( @ 16:53)  POCT Blood Glucose.: 119 mg/dL ( @ 11:46)    Physical Findings:  - Appearance: AOx4; no edema noted  - GI function: abdomen WDL; last BM this AM - hard  - Tubes: no feeding tubes  - Oral/Mouth cavity: chewing and swallowing difficulty, currently on altered consistency diet  - Skin: no skin breakdown documented     Nutrition Requirements:  Weight Used: current ethel 53 kg     Estimated Energy Needs    Continue []  Adjust [X]  Adjusted Energy Recommendations:  3287-4907 kcal/day 30-35kcal/kg        Estimated Protein Needs    Continue []  Adjust [X]  Adjusted Protein Recommendations: 69-80 gm/day 1.3-1.5g/kg        Estimated Fluid Needs        Continue []  Adjust [X]  Adjusted Fluid Recommendations: 1325 mL/day 25ml/kg     Nutrient Intake: 25% trays and Ensure 2x/day meeting ~75% estimated kcal and protein intake     [] Previous Nutrition Diagnosis: Inadequate Oral Intake            [X] Ongoing          [] Resolved    [] Previous Nutrition Diagnosis: Malnutrition            [X] Ongoing          [] Resolved     Nutrition Intervention      Goal/Expected Outcome: pt to cont to meet >75% estimated needs in 6 days.      Indicator/Monitoring: diet order, kcal intake, body composition, NFPE, labs  (lytes, BG, renal indices)    Recommendation:  Cont with diet and supplement, add note in diet order to give vanilla Ensures only    Patient assessed at moderate nutrition risk.  RD spectra [phone]. Registered Dietitian Follow-Up     Patient Profile Reviewed                           Yes [X]   No []     Nutrition History Previously Obtained        Yes [X]  No []       Pertinent Subjective Information: pt seen at bedside. Reports PO intake improved and had eggs and yogurt from morning tray. States he eats better when pain resolved with morphine. Enjoys Vanilla Ensure. Has complaint of throat pain when eating which started today and constipation - had a hard BM this AM and was painful when it passed. Avoiding sour foods such as juice as they exacerbate pain at his throat.      Pertinent Medical Interventions: remains on Oxygen. Possibly downgrade this PM.      Diet order:   Diet, Regular:   No Concentrated Potassium  Free Water Flush Instructions:  minced and moist entree, can get mixed consitencies and bread  Supplement Feeding Modality:  Oral  Ensure Enlive Cans or Servings Per Day:  1       Frequency:  Three Times a day (22 @ 11:34) [Active]    Anthropometrics:  Height for BMI (CENTIMETERS)	170 Centimeter(s)  Weight for BMI (lbs)	125 lb  Weight for BMI (kg)	56.7 kg   Daily Weight in k.2 ()  Body Mass Index	18.4 based on current weght 53.2 kg  Weight Change: downtrending from admit weight 63.5kg to now 53.2 kg    MEDICATIONS  (STANDING):  ALBUTerol    0.083% 2.5 milliGRAM(s) Nebulizer every 4 hours  ALBUTerol    90 MICROgram(s) HFA Inhaler 2 Puff(s) Inhalation every 4 hours  budesonide 160 MICROgram(s)/formoterol 4.5 MICROgram(s) Inhaler 2 Puff(s) Inhalation two times a day  diltiazem    Tablet 30 milliGRAM(s) Oral three times a day  enoxaparin Injectable 40 milliGRAM(s) SubCutaneous every 24 hours  glucagon  Injectable 1 milliGRAM(s) IntraMuscular once  insulin lispro (ADMELOG) corrective regimen sliding scale   SubCutaneous three times a day before meals  loratadine 10 milliGRAM(s) Oral daily  methylPREDNISolone sodium succinate Injectable 60 milliGRAM(s) IV Push every 12 hours  montelukast 10 milliGRAM(s) Oral daily  pantoprazole    Tablet 40 milliGRAM(s) Oral before breakfast  polyethylene glycol 3350 17 Gram(s) Oral two times a day  senna 2 Tablet(s) Oral at bedtime  tiotropium 18 MICROgram(s) Capsule 1 Capsule(s) Inhalation daily    MEDICATIONS  (PRN):  acetaminophen     Tablet .. 650 milliGRAM(s) Oral every 6 hours PRN Temp greater or equal to 38C (100.4F), Mild Pain (1 - 3)  aluminum hydroxide/magnesium hydroxide/simethicone Suspension 30 milliLiter(s) Oral every 4 hours PRN Dyspepsia  fluticasone propionate 50 MICROgram(s)/spray Nasal Spray 2 Spray(s) Both Nostrils two times a day PRN nasal congestion  guaifenesin/dextromethorphan Oral Liquid 10 milliLiter(s) Oral every 6 hours PRN Cough  melatonin 3 milliGRAM(s) Oral at bedtime PRN Insomnia  morphine  - Injectable 4 milliGRAM(s) IV Push four times a day PRN Moderate Pain (4 - 6)  ondansetron Injectable 4 milliGRAM(s) IV Push every 8 hours PRN Nausea and/or Vomiting    Pertinent Labs:                         16.2   26.31 )-----------( 435      ( 12 Aug 2022 05:32 )             49.2      @ 05:32: Na 138, BUN 41<H>, Cr 0.9, <H>, K+ 5.2<H>, Phos --, Mg 2.4, Alk Phos 77, ALT/SGPT 72<H>, AST/SGOT 29, HbA1c --    Finger Sticks:  POCT Blood Glucose.: 119 mg/dL ( @ 11:31)  POCT Blood Glucose.: 146 mg/dL ( @ 16:53)  POCT Blood Glucose.: 119 mg/dL ( @ 11:46)    Physical Findings:  - Appearance: AOx4; no edema noted  - GI function: abdomen WDL; last BM this AM - hard  - Tubes: no feeding tubes  - Oral/Mouth cavity: chewing and swallowing difficulty, currently on altered consistency diet  - Skin: no skin breakdown documented     Nutrition Requirements:  Weight Used: current ethel 53 kg     Estimated Energy Needs    Continue []  Adjust [X]  Adjusted Energy Recommendations:  5483-9048 kcal/day 30-35kcal/kg        Estimated Protein Needs    Continue []  Adjust [X]  Adjusted Protein Recommendations: 69-80 gm/day 1.3-1.5g/kg        Estimated Fluid Needs        Continue []  Adjust [X]  Adjusted Fluid Recommendations: 1325 mL/day 25ml/kg     Nutrient Intake: 25% trays and Ensure 2x/day meeting ~75% estimated kcal and protein intake     [] Previous Nutrition Diagnosis: Inadequate Oral Intake            [X] Ongoing          [] Resolved    [] Previous Nutrition Diagnosis: Malnutrition            [X] Ongoing          [] Resolved     Nutrition Intervention      Goal/Expected Outcome: pt to cont to meet >75% estimated needs in 6 days.      Indicator/Monitoring: diet order, kcal intake, body composition, NFPE, labs  (lytes, BG, renal indices)    Recommendation:  Cont with diet and supplement, add note in diet order to give vanilla Ensures only  Pain control for comfort  Oxygen as ordered/needed for comfort  If suspect dysphagia, S+S    Patient assessed at moderate nutrition risk.  RD spectra [phone].

## 2022-08-12 NOTE — PROGRESS NOTE ADULT - PROBLEM SELECTOR PLAN 4
Full code  Continue current medical management  Will follow for GOC  ______________  Jaun Rosales MD  Palliative Medicine  [facility]   of Ger[facility] Pall[facility]  [phone]

## 2022-08-12 NOTE — PROGRESS NOTE ADULT - SUBJECTIVE AND OBJECTIVE BOX
Patient is a 72y old  Male who presents with a chief complaint of COPD Exacerbation (12 Aug 2022 09:46)        SUBJECTIVE: Patient is doing better today, still     REVIEW OF SYSTEMS:    All Pertinent ROS are negative except per HPI       PHYSICAL EXAM  Vital Signs Last 24 Hrs  T(C): 35.7 (12 Aug 2022 07:00), Max: 37.1 (11 Aug 2022 20:54)  T(F): 96.3 (12 Aug 2022 07:00), Max: 98.7 (11 Aug 2022 20:54)  HR: 88 (12 Aug 2022 07:00) (88 - 109)  BP: 112/69 (12 Aug 2022 07:00) (112/69 - 158/88)  BP(mean): 83 (12 Aug 2022 07:00) (83 - 117)  RR: 34 (12 Aug 2022 04:46) (19 - 36)  SpO2: 96% (12 Aug 2022 07:00) (93% - 100%)    Parameters below as of 12 Aug 2022 04:46  Patient On (Oxygen Delivery Method): BiPAP/CPAP        CONSTITUTIONAL:  Cachectic. In NAD.    ENT:   Airway patent,   No thrush    CARDIAC:   Tachycardic  regular rhythm.    no edema      RESPIRATORY:   Diffuse mild wheezing  Normal chest expansion  Not tachypneic,  No use of accessory muscles    GASTROINTESTINAL:  Abdomen soft,   non-tender,   no guarding,   + BS    MUSCULOSKELETAL:   range of motion is not limited,  no clubbing, cyanosis    NEUROLOGICAL:   Alert and oriented   no motor or deficits.    SKIN:   Skin normal color for race,   warm, dry   No evidence of rash.      08-11-22 @ 07:01  -  08-12-22 @ 07:00  --------------------------------------------------------  IN:  Total IN: 0 mL    OUT:    Voided (mL): 500 mL  Total OUT: 500 mL    Total NET: -500 mL      LABS:               16.2   26.31 )-----------( 435      ( 12 Aug 2022 05:32 )             49.2                                               08-12    138  |  95<L>  |  41<H>  ----------------------------<  163<H>  5.2<H>   |  34<H>  |  0.9    Ca    9.8      12 Aug 2022 05:32  Mg     2.4     08-12    TPro  6.3  /  Alb  4.1  /  TBili  1.1  /  DBili  x   /  AST  29  /  ALT  72<H>  /  AlkPhos  77  08-12                                                                                           LIVER FUNCTIONS - ( 12 Aug 2022 05:32 )  Alb: 4.1 g/dL / Pro: 6.3 g/dL / ALK PHOS: 77 U/L / ALT: 72 U/L / AST: 29 U/L / GGT: x                                                                                                MEDICATIONS  (STANDING):  ALBUTerol    0.083% 2.5 milliGRAM(s) Nebulizer every 4 hours  ALBUTerol    90 MICROgram(s) HFA Inhaler 2 Puff(s) Inhalation every 4 hours  budesonide 160 MICROgram(s)/formoterol 4.5 MICROgram(s) Inhaler 2 Puff(s) Inhalation two times a day  diltiazem    Tablet 30 milliGRAM(s) Oral three times a day  enoxaparin Injectable 40 milliGRAM(s) SubCutaneous every 24 hours  glucagon  Injectable 1 milliGRAM(s) IntraMuscular once  insulin lispro (ADMELOG) corrective regimen sliding scale. SubCutaneous three times a day before meals  loratadine 10 milliGRAM(s) Oral daily  methylPREDNISolone sodium succinate Injectable 60 milliGRAM(s) IV Push every 12 hours  montelukast 10 milliGRAM(s) Oral daily  pantoprazole    Tablet 40 milliGRAM(s) Oral before breakfast  polyethylene glycol 3350 17 Gram(s) Oral two times a day  senna 2 Tablet(s) Oral at bedtime  tiotropium 18 MICROgram(s) Capsule 1 Capsule(s) Inhalation daily    MEDICATIONS  (PRN):  acetaminophen     Tablet .. 650 milliGRAM(s) Oral every 6 hours PRN Temp greater or equal to 38C (100.4F), Mild Pain (1 - 3)  aluminum hydroxide/magnesium hydroxide/simethicone Suspension 30 milliLiter(s) Oral every 4 hours PRN Dyspepsia  fluticasone propionate 50 MICROgram(s)/spray Nasal Spray 2 Spray(s) Both Nostrils two times a day PRN nasal congestion  guaifenesin/dextromethorphan Oral Liquid 10 milliLiter(s) Oral every 6 hours PRN Cough  melatonin 3 milliGRAM(s) Oral at bedtime PRN Insomnia  morphine  - Injectable 4 milliGRAM(s) IV Push four times a day PRN Moderate Pain (4 - 6)  ondansetron Injectable 4 milliGRAM(s) IV Push every 8 hours PRN Nausea and/or Vomiting      X-Rays reviewed    CXR interpreted by me: Patient is a 72y old  Male who presents with a chief complaint of COPD Exacerbation (12 Aug 2022 09:46)        SUBJECTIVE: Patient is doing better today, still SOB     REVIEW OF SYSTEMS:    All Pertinent ROS are negative except per HPI       PHYSICAL EXAM  Vital Signs Last 24 Hrs  T(C): 35.7 (12 Aug 2022 07:00), Max: 37.1 (11 Aug 2022 20:54)  T(F): 96.3 (12 Aug 2022 07:00), Max: 98.7 (11 Aug 2022 20:54)  HR: 88 (12 Aug 2022 07:00) (88 - 109)  BP: 112/69 (12 Aug 2022 07:00) (112/69 - 158/88)  BP(mean): 83 (12 Aug 2022 07:00) (83 - 117)  RR: 34 (12 Aug 2022 04:46) (19 - 36)  SpO2: 96% (12 Aug 2022 07:00) (93% - 100%)    Parameters below as of 12 Aug 2022 04:46  Patient On (Oxygen Delivery Method): BiPAP/CPAP        CONSTITUTIONAL:  Cachectic. In NAD.    ENT:   Airway patent,   No thrush    CARDIAC:   Tachycardic  regular rhythm.    no edema      RESPIRATORY:   Diffuse mild wheezing  Normal chest expansion  Not tachypneic,  No use of accessory muscles    GASTROINTESTINAL:  Abdomen soft,   non-tender,   no guarding,   + BS    MUSCULOSKELETAL:   range of motion is not limited,  no clubbing, cyanosis    NEUROLOGICAL:   Alert and oriented   no motor or deficits.    SKIN:   Skin normal color for race,   warm, dry   No evidence of rash.      08-11-22 @ 07:01  -  08-12-22 @ 07:00  --------------------------------------------------------  IN:  Total IN: 0 mL    OUT:    Voided (mL): 500 mL  Total OUT: 500 mL    Total NET: -500 mL      LABS:               16.2   26.31 )-----------( 435      ( 12 Aug 2022 05:32 )             49.2                                               08-12    138  |  95<L>  |  41<H>  ----------------------------<  163<H>  5.2<H>   |  34<H>  |  0.9    Ca    9.8      12 Aug 2022 05:32  Mg     2.4     08-12    TPro  6.3  /  Alb  4.1  /  TBili  1.1  /  DBili  x   /  AST  29  /  ALT  72<H>  /  AlkPhos  77  08-12                                                                                           LIVER FUNCTIONS - ( 12 Aug 2022 05:32 )  Alb: 4.1 g/dL / Pro: 6.3 g/dL / ALK PHOS: 77 U/L / ALT: 72 U/L / AST: 29 U/L / GGT: x                                                                                                MEDICATIONS  (STANDING):  ALBUTerol    0.083% 2.5 milliGRAM(s) Nebulizer every 4 hours  ALBUTerol    90 MICROgram(s) HFA Inhaler 2 Puff(s) Inhalation every 4 hours  budesonide 160 MICROgram(s)/formoterol 4.5 MICROgram(s) Inhaler 2 Puff(s) Inhalation two times a day  diltiazem    Tablet 30 milliGRAM(s) Oral three times a day  enoxaparin Injectable 40 milliGRAM(s) SubCutaneous every 24 hours  glucagon  Injectable 1 milliGRAM(s) IntraMuscular once  insulin lispro (ADMELOG) corrective regimen sliding scale. SubCutaneous three times a day before meals  loratadine 10 milliGRAM(s) Oral daily  methylPREDNISolone sodium succinate Injectable 60 milliGRAM(s) IV Push every 12 hours  montelukast 10 milliGRAM(s) Oral daily  pantoprazole    Tablet 40 milliGRAM(s) Oral before breakfast  polyethylene glycol 3350 17 Gram(s) Oral two times a day  senna 2 Tablet(s) Oral at bedtime  tiotropium 18 MICROgram(s) Capsule 1 Capsule(s) Inhalation daily    MEDICATIONS  (PRN):  acetaminophen     Tablet .. 650 milliGRAM(s) Oral every 6 hours PRN Temp greater or equal to 38C (100.4F), Mild Pain (1 - 3)  aluminum hydroxide/magnesium hydroxide/simethicone Suspension 30 milliLiter(s) Oral every 4 hours PRN Dyspepsia  fluticasone propionate 50 MICROgram(s)/spray Nasal Spray 2 Spray(s) Both Nostrils two times a day PRN nasal congestion  guaifenesin/dextromethorphan Oral Liquid 10 milliLiter(s) Oral every 6 hours PRN Cough  melatonin 3 milliGRAM(s) Oral at bedtime PRN Insomnia  morphine  - Injectable 4 milliGRAM(s) IV Push four times a day PRN Moderate Pain (4 - 6)  ondansetron Injectable 4 milliGRAM(s) IV Push every 8 hours PRN Nausea and/or Vomiting      X-Rays reviewed    CXR interpreted by me: DC instructions

## 2022-08-12 NOTE — PROGRESS NOTE ADULT - SUBJECTIVE AND OBJECTIVE BOX
HPI: 71yMale with PMH including COPD on 2L home O2 & CPAP (unclear setting), Hx COPD Exacerbation w/ Intubation x 3 d in 2004, Asthma, DM s/p right transmetatarsal amputation, admitted with COPD exacerbation, currently on Bipap, attempting to wean. For now, the patient and family want full aggressive medical management. They are open to palliative following along and re-addressing GOC as things progress.     INTERVAL EVENTS:  8/3: patient comfortbale, on HFNC, is on diet  8/4: states having cough- signed off as goals were clear  8/9: reconsulted for GOC. Pt remains on Bipap, poorly compliant.   8/10: patient dyspneic after extended conversation with our team and [employer]  ([id number]) while on NC; used no morphine PRN/24 hours  8/11: pt remains dyspneic with exertion or even speaking, using significant respiratory accessory muscles. off bipap on exam and able to eat. given some morphine for chest tightness. plan for family meeting today  8/12: patient states having dryness from O2 NC ([facility] [employer] [facility]  #[id number])    ADVANCE DIRECTIVES:    MOLST  [ ]  Living Will  [ ]   DECISION MAKER(s): Patient   [ ] Health Care Proxy(s)  [ X] Surrogate(s)  [ ] Guardian           Name(s): Phone Number(s): Fletcher Jacobo ( [phone])    BASELINE (I)ADL(s) (prior to admission):  [place]: [ ]Total  [X ] Moderate [ ]Dependent  Palliative Performance Status Version 2:         %    http://npcrc.org/files/news/palliative_performance_scale_ppsv2.pdf    Allergies    No Known Allergies    Intolerances    MEDICATIONS  (STANDING):  ALBUTerol    0.083% 2.5 milliGRAM(s) Nebulizer every 4 hours  ALBUTerol    90 MICROgram(s) HFA Inhaler 2 Puff(s) Inhalation every 4 hours  budesonide 160 MICROgram(s)/formoterol 4.5 MICROgram(s) Inhaler 2 Puff(s) Inhalation two times a day  diltiazem    Tablet 30 milliGRAM(s) Oral three times a day  enoxaparin Injectable 40 milliGRAM(s) SubCutaneous every 24 hours  glucagon  Injectable 1 milliGRAM(s) IntraMuscular once  insulin lispro (ADMELOG) corrective regimen sliding scale   SubCutaneous three times a day before meals  loratadine 10 milliGRAM(s) Oral daily  methylPREDNISolone sodium succinate Injectable 60 milliGRAM(s) IV Push every 12 hours  montelukast 10 milliGRAM(s) Oral daily  pantoprazole    Tablet 40 milliGRAM(s) Oral before breakfast  polyethylene glycol 3350 17 Gram(s) Oral two times a day  senna 2 Tablet(s) Oral at bedtime  sodium zirconium cyclosilicate 5 Gram(s) Oral once  tiotropium 18 MICROgram(s) Capsule 1 Capsule(s) Inhalation daily    MEDICATIONS  (PRN):  acetaminophen     Tablet .. 650 milliGRAM(s) Oral every 6 hours PRN Temp greater or equal to 38C (100.4F), Mild Pain (1 - 3)  aluminum hydroxide/magnesium hydroxide/simethicone Suspension 30 milliLiter(s) Oral every 4 hours PRN Dyspepsia  fluticasone propionate 50 MICROgram(s)/spray Nasal Spray 2 Spray(s) Both Nostrils two times a day PRN nasal congestion  guaifenesin/dextromethorphan Oral Liquid 10 milliLiter(s) Oral every 6 hours PRN Cough  melatonin 3 milliGRAM(s) Oral at bedtime PRN Insomnia  morphine  - Injectable 4 milliGRAM(s) IV Push four times a day PRN Moderate Pain (4 - 6)  ondansetron Injectable 4 milliGRAM(s) IV Push every 8 hours PRN Nausea and/or Vomiting      PRESENT SYMPTOMS:   Pain: [ ]yes [X ]no  QOL impact -   Location -           Aggravating factors -    Quality -   Radiation -  Timing-  Severity (0-10 scale):  Minimal acceptable level (0-10 scale):     CPOT:  0  https://www.sccm.org/getattachment/dtq89y94-7g1w-6g6s-[email or web address]/Critical-Care-Pain-Observation-Tool-(CPOT)    Dyspnea:                           [ ]Mild [ ]Moderate [ ]Severe   Anxiety:                             [ ]Mild [ ]Moderate [ ]Severe  Fatigue:                             [ ]Mild [ ]Moderate [ ]Severe  Nausea:                             [ ]Mild [ ]Moderate [ ]Severe  Loss of appetite:              [ ]Mild [ ]Moderate [ ]Severe - has appetite but some feelings of dysphagia   Constipation:                    [ ]Mild [ ]Moderate [ ]Severe    Other Symptoms: dry nose  [ X]All other review of systems negative     Palliative Performance Status Version 2:      30   %    http://npcrc.org/files/news/palliative_performance_scale_ppsv2.pdf    PHYSICAL EXAM:  Vital Signs Last 24 Hrs  T(C): 36.2 (12 Aug 2022 10:00), Max: 37.1 (11 Aug 2022 20:54)  T(F): 97.1 (12 Aug 2022 10:00), Max: 98.7 (11 Aug 2022 20:54)  HR: 96 (12 Aug 2022 11:00) (80 - 103)  BP: 145/78 (12 Aug 2022 10:00) (109/61 - 145/78)  BP(mean): 105 (12 Aug 2022 10:00) (78 - 105)  RR: 23 (12 Aug 2022 11:00) (23 - 44)  SpO2: 96% (12 Aug 2022 11:00) (93% - 98%)    Parameters below as of 12 Aug 2022 04:46  Patient On (Oxygen Delivery Method): BiPAP/CPAP      GENERAL: used Cantonese   [X ]Alert  [X ]Oriented x  3 [ ]Lethargic  [ ]Cachexia  [ ]Unarousable  [X ]Verbal  [ ]Non-Verbal  Behavioral:   [ ] Anxiety  [ ] Delirium [ ] Agitation [X ] Calm [ ] Other  HEENT:  [ X]Normal   [ ]Dry mouth   [] HNFNC [ ]ET Tube/Trach  [ ]Oral lesions  PULMONARY:   [ ]Clear [ ]Tachypnea  [ ]Audible excessive secretions [X ] No labored breathing  On Bipap   CARDIOVASCULAR:    [ X]Regular [ ]Irregular [ ]Tachy  [ ]Arnaldo [ ]Murmur [ ]Other  GASTROINTESTINAL:  X[ ]Soft  [ ]Distended  [X ] Not distended [ ]+BS  [ ]Non tender [ ]Tender  [ ]PEG [ ]OGT/ NGT  Last BM:   GENITOURINARY:  [X]Normal [ ] Incontinent   [ ]Oliguria/Anuria   [ ]Gutierrez  MUSCULOSKELETAL:   [ ]Normal   [ ]Weakness  [ ]Bed/Wheelchair bound [ ]Edema  NEUROLOGIC:   [X ]No focal deficits  [ ]Cognitive impairment  [ ]Dysphagia [ ]Dysarthria [ ]Paresis [ ]Other   SKIN:   [ ]Normal   [X ] Nonjaundiced [ ]Rash  [ ]Pressure ulcer(s)       Present on admission [ ]y [ ]n      LABS: reviewed                          16.2   26.31 )-----------( 435      ( 12 Aug 2022 05:32 )             49.2     08-12    138  |  95<L>  |  41<H>  ----------------------------<  163<H>  5.2<H>   |  34<H>  |  0.9    Ca    9.8      12 Aug 2022 05:32  Mg     2.4     08-12        RADIOLOGY & ADDITIONAL STUDIES:    < from: Xray Chest 1 View- PORTABLE-Routine (Xray Chest 1 View- PORTABLE-Routine in AM.) (08.10.22 @ 05:41) >  Impression:    Stable bilateral nodular opacities.  COPD.    --- End of Report ---    < end of copied text >

## 2022-08-12 NOTE — PROGRESS NOTE ADULT - SUBJECTIVE AND OBJECTIVE BOX
Pt seen and examined at bedside. No CP or SOB.      PAST MEDICAL & SURGICAL HISTORY:  COPD (chronic obstructive pulmonary disease)    Essential hypertension    Dyslipidemia    S/P transmetatarsal amputation of foot, right        VITAL SIGNS (Last 24 hrs):  T(C): 36.2 (08-12-22 @ 10:00), Max: 37.1 (08-11-22 @ 20:54)  HR: 96 (08-12-22 @ 11:00) (80 - 103)  BP: 145/78 (08-12-22 @ 10:00) (109/61 - 145/78)  RR: 23 (08-12-22 @ 11:00) (19 - 44)  SpO2: 96% (08-12-22 @ 11:00) (93% - 100%)  Wt(kg): --  Daily     Daily     I&O's Summary    11 Aug 2022 07:01  -  12 Aug 2022 07:00  --------------------------------------------------------  IN: 0 mL / OUT: 500 mL / NET: -500 mL        PHYSICAL EXAM:  GENERAL: NAD, well-developed  HEAD:  Atraumatic, Normocephalic  EYES: EOMI, PERRLA, conjunctiva and sclera clear  NECK: Supple, No JVD  CHEST/LUNG: Clear to auscultation bilaterally; No wheeze  HEART: Regular rate and rhythm; No murmurs, rubs, or gallops  ABDOMEN: Soft, Nontender, Nondistended; Bowel sounds present  EXTREMITIES:  2+ Peripheral Pulses, No clubbing, cyanosis, or edema  PSYCH: AAOx3  NEUROLOGY: non-focal  SKIN: No rashes or lesions    Labs Reviewed  Spoke to patient in regards to abnormal labs.    CBC Full  -  ( 12 Aug 2022 05:32 )  WBC Count : 26.31 K/uL  Hemoglobin : 16.2 g/dL  Hematocrit : 49.2 %  Platelet Count - Automated : 435 K/uL  Mean Cell Volume : 89.5 fL  Mean Cell Hemoglobin : 29.5 pg  Mean Cell Hemoglobin Concentration : 32.9 g/dL  Auto Neutrophil # : 24.14 K/uL  Auto Lymphocyte # : 0.48 K/uL  Auto Monocyte # : 1.30 K/uL  Auto Eosinophil # : 0.00 K/uL  Auto Basophil # : 0.06 K/uL  Auto Neutrophil % : 91.8 %  Auto Lymphocyte % : 1.8 %  Auto Monocyte % : 4.9 %  Auto Eosinophil % : 0.0 %  Auto Basophil % : 0.2 %    BMP:    08-12 @ 05:32    Blood Urea Nitrogen - 41  Calcium - 9.8  Carbond Dioxide - 34  Chloride - 95  Creatinine - 0.9  Glucose - 163  Potassium - 5.2  Sodium - 138      Hemoglobin A1c -     Urine Culture:        COVID Labs  CRP:    Procalcitonin, Serum: 0.37 ng/mL (07-31-22 @ 05:52)    D-Dimer:  295 ng/mL DDU (08-06-22 @ 19:05)  164 ng/mL DDU (08-06-22 @ 12:48)        Imaging reviewed independently and reviewed read    < from: Xray Chest 1 View- PORTABLE-Urgent (Xray Chest 1 View- PORTABLE-Urgent .) (08.11.22 @ 14:03) >    Impression:    Stable COPD/hyperinflation.    Ill-defined opacity overlying right heart border likely related to   previously CT documented right middle lobe atelectasis effusion or   pneumothorax.    < end of copied text >      MEDICATIONS  (STANDING):  ALBUTerol    0.083% 2.5 milliGRAM(s) Nebulizer every 4 hours  ALBUTerol    90 MICROgram(s) HFA Inhaler 2 Puff(s) Inhalation every 4 hours  budesonide 160 MICROgram(s)/formoterol 4.5 MICROgram(s) Inhaler 2 Puff(s) Inhalation two times a day  diltiazem    Tablet 30 milliGRAM(s) Oral three times a day  enoxaparin Injectable 40 milliGRAM(s) SubCutaneous every 24 hours  glucagon  Injectable 1 milliGRAM(s) IntraMuscular once  insulin lispro (ADMELOG) corrective regimen sliding scale   SubCutaneous three times a day before meals  loratadine 10 milliGRAM(s) Oral daily  methylPREDNISolone sodium succinate Injectable 60 milliGRAM(s) IV Push every 12 hours  montelukast 10 milliGRAM(s) Oral daily  pantoprazole    Tablet 40 milliGRAM(s) Oral before breakfast  polyethylene glycol 3350 17 Gram(s) Oral two times a day  senna 2 Tablet(s) Oral at bedtime  tiotropium 18 MICROgram(s) Capsule 1 Capsule(s) Inhalation daily    MEDICATIONS  (PRN):  acetaminophen     Tablet .. 650 milliGRAM(s) Oral every 6 hours PRN Temp greater or equal to 38C (100.4F), Mild Pain (1 - 3)  aluminum hydroxide/magnesium hydroxide/simethicone Suspension 30 milliLiter(s) Oral every 4 hours PRN Dyspepsia  fluticasone propionate 50 MICROgram(s)/spray Nasal Spray 2 Spray(s) Both Nostrils two times a day PRN nasal congestion  guaifenesin/dextromethorphan Oral Liquid 10 milliLiter(s) Oral every 6 hours PRN Cough  melatonin 3 milliGRAM(s) Oral at bedtime PRN Insomnia  morphine  - Injectable 4 milliGRAM(s) IV Push four times a day PRN Moderate Pain (4 - 6)  ondansetron Injectable 4 milliGRAM(s) IV Push every 8 hours PRN Nausea and/or Vomiting

## 2022-08-12 NOTE — PROGRESS NOTE ADULT - ASSESSMENT
71M PMHx COPD on home o2, HTN, DM2 here with acute hypercapnic and hypoxic resp failure.    #Acute hypercapnic and hypoxic resp failure 2/2 copd exacerbation   ct with lung nodules, emphysema  blood gas with acute hypercapnia, now resolved  Pt sob today, restarted on bipap keep 4/4 off  bipap prn, qhs  requiring 3l nc  s/p course doxy  solumedrol 60 iv bid--> solumedrol 60 intravenous daily   appreciate pulm  Symbicort  alberuol prn  spiriva  singulair 10  follow up palliative care today     #Transaminitis, cholestatic  ct/ us with liver hemangioma  repeat us in one year  trend    #leukocytosis likely 2/2 steroids use, pt completed course of antibiotics--> ID consulted, sending procal, cultures      #Hyperkalemia-->5.2  low k diet  check ekg  lokelma 5 daily po x1     #HTN  nifedipine 30--> dced started on cardizem recc pulm   BP stable     #DM2  ssi    #DVT ppx  lovenox    #Progress Note Handoff  Pending (specify):  BIPAP 4/4, copd management  Disposition: home  Anticipated date: TBD, still SICU, posisble downgrade today as per pulm

## 2022-08-12 NOTE — PROGRESS NOTE ADULT - ATTENDING COMMENTS
IMPRESSION:  Severe COPD exacerbation   HO End stage COPD   Acute hypoxemic resp failure with hypercapnia     Plan as outlined above
IMPRESSION:  Severe COPD exacerbation improving slowly   HO End stage COPD   Acute hypoxemic resp failure with hypercapnia     Plan as outlined above
negative...

## 2022-08-12 NOTE — PROGRESS NOTE ADULT - ASSESSMENT
IMP:  - COPD exacerbation      h/o chronic end-stage COPD, steroid dependent      pt increasingly BiPAP dependent and therefore NPO - today day 3  - DM with h/o R foot transmetatarsal amputation  - reported osteopenia  - + protein calorie malnutrition    SUGGEST:  - was B12 deficiency treated?  - po intake cont to be inadequate; suggest again considering placement of naso-duodenal tube for supplemental enteral feeding  - once tube at or beyond 2nd portion of duodenum can begin feeding; suggest starting with Peptamen AF at 25 ml/h  - check phos level with am labs - keep > 3.5  - check 25oh vitamin D level (if not already done as outpt by PMD)  - GOC noted - if pt requires intubation, can feed via NG or OG - but can not do so in BiPAP      family meeting planned for later today

## 2022-08-12 NOTE — PROGRESS NOTE ADULT - ASSESSMENT
71yMale with PMH including COPD on 2L home O2 & CPAP (unclear setting), Hx COPD Exacerbation w/ Intubation x 3 d in 2004, Asthma, DM s/p right transmetatarsal amputation, admitted with COPD exacerbation, currently on and off Bipap, with some dysphagia. Pt continues to be dyspneic, tachypneic, and tachycardic when off the Bipap.

## 2022-08-12 NOTE — PROGRESS NOTE ADULT - ASSESSMENT
IMPRESSION:  Severe COPD exacerbation improving slowly   HO End stage COPD   Acute hypoxemic resp failure with hypercapnia   Mild hyperkalemia    PLAN:    ·	Continue Oxygen therapy to keep pulse ox 88 TO 92%  ·	Nebs q4hr and PRN  ·	NIV 4 hrs on/4hrs off and QHS.  ·	AAT level   ·	Continue Solumedrol 60 q 24  ·	Continue Cardizem 30 mg Q8  ·	F/U BMP  ·	DVT ppx.    ·	OO Bed to chair  ·	very poor prognosis  ·	Possible downgrade to floor pm if remains stable   ·	Overall prognosis poor    IMPRESSION:  Severe COPD exacerbation improving slowly   HO End stage COPD   Acute hypoxemic resp failure with hypercapnia       PLAN:    ·	Continue Oxygen therapy to keep pulse ox 88 TO 92%  ·	Nebs q4hr and PRN  ·	NIV 4 hrs on/4hrs off and QHS.  ·	AAT level   ·	Continue Solumedrol 60 q 24  ·	Continue Cardizem 30 mg Q8  ·	F/U BMP  ·	DVT ppx.    ·	OO Bed to chair  ·	very poor prognosis  ·	Possible downgrade to floor pm if remains stable   ·	Overall prognosis poor

## 2022-08-12 NOTE — PROGRESS NOTE ADULT - PROBLEM SELECTOR PLAN 3
- plans have been for ongoing full care  - family to discuss code status  - see [facility] note 8/11/22

## 2022-08-12 NOTE — PROGRESS NOTE ADULT - SUBJECTIVE AND OBJECTIVE BOX
Interval Events:  pt alert, weak, of O2NC when seen, not in distress  BiPAP overnight  po intake tolerated but likely inadequate (% eaten per meal not noted)    VITALS:  T(F): 96.3 (08-12 @ 07:00), Max: 96.9 (08-12 @ 04:46)  HR: 88 (08-12 @ 07:00) (88 - 103)  BP: 112/69 (08-12 @ 07:00) (112/69 - 124/77)  RR: 34 (08-12 @ 04:46) (34 - 34)  SpO2: 96% (08-12 @ 07:00) (94% - 98%)    HEIGHT/WEIGHT/BMI:   Height (cm): 162.6 (07-30)  Weight (kg): 63.5 (07-30)  BMI (kg/m2): 24 (07-30)    I/Os:   08-11-22 @ 07:01  -  08-12-22 @ 07:00  --------------------------------------------------------  IN:  Total IN: 0 mL---  + intake but not recorded  OUT:    Voided (mL): 500 mL  Total OUT: 500 mL  Total NET: -500 mL    PHYSICAL EXAM:   GENERAL: NAD, well-groomed, cachectic, not in resp distress when seen  HEENT: Moist mucous membranes, no oral lesions noted  ABDOMEN: Soft, Nontender, Nondistended  + transmetatarsal amputation R foot  ext warm, no cyanosis, moves all extrem    STANDING MEDICATIONS:   ALBUTerol    0.083% 2.5 milliGRAM(s) Nebulizer every 4 hours  ALBUTerol    90 MICROgram(s) HFA Inhaler 2 Puff(s) Inhalation every 4 hours  budesonide 160 MICROgram(s)/formoterol 4.5 MICROgram(s) Inhaler 2 Puff(s) Inhalation two times a day  diltiazem    Tablet 30 milliGRAM(s) Oral three times a day  enoxaparin Injectable 40 milliGRAM(s) SubCutaneous every 24 hours  glucagon  Injectable 1 milliGRAM(s) IntraMuscular once  insulin lispro (ADMELOG) corrective regimen sliding scale   SubCutaneous three times a day before meals  loratadine 10 milliGRAM(s) Oral daily  methylPREDNISolone sodium succinate Injectable 60 milliGRAM(s) IV Push every 12 hours  montelukast 10 milliGRAM(s) Oral daily  pantoprazole    Tablet 40 milliGRAM(s) Oral before breakfast  polyethylene glycol 3350 17 Gram(s) Oral two times a day  senna 2 Tablet(s) Oral at bedtime  tiotropium 18 MICROgram(s) Capsule 1 Capsule(s) Inhalation daily      LABS:                         16.2   26.31 )-----------( 435      ( 12 Aug 2022 05:32 )             49.2     138  |  95<L>  |  41<H>  ----------------------------<  163<H>          (08-12-22 @ 05:32)  5.2<H>   |  34<H>  |  0.9    Ca    9.8          (08-12-22 @ 05:32)  Mg     2.4         (08-12-22 @ 05:32)    TPro  6.3  /  Alb  4.1  /  TBili  1.1  /  DBili  x   /  AST  29  /  ALT  72<H>  /  AlkPhos  77       08-12-22 @ 05:32      Triglycerides, Serum: 58 mg/dL (07-31 @ 05:52)    Vitamin B12, Serum: 373 pg/mL (07-31 @ 05:52)    Blood Glucose (Past 24 hours):  146 mg/dL (08-11 @ 16:53)  119 mg/dL (08-11 @ 11:46)    DIET:   Diet, Regular:   No Concentrated Potassium  Free Water Flush Instructions:  minced and moist entree, can get mixed consitencies and bread  Supplement Feeding Modality:  Oral  Ensure Enlive Cans or Servings Per Day:  1       Frequency:  Three Times a day (08-09-22 @ 11:34) [Active]

## 2022-08-13 NOTE — CONSULT NOTE ADULT - CONSULT REASON
SOB
continuous BiPAP
End stage COPD, on continuous Bipap, may require intubation, Nasoduodenal tube feeds or TPN required, so might need central line. Poor prognosis. Refused lung transplant.
Leukocytosis

## 2022-08-13 NOTE — PROGRESS NOTE ADULT - ASSESSMENT
IMPRESSION:  Severe COPD exacerbation improving slowly   HO End stage COPD   Acute hypoxemic resp failure with hypercapnia       PLAN:    Severe emphysema on CT chest; nodular infiltrates and small RML atelectasis noted  Continue Oxygen therapy to keep pulse ox 88 TO 92%. Avoid hyperoxia.   Nebs q4hr and PRN; Continue Symbicort 160 and Spiriva  NIV 4 hrs on/4hrs off and QHS; may attempt to obtain NIV on discharge given COPD and evidence of hypercapnea  May use same settings we have in the hospital   Wean off solumedrol and switch to PO prednisone to be tapered off over 10-14 days  Leukocytosis likely due to steroids. Procal is negative  Continue Cardizem 30 mg Q8; Low K diet; Monitor BMP for hyperkalemia  Continue Lovenox for DVT prophylaxis   Transfer to floor  Outpatient pulm follow up

## 2022-08-13 NOTE — PROGRESS NOTE ADULT - SUBJECTIVE AND OBJECTIVE BOX
Patient is a 72y old  Male who presents with a chief complaint of COPD Exacerbation (13 Aug 2022 13:31)        Over Night Events:    No events   Appears comfortable         ROS:  See HPI    PHYSICAL EXAM    ICU Vital Signs Last 24 Hrs  T(C): 35.8 (13 Aug 2022 07:00), Max: 36.4 (13 Aug 2022 00:12)  T(F): 96.4 (13 Aug 2022 07:00), Max: 97.5 (13 Aug 2022 00:12)  HR: 100 (13 Aug 2022 12:00) (80 - 122)  BP: 135/90 (13 Aug 2022 12:00) (108/78 - 154/104)  BP(mean): 106 (13 Aug 2022 12:00) (83 - 125)  ABP: --  ABP(mean): --  RR: 32 (13 Aug 2022 12:00) (16 - 36)  SpO2: 97% (13 Aug 2022 12:00) (91% - 98%)    O2 Parameters below as of 12 Aug 2022 20:00  Patient On (Oxygen Delivery Method): BiPAP/CPAP  O2 Flow (L/min): 40          General: NAD   HEENT: FRANNY             Lymphatic system: No cervical LN   Lungs: Bilateral decreased breath sounds   Cardiovascular: Regular   Gastrointestinal: Soft, Positive BS  Extremities: No clubbing.  Moves extremities.  Full Range of motion   Skin: Warm, intact  Neurological: No motor or sensory deficit       08-13-22 @ 07:01  -  08-13-22 @ 16:21  --------------------------------------------------------  IN:    Oral Fluid: 100 mL  Total IN: 100 mL    OUT:    Voided (mL): 1300 mL  Total OUT: 1300 mL    Total NET: -1200 mL          LABS:                            15.6   23.81 )-----------( 415      ( 13 Aug 2022 05:36 )             47.2                                               08-13    135  |  91<L>  |  31<H>  ----------------------------<  164<H>  5.3<H>   |  29  |  0.7    Ca    9.9      13 Aug 2022 13:23  Mg     2.3     08-13    TPro  5.9<L>  /  Alb  3.9  /  TBili  1.1  /  DBili  x   /  AST  35  /  ALT  77<H>  /  AlkPhos  71  08-13                                                                                           LIVER FUNCTIONS - ( 13 Aug 2022 05:36 )  Alb: 3.9 g/dL / Pro: 5.9 g/dL / ALK PHOS: 71 U/L / ALT: 77 U/L / AST: 35 U/L / GGT: x                                                                                                                                       MEDICATIONS  (STANDING):  ALBUTerol    0.083% 2.5 milliGRAM(s) Nebulizer every 4 hours  ALBUTerol    90 MICROgram(s) HFA Inhaler 2 Puff(s) Inhalation every 4 hours  budesonide 160 MICROgram(s)/formoterol 4.5 MICROgram(s) Inhaler 2 Puff(s) Inhalation two times a day  diltiazem    Tablet 30 milliGRAM(s) Oral three times a day  enoxaparin Injectable 40 milliGRAM(s) SubCutaneous every 24 hours  glucagon  Injectable 1 milliGRAM(s) IntraMuscular once  insulin lispro (ADMELOG) corrective regimen sliding scale   SubCutaneous three times a day before meals  loratadine 10 milliGRAM(s) Oral daily  montelukast 10 milliGRAM(s) Oral daily  pantoprazole    Tablet 40 milliGRAM(s) Oral before breakfast  polyethylene glycol 3350 17 Gram(s) Oral two times a day  senna 2 Tablet(s) Oral at bedtime  tiotropium 18 MICROgram(s) Capsule 1 Capsule(s) Inhalation daily    MEDICATIONS  (PRN):  acetaminophen     Tablet .. 650 milliGRAM(s) Oral every 6 hours PRN Temp greater or equal to 38C (100.4F), Mild Pain (1 - 3)  aluminum hydroxide/magnesium hydroxide/simethicone Suspension 30 milliLiter(s) Oral every 4 hours PRN Dyspepsia  fluticasone propionate 50 MICROgram(s)/spray Nasal Spray 2 Spray(s) Both Nostrils two times a day PRN nasal congestion  guaifenesin/dextromethorphan Oral Liquid 10 milliLiter(s) Oral every 6 hours PRN Cough  melatonin 3 milliGRAM(s) Oral at bedtime PRN Insomnia  morphine  - Injectable 4 milliGRAM(s) IV Push four times a day PRN Moderate Pain (4 - 6)  ondansetron Injectable 4 milliGRAM(s) IV Push every 8 hours PRN Nausea and/or Vomiting      Xrays:                                                                                     ECHO

## 2022-08-13 NOTE — PROGRESS NOTE ADULT - ASSESSMENT
71M PMHx COPD on home o2, HTN, DM2 here with acute hypercapnic and hypoxic resp failure.    #Acute hypercapnic and hypoxic resp failure 2/2 copd exacerbation   ct with lung nodules, emphysema  blood gas with acute hypercapnia, now resolved  Pt sob today, restarted on bipap keep 4/4 off  bipap prn, qhs  requiring 3l nc  s/p course doxy  solumedrol 60 iv bid--> solumedrol 60 intravenous daily   appreciate pulm  Symbicort  alberuol prn  spiriva  singulair 10  follow up palliative care today   Spoke to Pulm Pt will need AVAPs on DC. Spoke to CM.     #Transaminitis, cholestatic  ct/ us with liver hemangioma  repeat us in one year  trend    #leukocytosis likely 2/2 steroids use, pt completed course of antibiotics--> ID consult appreciated, No antibiotics, procal 0.05, likley 2/2 steroids    #Hyperkalemia-->5.2-->5.8  low k diet  check ekg  lokelma 5 daily po x1   Insulin/dextrose  - follow up BMP today     #HTN  contineu cardizem recc pulm  BP stable     #DM2  ssi    #DVT ppx  lovenox    #Progress Note Handoff  Pending (specify):  Pt will need AVAPs from pulm, continue steroids  Disposition: home, pt can be downgraded to F  Anticipated date: AVAPs on DC 8/15  spoke to family on the phone Jane [phone], Monica [phone]

## 2022-08-13 NOTE — PROGRESS NOTE ADULT - SUBJECTIVE AND OBJECTIVE BOX
Pt seen and examined at bedside. No CP or SOB. No overnight events. Pt feeling better. Spoke to Daughter Jane on the phone with pt today.           PAST MEDICAL & SURGICAL HISTORY:  COPD (chronic obstructive pulmonary disease)    Essential hypertension    Dyslipidemia    S/P transmetatarsal amputation of foot, right        VITAL SIGNS (Last 24 hrs):  T(C): 35.8 (08-13-22 @ 07:00), Max: 36.4 (08-13-22 @ 00:12)  HR: 101 (08-13-22 @ 10:00) (80 - 122)  BP: 108/78 (08-13-22 @ 10:00) (108/78 - 154/104)  RR: 19 (08-13-22 @ 10:00) (16 - 41)  SpO2: 91% (08-13-22 @ 10:00) (91% - 98%)  Wt(kg): --  Daily     Daily     I&O's Summary    13 Aug 2022 07:01  -  13 Aug 2022 13:32  --------------------------------------------------------  IN: 100 mL / OUT: 1300 mL / NET: -1200 mL        PHYSICAL EXAM:  GENERAL: NAD, well-developed  HEAD:  Atraumatic, Normocephalic  EYES: EOMI, PERRLA, conjunctiva and sclera clear  NECK: Supple, No JVD  CHEST/LUNG: Clear to auscultation bilaterally; No wheeze  HEART: Regular rate and rhythm; No murmurs, rubs, or gallops  ABDOMEN: Soft, Nontender, Nondistended; Bowel sounds present  EXTREMITIES:  2+ Peripheral Pulses, No clubbing, cyanosis, or edema  PSYCH: AAOx3  NEUROLOGY: non-focal  SKIN: No rashes or lesions    Labs Reviewed  Spoke to patient in regards to abnormal labs.    CBC Full  -  ( 13 Aug 2022 05:36 )  WBC Count : 23.81 K/uL  Hemoglobin : 15.6 g/dL  Hematocrit : 47.2 %  Platelet Count - Automated : 415 K/uL  Mean Cell Volume : 88.1 fL  Mean Cell Hemoglobin : 29.1 pg  Mean Cell Hemoglobin Concentration : 33.1 g/dL  Auto Neutrophil # : 22.37 K/uL  Auto Lymphocyte # : 0.26 K/uL  Auto Monocyte # : 0.64 K/uL  Auto Eosinophil # : 0.00 K/uL  Auto Basophil # : 0.06 K/uL  Auto Neutrophil % : 93.9 %  Auto Lymphocyte % : 1.1 %  Auto Monocyte % : 2.7 %  Auto Eosinophil % : 0.0 %  Auto Basophil % : 0.3 %    BMP:    08-13 @ 05:36    Blood Urea Nitrogen - 33  Calcium - 9.4  Carbond Dioxide - 33  Chloride - 96  Creatinine - 0.7  Glucose - 155  Potassium - 5.8  Sodium - 138      Hemoglobin A1c -     Urine Culture:        COVID Labs  CRP:    Procalcitonin, Serum: 0.05 ng/mL (08-12-22 @ 17:18)  Procalcitonin, Serum: 0.37 ng/mL (07-31-22 @ 05:52)    D-Dimer:  295 ng/mL DDU (08-06-22 @ 19:05)  164 ng/mL DDU (08-06-22 @ 12:48)      < from: Xray Chest 1 View- PORTABLE-Urgent (Xray Chest 1 View- PORTABLE-Urgent .) (08.11.22 @ 14:03) >    Impression:    Stable COPD/hyperinflation.    Ill-defined opacity overlying right heart border likely related to   previously CT documented right middle lobe atelectasis effusion or   pneumothorax.    < end of copied text >    MEDICATIONS  (STANDING):  ALBUTerol    0.083% 2.5 milliGRAM(s) Nebulizer every 4 hours  ALBUTerol    90 MICROgram(s) HFA Inhaler 2 Puff(s) Inhalation every 4 hours  budesonide 160 MICROgram(s)/formoterol 4.5 MICROgram(s) Inhaler 2 Puff(s) Inhalation two times a day  diltiazem    Tablet 30 milliGRAM(s) Oral three times a day  enoxaparin Injectable 40 milliGRAM(s) SubCutaneous every 24 hours  glucagon  Injectable 1 milliGRAM(s) IntraMuscular once  insulin lispro (ADMELOG) corrective regimen sliding scale   SubCutaneous three times a day before meals  loratadine 10 milliGRAM(s) Oral daily  montelukast 10 milliGRAM(s) Oral daily  pantoprazole    Tablet 40 milliGRAM(s) Oral before breakfast  polyethylene glycol 3350 17 Gram(s) Oral two times a day  senna 2 Tablet(s) Oral at bedtime  tiotropium 18 MICROgram(s) Capsule 1 Capsule(s) Inhalation daily    MEDICATIONS  (PRN):  acetaminophen     Tablet .. 650 milliGRAM(s) Oral every 6 hours PRN Temp greater or equal to 38C (100.4F), Mild Pain (1 - 3)  aluminum hydroxide/magnesium hydroxide/simethicone Suspension 30 milliLiter(s) Oral every 4 hours PRN Dyspepsia  fluticasone propionate 50 MICROgram(s)/spray Nasal Spray 2 Spray(s) Both Nostrils two times a day PRN nasal congestion  guaifenesin/dextromethorphan Oral Liquid 10 milliLiter(s) Oral every 6 hours PRN Cough  melatonin 3 milliGRAM(s) Oral at bedtime PRN Insomnia  morphine  - Injectable 4 milliGRAM(s) IV Push four times a day PRN Moderate Pain (4 - 6)  ondansetron Injectable 4 milliGRAM(s) IV Push every 8 hours PRN Nausea and/or Vomiting

## 2022-08-13 NOTE — CONSULT NOTE ADULT - SUBJECTIVE AND OBJECTIVE BOX
NILE VALDEZ  72y, Male  Allergy: Black pepper (Anaphylaxis)  No Known Drug Allergies      CHIEF COMPLAINT: COPD Exacerbation (12 Aug 2022 15:40)      LOS  13d    HPI:  71-year-old male with hx of COPD on 2L home O2 & CPAP (unclear setting), Hx COPD Exacerbation w/ Intubation x 3 d in 2004, Asthma, DM s/p right transmetatarsal amputation following infection. Patient presented for 2 days of shortness of breath, tachypnea, labored breathing. Patient endorses pleuritic chest tightness, but no increased O2 requirements @ home, increased sputum frequency or worsening quality. No fever, chills, or vomiting. The patient does report night sweats. No weight loss.    Patient recently completed course of azithromycin @ home & took 1 week of Prednisone 20 mg QD (finished few days prior to presentation)    Reports decreased feeding due to dry throat and nausea    In the ED, patient was provided w/ Duoneb and BiPAP, reports improvement of shortness of breath.  pH 7.19/81/31 on admission  s/p ceftriaxone & Azithromycin in the ED    CTA -ve for PE and active inflammation. Positive for severe Emphyesema and atelectasis  Covid -ve.  s/p covid vaccine Moderna x 2, last taken > 6 months ago as per patient (vaccine card not available)  s/p pneumonia vaccine 3 weeks ago (takes it every 5 years).    PFTs 3/2017: FEV1/FVC 43% [34% of predicted]  120 pack-year smoking hx, stopped in 2015  Was previously referred to Lung Transplant at [place] (patient does not recall, unclear what happened)    In the ED, VS: T 37.8, , /80, RR 28, SpO2 100% on BiPAP (30 Jul 2022 17:02)      INFECTIOUS DISEASE HISTORY:  History as above  ID consulted for worsening leukocytosis   Currently on 5L NC.   Xray 8/11 with ill defined on right heart border  Currently on Solumderol.   Denies worsening shortness of breath.   Has chronic productive cough.   Denies any abdominal pain, nausea, vomiting, diarrhaea     PAST MEDICAL & SURGICAL HISTORY:  COPD (chronic obstructive pulmonary disease)      Essential hypertension      Dyslipidemia      S/P transmetatarsal amputation of foot, right          FAMILY HISTORY  No pertinent family history in first degree relatives        SOCIAL HISTORY  Social History:  Quit smoking in 2015  Denies alcohol, illicit drug use (30 Jul 2022 17:02)        ROS  General: Denies rigors, nightsweats  HEENT: Denies headache, rhinorrhea, sore throat, eye pain  CV: Denies CP, palpitations  PULM: Denies wheezing, hemoptysis  GI: Denies hematemesis, hematochezia, melena  : Denies discharge, hematuria  MSK: Denies arthralgias, myalgias  SKIN: Denies rash, lesions  NEURO: Denies paresthesias, weakness  PSYCH: Denies depression, anxiety    VITALS:  T(F): 97.1, Max: 97.1 (08-12-22 @ 10:00)  HR: 122  BP: 151/88  RR: 30Vital Signs Last 24 Hrs  T(C): 36.2 (12 Aug 2022 20:00), Max: 36.2 (12 Aug 2022 10:00)  T(F): 97.1 (12 Aug 2022 20:00), Max: 97.1 (12 Aug 2022 10:00)  HR: 122 (12 Aug 2022 20:15) (80 - 122)  BP: 151/88 (12 Aug 2022 20:00) (109/61 - 151/88)  BP(mean): 110 (12 Aug 2022 20:00) (78 - 110)  RR: 30 (12 Aug 2022 20:00) (23 - 44)  SpO2: 96% (12 Aug 2022 20:15) (93% - 98%)    Parameters below as of 12 Aug 2022 20:00  Patient On (Oxygen Delivery Method): BiPAP/CPAP  O2 Flow (L/min): 40      PHYSICAL EXAM:  Gen: NAD, resting in bed  HEENT: Normocephalic, atraumatic  Neck: supple, no lymphadenopathy  CV: Regular rate & regular rhythm  Lungs: decreased BS at bases, no fremitus  Abdomen: Soft, BS present  Ext: Warm, well perfused  Neuro: non focal, awake  Skin: no rash, no erythema  Lines: no phlebitis    TESTS & MEASUREMENTS:                        16.2  26.31 )-----------( 435      ( 12 Aug 2022 05:32 )             49.2    08-12    138  |  95<L>  |  41<H>  ----------------------------<  163<H>  5.2<H>   |  34<H>  |  0.9    Ca    9.8      12 Aug 2022 05:32  Mg     2.4     08-12    TPro  6.3  /  Alb  4.1  /  TBili  1.1  /  DBili  x   /  AST  29  /  ALT  72<H>  /  AlkPhos  77  08-12      LIVER FUNCTIONS - ( 12 Aug 2022 05:32 )  Alb: 4.1 g/dL / Pro: 6.3 g/dL / ALK PHOS: 77 U/L / ALT: 72 U/L / AST: 29 U/L / GGT: x                    INFECTIOUS DISEASES TESTING  COVID-19 PCR: NotDetec (08-11-22 @ 16:16)  COVID-19 PCR: NotDetec (08-06-22 @ 17:56)  HIV-1/2 Combo Result: Nonreact (07-31-22 @ 05:52)  Procalcitonin, Serum: 0.37 ng/mL (07-31-22 @ 05:52)  COVID-19 PCR: NotDetec (07-30-22 @ 11:09)      RADIOLOGY & ADDITIONAL TESTS:  I have personally reviewed the last Chest xray  CXR  Xray Chest 1 View- PORTABLE-Urgent:  ACC: [id number] EXAM:  XR CHEST PORTABLE URGENT 1V                          PROCEDURE DATE:  08/11/2022          INTERPRETATION:  Clinical History/Reason for Exam:  Chest Pain    Technique:  Frontal view the chest lordotic projection.    Comparison: Chest x-ray 8/10/2022.    Findings:    Support devices:  Stable    Cardiac/mediastinum/hilum: Unremarkable    Lung parenchyma/ Pleura: Stable hyperinflation consistent with COPD.    Ill-defined opacity overlying right heart border likely related to  previously CT documented right middle lobe atelectasis effusion or  pneumothorax.      Skeleton/soft tissues: No focal skeletal lesions are identified.      Impression:    Stable COPD/hyperinflation.    Ill-defined opacity overlying right heart border likely related to  previously CT documented right middle lobe atelectasis effusion or  pneumothorax.    --- End of Report ---            MARYANNE PHILIP MD; Attending Radiologist  This document has been electronically signed. Aug 11 2022  4:35PM (08-11-22 @ 14:03)      CT      CARDIOLOGY TESTING  12 Lead ECG:  Ventricular Rate 103 BPM    Atrial Rate 103 BPM    P-R Interval 148 ms    QRS Duration 62 ms    Q-T Interval 340 ms    QTC Calculation(Bazett) 445 ms    P Axis 86 degrees    R Axis -39 degrees    T Axis 77 degrees    Diagnosis Line Sinus tachycardia  Left axis deviation  Anterior infarct , age undetermined  Abnormal ECG    Confirmed by PRABHU EPPS MD ([id number]) on 8/6/2022 1:54:43 PM (08-06-22 @ 11:24)  12 Lead ECG:  Ventricular Rate 105 BPM    Atrial Rate 105 BPM    P-R Interval 144 ms    QRS Duration 72 ms    Q-T Interval 354 ms    QTC Calculation(Bazett) 467 ms    P Axis 92 degrees    R Axis -31 degrees    T Axis 78 degrees    Diagnosis Line Sinus tachycardia with Fusion complexes  Left axis deviation  Anterior infarct , age undetermined  Abnormal ECG    Confirmed by PRABHU EPPS MD ([id number]) on 8/6/2022 1:54:38 PM (08-06-22 @ 11:23)      MEDICATIONS  ALBUTerol    0.083% 2.5 Nebulizer every 4 hours  ALBUTerol    90 MICROgram(s) HFA Inhaler 2 Inhalation every 4 hours  budesonide 160 MICROgram(s)/formoterol 4.5 MICROgram(s) Inhaler 2 Inhalation two times a day  diltiazem    Tablet 30 Oral three times a day  enoxaparin Injectable 40 SubCutaneous every 24 hours  glucagon  Injectable 1 IntraMuscular once  insulin lispro (ADMELOG) corrective regimen sliding scale  SubCutaneous three times a day before meals  loratadine 10 Oral daily  methylPREDNISolone sodium succinate Injectable 60 IV Push every 12 hours  montelukast 10 Oral daily  pantoprazole    Tablet 40 Oral before breakfast  polyethylene glycol 3350 17 Oral two times a day  senna 2 Oral at bedtime  tiotropium 18 MICROgram(s) Capsule 1 Inhalation daily      Weight  Weight (kg): 63.5 (07-30-22 @ 11:09)    ANTIBIOTICS:      ALLERGIES:  Black pepper (Anaphylaxis)  No Known Drug Allergies

## 2022-08-13 NOTE — CONSULT NOTE ADULT - ASSESSMENT
71yMale with PMH including COPD on 2L home O2 & CPAP (unclear setting), Hx COPD Exacerbation w/ Intubation x 3 d in 2004, Asthma, DM s/p right transmetatarsal amputation, admitted with COPD exacerbation, currently on Bipap, attempting to wean. For now, the patient and family want full aggressive medical management. They are open to palliative following along and re-addressing GOC as things progress.     Pt comfortable on exam.     MEDD (morphine equivalent daily dose): 0      See Recs below.    Please call [phone] with questions or concerns 24/7.   We will continue to follow.     Discussed with primary MD.  
IMPRESSION:    End stage COPD currently now in exacerbation last FEV1/FVC 40%, FEV1 31% (<1L)  consistent with severe obstructive defect, Refused lung transplant evaluation/ chest ct reviewed on NIV      PLAN:    ·	Oxygen therapy to keep pulse ox 99 TO 92%  ·	C/w home inhalers, symbicort and spiriva, nebs prn  ·	NIV at night and as needed  ·	Solumedrol 60 q 8  ·	doxy 100 q 12  ·	Discussed goals of care - wishes to be full code  ·	DVT ppx  ·	very poor prognosis  ·	sdu  
IMP:  - COPD exacerbation      h/o chronic end-stage COPD, steroid dependent      pt increasingly BiPAP dependent and therefore NPO - today day 3  - DM with h/o R foot transmetatarsal amputation  - reported osteopenia    SUGGEST:  - if pt able to come off BiPAP for about 5 minutes, place small bore long naso-enteric feeding tube (109 cm weighted)      - consider giving dose or 2 of Reglan to assist in trans-pyloric passage of tube  - once tube at or beyond 2nd portion of duodenum can begin feeding      - suggest starting with Peptamen AF at 25 ml/h  - check phos level with am labs - keep > 3.5  - check 25oh vitamin D level (if not already done as outpt by PMD)  - if can not place N-D tube, need to consider CVC for TPN  - GOC noted - if pt requires intubation, can feed via NG or OG - but can not do so in BiPAP    
ASSESSMENT  71-year-old male with hx of COPD on 2L home O2 & CPAP (unclear setting), Hx COPD Exacerbation w/ Intubation x 3 d in 2004, Asthma, DM s/p right transmetatarsal amputation following infection who presents with worsening shortness of breath    IMPRESSION  #COPD Exacerbation  #Leukocytosis  - Procalciconin 8/12 0.04  #DM    #Obesity BMI (kg/m2): 24  #DM  #Abx allergy:      RECOMMENDATIONS  - clinically does not appear to have bacterial pneumonia   - trend WBC - possible steroid induced   - steroids per primary team   - monitor off antibiotics     Please call or message on Microsoft Teams if with any questions.  Spectra [phone]

## 2022-08-14 NOTE — PROGRESS NOTE ADULT - ASSESSMENT
71M PMHx COPD on home o2, HTN, DM2 here with acute hypercapnic and hypoxic resp failure.    #Acute hypercapnic and hypoxic resp failure 2/2 copd exacerbation   ct with lung nodules, emphysema  blood gas with acute hypercapnia, now resolved  Pt sob today, restarted on bipap keep 4/4 off  bipap prn, qhs  requiring 3l nc  s/p course doxy  solumedrol 60 iv bid--> solumedrol 60 intravenous daily --> can convert to po on DC, will need long dannielle  appreciate pulm  Symbicort  alberuol prn  spiriva  singulair 10  follow up palliative care today   Spoke to Pulm Pt will need AVAPs on DC. Spoke to CM.     #Transaminitis, cholestatic  ct/ us with liver hemangioma  repeat us in one year  trend    #leukocytosis likely 2/2 steroids use, pt completed course of antibiotics--> ID consult appreciated, No antibiotics, procal 0.05, likley 2/2 steroids    #Hyperkalemia-->5.2-->5.8  low k diet  check ekg  lokelma 10 daily po x1   - follow up BMP in evening     #HTN  contineu cardizem recc pulm  Will switch Cardizem 30 mg q8 hr to  mg daily moniotr BP  BP stable     #DM2  ssi    #DVT ppx  lovenox    #Progress Note Handoff  Pending (specify):  Pt will need AVAPs from pulm, continue steroids  Disposition: home, pt can be downgraded to [facility]  Anticipated date: AVAPs on DC 8/15-16  spoke to family on the phone Jane [phone], Monica [phone]   71M PMHx COPD on home o2, HTN, DM2 here with acute hypercapnic and hypoxic resp failure.    #Acute on chronic hypercapnic and hypoxic resp failure 2/2 copd exacerbation   ct with lung nodules, emphysema  blood gas with acute hypercapnia, now resolved  Pt sob today, restarted on bipap keep 4/4 off  bipap prn, qhs  requiring 3l nc  s/p course doxy  solumedrol 60 iv bid--> solumedrol 60 intravenous daily --> can convert to po on DC, will need long dannielle  appreciate pulm  Symbicort  alberuol prn  spiriva  singulair 10  follow up palliative care today   Spoke to Pulm Pt will need NIV on DC. Spoke to CM.     #Transaminitis, cholestatic  ct/ us with liver hemangioma  repeat us in one year  trend    #leukocytosis likely 2/2 steroids use, pt completed course of antibiotics--> ID consult appreciated, No antibiotics, procal 0.05, likley 2/2 steroids    #Hyperkalemia-->5.2-->5.8  low k diet  check ekg  lokelma 10 daily po x1   - follow up BMP in evening     #HTN  contineu cardizem recc pulm  Will switch Cardizem 30 mg q8 hr to  mg daily moniotr BP  BP stable     #DM2  ssi    #DVT ppx  lovenox    #Progress Note Handoff  Pending (specify):  Pt will need NIV from pulm, continue steroids  Disposition: home, pt can be downgraded to [facility]  Anticipated date: NIV on DC 8/15-16  spoke to family on the phone Jane [phone], Monica [phone]

## 2022-08-14 NOTE — PROGRESS NOTE ADULT - SUBJECTIVE AND OBJECTIVE BOX
Pt seen and examined at bedside. No CP or SOB.          PAST MEDICAL & SURGICAL HISTORY:  COPD (chronic obstructive pulmonary disease)    Essential hypertension    Dyslipidemia    S/P transmetatarsal amputation of foot, right        VITAL SIGNS (Last 24 hrs):  T(C): 35.9 (08-14-22 @ 12:15), Max: 36.8 (08-13-22 @ 19:48)  HR: 107 (08-14-22 @ 12:15) (92 - 107)  BP: 106/70 (08-14-22 @ 12:15) (106/70 - 121/73)  RR: 20 (08-14-22 @ 12:15) (19 - 21)  SpO2: 93% (08-14-22 @ 05:59) (93% - 99%)  Wt(kg): --  Daily     Daily     I&O's Summary    13 Aug 2022 07:01  -  14 Aug 2022 07:00  --------------------------------------------------------  IN: 100 mL / OUT: 1300 mL / NET: -1200 mL        PHYSICAL EXAM:  GENERAL: NAD, frail  HEAD:  Atraumatic, Normocephalic  EYES: EOMI, PERRLA, conjunctiva and sclera clear  NECK: Supple, No JVD  CHEST/LUNG: Clear to auscultation bilaterally; No wheeze  HEART: Regular rate and rhythm; No murmurs, rubs, or gallops  ABDOMEN: Soft, Nontender, Nondistended; Bowel sounds present  EXTREMITIES:  2+ Peripheral Pulses, No clubbing, cyanosis, or edema  PSYCH: AAOx3  NEUROLOGY: non-focal  SKIN: No rashes or lesions    Labs Reviewed  Spoke to patient in regards to abnormal labs.    CBC Full  -  ( 14 Aug 2022 05:59 )  WBC Count : 24.03 K/uL  Hemoglobin : 15.9 g/dL  Hematocrit : 48.6 %  Platelet Count - Automated : 452 K/uL  Mean Cell Volume : 87.9 fL  Mean Cell Hemoglobin : 28.8 pg  Mean Cell Hemoglobin Concentration : 32.7 g/dL  Auto Neutrophil # : 22.45 K/uL  Auto Lymphocyte # : 0.32 K/uL  Auto Monocyte # : 0.72 K/uL  Auto Eosinophil # : 0.00 K/uL  Auto Basophil # : 0.04 K/uL  Auto Neutrophil % : 93.4 %  Auto Lymphocyte % : 1.3 %  Auto Monocyte % : 3.0 %  Auto Eosinophil % : 0.0 %  Auto Basophil % : 0.2 %    BMP:    08-14 @ 05:59    Blood Urea Nitrogen - 26  Calcium - 9.2  Carbond Dioxide - 31  Chloride - 92  Creatinine - 0.7  Glucose - 138  Potassium - 5.4  Sodium - 137      Hemoglobin A1c -     Urine Culture:  08-12 @ 17:18 Urine culture: --    Culture Results:   No growth to date.  Method Type: --  Organism: --  Organism Identification: --  Specimen Source: .Blood None        COVID Labs  CRP:    Procalcitonin, Serum: 0.05 ng/mL (08-12-22 @ 17:18)    D-Dimer:  295 ng/mL DDU (08-06-22 @ 19:05)  164 ng/mL DDU (08-06-22 @ 12:48)          MEDICATIONS  (STANDING):  ALBUTerol    0.083% 2.5 milliGRAM(s) Nebulizer every 4 hours  ALBUTerol    90 MICROgram(s) HFA Inhaler 2 Puff(s) Inhalation every 4 hours  budesonide 160 MICROgram(s)/formoterol 4.5 MICROgram(s) Inhaler 2 Puff(s) Inhalation two times a day  diltiazem    Tablet 30 milliGRAM(s) Oral three times a day  enoxaparin Injectable 40 milliGRAM(s) SubCutaneous every 24 hours  glucagon  Injectable 1 milliGRAM(s) IntraMuscular once  insulin lispro (ADMELOG) corrective regimen sliding scale   SubCutaneous three times a day before meals  loratadine 10 milliGRAM(s) Oral daily  methylPREDNISolone sodium succinate Injectable 60 milliGRAM(s) IV Push every 24 hours  montelukast 10 milliGRAM(s) Oral daily  nystatin    Suspension 252428 Unit(s) Swish and Swallow four times a day  pantoprazole    Tablet 40 milliGRAM(s) Oral before breakfast  polyethylene glycol 3350 17 Gram(s) Oral two times a day  senna 2 Tablet(s) Oral at bedtime  tiotropium 18 MICROgram(s) Capsule 1 Capsule(s) Inhalation daily    MEDICATIONS  (PRN):  acetaminophen     Tablet .. 650 milliGRAM(s) Oral every 6 hours PRN Temp greater or equal to 38C (100.4F), Mild Pain (1 - 3)  aluminum hydroxide/magnesium hydroxide/simethicone Suspension 30 milliLiter(s) Oral every 4 hours PRN Dyspepsia  fluticasone propionate 50 MICROgram(s)/spray Nasal Spray 2 Spray(s) Both Nostrils two times a day PRN nasal congestion  guaifenesin/dextromethorphan Oral Liquid 10 milliLiter(s) Oral every 6 hours PRN Cough  melatonin 3 milliGRAM(s) Oral at bedtime PRN Insomnia  morphine  - Injectable 4 milliGRAM(s) IV Push four times a day PRN Moderate Pain (4 - 6)  ondansetron Injectable 4 milliGRAM(s) IV Push every 8 hours PRN Nausea and/or Vomiting

## 2022-08-15 NOTE — CHART NOTE - NSCHARTNOTEFT_GEN_A_CORE
9o year old male patient admitted with chronic hypercapnic respiratory failure secondary to COPD, on home 2L oxygen and CPAP. He was retaining high CO2 levels of 61mmhg. On past admissions, the patient was intubated. On this admission, he was tried on BiPAP settings of 14/6 cm of H2O. He is still retaining high CO2 levels of 51. BIPAP was ruled out due to the high co2 levels obtained on BIPAP. ABG attached. Th patient needs home NIV given his persistent hypercapnia. The NIV will provide adequate continuous ventilation to keep her hypercapnia under control. The NIV will provide a longer expiratory time to reduce the effects of flow control. This NIV will provide a longer expiratory time to reduce the effects of flow limitation and air trapping. This will decrease the work of breathing, decrease the amount of CO2 and increase oxygen in blood. Without NIV the patient will likely clinically decline and ll cause readmissions. This patient requires augmented volume non invasive ventilation found on standard BIPAP. The patient will need this niv fo a safe discharge. please expediate authorization.

## 2022-08-15 NOTE — CHART NOTE - NSCHARTNOTESELECT_GEN_ALL_CORE
Event Note
Family Update/Event Note
Follow-up/Event Note
Palliative Care SW Initial Assessment/Event Note
Palliative Care SW Note/Event Note

## 2022-08-15 NOTE — PROGRESS NOTE ADULT - ASSESSMENT
72 yMale with PMH including COPD on 2L home O2 & CPAP (unclear setting), Hx COPD Exacerbation w/ Intubation x 3 d in 2004, Asthma, DM s/p right transmetatarsal amputation, admitted with COPD exacerbation, currently on and off Bipap, with some dysphagia. Pt continues to be dyspneic, although improved, and noncompliant with Bipap.     Met with family last week, goal is to d.c patient home and then follow up with pulm rehab if possible. Did discuss option for hospice as well.   Family to think about code status.  - discussed in detail last week as well.   Clinically patient mildly improved this week.     MEDD (morphine equivalent daily dose): 0      See Recs below.    Please call [phone] with questions or concerns 24/7.   We will continue to follow.     Discussed with primary MD.

## 2022-08-15 NOTE — PROGRESS NOTE ADULT - PROBLEM SELECTOR PLAN 3
- plans have been for ongoing full care  - family to discuss code status  - see [facility] note 8/11/22

## 2022-08-15 NOTE — PROGRESS NOTE ADULT - SUBJECTIVE AND OBJECTIVE BOX
HPI: 71yMale with PMH including COPD on 2L home O2 & CPAP (unclear setting), Hx COPD Exacerbation w/ Intubation x 3 d in 2004, Asthma, DM s/p right transmetatarsal amputation, admitted with COPD exacerbation, currently on Bipap, attempting to wean. For now, the patient and family want full aggressive medical management. They are open to palliative following along and re-addressing GOC as things progress.     INTERVAL EVENTS:  8/3: patient comfortbale, on HFNC, is on diet  8/4: states having cough- signed off as goals were clear  8/9: reconsulted for GOC. Pt remains on Bipap, poorly compliant.   8/10: patient dyspneic after extended conversation with our team and [employer]  ([id number]) while on NC; used no morphine PRN/24 hours  8/11: pt remains dyspneic with exertion or even speaking, using significant respiratory accessory muscles. off bipap on exam and able to eat. given some morphine for chest tightness. plan for family meeting today  8/12: patient states having dryness from O2 NC  9/15: pt reports breathing and difficulty swallowing if better today. he was able to eat a bit. he is still requiring a lot of help for ADLs.     ADVANCE DIRECTIVES:    MOLST  [ ]  Living Will  [ ]   DECISION MAKER(s): Patient   [ ] Health Care Proxy(s)  [ X] Surrogate(s)  [ ] Guardian           Name(s): Phone Number(s): Fletcher Jacobo ( [phone])    BASELINE (I)ADL(s) (prior to admission):  [place]: [ ]Total  [X ] Moderate [ ]Dependent  Palliative Performance Status Version 2:         %    http://npcrc.org/files/news/palliative_performance_scale_ppsv2.pdf    Allergies    No Known Allergies    Intolerances    MEDICATIONS  (STANDING):  ALBUTerol    0.083% 2.5 milliGRAM(s) Nebulizer every 4 hours  ALBUTerol    90 MICROgram(s) HFA Inhaler 2 Puff(s) Inhalation every 4 hours  budesonide 160 MICROgram(s)/formoterol 4.5 MICROgram(s) Inhaler 2 Puff(s) Inhalation two times a day  diltiazem    Tablet 30 milliGRAM(s) Oral three times a day  enoxaparin Injectable 40 milliGRAM(s) SubCutaneous every 24 hours  glucagon  Injectable 1 milliGRAM(s) IntraMuscular once  insulin lispro (ADMELOG) corrective regimen sliding scale   SubCutaneous three times a day before meals  loratadine 10 milliGRAM(s) Oral daily  methylPREDNISolone sodium succinate Injectable 60 milliGRAM(s) IV Push every 12 hours  montelukast 10 milliGRAM(s) Oral daily  pantoprazole    Tablet 40 milliGRAM(s) Oral before breakfast  polyethylene glycol 3350 17 Gram(s) Oral two times a day  senna 2 Tablet(s) Oral at bedtime  sodium zirconium cyclosilicate 5 Gram(s) Oral once  tiotropium 18 MICROgram(s) Capsule 1 Capsule(s) Inhalation daily    MEDICATIONS  (PRN):  acetaminophen     Tablet .. 650 milliGRAM(s) Oral every 6 hours PRN Temp greater or equal to 38C (100.4F), Mild Pain (1 - 3)  aluminum hydroxide/magnesium hydroxide/simethicone Suspension 30 milliLiter(s) Oral every 4 hours PRN Dyspepsia  fluticasone propionate 50 MICROgram(s)/spray Nasal Spray 2 Spray(s) Both Nostrils two times a day PRN nasal congestion  guaifenesin/dextromethorphan Oral Liquid 10 milliLiter(s) Oral every 6 hours PRN Cough  melatonin 3 milliGRAM(s) Oral at bedtime PRN Insomnia  morphine  - Injectable 4 milliGRAM(s) IV Push four times a day PRN Moderate Pain (4 - 6)  ondansetron Injectable 4 milliGRAM(s) IV Push every 8 hours PRN Nausea and/or Vomiting      PRESENT SYMPTOMS:   Pain: [ ]yes [X ]no  QOL impact -   Location -           Aggravating factors -    Quality -   Radiation -  Timing-  Severity (0-10 scale):  Minimal acceptable level (0-10 scale):     CPOT:  0  https://www.[facility].org/getattachment/[email or web address]/Critical-Care-Pain-Observation-Tool-(CPOT)    Dyspnea:                           [ ]Mild [ ]Moderate [ ]Severe   Anxiety:                             [ ]Mild [ ]Moderate [ ]Severe  Fatigue:                             [ ]Mild [ ]Moderate [ ]Severe  Nausea:                             [ ]Mild [ ]Moderate [ ]Severe  Loss of appetite:              [ ]Mild [ ]Moderate [ ]Severe - has appetite but some feelings of dysphagia   Constipation:                    [ ]Mild [ ]Moderate [ ]Severe    Other Symptoms: dry nose  [ X]All other review of systems negative     Palliative Performance Status Version 2:      30   %    http://npcrc.org/files/news/palliative_performance_scale_ppsv2.pdf    PHYSICAL EXAM:  Vital Signs Last 24 Hrs  T(C): 36.2 (12 Aug 2022 10:00), Max: 37.1 (11 Aug 2022 20:54)  T(F): 97.1 (12 Aug 2022 10:00), Max: 98.7 (11 Aug 2022 20:54)  HR: 96 (12 Aug 2022 11:00) (80 - 103)  BP: 145/78 (12 Aug 2022 10:00) (109/61 - 145/78)  BP(mean): 105 (12 Aug 2022 10:00) (78 - 105)  RR: 23 (12 Aug 2022 11:00) (23 - 44)  SpO2: 96% (12 Aug 2022 11:00) (93% - 98%)    GENERAL: used Cantonese   [X ]Alert  [X ]Oriented x  3 [ ]Lethargic  [ ]Cachexia  [ ]Unarousable  [X ]Verbal  [ ]Non-Verbal  Behavioral:   [ ] Anxiety  [ ] Delirium [ ] Agitation [X ] Calm [ ] Other  HEENT:  [ X]Normal   [ ]Dry mouth   [] HNFNC [ ]ET Tube/Trach  [ ]Oral lesions  PULMONARY:   [ ]Clear [ ]Tachypnea  [ ]Audible excessive secretions [X ] No labored breathing  On Bipap   CARDIOVASCULAR:    [ X]Regular [ ]Irregular [ ]Tachy  [ ]Arnaldo [ ]Murmur [ ]Other  GASTROINTESTINAL:  X[ ]Soft  [ ]Distended  [X ] Not distended [ ]+BS  [ ]Non tender [ ]Tender  [ ]PEG [ ]OGT/ NGT  Last BM:   GENITOURINARY:  [X]Normal [ ] Incontinent   [ ]Oliguria/Anuria   [ ]Gutierrez  MUSCULOSKELETAL:   [ ]Normal   [ ]Weakness  [ ]Bed/Wheelchair bound [ ]Edema  NEUROLOGIC:   [X ]No focal deficits  [ ]Cognitive impairment  [ ]Dysphagia [ ]Dysarthria [ ]Paresis [ ]Other   SKIN:   [ ]Normal   [X ] Nonjaundiced [ ]Rash  [ ]Pressure ulcer(s)       Present on admission [ ]y [ ]n      LABS: reviewed    08-15    134<L>  |  91<L>  |  25<H>  ----------------------------<  233<H>  4.9   |  27  |  0.8    Ca    9.3      15 Aug 2022 06:33  Mg     2.3     08-15    TPro  5.6<L>  /  Alb  3.6  /  TBili  1.5<H>  /  DBili  x   /  AST  32  /  ALT  73<H>  /  AlkPhos  72  08-15                            15.2   24.27 )-----------( 404      ( 15 Aug 2022 06:33 )             45.3       RADIOLOGY & ADDITIONAL STUDIES:    < from: Xray Chest 1 View- PORTABLE-Routine (Xray Chest 1 View- PORTABLE-Routine in AM.) (08.10.22 @ 05:41) >  Impression:    Stable bilateral nodular opacities.  COPD.    --- End of Report ---    < end of copied text >

## 2022-08-15 NOTE — PROGRESS NOTE ADULT - ASSESSMENT
71M PMHx COPD on home o2, HTN, DM2 here with acute hypercapnic and hypoxic resp failure.    #Acute hypercapnic and hypoxic resp failure 2/2 copd exacerbation   ct with lung nodules, emphysema  blood gas with acute hypercapnia, now resolved  Pt sob today, restarted on bipap keep 4/4 off  bipap prn, qhs  requiring 3l nc  s/p course doxy  solumedrol 60 iv bid--> solumedrol 60 intravenous daily --> can convert to po on DC, will need long dannielle  appreciate pulm  Symbicort  alberuol prn  spiriva  singulair 10  follow up palliative care today   Spoke to Pulm Pt will need AVAPs on DC. Spoke to CM. WILL BE ARRANGED  -SWITCHED IV STEROIDS TO PO STEROIDS WITH TAPER RECOMMENDATIONS IN 2 WEEKS    #Transaminitis, cholestatic  ct/ us with liver hemangioma  repeat us in one year  trend    #leukocytosis likely 2/2 steroids use, pt completed course of antibiotics--> ID consult appreciated, No antibiotics, procal 0.05, likley 2/2 steroids    #Hyperkalemia-->5.2-->5.8  low k diet  check ekg  lokelma 10 daily po x1   - follow up BMP in evening     #HTN  contineu cardizem recc pulm  Will switch Cardizem 30 mg q8 hr to  mg daily moniotr BP  BP stable     #DM2  ssi    #DVT ppx  lovenox    #Progress Note Handoff  Pending (specify):  Pt will need AVAPs from pulm, continue steroids  Disposition: home, pt can be downgraded to [facility]  Anticipated date: AVAPs on DC 8/15-16  spoke to family on the phone Jane [phone], Monica [phone]

## 2022-08-15 NOTE — PROGRESS NOTE ADULT - SUBJECTIVE AND OBJECTIVE BOX
24H events:    72 year old male patient, 16th day of admission, admitted for the workup and management of   copd exacerbation  Patient seen and examined at the bed side. Reports no active complaints  Currently on 2L oxygen      PAST MEDICAL & SURGICAL HISTORY  COPD (chronic obstructive pulmonary disease)    Essential hypertension    Dyslipidemia    S/P transmetatarsal amputation of foot, right      SOCIAL HISTORY:  Negative for smoking/alcohol/drug use.     ALLERGIES:  Black pepper (Anaphylaxis)  No Known Drug Allergies    MEDICATIONS:  STANDING MEDICATIONS  ALBUTerol    0.083% 2.5 milliGRAM(s) Nebulizer every 4 hours  ALBUTerol    90 MICROgram(s) HFA Inhaler 2 Puff(s) Inhalation every 4 hours  budesonide 160 MICROgram(s)/formoterol 4.5 MICROgram(s) Inhaler 2 Puff(s) Inhalation two times a day  diltiazem    milliGRAM(s) Oral daily  enoxaparin Injectable 40 milliGRAM(s) SubCutaneous every 24 hours  glucagon  Injectable 1 milliGRAM(s) IntraMuscular once  insulin lispro (ADMELOG) corrective regimen sliding scale   SubCutaneous three times a day before meals  loratadine 10 milliGRAM(s) Oral daily  montelukast 10 milliGRAM(s) Oral daily  nystatin    Suspension 600568 Unit(s) Swish and Swallow four times a day  pantoprazole    Tablet 40 milliGRAM(s) Oral before breakfast  polyethylene glycol 3350 17 Gram(s) Oral two times a day  predniSONE   Tablet 40 milliGRAM(s) Oral daily  senna 2 Tablet(s) Oral at bedtime  sodium zirconium cyclosilicate 10 Gram(s) Oral once  tiotropium 18 MICROgram(s) Capsule 1 Capsule(s) Inhalation daily    PRN MEDICATIONS  acetaminophen     Tablet .. 650 milliGRAM(s) Oral every 6 hours PRN  aluminum hydroxide/magnesium hydroxide/simethicone Suspension 30 milliLiter(s) Oral every 4 hours PRN  fluticasone propionate 50 MICROgram(s)/spray Nasal Spray 2 Spray(s) Both Nostrils two times a day PRN  guaifenesin/dextromethorphan Oral Liquid 10 milliLiter(s) Oral every 6 hours PRN  melatonin 3 milliGRAM(s) Oral at bedtime PRN  morphine  - Injectable 4 milliGRAM(s) IV Push four times a day PRN  ondansetron Injectable 4 milliGRAM(s) IV Push every 8 hours PRN    VITALS:   T(F): 97  HR: 101  BP: 118/69  RR: 19  SpO2: 94%    LABS:                        15.2   24.27 )-----------( 404      ( 15 Aug 2022 06:33 )             45.3     08-15    134<L>  |  91<L>  |  25<H>  ----------------------------<  233<H>  4.9   |  27  |  0.8    Ca    9.3      15 Aug 2022 06:33  Mg     2.3     08-15    TPro  5.6<L>  /  Alb  3.6  /  TBili  1.5<H>  /  DBili  x   /  AST  32  /  ALT  73<H>  /  AlkPhos  72  08-15                  RADIOLOGY:    PHYSICAL EXAM:    General: NAD   HEENT: FRANNY             Lymphatic system: No cervical LN   Lungs: Bilateral normal breath sounds   Cardiovascular: Regular   Gastrointestinal: Soft, Positive BS  Extremities: No clubbing.  Moves extremities.  Full Range of motion   Skin: Warm, intact  Neurological: No motor or sensory deficit

## 2022-08-15 NOTE — PROGRESS NOTE ADULT - NS ATTEND AMEND GEN_ALL_CORE FT
Agree with above, d/w Cantonese , patient feeling better, eating, did not need BiPAP overnight  ______________  Jaun Rosales MD  Palliative Medicine  [facility]   of Geriatric and Palliative Medicine  [phone]
Agree with above - patient comfortable aside from cough, will add hycodan, will sign off, reconsult PRN  ______________  Jaun Rosales MD  Palliative Medicine  [facility]   of Geriatric and Palliative Medicine  [phone]
Agree with above, family meeting held, goals are still full code, and possibly pulmonary rehab and home with BiPAP/O2 NC. Explained chronic nature of COPD and that frequent hospital says are more common as disease progresses. Daughters understand; will follow.   ______________  Jaun Rosales MD  Palliative Medicine  [facility]   of Geriatric and Palliative Medicine  [phone]
Agree with above, ongoing GOC, would downtitrate to morphine 2mg IV PRN Q4 for pain or dyspnea for now, given opioid naive, and monitor  ______________  Jaun Rosales MD  Palliative Medicine  [facility]   of Geriatric and Palliative Medicine  [phone]

## 2022-08-16 NOTE — PROGRESS NOTE ADULT - SUBJECTIVE AND OBJECTIVE BOX
MON, YUK  72y, Male  Allergy: Black pepper (Anaphylaxis)  No Known Drug Allergies      LOS  17d    CHIEF COMPLAINT: COPD Exacerbation (15 Aug 2022 18:02)      INTERVAL EVENTS/HPI  - No acute events overnight  - T(F): , Max: 97.4 (08-15-22 @ 21:40)  - WBC remains elevated, on BIPAP   - denies any abdominal pain, diarrhea   - WBC Count: 24.27 (08-15-22 @ 06:33)  WBC Count: 24.03 (08-14-22 @ 05:59)     - Creatinine, Serum: 0.8 (08-15-22 @ 06:33)  Creatinine, Serum: 0.8 (08-14-22 @ 21:33)       ROS  General: Denies rigors, nightsweats  HEENT: Denies headache, rhinorrhea, sore throat, eye pain  CV: Denies CP, palpitations  PULM: Denies wheezing, hemoptysis  GI: Denies hematemesis, hematochezia, melena  : Denies discharge, hematuria  MSK: Denies arthralgias, myalgias  SKIN: Denies rash, lesions  NEURO: Denies paresthesias, weakness  PSYCH: Denies depression, anxiety    VITALS:  T(F): 96.9, Max: 97.4 (08-15-22 @ 21:40)  HR: 110  BP: 136/86  RR: 19Vital Signs Last 24 Hrs  T(C): 36.1 (16 Aug 2022 06:14), Max: 36.3 (15 Aug 2022 21:40)  T(F): 96.9 (16 Aug 2022 06:14), Max: 97.4 (15 Aug 2022 21:40)  HR: 110 (16 Aug 2022 08:50) (101 - 110)  BP: 136/86 (16 Aug 2022 06:14) (118/69 - 136/86)  BP(mean): --  RR: 19 (16 Aug 2022 06:14) (18 - 19)  SpO2: 99% (16 Aug 2022 08:50) (96% - 99%)        PHYSICAL EXAM:  Gen: NAD, resting in bed  HEENT: Normocephalic, atraumatic  Neck: supple, no lymphadenopathy  CV: Regular rate & regular rhythm  Lungs: decreased BS at bases, no fremitus  Abdomen: Soft, BS present  Ext: Warm, well perfused  Neuro: non focal, awake  Skin: no rash, no erythema  Lines: no phlebitis    FH: Non-contributory  Social Hx: Non-contributory    TESTS & MEASUREMENTS:                        15.2   24.27 )-----------( 404      ( 15 Aug 2022 06:33 )             45.3     08-15    134<L>  |  91<L>  |  25<H>  ----------------------------<  233<H>  4.9   |  27  |  0.8    Ca    9.3      15 Aug 2022 06:33  Mg     2.3     08-15    TPro  5.6<L>  /  Alb  3.6  /  TBili  1.5<H>  /  DBili  x   /  AST  32  /  ALT  73<H>  /  AlkPhos  72  08-15      LIVER FUNCTIONS - ( 15 Aug 2022 06:33 )  Alb: 3.6 g/dL / Pro: 5.6 g/dL / ALK PHOS: 72 U/L / ALT: 73 U/L / AST: 32 U/L / GGT: x               Culture - Blood (collected 08-12-22 @ 17:18)  Source: .Blood None  Preliminary Report (08-14-22 @ 01:02):    No growth to date.    Culture - Blood (collected 08-12-22 @ 17:18)  Source: .Blood None  Preliminary Report (08-14-22 @ 01:02):    No growth to date.            INFECTIOUS DISEASES TESTING  Procalcitonin, Serum: 0.05 (08-12-22 @ 17:18)  COVID-19 PCR: NotDetec (08-11-22 @ 16:16)  COVID-19 PCR: NotDetec (08-06-22 @ 17:56)  HIV-1/2 Combo Result: Nonreact (07-31-22 @ 05:52)  Procalcitonin, Serum: 0.37 (07-31-22 @ 05:52)  COVID-19 PCR: NotDetec (07-30-22 @ 11:09)      INFLAMMATORY MARKERS      RADIOLOGY & ADDITIONAL TESTS:  I have personally reviewed the last available Chest xray  CXR      CT      CARDIOLOGY TESTING  12 Lead ECG:   Ventricular Rate 103 BPM    Atrial Rate 103 BPM    P-R Interval 148 ms    QRS Duration 62 ms    Q-T Interval 340 ms    QTC Calculation(Bazett) 445 ms    P Axis 86 degrees    R Axis -39 degrees    T Axis 77 degrees    Diagnosis Line Sinus tachycardia  Left axis deviation  Anterior infarct , age undetermined  Abnormal ECG    Confirmed by PRABHU EPPS MD ([id number]) on 8/6/2022 1:54:43 PM (08-06-22 @ 11:24)  12 Lead ECG:   Ventricular Rate 105 BPM    Atrial Rate 105 BPM    P-R Interval 144 ms    QRS Duration 72 ms    Q-T Interval 354 ms    QTC Calculation(Bazett) 467 ms    P Axis 92 degrees    R Axis -31 degrees    T Axis 78 degrees    Diagnosis Line Sinus tachycardia with Fusion complexes  Left axis deviation  Anterior infarct , age undetermined  Abnormal ECG    Confirmed by PRABHU EPPS MD ([id number]) on 8/6/2022 1:54:38 PM (08-06-22 @ 11:23)      MEDICATIONS  ALBUTerol    0.083% 2.5 Nebulizer every 4 hours  ALBUTerol    90 MICROgram(s) HFA Inhaler 2 Inhalation every 4 hours  budesonide 160 MICROgram(s)/formoterol 4.5 MICROgram(s) Inhaler 2 Inhalation two times a day  diltiazem    Oral daily  enoxaparin Injectable 40 SubCutaneous every 24 hours  glucagon  Injectable 1 IntraMuscular once  insulin lispro (ADMELOG) corrective regimen sliding scale  SubCutaneous three times a day before meals  loratadine 10 Oral daily  methylPREDNISolone sodium succinate Injectable 60 IV Push every 12 hours  montelukast 10 Oral daily  nystatin    Suspension 249389 Swish and Swallow four times a day  pantoprazole    Tablet 40 Oral before breakfast  polyethylene glycol 3350 17 Oral two times a day  senna 2 Oral at bedtime  sodium zirconium cyclosilicate 10 Oral once  tiotropium 18 MICROgram(s) Capsule 1 Inhalation daily      WEIGHT  Weight (kg): 63.5 (07-30-22 @ 11:09)      ANTIBIOTICS:  nystatin    Suspension 593125 Unit(s) Swish and Swallow four times a day      All available historical records have been reviewed

## 2022-08-16 NOTE — PROGRESS NOTE ADULT - PROBLEM SELECTOR PLAN 2
- c/w morphine 4mg IV PRN - used 0 dose/24 hours, would likely help with suspected musculoskeletal chest pain

## 2022-08-16 NOTE — PROGRESS NOTE ADULT - PROBLEM SELECTOR PLAN 1
continues to be Bipap dependent, was on BiPAP today, unable to fully communicate with him as a result  continue med mgmt per pulm   pt and families goal is pulm rehab if possible   full code

## 2022-08-16 NOTE — PROGRESS NOTE ADULT - SUBJECTIVE AND OBJECTIVE BOX
HPI: 71yMale with PMH including COPD on 2L home O2 & CPAP (unclear setting), Hx COPD Exacerbation w/ Intubation x 3 d in 2004, Asthma, DM s/p right transmetatarsal amputation, admitted with COPD exacerbation, currently on Bipap, attempting to wean. For now, the patient and family want full aggressive medical management. They are open to palliative following along and re-addressing GOC as things progress.     INTERVAL EVENTS:  8/3: patient comfortbale, on HFNC, is on diet  8/4: states having cough- signed off as goals were clear  8/9: reconsulted for GOC. Pt remains on Bipap, poorly compliant.   8/10: patient dyspneic after extended conversation with our team and [employer]  ([id number]) while on NC; used no morphine PRN/24 hours  8/11: pt remains dyspneic with exertion or even speaking, using significant respiratory accessory muscles. off bipap on exam and able to eat. given some morphine for chest tightness. plan for family meeting today  8/12: patient states having dryness from O2 NC  8/15: pt reports breathing and difficulty swallowing if better today. he was able to eat a bit. he is still requiring a lot of help for ADLs.   8/16: patient on BiPAP, slightly worse today    ADVANCE DIRECTIVES:    MOLST  [ ]  Living Will  [ ]   DECISION MAKER(s): Patient   [ ] Health Care Proxy(s)  [ X] Surrogate(s)  [ ] Guardian           Name(s): Phone Number(s): Fletcher Jacobo ( [phone])    BASELINE (I)ADL(s) (prior to admission):  [place]: [ ]Total  [X ] Moderate [ ]Dependent  Palliative Performance Status Version 2:         %    http://npcrc.org/files/news/palliative_performance_scale_ppsv2.pdf    Allergies    No Known Allergies    Intolerances    MEDICATIONS  (STANDING):  ALBUTerol    0.083% 2.5 milliGRAM(s) Nebulizer every 4 hours  ALBUTerol    90 MICROgram(s) HFA Inhaler 2 Puff(s) Inhalation every 4 hours  budesonide 160 MICROgram(s)/formoterol 4.5 MICROgram(s) Inhaler 2 Puff(s) Inhalation two times a day  diltiazem    milliGRAM(s) Oral daily  enoxaparin Injectable 40 milliGRAM(s) SubCutaneous every 24 hours  glucagon  Injectable 1 milliGRAM(s) IntraMuscular once  insulin lispro (ADMELOG) corrective regimen sliding scale   SubCutaneous three times a day before meals  loratadine 10 milliGRAM(s) Oral daily  methylPREDNISolone sodium succinate Injectable 60 milliGRAM(s) IV Push every 12 hours  montelukast 10 milliGRAM(s) Oral daily  nystatin    Suspension [id number] Unit(s) Swish and Swallow four times a day  pantoprazole    Tablet 40 milliGRAM(s) Oral before breakfast  polyethylene glycol 3350 17 Gram(s) Oral two times a day  senna 2 Tablet(s) Oral at bedtime  sodium zirconium cyclosilicate 10 Gram(s) Oral once  tiotropium 18 MICROgram(s) Capsule 1 Capsule(s) Inhalation daily    MEDICATIONS  (PRN):  acetaminophen     Tablet .. 650 milliGRAM(s) Oral every 6 hours PRN Temp greater or equal to 38C (100.4F), Mild Pain (1 - 3)  aluminum hydroxide/magnesium hydroxide/simethicone Suspension 30 milliLiter(s) Oral every 4 hours PRN Dyspepsia  fluticasone propionate 50 MICROgram(s)/spray Nasal Spray 2 Spray(s) Both Nostrils two times a day PRN nasal congestion  guaifenesin/dextromethorphan Oral Liquid 10 milliLiter(s) Oral every 6 hours PRN Cough  melatonin 3 milliGRAM(s) Oral at bedtime PRN Insomnia  morphine  - Injectable 4 milliGRAM(s) IV Push four times a day PRN Moderate Pain (4 - 6)  ondansetron Injectable 4 milliGRAM(s) IV Push every 8 hours PRN Nausea and/or Vomiting        PRESENT SYMPTOMS:   Pain: [ ]yes [ ]no  QOL impact -   Location -   chest discomfort        Aggravating factors -    Quality -   Radiation -  Timing-  Severity (0-10 scale):  Minimal acceptable level (0-10 scale):     CPOT:  0  https://www.[facility].org/getattachment/yqm43q37-6c2m-9d2e-2y8e-3692b9068p7h/Critical-Care-Pain-Observation-Tool-(CPOT)    Dyspnea:                           [ ]Mild [ ]Moderate [ ]Severe   Anxiety:                             [ ]Mild [ ]Moderate [ ]Severe  Fatigue:                             [ ]Mild [ ]Moderate [ ]Severe  Nausea:                             [ ]Mild [ ]Moderate [ ]Severe  Loss of appetite:              [ ]Mild [ ]Moderate [ ]Severe - has appetite but some feelings of dysphagia   Constipation:                    [ ]Mild [ ]Moderate [ ]Severe    Other Symptoms:    [ X]All other review of systems negative     Palliative Performance Status Version 2:      30   %    http://[facility].org/files/news/palliative_performance_scale_ppsv2.pdf    PHYSICAL EXAM:  Vital Signs Last 24 Hrs  T(C): 35.7 (16 Aug 2022 13:30), Max: 36.3 (15 Aug 2022 21:40)  T(F): 96.2 (16 Aug 2022 13:30), Max: 97.4 (15 Aug 2022 21:40)  HR: 91 (16 Aug 2022 13:30) (91 - 110)  BP: 127/78 (16 Aug 2022 13:30) (119/68 - 136/86)  BP(mean): --  RR: 18 (16 Aug 2022 13:30) (18 - 19)  SpO2: 99% (16 Aug 2022 08:50) (96% - 99%)    GENERAL:   [X ]Alert  [ ]Oriented x  3 [ ]Lethargic  [ ]Cachexia  [ ]Unarousable  [X ]Verbal  [ ]Non-Verbal  Behavioral:   [ ] Anxiety  [ ] Delirium [ ] Agitation [X ] Calm [ ] Other  HEENT:  [ X]Normal   [ ]Dry mouth   [] HNFNC [ ]ET Tube/Trach  [ ]Oral lesions  PULMONARY:   [ ]Clear [ ]Tachypnea  [ ]Audible excessive secretions [X ] No labored breathing  On Bipap   CARDIOVASCULAR:    [ X]Regular [ ]Irregular [ ]Tachy  [ ]Arnaldo [ ]Murmur [ ]Other  GASTROINTESTINAL:  X[ ]Soft  [ ]Distended  [X ] Not distended [ ]+BS  [ ]Non tender [ ]Tender  [ ]PEG [ ]OGT/ NGT  Last BM:   GENITOURINARY:  [X]Normal [ ] Incontinent   [ ]Oliguria/Anuria   [ ]Gutierrez  MUSCULOSKELETAL:   [ ]Normal   [ ]Weakness  [ ]Bed/Wheelchair bound [ ]Edema  NEUROLOGIC:   [X ]No focal deficits  [ ]Cognitive impairment  [ ]Dysphagia [ ]Dysarthria [ ]Paresis [ ]Other   SKIN:   [ ]Normal   [X ] Nonjaundiced [ ]Rash  [ ]Pressure ulcer(s)       Present on admission [ ]y [ ]n      LABS: reviewed                          15.2   24.46 )-----------( 409      ( 16 Aug 2022 11:43 )             46.1     08-16    141  |  98  |  28<H>  ----------------------------<  145<H>  5.1<H>   |  33<H>  |  0.7    Ca    8.6      16 Aug 2022 11:43  Mg     2.3     08-16          RADIOLOGY & ADDITIONAL STUDIES:    < from: Xray Chest 1 View- PORTABLE-Routine (Xray Chest 1 View- PORTABLE-Routine in AM.) (08.10.22 @ 05:41) >  Impression:    Stable bilateral nodular opacities.  COPD.    --- End of Report ---    < end of copied text >

## 2022-08-16 NOTE — PROGRESS NOTE ADULT - ASSESSMENT
71M PMHx COPD on home o2, HTN, DM2 here with acute hypercapnic and hypoxic resp failure.    #Acute hypercapnic and hypoxic resp failure 2/2 copd exacerbation   -ct with lung nodules, emphysema  -blood gas with acute hypercapnia, now resolved  -Pt sob today, restarted on bipap session. Currently on oxygen via nasal cannula  -bipap prn, qhs  -requiring 3l nc  -s/p course doxy  -appreciate pulm  -Symbicort  -alberuol prn  -spiriva  -singulair 10  f-ollow up palliative care today  -08/15 iv steroids switched to oral steroids, patient reported SOB today, reinstated iv steroids   -Spoke to Pulm, Pt will need AVAPs on DC. Spoke to . WILL BE ARRANGED  -cxr 08/16, no acute changes  #Transaminitis, cholestatic  ct/ us with liver hemangioma  repeat us in one year  trend    #leukocytosis likely 2/2 steroids use, pt completed course of antibiotics--> ID consult appreciated, No antibiotics, procal 0.05, likley 2/2 steroids    #Hyperkalemia-->5.2-->5.8  low k diet  check ekg  lokelma 10 daily po x1   - follow up BMP in evening     #HTN  contineu cardizem recc pulm  Will switch Cardizem 30 mg q8 hr to  mg daily moniotr BP  BP stable     #DM2  ssi    #DVT ppx  lovenox    #Progress Note Handoff  Pending (specify):  Pt will need AVAPs from pul, continue steroids  Disposition: home, pt can be downgraded to [facility]  Anticipated date: AVAPs on DC  spoke to family on the phone Jane [phone], Monica [phone]

## 2022-08-16 NOTE — PROGRESS NOTE ADULT - SUBJECTIVE AND OBJECTIVE BOX
24H events:    72  year old patient, 16th day of admission, admitted for the workup and management of copd exacerbation  .  Patient seen and examined at the bed side. Reported shortness of breath, reinstated BIPAP session. Currently on 3L oxygen via nasal cannula    PAST MEDICAL & SURGICAL HISTORY  COPD (chronic obstructive pulmonary disease)    Essential hypertension    Dyslipidemia    S/P transmetatarsal amputation of foot, right      SOCIAL HISTORY:  Negative for smoking/alcohol/drug use.     ALLERGIES:  Black pepper (Anaphylaxis)  No Known Drug Allergies    MEDICATIONS:  STANDING MEDICATIONS  ALBUTerol    0.083% 2.5 milliGRAM(s) Nebulizer every 4 hours  ALBUTerol    90 MICROgram(s) HFA Inhaler 2 Puff(s) Inhalation every 4 hours  budesonide 160 MICROgram(s)/formoterol 4.5 MICROgram(s) Inhaler 2 Puff(s) Inhalation two times a day  diltiazem    milliGRAM(s) Oral daily  enoxaparin Injectable 40 milliGRAM(s) SubCutaneous every 24 hours  glucagon  Injectable 1 milliGRAM(s) IntraMuscular once  insulin lispro (ADMELOG) corrective regimen sliding scale   SubCutaneous three times a day before meals  loratadine 10 milliGRAM(s) Oral daily  methylPREDNISolone sodium succinate Injectable 60 milliGRAM(s) IV Push every 12 hours  montelukast 10 milliGRAM(s) Oral daily  nystatin    Suspension 154646 Unit(s) Swish and Swallow four times a day  pantoprazole    Tablet 40 milliGRAM(s) Oral before breakfast  polyethylene glycol 3350 17 Gram(s) Oral two times a day  senna 2 Tablet(s) Oral at bedtime  sodium zirconium cyclosilicate 10 Gram(s) Oral once  tiotropium 18 MICROgram(s) Capsule 1 Capsule(s) Inhalation daily    PRN MEDICATIONS  acetaminophen     Tablet .. 650 milliGRAM(s) Oral every 6 hours PRN  aluminum hydroxide/magnesium hydroxide/simethicone Suspension 30 milliLiter(s) Oral every 4 hours PRN  fluticasone propionate 50 MICROgram(s)/spray Nasal Spray 2 Spray(s) Both Nostrils two times a day PRN  guaifenesin/dextromethorphan Oral Liquid 10 milliLiter(s) Oral every 6 hours PRN  melatonin 3 milliGRAM(s) Oral at bedtime PRN  morphine  - Injectable 4 milliGRAM(s) IV Push four times a day PRN  ondansetron Injectable 4 milliGRAM(s) IV Push every 8 hours PRN    VITALS:   T(F): 96.2  HR: 91  BP: 127/78  RR: 18  SpO2: 99%    LABS:                        15.2   24.46 )-----------( 409      ( 16 Aug 2022 11:43 )             46.1     08-16    141  |  98  |  28<H>  ----------------------------<  145<H>  5.1<H>   |  33<H>  |  0.7    Ca    8.6      16 Aug 2022 11:43  Mg     2.3     08-16    TPro  5.4<L>  /  Alb  3.6  /  TBili  1.4<H>  /  DBili  x   /  AST  29  /  ALT  72<H>  /  AlkPhos  76  08-16                  RADIOLOGY:    PHYSICAL EXAM:      General: NAD   HEENT: FRANNY             Lymphatic system: No cervical LN   Lungs: Bilateral normal breath sounds   Cardiovascular: Regular   Gastrointestinal: Soft, Positive BS  Extremities: No clubbing.  Moves extremities.  Full Range of motion   Skin: Warm, intact  Neurological: No motor or sensory deficit

## 2022-08-16 NOTE — PROGRESS NOTE ADULT - TIME BILLING
71M PMHx COPD on home o2, HTN, DM2 here with acute hypercapnic and hypoxic resp failure.    #Acute hypercapnic and hypoxic resp failure  ct with lung nodules, emphysema  blood gas with acute hypercapnia, now resolved  bipap prn, qhs  requiring 3l nc  doxy  solumedrol 60 iv bid  repeat blood gas  appreciate pulm  Symbicort  alberuol neb q6  spiriva  singulair 10  #Hyperbilirubinemia, isolated  now resolved  ct/ us with liver hemangioma  repeat us in one year  trend  #HTN  nifedipine 30  #DM2  ssi  #DVT ppx  lovenox    #Progress Note Handoff:  Pending (specify):  Consults_________, Tests________, Test Results_______, Other____iv steroids, hypoxia_____  Family discussion: d/w pt at bedside re: treatment plan, primary dx  Disposition: Home___/SNF___/Other________/Unknown at this time____x____
as above
71M PMHx COPD on home o2, HTN, DM2 here with acute hypercapnic and hypoxic resp failure.    #Acute hypercapnic and hypoxic resp failure  ct with lung nodules, emphysema  blood gas with acute hypercapnia, now resolved  bipap prn, qhs  improving off hfnc, on 3l nc  doxy  solumedrol 60 iv bid  appreciate pulm  symbicort  duoneb q6  singulair 10  #HTN  nifedipine 30  #DM2  ssi  #DVT ppx  lovenox    #Progress Note Handoff:  Pending (specify):  Consults_________, Tests________, Test Results_______, Other____iv steroids, hypoxia_____  Family discussion: d/w pt at bedside re: treatment plan, primary dx  Disposition: Home___/SNF___/Other________/Unknown at this time____x____
71M PMHx COPD on home o2, HTN, DM2 here with acute hypercapnic and hypoxic resp failure.    #Acute hypercapnic and hypoxic resp failure  ct with lung nodules, emphysema  blood gas with acute hypercapnia, now resolved  bipap prn, qhs  improving off hfnc, on 3l nc  doxy  solumedrol 60 iv bid  appreciate pulm  symbicort  duoneb q6  singulair 10  #Hyperbilirubinemia, isolated  ct/ us with liver hemangioma  repeat us in one year  trend  #HTN  nifedipine 30  #DM2  ssi  #DVT ppx  lovenox    #Progress Note Handoff:  Pending (specify):  Consults_________, Tests________, Test Results_______, Other____iv steroids, hypoxia_____  Family discussion: d/w pt at bedside re: treatment plan, primary dx  Disposition: Home___/SNF___/Other________/Unknown at this time____x____
as above
71M PMHx COPD on home o2, HTN, DM2 here with acute hypercapnic and hypoxic resp failure.    #Acute hypercapnic and hypoxic resp failure  ct with lung nodules, emphysema  blood gas with acute hypercapnia, now resolved  bipap prn, qhs  requiring 3l nc  s/p course doxy  solumedrol 60 iv bid  appreciate pulm  Symbicort  alberuol prn  spiriva  singulair 10  #Transaminitis, cholestatic  ct/ us with liver hemangioma  repeat us in one year  trend  #Hyperkalemia  low k diet  check ekg  lokelma 5 bid x 1 day  #HTN  nifedipine 30  #DM2  ssi  #DVT ppx  lovenox    #Progress Note Handoff:  Pending (specify):  Consults_________, Tests________, Test Results_______, Other____iv steroids, hypoxia_____  Family discussion: d/w pt at bedside re: treatment plan, primary dx  Disposition: Home___/SNF___/Other________/Unknown at this time____x____
as above
I have personally seen and examined this patient.    I have reviewed all pertinent clinical information and reviewed all relevant imaging and diagnostic studies personally.   I counseled the patient about diagnostic testing and treatment plan. All questions were answered.   I discussed recommendations with the primary team.

## 2022-08-16 NOTE — PROGRESS NOTE ADULT - ASSESSMENT
ASSESSMENT  71-year-old male with hx of COPD on 2L home O2 & CPAP (unclear setting), Hx COPD Exacerbation w/ Intubation x 3 d in 2004, Asthma, DM s/p right transmetatarsal amputation following infection who presents with worsening shortness of breath    IMPRESSION  #COPD Exacerbation  #Leukocytosis  - Procalciconin 8/12 0.04  #DM    #Obesity BMI (kg/m2): 24  #DM  #Abx allergy:      RECOMMENDATIONS  - no further developing symptoms to suggest infection   - trend WBC - possible steroid induced   - steroids per primary team   - monitor off antibiotics   - recall as needed    Please call or message on [employer] Teams if with any questions.  Spectra [id number]

## 2022-08-16 NOTE — PROGRESS NOTE ADULT - PROBLEM SELECTOR PLAN 4
Full code  Continue current medical management  Will follow for symptoms  ______________  Jaun Rosales MD  Palliative Medicine  [facility]   of Geriatric and Palliative Medicine  [phone]

## 2022-08-16 NOTE — PROGRESS NOTE ADULT - PROBLEM SELECTOR PLAN 3
- plans have been for ongoing full care  - family to discuss code status  - see [facility] note 8/11/22

## 2022-08-17 NOTE — PROGRESS NOTE ADULT - SUBJECTIVE AND OBJECTIVE BOX
24H events:    72  year old patient, 16th day of admission, admitted for the workup and management of copd exacerbation  .  Patient seen and examined at the bed side. Reported shortness of breath, reinstated BIPAP session. Currently on 3L oxygen via nasal cannul    PAST MEDICAL & SURGICAL HISTORY  COPD (chronic obstructive pulmonary disease)    Essential hypertension    Dyslipidemia    S/P transmetatarsal amputation of foot, right      SOCIAL HISTORY:  Negative for smoking/alcohol/drug use.     ALLERGIES:  Black pepper (Anaphylaxis)  No Known Drug Allergies    MEDICATIONS:  STANDING MEDICATIONS  ALBUTerol    0.083% 2.5 milliGRAM(s) Nebulizer every 4 hours  ALBUTerol    90 MICROgram(s) HFA Inhaler 2 Puff(s) Inhalation every 4 hours  budesonide 160 MICROgram(s)/formoterol 4.5 MICROgram(s) Inhaler 2 Puff(s) Inhalation two times a day  diltiazem    milliGRAM(s) Oral daily  enoxaparin Injectable 40 milliGRAM(s) SubCutaneous every 24 hours  glucagon  Injectable 1 milliGRAM(s) IntraMuscular once  insulin lispro (ADMELOG) corrective regimen sliding scale   SubCutaneous three times a day before meals  loratadine 10 milliGRAM(s) Oral daily  methylPREDNISolone sodium succinate Injectable 60 milliGRAM(s) IV Push every 12 hours  montelukast 10 milliGRAM(s) Oral daily  nystatin    Suspension 363520 Unit(s) Swish and Swallow four times a day  pantoprazole    Tablet 40 milliGRAM(s) Oral before breakfast  polyethylene glycol 3350 17 Gram(s) Oral two times a day  senna 2 Tablet(s) Oral at bedtime  sodium zirconium cyclosilicate 10 Gram(s) Oral once  tiotropium 18 MICROgram(s) Capsule 1 Capsule(s) Inhalation daily    PRN MEDICATIONS  acetaminophen     Tablet .. 650 milliGRAM(s) Oral every 6 hours PRN  aluminum hydroxide/magnesium hydroxide/simethicone Suspension 30 milliLiter(s) Oral every 4 hours PRN  fluticasone propionate 50 MICROgram(s)/spray Nasal Spray 2 Spray(s) Both Nostrils two times a day PRN  guaifenesin/dextromethorphan Oral Liquid 10 milliLiter(s) Oral every 6 hours PRN  melatonin 3 milliGRAM(s) Oral at bedtime PRN  ondansetron Injectable 4 milliGRAM(s) IV Push every 8 hours PRN    VITALS:   T(F): 97.1  HR: 101  BP: 136/65  RR: 18  SpO2: 97%    LABS:                        15.3   22.51 )-----------( 408      ( 17 Aug 2022 05:43 )             44.6     08-17    136  |  94<L>  |  22<H>  ----------------------------<  159<H>  5.2<H>   |  34<H>  |  0.8    Ca    9.4      17 Aug 2022 05:43  Mg     2.3     08-17    TPro  5.8<L>  /  Alb  3.5  /  TBili  1.1  /  DBili  x   /  AST  24  /  ALT  73<H>  /  AlkPhos  79  08-17                  RADIOLOGY:    PHYSICAL EXAM:  General: NAD   HEENT: FRANNY             Lymphatic system: No cervical LN   Lungs: Bilateral decreased breath breath sounds on expiration.  Cardiovascular: Regular   Gastrointestinal: Soft, Positive BS  Extremities: No clubbing.  Moves extremities.  Full Range of motion   Skin: Warm, intact  Neurological: No motor or sensory deficit

## 2022-08-17 NOTE — PROGRESS NOTE ADULT - SUBJECTIVE AND OBJECTIVE BOX
HPI: 71yMale with PMH including COPD on 2L home O2 & CPAP (unclear setting), Hx COPD Exacerbation w/ Intubation x 3 d in 2004, Asthma, DM s/p right transmetatarsal amputation, admitted with COPD exacerbation, currently on Bipap, attempting to wean. For now, the patient and family want full aggressive medical management. They are open to palliative following along and re-addressing GOC as things progress.     INTERVAL EVENTS:  8/3: patient comfortbale, on HFNC, is on diet  8/4: states having cough- signed off as goals were clear  8/9: reconsulted for GOC. Pt remains on Bipap, poorly compliant.   8/10: patient dyspneic after extended conversation with our team and [employer]  ([id number]) while on NC; used no morphine PRN/24 hours  8/11: pt remains dyspneic with exertion or even speaking, using significant respiratory accessory muscles. off bipap on exam and able to eat. given some morphine for chest tightness. plan for family meeting today  8/12: patient states having dryness from O2 NC  8/15: pt reports breathing and difficulty swallowing if better today. he was able to eat a bit. he is still requiring a lot of help for ADLs.   8/16: patient on BiPAP, slightly worse today  8/17: patient comfortable off BiPAP    ADVANCE DIRECTIVES:    MOLST  [ ]  Living Will  [ ]   DECISION MAKER(s): Patient   [ ] Health Care Proxy(s)  [ X] Surrogate(s)  [ ] Guardian           Name(s): Phone Number(s): Fletcher Jacobo ( [phone])    BASELINE (I)ADL(s) (prior to admission):  [place]: [ ]Total  [X ] Moderate [ ]Dependent  Palliative Performance Status Version 2:         %    http://npcrc.org/files/news/palliative_performance_scale_ppsv2.pdf    Allergies    No Known Allergies    Intolerances  MEDICATIONS  (STANDING):  ALBUTerol    0.083% 2.5 milliGRAM(s) Nebulizer every 4 hours  ALBUTerol    90 MICROgram(s) HFA Inhaler 2 Puff(s) Inhalation every 4 hours  budesonide 160 MICROgram(s)/formoterol 4.5 MICROgram(s) Inhaler 2 Puff(s) Inhalation two times a day  diltiazem    milliGRAM(s) Oral daily  enoxaparin Injectable 40 milliGRAM(s) SubCutaneous every 24 hours  glucagon  Injectable 1 milliGRAM(s) IntraMuscular once  insulin lispro (ADMELOG) corrective regimen sliding scale   SubCutaneous three times a day before meals  loratadine 10 milliGRAM(s) Oral daily  methylPREDNISolone sodium succinate Injectable 60 milliGRAM(s) IV Push every 12 hours  montelukast 10 milliGRAM(s) Oral daily  nystatin    Suspension 911752 Unit(s) Swish and Swallow four times a day  pantoprazole    Tablet 40 milliGRAM(s) Oral before breakfast  polyethylene glycol 3350 17 Gram(s) Oral two times a day  senna 2 Tablet(s) Oral at bedtime  sodium zirconium cyclosilicate 10 Gram(s) Oral once  tiotropium 18 MICROgram(s) Capsule 1 Capsule(s) Inhalation daily    MEDICATIONS  (PRN):  acetaminophen     Tablet .. 650 milliGRAM(s) Oral every 6 hours PRN Temp greater or equal to 38C (100.4F), Mild Pain (1 - 3)  aluminum hydroxide/magnesium hydroxide/simethicone Suspension 30 milliLiter(s) Oral every 4 hours PRN Dyspepsia  fluticasone propionate 50 MICROgram(s)/spray Nasal Spray 2 Spray(s) Both Nostrils two times a day PRN nasal congestion  guaifenesin/dextromethorphan Oral Liquid 10 milliLiter(s) Oral every 6 hours PRN Cough  melatonin 3 milliGRAM(s) Oral at bedtime PRN Insomnia  morphine  - Injectable 4 milliGRAM(s) IV Push four times a day PRN pain (4-10), dyspnea  ondansetron Injectable 4 milliGRAM(s) IV Push every 8 hours PRN Nausea and/or Vomiting          PRESENT SYMPTOMS:   Pain: [ ]yes [ ]no  QOL impact -   Location -   chest discomfort        Aggravating factors -    Quality -   Radiation -  Timing-  Severity (0-10 scale):  Minimal acceptable level (0-10 scale):     CPOT:  0  https://www.sccm.org/getattachment/zqs60j47-6p8r-8p6c-7q6m-5845u8094p8w/Critical-Care-Pain-Observation-Tool-(CPOT)    Dyspnea:                           [ ]Mild [ ]Moderate [ ]Severe   Anxiety:                             [ ]Mild [ ]Moderate [ ]Severe  Fatigue:                             [ ]Mild [ ]Moderate [ ]Severe  Nausea:                             [ ]Mild [ ]Moderate [ ]Severe  Loss of appetite:              [ ]Mild [ ]Moderate [ ]Severe - has appetite but some feelings of dysphagia   Constipation:                    [ ]Mild [ ]Moderate [ ]Severe    Other Symptoms:    [ X]All other review of systems negative     Palliative Performance Status Version 2:      30   %    http://[facility].org/files/news/palliative_performance_scale_ppsv2.pdf    PHYSICAL EXAM:  Vital Signs Last 24 Hrs  T(C): 36.2 (17 Aug 2022 14:21), Max: 36.2 (17 Aug 2022 14:21)  T(F): 97.1 (17 Aug 2022 14:21), Max: 97.1 (17 Aug 2022 14:21)  HR: 101 (17 Aug 2022 14:21) (80 - 111)  BP: 136/65 (17 Aug 2022 14:21) (103/67 - 136/65)  BP(mean): --  RR: 18 (17 Aug 2022 14:21) (18 - 18)  SpO2: 97% (17 Aug 2022 05:27) (97% - 99%)    Parameters below as of 17 Aug 2022 05:27  Patient On (Oxygen Delivery Method): nasal cannula, high flow        GENERAL:   [X ]Alert  [ ]Oriented x  3 [ ]Lethargic  [ ]Cachexia  [ ]Unarousable  [X ]Verbal  [ ]Non-Verbal  Behavioral:   [ ] Anxiety  [ ] Delirium [ ] Agitation [X ] Calm [ ] Other  HEENT:  [ X]Normal   [ ]Dry mouth   [] HNFNC [ ]ET Tube/Trach  [ ]Oral lesions  PULMONARY:   [ ]Clear [ ]Tachypnea  [ ]Audible excessive secretions [X ] No labored breathing  CARDIOVASCULAR:    [ X]Regular [ ]Irregular [ ]Tachy  [ ]Arnaldo [ ]Murmur [ ]Other  GASTROINTESTINAL:  X[ ]Soft  [ ]Distended  [X ] Not distended [ ]+BS  [ ]Non tender [ ]Tender  [ ]PEG [ ]OGT/ NGT  Last BM:   GENITOURINARY:  [X]Normal [ ] Incontinent   [ ]Oliguria/Anuria   [ ]Gutierrez  MUSCULOSKELETAL:   [ ]Normal   [ ]Weakness  [ ]Bed/Wheelchair bound [ ]Edema  NEUROLOGIC:   [X ]No focal deficits  [ ]Cognitive impairment  [ ]Dysphagia [ ]Dysarthria [ ]Paresis [ ]Other   SKIN:   [ ]Normal   [X ] Nonjaundiced [ ]Rash  [ ]Pressure ulcer(s)       Present on admission [ ]y [ ]n      LABS: reviewed                                         15.3   22.51 )-----------( 408      ( 17 Aug 2022 05:43 )             44.6     08-17    136  |  94<L>  |  22<H>  ----------------------------<  159<H>  5.2<H>   |  34<H>  |  0.8    Ca    9.4      17 Aug 2022 05:43  Mg     2.3     08-17            RADIOLOGY & ADDITIONAL STUDIES:    < from: Xray Chest 1 View- PORTABLE-Routine (Xray Chest 1 View- PORTABLE-Routine in AM.) (08.10.22 @ 05:41) >  Impression:    Stable bilateral nodular opacities.  COPD.    --- End of Report ---    < end of copied text >

## 2022-08-17 NOTE — PROGRESS NOTE ADULT - PROBLEM SELECTOR PLAN 4
Full code  Continue current medical management  Will follow for symptoms  ______________  Jaun Rosales MD  Palliative Medicine  [facility]   of Geriatric and Palliative Medicine  [phone]

## 2022-08-17 NOTE — PROGRESS NOTE ADULT - PROBLEM SELECTOR PLAN 1
patient comfortable today on NC  continue med mgmt per pulm   pt and families goal is pulm rehab if possible   full code

## 2022-08-17 NOTE — PROGRESS NOTE ADULT - ASSESSMENT
71M PMHx COPD on home o2, HTN, DM2 here with acute hypercapnic and hypoxic resp failure.    #Acute hypercapnic and hypoxic resp failure 2/2 copd exacerbation   -ct with lung nodules, emphysema  -blood gas with acute hypercapnia, now resolved  -Pt sob today, restarted on bipap session. Currently on oxygen via nasal cannula  -bipap prn, qhs  -requiring 3l nc  -s/p course doxy  -appreciate pulm  -Symbicort  -alberuol prn  -spiriva  -singulair 10  f-ollow up palliative care today  -08/15 iv steroids switched to oral steroids, patient reported SOB today, reinstated iv steroids   -Spoke to Pulm, Pt will need AVAPs on DC. Spoke to . WILL BE ARRANGED  -cxr 08/16, no acute changes  #Transaminitis, cholestatic  ct/ us with liver hemangioma  repeat us in one year  trend    #leukocytosis likely 2/2 steroids use, pt completed course of antibiotics--> ID consult appreciated, No antibiotics, procal 0.05, likley 2/2 steroids    #Hyperkalemia-->5.2-->5.8  low k diet  check ekg  lokelma 10 daily po x1   - follow up BMP in evening     #HTN  contineu cardizem recc pulm  Will switch Cardizem 30 mg q8 hr to  mg daily moniotr BP  BP stable     #DM2  ssi    #DVT ppx  lovenox    #Progress Note Handoff  Pending (specify):  Pt will need AVAPs from pul, continue steroids  Disposition: home, pt can be downgraded to [facility]  Anticipated date: AVAPs on DC  spoke to family on the phone Jane [phone], Monica [phone]

## 2022-08-17 NOTE — PROGRESS NOTE ADULT - PROBLEM SELECTOR PLAN 3
- plans have been for ongoing full care  - family to discuss code status  - see [facility] note 8/11/22

## 2022-08-18 NOTE — PROGRESS NOTE ADULT - PROBLEM SELECTOR PLAN 3
- plans have been for ongoing full care  - family to discuss code status  - see [facility] note 8/11/22

## 2022-08-18 NOTE — PROGRESS NOTE ADULT - ASSESSMENT
71M PMHx COPD on home o2, HTN, DM2 here with acute hypercapnic and hypoxic resp failure.    #Acute hypercapnic and hypoxic resp failure 2/2 copd exacerbation   -ct with lung nodules, emphysema  -blood gas with acute hypercapnia, now resolved  - Currently on oxygen via nasal cannula  -bipap prn, qhs  -requiring 3l nc  -s/p course doxy  -appreciate pulm  -08/15 iv steroids switched to oral steroids, patient reported SOB , reinstated iv steroids    > Will switch back to PO   -As per  Pulm, Pt will need AVAPs on DC.   -cxr 08/16, no acute changes    #Transaminitis, cholestatic  ct/ us with liver hemangioma  repeat us in one year  trend    #leukocytosis likely 2/2 steroids use, pt completed course of antibiotics--> ID consult appreciated, No antibiotics, procal 0.05, likley 2/2 steroids    #Hyperkalemia-->5.2-->5.8 >> 4.4   low k diet  - follow up BMP    #HTN   switched  Cardizem 30 mg q8 hr to  mg daily   BP stable     #DM2  ssi    #DVT ppx  lovenox    #Progress Note Handoff  Pending (specify):  AVAP set up   Dispo : Home

## 2022-08-18 NOTE — PROGRESS NOTE ADULT - ASSESSMENT
72 yMale with PMH including COPD on 2L home O2 & CPAP (unclear setting), Hx COPD Exacerbation w/ Intubation x 3 d in 2004, Asthma, DM s/p right transmetatarsal amputation, admitted with COPD exacerbation, currently on and off Bipap, with some dysphagia. Pt continues to be dyspneic, although improved, and noncompliant with Bipap.     Met with family last week, goal is to d.c patient home and then follow up with pulm rehab if possible. Did discuss option for hospice as well.   Family to think about code status.  - discussed in detail last week as well.   Clinically patient mildly improved this week.

## 2022-08-18 NOTE — PROGRESS NOTE ADULT - PROBLEM SELECTOR PROBLEM 4
Palliative care by specialist

## 2022-08-18 NOTE — PROGRESS NOTE ADULT - PROBLEM SELECTOR PLAN 4
Full code  Continue current medical management  Signing off, reconsult PRN  ______________  Jaun Rosales MD  Palliative Medicine  [facility]   of Geriatric and Palliative Medicine  [phone]

## 2022-08-18 NOTE — PROGRESS NOTE ADULT - SUBJECTIVE AND OBJECTIVE BOX
HPI: 71yMale with PMH including COPD on 2L home O2 & CPAP (unclear setting), Hx COPD Exacerbation w/ Intubation x 3 d in 2004, Asthma, DM s/p right transmetatarsal amputation, admitted with COPD exacerbation, currently on Bipap, attempting to wean. For now, the patient and family want full aggressive medical management. They are open to palliative following along and re-addressing GOC as things progress.     INTERVAL EVENTS:  8/3: patient comfortbale, on HFNC, is on diet  8/4: states having cough- signed off as goals were clear  8/9: reconsulted for GOC. Pt remains on Bipap, poorly compliant.   8/10: patient dyspneic after extended conversation with our team and Cantonese  ([id number]) while on NC; used no morphine PRN/24 hours  8/11: pt remains dyspneic with exertion or even speaking, using significant respiratory accessory muscles. off bipap on exam and able to eat. given some morphine for chest tightness. plan for family meeting today  8/12: patient states having dryness from O2 NC  8/15: pt reports breathing and difficulty swallowing if better today. he was able to eat a bit. he is still requiring a lot of help for ADLs.   8/16: patient on BiPAP, slightly worse today  8/17: patient comfortable off BiPAP  8/18: patient states mouth feels better, no issues breathing (Cantonese  [id number])    ADVANCE DIRECTIVES:    MOLST  [ ]  Living Will  [ ]   DECISION MAKER(s): Patient   [ ] Health Care Proxy(s)  [ X] Surrogate(s)  [ ] Guardian           Name(s): Phone Number(s): Fletcher Jacobo ( [phone])    BASELINE (I)ADL(s) (prior to admission):  [place]: [ ]Total  [X ] Moderate [ ]Dependent  Palliative Performance Status Version 2:         %    [email or web address]/[email or web address]    Allergies    No Known Allergies    Intolerances  MEDICATIONS  (STANDING):  ALBUTerol    0.083% 2.5 milliGRAM(s) Nebulizer every 4 hours  ALBUTerol    90 MICROgram(s) HFA Inhaler 2 Puff(s) Inhalation every 4 hours  budesonide 160 MICROgram(s)/formoterol 4.5 MICROgram(s) Inhaler 2 Puff(s) Inhalation two times a day  diltiazem    milliGRAM(s) Oral daily  enoxaparin Injectable 40 milliGRAM(s) SubCutaneous every 24 hours  glucagon  Injectable 1 milliGRAM(s) IntraMuscular once  insulin lispro (ADMELOG) corrective regimen sliding scale   SubCutaneous three times a day before meals  loratadine 10 milliGRAM(s) Oral daily  methylPREDNISolone sodium succinate Injectable 40 milliGRAM(s) IV Push every 12 hours  montelukast 10 milliGRAM(s) Oral daily  nystatin    Suspension 315609 Unit(s) Swish and Swallow four times a day  pantoprazole    Tablet 40 milliGRAM(s) Oral before breakfast  polyethylene glycol 3350 17 Gram(s) Oral two times a day  senna 2 Tablet(s) Oral at bedtime  sodium zirconium cyclosilicate 10 Gram(s) Oral once  tiotropium 18 MICROgram(s) Capsule 1 Capsule(s) Inhalation daily    MEDICATIONS  (PRN):  acetaminophen     Tablet .. 650 milliGRAM(s) Oral every 6 hours PRN Temp greater or equal to 38C (100.4F), Mild Pain (1 - 3)  aluminum hydroxide/magnesium hydroxide/simethicone Suspension 30 milliLiter(s) Oral every 4 hours PRN Dyspepsia  fluticasone propionate 50 MICROgram(s)/spray Nasal Spray 2 Spray(s) Both Nostrils two times a day PRN nasal congestion  guaifenesin/dextromethorphan Oral Liquid 10 milliLiter(s) Oral every 6 hours PRN Cough  melatonin 3 milliGRAM(s) Oral at bedtime PRN Insomnia  ondansetron Injectable 4 milliGRAM(s) IV Push every 8 hours PRN Nausea and/or Vomiting    PRESENT SYMPTOMS:   Pain: [ ]yes [ X]no  QOL impact -   Location -   chest discomfort        Aggravating factors -    Quality -   Radiation -  Timing-  Severity (0-10 scale):  Minimal acceptable level (0-10 scale):     CPOT:  0  https://www.sccm.org/getattachment/pdc74j75-1q1e-8c2j-2e1r-0510i8441d6r/Critical-Care-Pain-Observation-Tool-(CPOT)    Dyspnea:                           [ ]Mild [ ]Moderate [ ]Severe   Anxiety:                             [ ]Mild [ ]Moderate [ ]Severe  Fatigue:                             [ ]Mild [ ]Moderate [ ]Severe  Nausea:                             [ ]Mild [ ]Moderate [ ]Severe  Loss of appetite:              [ ]Mild [ ]Moderate [ ]Severe - has appetite but some feelings of dysphagia   Constipation:                    [ ]Mild [ ]Moderate [ ]Severe    Other Symptoms:    [ X]All other review of systems negative     Palliative Performance Status Version 2:      30   %    [email or web address]    PHYSICAL EXAM:  Vital Signs Last 24 Hrs  T(C): 36.2 (17 Aug 2022 14:21), Max: 36.2 (17 Aug 2022 14:21)  T(F): 97.1 (17 Aug 2022 14:21), Max: 97.1 (17 Aug 2022 14:21)  HR: 101 (17 Aug 2022 14:21) (80 - 111)  BP: 136/65 (17 Aug 2022 14:21) (103/67 - 136/65)  BP(mean): --  RR: 18 (17 Aug 2022 14:21) (18 - 18)  SpO2: 97% (17 Aug 2022 05:27) (97% - 99%)    Parameters below as of 17 Aug 2022 05:27  Patient On (Oxygen Delivery Method): nasal cannula, high flow        GENERAL:   [X ]Alert  [ ]Oriented x  3 [ ]Lethargic  [ ]Cachexia  [ ]Unarousable  [X ]Verbal  [ ]Non-Verbal  Behavioral:   [ ] Anxiety  [ ] Delirium [ ] Agitation [X ] Calm [ ] Other  HEENT:  [ X]Normal   [ ]Dry mouth   [] HNFNC [ ]ET Tube/Trach  [ ]Oral lesions  PULMONARY:   [ ]Clear [ ]Tachypnea  [ ]Audible excessive secretions [X ] No labored breathing  CARDIOVASCULAR:    [ X]Regular [ ]Irregular [ ]Tachy  [ ]Arnaldo [ ]Murmur [ ]Other  GASTROINTESTINAL:  X[ ]Soft  [ ]Distended  [X ] Not distended [ ]+BS  [ ]Non tender [ ]Tender  [ ]PEG [ ]OGT/ NGT  Last BM:   GENITOURINARY:  [X]Normal [ ] Incontinent   [ ]Oliguria/Anuria   [ ]Gutierrez  MUSCULOSKELETAL:   [ ]Normal   [ ]Weakness  [ ]Bed/Wheelchair bound [ ]Edema  NEUROLOGIC:   [X ]No focal deficits  [ ]Cognitive impairment  [ ]Dysphagia [ ]Dysarthria [ ]Paresis [ ]Other   SKIN:   [ ]Normal   [X ] Nonjaundiced [ ]Rash  [ ]Pressure ulcer(s)       Present on admission [ ]y [ ]n      LABS: reviewed               14.2   26.85 )-----------( 387      ( 18 Aug 2022 06:47 )             42.3     08-18    142  |  99  |  26<H>  ----------------------------<  102<H>  4.4   |  34<H>  |  0.7    Ca    8.8      18 Aug 2022 06:47  Mg     2.2     08-18    RADIOLOGY & ADDITIONAL STUDIES:    < from: Xray Chest 1 View- PORTABLE-Routine (Xray Chest 1 View- PORTABLE-Routine in AM.) (08.10.22 @ 05:41) >  Impression:    Stable bilateral nodular opacities.  COPD.    --- End of Report ---    < end of copied text >

## 2022-08-18 NOTE — PROGRESS NOTE ADULT - ASSESSMENT
71M PMHx COPD on home o2, HTN, DM2 here with acute hypercapnic and hypoxic resp failure.    #Acute hypercapnic and hypoxic resp failure 2/2 copd exacerbation   -ct with lung nodules, emphysema  -blood gas with acute hypercapnia, now resolved  -Pt sob today, restarted on bipap session. Currently on oxygen via nasal cannula  -bipap prn, qhs  -requiring 3l nc  -s/p course doxy  -appreciate pulm  -Symbicort  -alberuol prn  -spiriva  -singulair 10  -08/15 iv steroids switched to oral steroids, patient reported SOB yesterday, reinstated iv steroids. Switch to oral steroids today 08/18/22 and taper.  -Spoke to Pulm, Pt will need AVAPs on DC. Spoke to . WILL BE ARRANGED  -cxr 08/16, no acute changes    #Transaminitis, cholestatic  ct/ us with liver hemangioma  repeat us in one year  trend    #leukocytosis likely 2/2 steroids use, pt completed course of antibiotics--> ID consult appreciated, No antibiotics, procal 0.05, likley 2/2 steroids    #Hyperkalemia-->5.2-->5.8  low k diet  check ekg  lokelma 10 daily po x1   - follow up BMP in evening     #HTN  contineu cardizem recc pulm  Will switch Cardizem 30 mg q8 hr to  mg daily moniotr BP  BP stable     #DM2  ssi    #DVT ppx  lovenox    #Progress Note Handoff  Pending (specify):  Pt will need AVAPs from pul, continue steroids  Disposition: home, pt can [facility] downgraded to [email or web address]  Anticipated date: AVAPs on DC  spoke to family on the phone Jane [phone], Monica [phone]

## 2022-08-18 NOTE — PROGRESS NOTE ADULT - SUBJECTIVE AND OBJECTIVE BOX
NILE VALDEZ  72y  Male      Patient is a 72y old  Male who presents with a chief complaint of COPD Exacerbation.       INTERVAL HPI/OVERNIGHT EVENTS:      ******************************* REVIEW OF SYSTEMS:**********************************************    All other review of systems negative    *********************** VITALS ******************************************    T(F): 96.6 (08-18-22 @ 12:08)  HR: 96 (08-18-22 @ 12:08) (90 - 107)  BP: 119/57 (08-18-22 @ 12:08) (103/74 - 136/65)  RR: 18 (08-18-22 @ 12:08) (18 - 18)  SpO2: 97% (08-18-22 @ 04:53) (95% - 97%)    08-17-22 @ 07:01  -  08-18-22 @ 07:00  --------------------------------------------------------  IN: 0 mL / OUT: 2 mL / NET: -2 mL            08-17-22 @ 07:01  -  08-18-22 @ 07:00  --------------------------------------------------------  IN: 0 mL / OUT: 2 mL / NET: -2 mL        ******************************** PHYSICAL EXAM:**************************************************  GENERAL: NAD    PSYCH: no agitation, baseline mentation  HEENT:     NERVOUS SYSTEM:  Alert & Oriented X3,     PULMONARY: RUSSEL, CTA    CARDIOVASCULAR: S1S2 RRR    GI: Soft, NT, ND; BS present.    EXTREMITIES:  2+ Peripheral Pulses, No clubbing, cyanosis, or edema    LYMPH: No lymphadenopathy noted    SKIN: No rashes or lesions      **************************** LABS *******************************************************                          14.2   26.85 )-----------( 387      ( 18 Aug 2022 06:47 )             42.3     08-18    142  |  99  |  26<H>  ----------------------------<  102<H>  4.4   |  34<H>  |  0.7    Ca    8.8      18 Aug 2022 06:47  Mg     2.2     08-18    TPro  5.2<L>  /  Alb  3.3<L>  /  TBili  0.8  /  DBili  x   /  AST  24  /  ALT  59<H>  /  AlkPhos  74  08-18          Lactate Trend        CAPILLARY BLOOD GLUCOSE      POCT Blood Glucose.: 223 mg/dL (18 Aug 2022 11:42)          **************************Active Medications *******************************************  Black pepper (Anaphylaxis)  No Known Drug Allergies      acetaminophen     Tablet .. 650 milliGRAM(s) Oral every 6 hours PRN  ALBUTerol    0.083% 2.5 milliGRAM(s) Nebulizer every 4 hours  ALBUTerol    90 MICROgram(s) HFA Inhaler 2 Puff(s) Inhalation every 4 hours  aluminum hydroxide/magnesium hydroxide/simethicone Suspension 30 milliLiter(s) Oral every 4 hours PRN  budesonide 160 MICROgram(s)/formoterol 4.5 MICROgram(s) Inhaler 2 Puff(s) Inhalation two times a day  diltiazem    milliGRAM(s) Oral daily  enoxaparin Injectable 40 milliGRAM(s) SubCutaneous every 24 hours  fluticasone propionate 50 MICROgram(s)/spray Nasal Spray 2 Spray(s) Both Nostrils two times a day PRN  glucagon  Injectable 1 milliGRAM(s) IntraMuscular once  guaifenesin/dextromethorphan Oral Liquid 10 milliLiter(s) Oral every 6 hours PRN  insulin lispro (ADMELOG) corrective regimen sliding scale   SubCutaneous three times a day before meals  loratadine 10 milliGRAM(s) Oral daily  melatonin 3 milliGRAM(s) Oral at bedtime PRN  methylPREDNISolone sodium succinate Injectable 40 milliGRAM(s) IV Push every 12 hours  montelukast 10 milliGRAM(s) Oral daily  nystatin    Suspension 106110 Unit(s) Swish and Swallow four times a day  ondansetron Injectable 4 milliGRAM(s) IV Push every 8 hours PRN  pantoprazole    Tablet 40 milliGRAM(s) Oral before breakfast  polyethylene glycol 3350 17 Gram(s) Oral two times a day  senna 2 Tablet(s) Oral at bedtime  sodium zirconium cyclosilicate 10 Gram(s) Oral once  tiotropium 18 MICROgram(s) Capsule 1 Capsule(s) Inhalation daily      ***************************************************  RADIOLOGY & ADDITIONAL TESTS:    Imaging Personally Reviewed:  [ ] YES  [ ] NO    HEALTH ISSUES - PROBLEM Dx:  COPD exacerbation    Nutritional deficiency    Palliative care by specialist    Advanced care planning/counseling discussion    Dyspnea

## 2022-08-19 NOTE — DISCHARGE NOTE PROVIDER - HOSPITAL COURSE
71 year old male patient, with past medical history of COPD on home o2, HTN, DM2 admitted for the workup and management of acute hypercapnic and hypoxic respiratory failure and copd exacerbation.  Blood gasses reported acute hypercapnia, now resolved. CT chest reported lung nodules, emphysema. Chest xray 08/16 no acute changes.   Currently on oxygen via nasal cannula, 3L. BIPAP sessions given during hospital stay.  During hospital course, Pulmonology was consulted, Symbicort given, alberuol given, spiriva,  singulair, iv steroids given, switched to oral steroids on 08/18, taper recommended.  Pt will need AVAPs at home. Arranged at home by .       #Transaminitis, cholestatic  -ct/ us with liver hemangioma  -repeat us in one year     71 year old male patient, with past medical history of COPD on home o2, HTN, DM2 admitted for the workup and management of acute hypercapnic and hypoxic respiratory failure and copd exacerbation.  Blood gasses reported acute hypercapnia, now resolved. CT chest reported lung nodules, emphysema. Chest xray 08/16 no acute changes.   Currently on oxygen via nasal cannula, 3L. BIPAP sessions given during hospital stay.  During hospital course, Pulmonology was consulted, Symbicort given, alberuol given, spiriva,  singulair, iv steroids given, switched to oral steroids on 08/18, taper recommended.  Pt will need AVAPs at home. Arranged at home by .         #Hypertension  -Blood pressure well controlled  -Nifedipine switched to diltiazem    #Transaminitis, cholestatic  -ct/ us with liver hemangioma  -repeat us in one year

## 2022-08-19 NOTE — PROGRESS NOTE ADULT - REASON FOR ADMISSION
COPD Exacerbation

## 2022-08-19 NOTE — DISCHARGE NOTE PROVIDER - NSDCMRMEDTOKEN_GEN_ALL_CORE_FT
acetaminophen 325 mg oral tablet: 2 tab(s) orally every 6 hours, As needed, Temp greater or equal to 38C (100.4F), Mild Pain (1 - 3)  aluminum hydroxide-magnesium hydroxide 200 mg-200 mg/5 mL oral suspension: 30 milliliter(s) orally every 4 hours, As needed, Dyspepsia  benzonatate 200 mg oral capsule: 1 cap(s) orally 3 times a day, As Needed  budesonide-formoterol 160 mcg-4.5 mcg/inh inhalation aerosol: 2 puff(s) inhaled 2 times a day  Childrens Flonase 50 mcg/inh nasal spray: 2 spray(s) nasal 2 times a day, As needed, nasal congestion  ClearLax oral powder for reconstitution: 17 gram(s) orally 2 times a day  Innerclean oral tablet: 2 tab(s) orally once a day (at bedtime)  ipratropium-albuterol 0.5 mg-2.5 mg/3 mL inhalation solution: 3 milliliter(s) inhaled 4 times a day  loratadine 10 mg oral tablet: 1 tab(s) orally once a day  melatonin 3 mg oral tablet: 1 tab(s) orally once a day (at bedtime), As needed, Insomnia  montelukast 10 mg oral tablet: 1 tab(s) orally once a day  NIFEdipine 30 mg oral tablet, extended release: 1 tab(s) orally once a day  nystatin 100,000 units/mL oral suspension: 5 milliliter(s) orally 4 times a day  predniSONE 20 mg oral tablet: 2 tab(s) orally once a day  Protonix 40 mg oral delayed release tablet: 1 tab(s) orally once a day  Ventolin HFA 90 mcg/inh inhalation aerosol: 2 puff(s) inhaled 4 times a day, As Needed   benzonatate 200 mg oral capsule: 1 cap(s) orally 3 times a day, As Needed  budesonide-formoterol 160 mcg-4.5 mcg/inh inhalation aerosol: 2 puff(s) inhaled 2 times a day  budesonide-formoterol 160 mcg-4.5 mcg/inh inhalation aerosol: 2 puff(s) inhaled 2 times a day  Childrens Flonase 50 mcg/inh nasal spray: 2 spray(s) nasal 2 times a day, As needed, nasal congestion  Childrens Flonase 50 mcg/inh nasal spray: 2 spray(s) nasal 2 times a day, As needed, nasal congestion  ClearLax oral powder for reconstitution: 17 gram(s) orally 2 times a day  Innerclean oral tablet: 2 tab(s) orally once a day (at bedtime)  ipratropium-albuterol 0.5 mg-2.5 mg/3 mL inhalation solution: 3 milliliter(s) inhaled 4 times a day  loratadine 10 mg oral tablet: 1 tab(s) orally once a day  melatonin 3 mg oral tablet: 1 tab(s) orally once a day (at bedtime), As needed, Insomnia  montelukast 10 mg oral tablet: 1 tab(s) orally once a day  NIFEdipine 30 mg oral tablet, extended release: 1 tab(s) orally once a day  nystatin 100,000 units/mL oral suspension: 5 milliliter(s) orally 4 times a day.  stop on 08/23  predniSONE 20 mg oral tablet: 2 tab(s) orally once a day till 08/23  1tab orally once a day from 08/24 to 08/28  1/2 tablet once a day from 29/08 to 09/02  Protonix 40 mg oral delayed release tablet: 1 tab(s) orally once a day  Ventolin HFA 90 mcg/inh inhalation aerosol: 2 puff(s) inhaled 4 times a day, As Needed   benzonatate 200 mg oral capsule: 1 cap(s) orally 3 times a day, As Needed  budesonide-formoterol 160 mcg-4.5 mcg/inh inhalation aerosol: 2 puff(s) inhaled 2 times a day  Childrens Flonase 50 mcg/inh nasal spray: 2 spray(s) nasal 2 times a day, As needed, nasal congestion  ClearLax oral powder for reconstitution: 17 gram(s) orally 2 times a day  dilTIAZem 120 mg/24 hours oral capsule, extended release: 1 cap(s) orally once a day  Innerclean oral tablet: 2 tab(s) orally once a day (at bedtime)  ipratropium-albuterol 0.5 mg-2.5 mg/3 mL inhalation solution: 3 milliliter(s) inhaled 4 times a day  loratadine 10 mg oral tablet: 1 tab(s) orally once a day  melatonin 3 mg oral tablet: 1 tab(s) orally once a day (at bedtime), As needed, Insomnia  montelukast 10 mg oral tablet: 1 tab(s) orally once a day  nystatin 100,000 units/mL oral suspension: 5 milliliter(s) orally 4 times a day.  stop on 08/23  predniSONE 20 mg oral tablet: 2 tab(s) orally once a day till 08/22  1tab orally once a day from 08/23 to 08/26  1/2 tablet once a day from 08/27 to 08/30 and then stop  Protonix 40 mg oral delayed release tablet: 1 tab(s) orally once a day  Ventolin HFA 90 mcg/inh inhalation aerosol: 2 puff(s) inhaled 4 times a day, As Needed

## 2022-08-19 NOTE — PROGRESS NOTE ADULT - PROVIDER SPECIALTY LIST ADULT
Internal Medicine
Internal Medicine
Pulmonology
Hospitalist
Internal Medicine
Internal Medicine
Palliative Care
Pulmonology
Critical Care
Critical Care
Hospitalist
Internal Medicine
Internal Medicine
Palliative Care
Pulmonology
Hospitalist
Hospitalist
Infectious Disease
Internal Medicine
Nutrition Support
Palliative Care
Pulmonology
Hospitalist
Hospitalist
Internal Medicine
Hospitalist
Palliative Care

## 2022-08-19 NOTE — PROGRESS NOTE ADULT - NUTRITIONAL ASSESSMENT
This patient has been assessed with a concern for Malnutrition and has been determined to have a diagnosis/diagnoses of Severe protein-calorie malnutrition.    This patient is being managed with:   Diet Regular-  No Concentrated Potassium  Free Water Flush Instructions:  VANILLA Ensure; minced & moist entree can get mixed consitencies and bread.  Supplement Feeding Modality:  Oral  Ensure Enlive Cans or Servings Per Day:  1       Frequency:  Three Times a day  Entered: Aug 12 2022 12:16PM    
This patient has been assessed with a concern for Malnutrition and has been determined to have a diagnosis/diagnoses of Severe protein-calorie malnutrition.    This patient is being managed with:   Diet Regular-  No Concentrated Potassium  Free Water Flush Instructions:  minced and moist entree can get mixed consitencies and bread  Supplement Feeding Modality:  Oral  Ensure Enlive Cans or Servings Per Day:  1       Frequency:  Three Times a day  Entered: Aug  9 2022 11:35AM    
This patient has been assessed with a concern for Malnutrition and has been determined to have a diagnosis/diagnoses of Severe protein-calorie malnutrition.    This patient is being managed with:   Diet Regular-  No Concentrated Potassium  Free Water Flush Instructions:  VANILLA Ensure; minced & moist entree can get mixed consitencies and bread.  Supplement Feeding Modality:  Oral  Ensure Enlive Cans or Servings Per Day:  1       Frequency:  Three Times a day  Entered: Aug 12 2022 12:16PM    
This patient has been assessed with a concern for Malnutrition and has been determined to have a diagnosis/diagnoses of Severe protein-calorie malnutrition.    This patient is being managed with:   Diet Regular-  No Concentrated Potassium  Free Water Flush Instructions:  VANILLA Ensure; minced & moist entree can get mixed consitencies and bread.  Supplement Feeding Modality:  Oral  Ensure Enlive Cans or Servings Per Day:  1       Frequency:  Three Times a day  Entered: Aug 12 2022 12:16PM    
This patient has been assessed with a concern for Malnutrition and has been determined to have a diagnosis/diagnoses of Severe protein-calorie malnutrition.    This patient is being managed with:   Diet Regular-  No Concentrated Potassium  Free Water Flush Instructions:  minced and moist entree can get mixed consitencies and bread  Supplement Feeding Modality:  Oral  Ensure Enlive Cans or Servings Per Day:  1       Frequency:  Three Times a day  Entered: Aug  9 2022 11:35AM    
This patient has been assessed with a concern for Malnutrition and has been determined to have a diagnosis/diagnoses of Severe protein-calorie malnutrition.    This patient is being managed with:   Diet Regular-  No Concentrated Potassium  Free Water Flush Instructions:  VANILLA Ensure; minced & moist entree can get mixed consitencies and bread.  Supplement Feeding Modality:  Oral  Ensure Enlive Cans or Servings Per Day:  1       Frequency:  Three Times a day  Entered: Aug 12 2022 12:16PM    

## 2022-08-19 NOTE — DISCHARGE NOTE NURSING/CASE MANAGEMENT/SOCIAL WORK - PATIENT PORTAL LINK FT
You can access the FollowMyHealth Patient Portal offered by [facility] by registering at the following website: [email or web address]. By joining [facility]’s FollowMyHealth portal, you will also be able to view your health information using other applications (apps) compatible with our system.

## 2022-08-19 NOTE — DISCHARGE NOTE PROVIDER - CARE PROVIDER_API CALL
BAUTISTA WHITTAKER  Phone: (   )    -  Fax: (   )    -  Established Patient  Follow Up Time: 2 weeks

## 2022-08-19 NOTE — DISCHARGE NOTE PROVIDER - PROVIDER TOKENS
FREE:[LAST:[PANFILO],FIRST:[LING],PHONE:[(   )    -],FAX:[(   )    -],FOLLOWUP:[2 weeks],ESTABLISHEDPATIENT:[T]]

## 2022-08-19 NOTE — DISCHARGE NOTE NURSING/CASE MANAGEMENT/SOCIAL WORK - NSDCPEFALRISK_GEN_ALL_CORE
For information on Fall & Injury Prevention, visit: [email or web address] OR  [email or web address] OR  https://www.cdc.gov/steadi/patient.html

## 2022-08-19 NOTE — DISCHARGE NOTE NURSING/CASE MANAGEMENT/SOCIAL WORK - NSDCFUADDAPPT_GEN_ALL_CORE_FT
APPTS ARE READY TO BE MADE: [x ] YES    Best Family or Patient Contact (if needed):  DaughterELIA,MON  [phone]  Additional Information about above appointments (if needed):    1:   2:   3:     Other comments or requests:   Patient admitted and managed for COPD exacerbation

## 2022-08-19 NOTE — DISCHARGE NOTE PROVIDER - NSDCFUADDAPPT_GEN_ALL_CORE_FT
APPTS ARE READY TO BE MADE: [x ] YES    Best Family or Patient Contact (if needed):  DaughterELIA,MON  [phone]  Additional Information about above appointments (if needed):    1:   2:   3:     Other comments or requests:   Patient admitted and managed for COPD exacerbation APPTS ARE READY TO BE MADE: [x ] YES    Best Family or Patient Contact (if needed):  Daughter, ELIA,MON  [phone]  Additional Information about above appointments (if needed):  Appointment Facilitated:  Patient was provided with follow up request details and was advised to call to schedule follow up within specified time frame    1:   2:   3:     Other comments or requests:   Patient admitted and managed for COPD exacerbation

## 2022-08-19 NOTE — DISCHARGE NOTE PROVIDER - NSDCCPCAREPLAN_GEN_ALL_CORE_FT
PRINCIPAL DISCHARGE DIAGNOSIS  Diagnosis: COPD exacerbation  Assessment and Plan of Treatment: You were admitted and managed here for COPD exacerbation.  Chronic Obstructive Pulmonary Disease  Chronic obstructive pulmonary disease (COPD) is a lung condition in which airflow from the lungs is limited. Causes include smoking, secondhand smoke exposure, genetics, or recurrent infections. Take all medicines (inhaled or pills) as directed by your health care provider. Avoid exposure to irritants such as smoke, chemicals, and fumes that aggravate your breathing.  If you are a smoker, the most important thing that you can do is stop smoking. Continuing to smoke will cause further lung damage and breathing trouble. Ask your health care provider for help with quitting smoking.  SEEK IMMEDIATE MEDICAL CARE IF YOU HAVE ANY OF THE FOLLOWING SYMPTOMS: shortness of breath at rest or when talking, bluish discoloration of lips, skin, fever, worsening cough, unexplained chest pain, or lightheadedness/dizziness.   Please take your medications as prescribed and follow the instructions given to you regarding your health. Keep follow up appointments with your doctors as instructed.

## 2022-08-19 NOTE — PROGRESS NOTE ADULT - SUBJECTIVE AND OBJECTIVE BOX
NILE VALDEZ  72y  Male      Patient is a 72y old  Male who presents with a chief complaint of COPD Exacerbation.       INTERVAL HPI/OVERNIGHT EVENTS:      ******************************* REVIEW OF SYSTEMS:**********************************************    All other review of systems negative    *********************** VITALS ******************************************    T(F): 97.2 (08-19-22 @ 12:49)  HR: 93 (08-19-22 @ 12:49) (91 - 102)  BP: 109/74 (08-19-22 @ 12:49) (100/61 - 109/74)  RR: 26 (08-19-22 @ 12:49) (18 - 26)  SpO2: 96% (08-19-22 @ 12:49) (96% - 98%)            ******************************** PHYSICAL EXAM:**************************************************  GENERAL: NAD    PSYCH: no agitation, baseline mentation  HEENT:     NERVOUS SYSTEM:  Alert & Oriented X3,    PULMONARY: RUSSEL, CTA    CARDIOVASCULAR: S1S2 RRR    GI: Soft, NT, ND; BS present.    EXTREMITIES:  2+ Peripheral Pulses, No clubbing, cyanosis, or edema    LYMPH: No lymphadenopathy noted    SKIN: No rashes or lesions      **************************** LABS *******************************************************                          14.9   25.15 )-----------( 359      ( 19 Aug 2022 05:57 )             44.1     08-19    137  |  95<L>  |  24<H>  ----------------------------<  120<H>  5.0   |  32  |  0.6<L>    Ca    9.2      19 Aug 2022 05:57  Mg     2.2     08-19    TPro  5.4<L>  /  Alb  3.4<L>  /  TBili  0.9  /  DBili  x   /  AST  23  /  ALT  62<H>  /  AlkPhos  79  08-19          Lactate Trend        CAPILLARY BLOOD GLUCOSE      POCT Blood Glucose.: 204 mg/dL (19 Aug 2022 11:42)          **************************Active Medications *******************************************  Black pepper (Anaphylaxis)  No Known Drug Allergies      acetaminophen     Tablet .. 650 milliGRAM(s) Oral every 6 hours PRN  ALBUTerol    0.083% 2.5 milliGRAM(s) Nebulizer every 4 hours  ALBUTerol    90 MICROgram(s) HFA Inhaler 2 Puff(s) Inhalation every 4 hours  aluminum hydroxide/magnesium hydroxide/simethicone Suspension 30 milliLiter(s) Oral every 4 hours PRN  budesonide 160 MICROgram(s)/formoterol 4.5 MICROgram(s) Inhaler 2 Puff(s) Inhalation two times a day  diltiazem    milliGRAM(s) Oral daily  enoxaparin Injectable 40 milliGRAM(s) SubCutaneous every 24 hours  fluticasone propionate 50 MICROgram(s)/spray Nasal Spray 2 Spray(s) Both Nostrils two times a day PRN  glucagon  Injectable 1 milliGRAM(s) IntraMuscular once  guaifenesin/dextromethorphan Oral Liquid 10 milliLiter(s) Oral every 6 hours PRN  insulin lispro (ADMELOG) corrective regimen sliding scale   SubCutaneous three times a day before meals  loratadine 10 milliGRAM(s) Oral daily  melatonin 3 milliGRAM(s) Oral at bedtime PRN  montelukast 10 milliGRAM(s) Oral daily  nystatin    Suspension 158628 Unit(s) Swish and Swallow four times a day  ondansetron Injectable 4 milliGRAM(s) IV Push every 8 hours PRN  pantoprazole    Tablet 40 milliGRAM(s) Oral before breakfast  polyethylene glycol 3350 17 Gram(s) Oral two times a day  predniSONE   Tablet 40 milliGRAM(s) Oral daily  senna 2 Tablet(s) Oral at bedtime  sodium zirconium cyclosilicate 10 Gram(s) Oral once  tiotropium 18 MICROgram(s) Capsule 1 Capsule(s) Inhalation daily      ***************************************************  RADIOLOGY & ADDITIONAL TESTS:    Imaging Personally Reviewed:  [ ] YES  [ ] NO    HEALTH ISSUES - PROBLEM Dx:  COPD exacerbation    Nutritional deficiency    Palliative care by specialist    Advanced care planning/counseling discussion    Dyspnea

## 2022-08-19 NOTE — DISCHARGE NOTE PROVIDER - NSDCFUADDINST_GEN_ALL_CORE_FT
Patient will be discharged on home oxygen with BiPAP machine arranged at home.  For Hypertension, Nifedipine switched to diltiazem by pulmonology. Kindly keep follow up with your PCP

## 2022-08-19 NOTE — PROGRESS NOTE ADULT - ASSESSMENT
71M PMHx COPD on home o2, HTN, DM2 here with acute hypercapnic and hypoxic resp failure.    #Acute hypercapnic and hypoxic resp failure 2/2 copd exacerbation   -ct with lung nodules, emphysema  -blood gas with acute hypercapnia, now resolved  - Currently on oxygen via nasal cannula  -bipap prn, qhs  -requiring 3l nc  -s/p course doxy  -appreciate pulm  -08/15 iv steroids switched to oral steroids, patient reported SOB , reinstated iv steroids    > Will switch back to PO   -cxr 08/16, no acute changes    #Transaminitis, cholestatic  ct/ us with liver hemangioma  repeat US  in one year    #leukocytosis likely 2/2 steroids use, pt completed course of antibiotics--> ID consult appreciated, No antibiotics, procal 0.05, likley 2/2 steroids    #Hyperkalemia-->5.2-->5.8 >> 4.4   low k diet  - follow up BMP    #HTN   switched  Cardizem 30 mg q8 hr to  mg daily   BP stable     #DM2  ssi    #DVT ppx  lovenox    DC planning today with AVAP.   #Progress Note Handoff  Pending (specify):    Dispo : Home

## 2022-08-24 PROBLEM — J44.9 COPD (CHRONIC OBSTRUCTIVE PULMONARY DISEASE): Status: ACTIVE | Noted: 2017-01-31

## 2022-08-24 PROBLEM — I10 HTN (HYPERTENSION): Status: ACTIVE | Noted: 2022-01-01

## 2022-08-24 NOTE — COUNSELING
[de-identified] : Pt was informed about CN’s role/ STARS program and overview of transitional care reviewed with patient. Pt educated on topics of importance such as compliance with prescribed medication regimen, COPD action plan and escalation process, adequate hydration, and proper diet. Pt encouraged calling CN with any issues, concerns or questions, also educated to notify CN if experiencing CP, SOB, cough, increased mucus production, increased use of rescue inhaler, fever, chills, fatigue, “chest cold symptoms” dizziness, lightheadedness, n/v/d/c, swelling to extremities and/or any signs of COPD exacerbation/flare as reviewed. Reassurance provided. Will continue to monitor\par \par

## 2022-08-24 NOTE — PHYSICAL EXAM
[No Acute Distress] : no acute distress [No Respiratory Distress] : no respiratory distress  [No Accessory Muscle Use] : no accessory muscle use [No Edema] : there was no peripheral edema [Non-distended] : non-distended [Normal Affect] : the affect was normal [Alert and Oriented x3] : oriented to person, place, and time [Normal Insight/Judgement] : insight and judgment were intact

## 2022-08-24 NOTE — HISTORY OF PRESENT ILLNESS
[Home] : at home, [unfilled] , at the time of the visit. [Other Location: e.g. Home (Enter Location, City,State)___] : at [unfilled] [Family Member] : family member [Verbal consent obtained from patient] : the patient, [unfilled] [FreeTextEntry1] : follow up hospital discharge  [de-identified] : Patient is a 72 y/o M enrolled in the STARS TCM program s/p a recent discharge from [facility] for COPD exacerbation he has a PMH of HTN, DM, and COPD (on home 02). Patient was treated with steroids , nebulizer treatments improved clincally and dc home with HHA. Patient observed via tele health alert in NAD daughter at bedside translating , denies c/p, sob, fever, states cough is improved. Refused paciific translation and family tranlsated . \par \par Hospital Course copied SCM: 71 year old male patient, with past medical history of COPD on home o2, HTN, DM2 admitted for the workup and management of acute hypercapnic and hypoxic respiratory failure and copd exacerbation.\par Blood gasses reported acute hypercapnia, now resolved. CT chest reported lung nodules, emphysema. Chest xray 08/16 no acute changes. \par Currently on oxygen via nasal cannula, 3L. BIPAP sessions given during hospital stay.\par During hospital course, Pulmonology was consulted, Symbicort given, alberuol given, spiriva, singulair, iv steroids given, switched to oral steroids on 08/18, taper recommended.\par Pt will need AVAPs at home. Arranged at home by .\par #Hypertension\par -Blood pressure well controlled\par -Nifedipine switched to diltiazem\par #Transaminitis, cholestatic\par -ct/ us with liver hemangioma\par -repeat us in one year

## 2022-08-24 NOTE — ASSESSMENT
[FreeTextEntry1] : Patient is a 72 y/o M enrolled in the [facility] TCM program s/p a recent discharge from [facility] for COPD exacerbation he has a PMH of HTN, DM, and COPD (on home 02). Patient was treated with steroids , nebulizer treatments improved clincally and dc home with HHA. Patient observed via tele health alert in NAD daughter at bedside translating , denies c/p, sob, fever, states cough is improved. Refused paciific translation and family tranlsated .

## 2022-08-24 NOTE — PLAN
[FreeTextEntry1] : COPD- c/w Symbicort, albuterol, prednisone ,02 supplementation , AVAP, pulmonary follow up \par HTN- c.w current meds\par PCP/ house call options discussed and placed\par Homecare referral placed , can benefit from skilled services

## 2022-08-27 NOTE — H&P ADULT - NSHPLABSRESULTS_GEN_ALL_CORE
LABS:                        13.7   17.89 )-----------( 301      ( 27 Aug 2022 12:53 )             41.2     08-27    132<L>  |  91<L>  |  29<H>  ----------------------------<  80  3.6   |  30  |  0.6<L>    Ca    8.8      27 Aug 2022 12:53  Mg     1.8     08-27    TPro  5.9<L>  /  Alb  3.5  /  TBili  1.9<H>  /  DBili  x   /  AST  34  /  ALT  87<H>  /  AlkPhos  95  08-27      Urinalysis Basic - ( 27 Aug 2022 17:50 )    Color: Yellow / Appearance: Clear / SG: >1.050 / pH: x  Gluc: x / Ketone: Moderate  / Bili: Negative / Urobili: <2 mg/dL   Blood: x / Protein: 30 mg/dL / Nitrite: Negative   Leuk Esterase: Negative / RBC: 4-6 /HPF / WBC 1 /HPF   Sq Epi: x / Non Sq Epi: 1 /HPF / Bacteria: Negative        Lactate, Blood: 1.3 mmol/L (08-27-22 @ 12:53)  Troponin T, Serum: 0.06 ng/mL *HH* (08-27-22 @ 12:53)      CARDIAC MARKERS ( 27 Aug 2022 12:53 )  x     / 0.06 ng/mL / x     / x     / x          RADIOLOGY:      INTERPRETATION:  Clinical History / Reason for exam: Dyspnea  CTA the pulmonary arteries was performed following the intravenous   administration of 75 cc of Omnipaque 350 contrast and no contrast was   discarded. Thin section axial and thin section reformatted sagittal and   coronal views were obtained. MIPS imaging was performed.  Comparison 7/30/2022  The pulmonary arteries appearnormal in size and opacification with no   intraluminal filling defects seen.  The thoracic aorta shows no aneurysm or dissection.  There is no mediastinal or hilar lymphadenopathy or mass.  No parenchymal opacity pleural effusion or air leak is seen.  The bony structures are intact.  The soft tissues of the chest wall are intact.    IMPRESSION: No pulmonary embolus seen.    --- End of Report ---      Impression:    No radiographic evidence of acute cardiopulmonary disease.    --- End of Report ---

## 2022-08-27 NOTE — H&P ADULT - NSHPREVIEWOFSYSTEMS_GEN_ALL_CORE
General:	denies night sweats, wt changes, cold/heat intolerance    Skin: denies rashes, pruritis, nail/hair changes  	  Ophthalmologic: denies vision changes, denies eye discharge or irritation  	  ENMT: denies nasal discharge, hearing changes, mouth sores, difficulty swallowing    Respiratory and Thorax: endorses mild non productive cough, endorses improving sob  	  Cardiovascular: denies current CP, denies SULTANA, denies palpitation    Gastrointestinal: denies n/v/d    Genitourinary: denies urgency, burning, nocturia; family endorses dark urine    Musculoskeletal: denies muscle/joint pain; endorses weakness, can't walk    Neurological: denies dizziness, syncope, HA    Psychiatric: denies si/hi and hallucinations    Hematology/Lymphatics: denies easy bleeding/bruising    Endocrine: denies wt changes, hair/nail changes, denies cold/heat sensitivity

## 2022-08-27 NOTE — ED PROVIDER NOTE - PHYSICAL EXAMINATION
CONSTITUTIONAL:  moderate respiratory distress  SKIN:  warm, intact  HEAD:  NCAT  EYES:  NL inspection  NECK:  supple; non tender  CARD:  sinus tach, distal pulses palpable  RESP:  + end expiratory wheezing w/ diminished breath sounds b/l lung fields  ABD:  S/NT, no R/G  MSK:  no pedal edema  NEURO:  CN2-12 intact, sensation intact x4 extremities, nl coordination, grossly unremarkable  PSYCH:  cooperative, appropriate

## 2022-08-27 NOTE — ED PROVIDER NOTE - CLINICAL SUMMARY MEDICAL DECISION MAKING FREE TEXT BOX
WOB improved on bipap w/ treatment of copd   ct negative for pe or pna  pt w/ new trop/bnp  ?atypical acs sx, possible demand trop from hypoxemia overnight, ?myocarditis given persistent tachycardia/fever w/o clear source/elevated trop  will admit to higher level of care for close monitoring and further w/u

## 2022-08-27 NOTE — ED CLERICAL - NS ED CLERK NOTE PRE-ARRIVAL INFORMATION; ADDITIONAL PRE-ARRIVAL INFORMATION
This patient is enrolled in the STARS readmission reduction initiative and has active care navigation. This patient can be followed up by the care navigation team within 24 hours.  To arrange close follow-up or to obtain additional clinical information, please call the contact number above. The on call [facility] [place] Hospitalist has been notified and will coordinate care in concert with the ED Physician including consults as necessary. Call [phone] (North), [phone] (South) to reach the hospitalist. You may also call the Hospitalist Division at  at either site. Consider CDU for management per guidelines

## 2022-08-27 NOTE — ED ADULT NURSE NOTE - OBJECTIVE STATEMENT
increase sob and work of breathing, on bipap at home 4 hours on and off during the day and all night. d/c on friday for COPD exacerbation

## 2022-08-27 NOTE — ED PROVIDER NOTE - PROGRESS NOTE DETAILS
d/w hospitalist- Dr. Ackerman- agree pt should be admitted and not goto OBS WOB improving on bipap, pt more comfortable appearing TD: Pt's case discussed with ICU fellow who states pt can be admitted to stepdown. Pt endorsed to MAR.

## 2022-08-27 NOTE — H&P ADULT - HISTORY OF PRESENT ILLNESS
71yo man with hx of hx COPD on home O2 w/ multiple hospitalizations for exacerbation, w/ Intubation x 3 d in 2004, last d/c 8/19, Asthma, DM s/p right transmetatarsal amputation following infection.     BIB EMS alerted by daughters for SOB and wheezing since last night. Pt also complained of episode of CP radiating to back, resolved now. Pt endorses that this episode feels similar to prior admissions for exacerbations. Pt also endorses dark urine.  Of note pt last dose of prednisone from prior admission was today.    Since last d/c pt has remained bed ridden, unable to walk, PT was scheduled to come this Thursday but didn't show.    ED   /94  RR 30 100% cPaP TMax 101.5 F  WBC 17.89 (25.15 8/19) TBili 1.9 ALT 87 T 0.06   CT PE negative  CXR stable from prior    Pt given cefepime, levofloxacin, vanco, 2.8L LR, tylenol, solumedrol 125, placed on bipap    Hr improved to low 100s    Pt admitted to sdu for copd exacerbation

## 2022-08-27 NOTE — ED PROVIDER NOTE - CARE PLAN
Principal Discharge DX:	COPD with acute exacerbation  Secondary Diagnosis:	Elevated troponin level   1

## 2022-08-27 NOTE — H&P ADULT - NSHPSOCIALHISTORY_GEN_ALL_CORE
former smoker  covid vax'd  lives w/ daughters former smoker, denies ETOH, illicit drug use  covid vax'd  lives w/ daughters

## 2022-08-27 NOTE — H&P ADULT - NSHPPHYSICALEXAM_GEN_ALL_CORE
CONSTITUTIONAL: NAD    EYES: PERRLA and symmetric, EOMI, No conjunctival or scleral injection, non-icteric    ENMT: Oral mucosa with moist membranes. No external nasal lesions; normal dentition; no gross hearing impairment noted.    NECK: Supple, symmetric and without tracheal deviation; thyroid gland not enlarged and without palpable masses    RESPIRATORY: No respiratory distress, no use of accessory muscles; CTA b/l, BS R>L, no/trace wheezes s/p steroids, no rales or rhonchi, no dullness or hyperresonance to percussion, no tactile fremitus, no subcutaneous emphysema    CARDIOVASCULAR: RRRR, +S1S2, no murmur appreciated, no rubs, no gallops; no JVD; no peripheral edema    Vascular: no carotid bruits; no abdominal bruit; carotid pulse palpable, radial pulse palpable, posterior tibialis pulse palpable    GASTROINTESTINAL: Soft, non tender, non distended, no rebound, no guarding; No palpable masses; no hepatosplenomegaly; no hernia palpated;    MUSCULOSKELETAL: no significant limitation on ROM, moves all extremities in plane of bed; right midfoot amputation noted    SKIN: No rashes or ulcers noted; no subcutaneous nodules or induration palpable; bruising on L arm    NEUROLOGIC: CN II-XII intact; sensation intact in upper and lower extremities b/l to light touch;     PSYCHIATRIC: Appropriate insight/judgment; A+O x 3, mood and affect appropriate, recent/remote memory intact

## 2022-08-27 NOTE — ED PROVIDER NOTE - NS ED ROS FT
CONSTITUTIONAL:  see HPI  SKIN:  no skin rash  EYES:  no visual changes  ENMT: no neck pain or stiffness  CARD:  + chest tightness, no chest pain  RESP: + SOB and moderate respiratory distress  ABD:  no abdominal pain, nausea, vomiting, or diarrhea  MSK:  no back pain  NEURO:  no headache   Except as documented in the HPI,  all other systems are negative

## 2022-08-27 NOTE — ED PROVIDER NOTE - ATTENDING CONTRIBUTION TO CARE
71  year old male patient, with past medical history of COPD on home o2, HTN, DM2  pt presents for eval of resp distress. pt was dc from hospital 8/19 for copd exacerbation. overnight, pt developed worsening SOB and chest tightness.  when ems arrived, pt was slightly hypoxemic and placed on cpap.      vs tachycardic, febrile  gen- moderate resp distress, elderly  card-sinus tac  lungs-distant breath sounds, faint end exp wheezing  abd-sntnd, no guarding or rebound  neuro- full str/sensation, cn ii-xii grossly intact, normal coordination    c/f copd exacerbation- bipap, steroids, mag, nebs  c/f sepsis/infection- source more likely pulm  tachycardic, increased wob, recent admission, r/o PE  will provide supportive care, serial exam and ED observation period

## 2022-08-27 NOTE — ED PROVIDER NOTE - DISPOSITION TYPE
Bcc Pigmented Histology Text: Functional melanocytes are scattered through the basal cell carcinoma tumor islands and there are numerous melanophages in the stroma ADMIT

## 2022-08-27 NOTE — H&P ADULT - ATTENDING COMMENTS
CXR and CT Chest reviewed without focal opacity or effusion, hyperinflated lungs  EKG reviewed sinus tachy, right atrial enlargement    PE?    # Acute COPD exacerbation  # End stage COPD with h/o multiple intubation in the past  - c/w IV steriod solumedrol 60 mg daily (completed prednisone taper from home)  - follow up pulm, managed under step-down unit  - BIPAP   - duoneb q6h and prn    # Asthma  - c/w loratadine, montelukast    # DM  - monitor fsg, NPO while on BIPAP    # HTN, stable  - c/w cardizem    # GERD  - c/w Protonix    # [place] hernia, pulmonary nodules  # Hepatic Nodule likely hemangioma, 1.1 cm focal enhancement of the right hepatic lobe on CTA chest  - seen on CTA Chest prior admission  - outpatient f/u    # Code status: Full code  # DVT prophylaxis: on lovenox subcut CXR and CT Chest reviewed without focal opacity or effusion, hyperinflated lungs  EKG reviewed sinus tachy, right atrial enlargement    PE?    # Acute COPD exacerbation  # End stage COPD with h/o multiple intubation in the past  - c/w IV steriod solumedrol 60 mg daily (completed prednisone taper from home)  - follow up pulm, managed under step-down unit  - BIPAP   - duoneb q6h and prn    # Elevated troponin likely demand ischemia  - trend cardiac enzymes, EKG without ischemia changes    # Deconditioning   - PT/Rehab when off BIPAP    # Asthma  - c/w loratadine, montelukast    # DM  - monitor fsg, NPO while on BIPAP    # HTN, stable  - c/w cardizem    # GERD  - c/w Protonix    # [place] hernia, pulmonary nodules  # Hepatic Nodule likely hemangioma, 1.1 cm focal enhancement of the right hepatic lobe on CTA chest  - seen on CTA Chest prior admission  - outpatient f/u    # Code status: Full code  # DVT prophylaxis: on lovenox subcut CXR and CT Chest reviewed without focal opacity or effusion, hyperinflated lungs  EKG reviewed sinus tachy, right atrial enlargement    PHYSICAL EXAM:  General Appearance: In mild respiratory distress, remains on BIPAP, normal for age and gender. 	  Neck: Normal JVP, no bruit.   Eyes: Conjunctiva clear, Extra Ocular muscles intact. No scleral icterus.  ENMT: Moist oral mucosa. No oral lesion.  Cardiovascular: Regular rate and rhythm S1 S2, No JVD, No murmurs.  Respiratory: Poor air entry bilaterally. No wheezes, rales or rhonchi.  Psychiatry: Alert and oriented x 3, Mood & affect appropriate.  Gastrointestinal:  Soft, Non-tender, Non-distended.  Neurologic: Non-focal deficits.  Musculoskeletal/ extremities: Move all extremities, No clubbing, cyanosis or edema. s/p right TMA  Vascular: Peripheral pulses palpable on left foot.  Skin/Integumen: No rashes, No ecchymoses, No cyanosis.      # Acute COPD exacerbation  # End stage COPD with h/o multiple intubations in the past, recurrent admission   - no evidence of infection on CT chest, pt with poor nutrition, deconditioning, muscle wasting with fatigued respiratory effort  - c/w IV steriod solumedrol 60 mg daily (completed prednisone taper from home)  - follow up pulm, managed under step-down unit  - BIPAP for now on FiO2 35%  - duoneb q6h and prn  - spoke with pt's daughter who asked it patient could be candidate for lung transplant, will defer to pulmonologist regarding the candidacy and process involved, goals of care discussed again, pt is to remain full code at this time    # NPO status for past 3 days  - pt is previously assessed for TPN via PICC last admission but pt was able to be weaned off BIPAP  - at this time, pt has worsening respiratory effort, will reconsider alternative means of nutrition  - nutrition consult, IR for PICC if pt remains NPO  - IV hydration for now    # Elevated troponin likely demand ischemia  - trend cardiac enzymes, EKG without ischemia changes    # Deconditioning   - PT/Rehab when off BIPAP    # Asthma  - c/w loratadine, montelukast    # DM  - monitor fsg, NPO while on BIPAP    # HTN, stable  - c/w cardizem    # GERD  - c/w Protonix    # [place] hernia, pulmonary nodules  # Hepatic Nodule likely hemangioma, 1.1 cm focal enhancement of the right hepatic lobe on CTA chest  - seen on CTA Chest prior admission  - outpatient f/u    # Code status: Full code  # DVT prophylaxis: on lovenox subcut

## 2022-08-27 NOTE — H&P ADULT - ASSESSMENT
71yo man pmhx COPD on home O2 hx of intubations, recent d/c 8/19 presents w/ deconditioning and copd exacerbation    # AHcRF 2/2 copd exacerbation  - s/p solumedrol   - f/u procal  - CXR not suggestive of pna, wbc trending down from d/c, has been on prednisone  - doubt infxn, however considering high risk pt c/w IV abx until cx return  - c/w IV steroids  - BiPap prn and qHS, AM abg  - duonebs q6    # ?CAD  - former smoker, f/u a1c/lipids  - tachy to 130s --> improved  - f/u tte  - trops/bnp likely demand 2/2 tachy  - trend trops, am ekg    # DM  - f/u a1c/lipids  - c/w insulin reg from prior admission    # deconditioning  - f/u pt    DVT lovenox  Diet cc  Activity as tolerated

## 2022-08-27 NOTE — ED PROVIDER NOTE - OBJECTIVE STATEMENT
71  year old male patient, with past medical history of COPD on home o2, HTN, DM2  pt presents for eval of resp distress. pt was dc from hospital 8/19 for copd exacerbation. overnight, pt developed worsening SOB and chest tightness.  when ems arrived, pt was slightly hypoxemic and placed on cpap.

## 2022-08-28 NOTE — PHYSICAL THERAPY INITIAL EVALUATION ADULT - SPECIFY REASON(S)
Hold PT at this time secondary pt is currently on BIPAP, RN staff/Jose abbott. PT to f/u when appropriate.

## 2022-08-29 NOTE — CONSULT NOTE ADULT - ASSESSMENT
IMP:  - COPD exacerbation  - acute hypoxemic resp failure on BiPAP  - severe protein calorie malnutrition - + further 9% wt loss in the past month, + loss of LBM, more bed/chairbound than previously    pt known to us from prior admission  need to determine GOC, not just for the next few days, but ongoing  suggest placing small bore naso-duodenal tube, so that pt can be fed while on BiPAP - would start with Replete at 25 ml/h once tube tip beyond 2nd portion of duodenum  can allow po intake as tolerated, if he can be off BiPAP for ~ 45-60 min at a time (tube feeding does not preclude oral feeding)  parenteral nutrition is not indicated ans would not solve the loner term questions for this pt  pt and family need to decide if they want a GT placed - it so, would consider IR first
IMPRESSION:    Severe COPD now exacerbation/ recurrent admission/ ct chest noted  HO End stage COPD   Acute hypoxemic resp failure with hypercapnia       PLAN:      Continue Oxygen therapy to keep pulse ox 88 TO 92%. Avoid hyperoxia.   Nebs q4hr and PRN; Continue Symbicort 160 and Spiriva  NIV 4 hrs on/4hrs off and QHS; may attempt to obtain NIV on discharge given COPD and evidence of hypercapnea  solumedrol 60 q 12  DVT prophylaxis  SDU  Poor prognosis  GOC  
72M with PMH of COPD on home O2 with multiple hospitalizations, asthma, DM s/p R transmetatarsal amputation, here with worsening dyspnea after discharge on 8/19/22. Imaging here did not indicate infection, patient continued on steroid taper, nebs, and maintained on BiPAP. Nutrition consulted, recommended replete via small-bore naso-duodenal tube. Palliative care consulted for GOC.

## 2022-08-29 NOTE — CONSULT NOTE ADULT - PROBLEM SELECTOR RECOMMENDATION 5
- will follow  ______________  Janu Rosales MD  Palliative Medicine  [facility]   of Geriatric and Palliative Medicine  [phone]

## 2022-08-29 NOTE — CONSULT NOTE ADULT - SUBJECTIVE AND OBJECTIVE BOX
73yo man with hx of hx COPD on home O2 w/ multiple hospitalizations for exacerbation, w/ Intubation x 3 d in 2004, last d/c 8/19, Asthma, DM s/p right transmetatarsal amputation following infection.     BIB EMS alerted by daughters for SOB and wheezing since last night. Pt also complained of episode of CP radiating to back, resolved now. Pt endorses that this episode feels similar to prior admissions for exacerbations. Pt also endorses dark urine.  Of note pt last dose of prednisone from prior admission was today.  Since last d/c pt has remained bed ridden, unable to walk, PT was scheduled to come this Thursday but didn't show.    ED   /94  RR 30 100% cPaP TMax 101.5 F  WBC 17.89 (25.15 8/19) TBili 1.9 ALT 87 T 0.06   CT PE negative  CXR stable from prior  Pt given cefepime, levofloxacin, vanco, 2.8L LR, tylenol, solumedrol 125, placed on bipap  Hr improved to low 100s  Pt admitted to sdu for copd exacerbation (27 Aug 2022 18:48)    NUTRITION SUPPORT NOTE:  consult called for poor po intake and pt needing to be NPO while on BiPAP  Per d/w daughter on phone while at pt's bedside, he has been eating poorly for several days due to increased SOB.    REVIEW OF SYSTEMS:  Negative except as noted above.     PAST MEDICAL/SURGICAL HISTORY:   COPD (chronic obstructive pulmonary disease)  Essential hypertension  Dyslipidemia  S/P transmetatarsal amputation of foot, right    ALLERGIES:  Black pepper (Anaphylaxis)  No Known Drug Allergies    VITALS:  T(F): --  HR: 114 (08-29 @ 07:40) (106 - 114)  BP: 116/80 (08-29 @ 05:30) (98/55 - 116/80)  RR: 22 (08-29 @ 05:30) (22 - 25)  SpO2: 96% (08-29 @ 07:40) (96% - 99%)    HEIGHT/WEIGHT/BMI:   Height (cm): 162.6 (08-27), 162.6 (07-30)  Weight (kg): 58 (08-27), 63.5 (07-30)  BMI (kg/m2): 21.9 (08-27), 24 (08-27), 24 (07-30)    I/Os:       PHYSICAL EXAM:   GENERAL: NAD, well-groomed, well-developed, + facial, infraclavicular wasting, pt alert, verbal to daughter on phine - more SOB when trying to talk  HEENT: Moist mucous membranes, Good dentition, No lesions  ABDOMEN: Soft, Nontender, Nondistended  EXTREMITIES:  No clubbing, cyanosis, or edema, R amputation, ++ loss of LBM in calves most noticeable  SKIN: warm and well perfused; No obvious rashes or lesions  IV ACCESS:   ENTERAL ACCESS:     STANDING MEDICATIONS:   ALBUTerol    0.083%.. 2.5 milliGRAM(s) Nebulizer every 20 minutes  albuterol/ipratropium for Nebulization 3 milliLiter(s) Nebulizer every 6 hours  dextrose 5% + sodium chloride 0.9%. 1000 milliLiter(s) IV Continuous <Continuous>  enoxaparin Injectable 40 milliGRAM(s) SubCutaneous every 24 hours  insulin lispro (ADMELOG) corrective regimen sliding scale   SubCutaneous three times a day before meals  lactated ringers Bolus 1800 milliLiter(s) IV Bolus once  methylPREDNISolone sodium succinate Injectable 60 milliGRAM(s) IV Push every 12 hours      LABS:                         11.3   10.03 )-----------( 215      ( 29 Aug 2022 06:51 )             33.5     134<L>  |  93<L>  |  21<H>  ----------------------------<  115<H>          (08-28-22 @ 05:37)  4.7   |  29  |  0.5<L>    Ca    8.6          (08-28-22 @ 05:37)  Phos  4.2         (08-28-22 @ 05:37)  Mg     2.2         (08-28-22 @ 05:37)    TPro  5.9<L>  /  Alb  3.5  /  TBili  1.9<H>  /  DBili  x   /  AST  34  /  ALT  87<H>  /  AlkPhos  95       08-27-22 @ 12:53      Triglycerides, Serum: 81 mg/dL (08-28 @ 05:37)    Vitamin B12, Serum: 373 pg/mL (07-31 @ 05:52)    A1c: 6.5 % (07-31-22 @ 05:52)    Blood Glucose (Past 24 hours):  160 mg/dL (08-29 @ 07:13)  133 mg/dL (08-28 @ 16:30)  118 mg/dL (08-28 @ 12:02)      DIET:   Diet, Regular:   Consistent Carbohydrate Evening Snack (08-27-22 @ 19:39) [Active]    RADIOLOGY:   < from: CT Angio Chest PE Protocol w/ IV Cont (08.27.22 @ 16:03) >  Comparison 7/30/2022  The pulmonary arteries appearnormal in size and opacification with no   intraluminal filling defects seen.  The thoracic aorta shows no aneurysm or dissection.  There is no mediastinal or hilar lymphadenopathy or mass.  No parenchymal opacity pleural effusion or air leak is seen.  The bony structures are intact.  The soft tissues of the chest wall are intact.    IMPRESSION: No pulmonary embolus seen.    < end of copied text >  < from: Xray Chest 1 View-PORTABLE IMMEDIATE (08.27.22 @ 13:07) >  Support devices: None.    Cardiac/mediastinum/hilum: Unchanged.    Lung parenchyma/Pleura: Within normal limits.    < end of copied text >    
Patient is a 72y old  Male who presents with a chief complaint of SOB    71 yo man with hx of hx end stage COPD on home O2 w/ multiple hospitalizations for exacerbation, w/ Intubation x 3 d in 2004, last d/c 8/19, Asthma, DM s/p right transmetatarsal amputation following infection.     BIB EMS alerted by daughters for SOB and wheezing since last night. Pt also complained of episode of CP radiating to back, resolved now. Pt endorses that this episode feels similar to prior admissions for exacerbations. Pt also endorses dark urine.  Of note pt last dose of prednisone from prior admission was today.    Since last d/c pt has remained bed ridden, unable to walk    ED   /94  RR 30 100% cPaP TMax 101.5 F  WBC 17.89 (25.15 8/19) TBili 1.9 ALT 87 T 0.06   CT PE negative  CXR stable from prior    Pt given cefepime, levofloxacin, vanco, 2.8L LR, tylenol, solumedrol 125, placed on bipap    Hr improved to low 100s    Pt admitted to sdu for copd exacerbation, this am on BIPAP 35% ct angio noted      PAST MEDICAL & SURGICAL HISTORY:  COPD (chronic obstructive pulmonary disease)      Essential hypertension      Dyslipidemia      S/P transmetatarsal amputation of foot, right          SOCIAL HX:   Smoking  ex    FAMILY HISTORY:  No pertinent family history in first degree relatives        REVIEW OF SYSTEMS see hpi    Allergies    Black pepper (Anaphylaxis)  No Known Drug Allergies    Intolerances        ALBUTerol    0.083%.. 2.5 milliGRAM(s) Nebulizer every 20 minutes  albuterol/ipratropium for Nebulization 3 milliLiter(s) Nebulizer every 6 hours  dextrose 5% + sodium chloride 0.9%. 1000 milliLiter(s) IV Continuous <Continuous>  dextrose 5%. 1000 milliLiter(s) IV Continuous <Continuous>  dextrose 5%. 1000 milliLiter(s) IV Continuous <Continuous>  dextrose 50% Injectable 25 Gram(s) IV Push once  dextrose 50% Injectable 12.5 Gram(s) IV Push once  dextrose 50% Injectable 25 Gram(s) IV Push once  dextrose Oral Gel 15 Gram(s) Oral once PRN  enoxaparin Injectable 40 milliGRAM(s) SubCutaneous every 24 hours  glucagon  Injectable 1 milliGRAM(s) IntraMuscular once  insulin lispro (ADMELOG) corrective regimen sliding scale   SubCutaneous three times a day before meals  lactated ringers Bolus 1800 milliLiter(s) IV Bolus once  methylPREDNISolone sodium succinate Injectable 60 milliGRAM(s) IV Push every 24 hours  : Home Meds:      PHYSICAL EXAM    ICU Vital Signs Last 24 Hrs  T(C): 37.6 (28 Aug 2022 17:10), Max: 37.6 (28 Aug 2022 17:10)  T(F): 99.7 (28 Aug 2022 17:10), Max: 99.7 (28 Aug 2022 17:10)  HR: 106 (29 Aug 2022 05:30) (106 - 120)  BP: 116/80 (29 Aug 2022 05:30) (86/61 - 116/80)  RR: 22 (29 Aug 2022 05:30) (22 - 30)  SpO2: 96% (29 Aug 2022 05:30) (96% - 99%)    O2 Parameters below as of 29 Aug 2022 05:30  Patient On (Oxygen Delivery Method): BiPAP/CPAP    O2 Concentration (%): 35        General: ill looking, cachectic  Lungs: dec bs diffusely  Cardiovascular: Regular  Abdomen: Soft, Positive BS  Extremities: No clubbing  Skin: Warm  Neurological: Non focal         LABS:                          11.9   13.22 )-----------( 254      ( 28 Aug 2022 05:37 )             35.1                                               08-28    134<L>  |  93<L>  |  21<H>  ----------------------------<  115<H>  4.7   |  29  |  0.5<L>    Ca    8.6      28 Aug 2022 05:37  Phos  4.2     08-28  Mg     2.2     08-28    TPro  5.9<L>  /  Alb  3.5  /  TBili  1.9<H>  /  DBili  x   /  AST  34  /  ALT  87<H>  /  AlkPhos  95  08-27                                             Urinalysis Basic - ( 27 Aug 2022 17:50 )    Color: Yellow / Appearance: Clear / SG: >1.050 / pH: x  Gluc: x / Ketone: Moderate  / Bili: Negative / Urobili: <2 mg/dL   Blood: x / Protein: 30 mg/dL / Nitrite: Negative   Leuk Esterase: Negative / RBC: 4-6 /HPF / WBC 1 /HPF   Sq Epi: x / Non Sq Epi: 1 /HPF / Bacteria: Negative        CARDIAC MARKERS ( 28 Aug 2022 05:37 )  x     / 0.03 ng/mL / x     / x     / x      CARDIAC MARKERS ( 27 Aug 2022 21:04 )  x     / 0.04 ng/mL / 50 U/L / x     / 6.4 ng/mL  CARDIAC MARKERS ( 27 Aug 2022 12:53 )  x     / 0.06 ng/mL / x     / x     / x                                                LIVER FUNCTIONS - ( 27 Aug 2022 12:53 )  Alb: 3.5 g/dL / Pro: 5.9 g/dL / ALK PHOS: 95 U/L / ALT: 87 U/L / AST: 34 U/L / GGT: x                                                                                                                                      MEDICATIONS  (STANDING):  ALBUTerol    0.083%.. 2.5 milliGRAM(s) Nebulizer every 20 minutes  albuterol/ipratropium for Nebulization 3 milliLiter(s) Nebulizer every 6 hours  dextrose 5% + sodium chloride 0.9%. 1000 milliLiter(s) (75 mL/Hr) IV Continuous <Continuous>  dextrose 5%. 1000 milliLiter(s) (100 mL/Hr) IV Continuous <Continuous>  dextrose 5%. 1000 milliLiter(s) (50 mL/Hr) IV Continuous <Continuous>  dextrose 50% Injectable 25 Gram(s) IV Push once  dextrose 50% Injectable 12.5 Gram(s) IV Push once  dextrose 50% Injectable 25 Gram(s) IV Push once  enoxaparin Injectable 40 milliGRAM(s) SubCutaneous every 24 hours  glucagon  Injectable 1 milliGRAM(s) IntraMuscular once  insulin lispro (ADMELOG) corrective regimen sliding scale   SubCutaneous three times a day before meals  lactated ringers Bolus 1800 milliLiter(s) IV Bolus once  methylPREDNISolone sodium succinate Injectable 60 milliGRAM(s) IV Push every 24 hours    MEDICATIONS  (PRN):  dextrose Oral Gel 15 Gram(s) Oral once PRN Blood Glucose LESS THAN 70 milliGRAM(s)/deciliter        
HPI: 72M with PMH of COPD on home O2 with multiple hospitalizations, asthma, DM s/p R transmetatarsal amputation, here with worsening dyspnea after discharge on 8/19/22. Imaging here did not indicate infection, patient continued on steroid taper, nebs, and maintained on BiPAP. Nutrition consulted, recommended replete via small-bore naso-duodenal tube. Palliative care consulted for GOC.    PERTINENT PM/SXH:   COPD (chronic obstructive pulmonary disease)    Essential hypertension    Dyslipidemia      S/P transmetatarsal amputation of foot, right    FAMILY HISTORY:  No cardiovascular or pulmonary family history         ITEMS NOT CHECKED ARE NOT PRESENT    SOCIAL HISTORY:   Significant other/partner[ ]  Children[ X]  Buddhist/Spirituality:  Substance hx:  [ ]   Tobacco hx:  [ ]   Alcohol hx: [ ]   Living Situation: [X ]Home  [ ]Long term care  [ ]Rehab [ ]Other  Home Services: [ ] HHA [ ] Visting RN [ ] Hospice  Occupation:  Home Opioid hx:  [ ] Y [ ] N [ ] I-Stop Reference No:    ADVANCE DIRECTIVES:    Full Code  MOLST  [ ]  Living Will  [ ]   DECISION MAKER(s):  [ ] Health Care Proxy(s)  [ ] Surrogate(s)  [ ] Guardian           Name(s): Phone Number(s):    BASELINE (I)ADL(s) (prior to admission):  [place]: [ ]Total  [ ] Moderate [ ]Dependent  Palliative Performance Status Version 2:         %    http://npcrc.org/files/news/palliative_performance_scale_ppsv2.pdf    Allergies    Black pepper (Anaphylaxis)  No Known Drug Allergies    Intolerances    MEDICATIONS  (STANDING):  ALBUTerol    0.083% 2.5 milliGRAM(s) Nebulizer every 4 hours  ALBUTerol    0.083%.. 2.5 milliGRAM(s) Nebulizer every 20 minutes  albuterol/ipratropium for Nebulization 3 milliLiter(s) Nebulizer every 6 hours  dextrose 5% + sodium chloride 0.9%. 1000 milliLiter(s) (75 mL/Hr) IV Continuous <Continuous>  dextrose 5%. 1000 milliLiter(s) (100 mL/Hr) IV Continuous <Continuous>  dextrose 5%. 1000 milliLiter(s) (50 mL/Hr) IV Continuous <Continuous>  dextrose 50% Injectable 25 Gram(s) IV Push once  dextrose 50% Injectable 12.5 Gram(s) IV Push once  dextrose 50% Injectable 25 Gram(s) IV Push once  enoxaparin Injectable 40 milliGRAM(s) SubCutaneous every 24 hours  glucagon  Injectable 1 milliGRAM(s) IntraMuscular once  insulin lispro (ADMELOG) corrective regimen sliding scale   SubCutaneous three times a day before meals  lactated ringers Bolus 1800 milliLiter(s) IV Bolus once  methylPREDNISolone sodium succinate Injectable 60 milliGRAM(s) IV Push every 12 hours    MEDICATIONS  (PRN):  dextrose Oral Gel 15 Gram(s) Oral once PRN Blood Glucose LESS THAN 70 milliGRAM(s)/deciliter      PRESENT SYMPTOMS: [ ]Unable to obtain due to poor mentation   Source if other than patient:  [X ]Family   [X ]Team     Pain: [ ]yes [X ]no  QOL impact -   Location -                    Aggravating factors -  Quality -  Radiation -  Timing-  Severity (0-10 scale):  Minimal acceptable level (0-10 scale):     CPOT:    https://www.[facility].org/getattachment/[email or web address]-[email or web address]-[email or web address]/Critical-Care-Pain-Observation-Tool-(CPOT)      PAIN AD Score: 1    http://geriatrictoolkit.[email or web address]/painad.pdf (press ctrl +  left click to view)    Dyspnea:                           [ ]Mild [ ]Moderate [ ]Severe  Anxiety:                             [ ]Mild [ ]Moderate [ ]Severe  Fatigue:                             [ ]Mild [ ]Moderate [ ]Severe  Nausea:                             [ ]Mild [ ]Moderate [ ]Severe  Loss of appetite:              [ ]Mild [ ]Moderate [ ]Severe  Constipation:                    [ ]Mild [ ]Moderate [ ]Severe    Other Symptoms:  [ ]All other review of systems negative     Palliative Performance Status Version 2:         %    http://npcrc.org/files/news/palliative_performance_scale_ppsv2.pdf    PHYSICAL EXAM:  Vital Signs Last 24 Hrs  T(C): 37.6 (28 Aug 2022 17:10), Max: 37.6 (28 Aug 2022 17:10)  T(F): 99.7 (28 Aug 2022 17:10), Max: 99.7 (28 Aug 2022 17:10)  HR: 116 (29 Aug 2022 11:25) (106 - 120)  BP: 134/84 (29 Aug 2022 11:25) (98/55 - 134/84)  BP(mean): --  RR: 21 (29 Aug 2022 11:25) (21 - 25)  SpO2: 96% (29 Aug 2022 11:25) (96% - 99%)    Parameters below as of 29 Aug 2022 11:25  Patient On (Oxygen Delivery Method): BiPAP/CPAP     I&O's Summary    GENERAL:  [ X]Alert  [ ]Oriented x   [ ]Lethargic  [ ]Cachexia  [ ]Unarousable  [ ]Verbal  [ ]Non-Verbal  Behavioral:   [ ] Anxiety  [ ] Delirium [ ] Agitation [X ] Calm [ ] Other  HEENT:  [ ]Normal   [ ]Dry mouth   [ ]ET Tube/Trach  [ ]Oral lesions  PULMONARY:   [ ]Clear [ ]Tachypnea  [ ]Audible excessive secretions [X ] No labored breathing  [ ]Rhonchi        [ ]Right [ ]Left [ ]Bilateral  [ ]Crackles        [ ]Right [ ]Left [ ]Bilateral  [ ]Wheezing     [ ]Right [ ]Left [ ]Bilateral  [ ]Diminished breath sounds [ ]right [ ]left [ ]bilateral  CARDIOVASCULAR:    [ ]Regular [ ]Irregular [ ]Tachy  [ ]Arnaldo [ ]Murmur [ ]Other  GASTROINTESTINAL:  [ ]Soft  [ ]Distended  [X ] Not distended [ ]+BS  [ ]Non tender [ ]Tender  [ ]PEG [ ]OGT/ NGT  Last BM:   GENITOURINARY:  [X ]Normal [ ] Incontinent   [ ]Oliguria/Anuria   [ ]Gutierrez  MUSCULOSKELETAL:   [ ]Normal   [ ]Weakness  [ ]Bed/Wheelchair bound [ ]Edema  NEUROLOGIC:   [X ]No focal deficits  [ ]Cognitive impairment  [ ]Dysphagia [ ]Dysarthria [ ]Paresis [ ]Other   SKIN:   [ ]Normal   [ X] Nonjaundiced [ ]Rash  [ ]Pressure ulcer(s)       Present on admission [ ]y [ ]n  LABS:                        11.3   10.03 )-----------( 215      ( 29 Aug 2022 06:51 )             33.5   08-29    138  |  99  |  24<H>  ----------------------------<  175<H>  4.2   |  27  |  <0.5<L>    Ca    8.6      29 Aug 2022 06:51  Phos  3.0     08-29  Mg     2.1     08-29    TPro  4.9<L>  /  Alb  2.8<L>  /  TBili  1.1  /  DBili  x   /  AST  27  /  ALT  59<H>  /  AlkPhos  71  08-29      Urinalysis Basic - ( 27 Aug 2022 17:50 )    Color: Yellow / Appearance: Clear / SG: >1.050 / pH: x  Gluc: x / Ketone: Moderate  / Bili: Negative / Urobili: <2 mg/dL   Blood: x / Protein: 30 mg/dL / Nitrite: Negative   Leuk Esterase: Negative / RBC: 4-6 /HPF / WBC 1 /HPF   Sq Epi: x / Non Sq Epi: 1 /HPF / Bacteria: Negative      RADIOLOGY & ADDITIONAL STUDIES:  8/27/22 CTA chest No pulmonary embolus seen.  PROTEIN CALORIE MALNUTRITION PRESENT: [ ]mild [ ]moderate [ ]severe [ ]underweight [ ]morbid obesity  https://www.andeal.org/vault/2440/web/files/ONC/Table_Clinical%20Characteristics%20to%20Document%20Malnutrition-White%20JV%20et%20al%739369.pdf    Height (cm): 162.6 (08-27-22 @ 12:19), 162.6 (07-30-22 @ 11:09)  Weight (kg): 58 (08-27-22 @ 12:46), 63.5 (07-30-22 @ 11:09)  BMI (kg/m2): 21.9 (08-27-22 @ 12:46), 24 (08-27-22 @ 12:19), 24 (07-30-22 @ 11:09)    [ ]PPSV2 < or = to 30% [ ]significant weight loss  [ ]poor nutritional intake  [ ]anasarca      [ ]Artificial Nutrition      REFERRALS:   [ ]Chaplaincy  [ ]Hospice  [ ]Child Life  [ ]Social Work  [ ]Case management [ ]Holistic Therapy     Goals of Care Document:     ______________  Jaun Rosales MD  Palliative Medicine  [facility]   of Geriatric and Palliative Medicine  [phone]

## 2022-08-29 NOTE — PROGRESS NOTE ADULT - SUBJECTIVE AND OBJECTIVE BOX
NILE VALDEZ  72y Male    Patient is a 72y old  Male who presents with a chief complaint of 72y    INTERVAL HPI/OVERNIGHT EVENTS:    ROS: Pt is seen and examined at bedside. Pt states that his SOB is not better and still complains of weakness and chest tiredness. Otherwise, no fever reported.    Overnight events: No acute events overnight.    Vital Signs Last 24 Hrs  T(C): --  T(F): --  HR: 118 (29 Aug 2022 17:48) (106 - 118)  BP: 105/67 (29 Aug 2022 17:48) (98/55 - 134/84)  BP(mean): --  RR: 19 (29 Aug 2022 17:48) (19 - 25)  SpO2: 96% (29 Aug 2022 17:48) (94% - 99%)    Parameters below as of 29 Aug 2022 17:48  Patient On (Oxygen Delivery Method): BiPAP/CPAP        Black pepper (Anaphylaxis)  No Known Drug Allergies      MEDICATIONS  (STANDING):  ALBUTerol    0.083% 2.5 milliGRAM(s) Nebulizer every 4 hours  ALBUTerol    0.083%.. 2.5 milliGRAM(s) Nebulizer every 20 minutes  albuterol/ipratropium for Nebulization 3 milliLiter(s) Nebulizer every 6 hours  dextrose 5% + sodium chloride 0.9%. 1000 milliLiter(s) (75 mL/Hr) IV Continuous <Continuous>  dextrose 5%. 1000 milliLiter(s) (100 mL/Hr) IV Continuous <Continuous>  dextrose 5%. 1000 milliLiter(s) (50 mL/Hr) IV Continuous <Continuous>  dextrose 50% Injectable 25 Gram(s) IV Push once  dextrose 50% Injectable 12.5 Gram(s) IV Push once  dextrose 50% Injectable 25 Gram(s) IV Push once  enoxaparin Injectable 40 milliGRAM(s) SubCutaneous every 24 hours  glucagon  Injectable 1 milliGRAM(s) IntraMuscular once  insulin lispro (ADMELOG) corrective regimen sliding scale   SubCutaneous three times a day before meals  lactated ringers Bolus 1800 milliLiter(s) IV Bolus once  methylPREDNISolone sodium succinate Injectable 60 milliGRAM(s) IV Push every 12 hours    MEDICATIONS  (PRN):  dextrose Oral Gel 15 Gram(s) Oral once PRN Blood Glucose LESS THAN 70 milliGRAM(s)/deciliter  morphine  - Injectable 4 milliGRAM(s) IV Push every 4 hours PRN pain (4-10), dyspnea      PHYSICAL EXAM:  General Appearance: In mild respiratory distress, remains on BIPAP, normal for age and gender. 	  Neck: Normal JVP, no bruit.   Eyes: Conjunctiva clear, Extra Ocular muscles intact. No scleral icterus.  ENMT: Moist oral mucosa. No oral lesion.  Cardiovascular: Regular rate and rhythm S1 S2, No JVD, No murmurs.  Respiratory: Poor air entry bilaterally. No wheezes, rales or rhonchi.  Psychiatry: Alert and oriented x 3, Mood & affect appropriate.  Gastrointestinal:  Soft, Non-tender, Non-distended.  Neurologic: Non-focal deficits.  Musculoskeletal/ extremities: Move all extremities, No clubbing, cyanosis or edema. s/p right TMA  Vascular: Peripheral pulses palpable on left foot.  Skin/Integumen: No rashes, No ecchymoses, No cyanosis.    LABS:                        11.3   10.03 )-----------( 215      ( 29 Aug 2022 06:51 )             33.5     08-29    138  |  99  |  24<H>  ----------------------------<  175<H>  4.2   |  27  |  <0.5<L>    Ca    8.6      29 Aug 2022 06:51  Phos  3.0     08-29  Mg     2.1     08-29    TPro  4.9<L>  /  Alb  2.8<L>  /  TBili  1.1  /  DBili  x   /  AST  27  /  ALT  59<H>  /  AlkPhos  71  08-29          RADIOLOGY & ADDITIONAL TESTS:

## 2022-08-29 NOTE — CONSULT NOTE ADULT - PROBLEM SELECTOR RECOMMENDATION 4
- d/w daughter, open to family meeting to discuss GOC, including nutritional GOC  - briefly discussed PEG vs NGT vs TPN, and how TPN would offer greater risk than benefit; also discussed generally ventilatory support and long-term trach options if patient not stable on BiPAP  - she would like to discuss GOC further with pulmonary and our teams  - family meeting at 12PM tomorrow, d/w pulm

## 2022-08-29 NOTE — PHYSICAL THERAPY INITIAL EVALUATION ADULT - GENERAL OBSERVATIONS, REHAB EVAL
Chart reviewed and case discussed with AVILA Richardson. Per RN pt continues to be on BiPAP at this time. Hold PT IE till pt cleared to participate in PT session. RN notified.

## 2022-08-30 NOTE — ED ADULT NURSE REASSESSMENT NOTE - NS ED NURSE REASSESS COMMENT FT1
Pt family stated with patient they no longer want regularly scheduled blood draws, but are OK with emergency draws.

## 2022-08-30 NOTE — PROGRESS NOTE ADULT - CONVERSATION DETAILS
Spoke with family at bedside. Poor prognosis explained. Discussed the likely need for intubation given his poor status but explained that he is very weak and may not come off intubation unless it is done palliatively. Explained that even with maximum medical care, he is unlikely to fully recover and will continue to decline. Discussed option for DNR/DNI and option for CMO/bipap removal.     For now, family decided on DNR/DNI and will want to start Comfort care likely tomorrow AM. They first need to speak with family. Will FU re: GO tomorrow. They are aware he could pass away overnight. Spoke with family at bedside. Poor prognosis explained. Discussed the likely need for intubation given his poor status but explained that he is very weak and may not come off intubation unless it is done palliatively. Explained that even with maximum medical care, he is unlikely to fully recover and will continue to decline. Discussed option for DNR/DNI and option for CMO/bipap removal.     For now, family decided on DNR/DNI and will want to start comfort care likely tomorrow AM. They first need to speak with family. Will FU re: GO tomorrow. They are aware he could pass away overnight.

## 2022-08-30 NOTE — PROGRESS NOTE ADULT - SUBJECTIVE AND OBJECTIVE BOX
HPI:    73yo man with hx of hx COPD on home O2 w/ multiple hospitalizations for exacerbation, w/ Intubation x 3 d in 2004, last d/c 8/19, Asthma, DM s/p right transmetatarsal amputation following infection.     BIB EMS alerted by daughters for SOB and wheezing since last night. Pt also complained of episode of CP radiating to back, resolved now. Pt endorses that this episode feels similar to prior admissions for exacerbations. Pt also endorses dark urine.  Of note pt last dose of prednisone from prior admission was today.    Since last d/c pt has remained bed ridden, unable to walk, PT was scheduled to come this Thursday but didn't show.    ED   /94  RR 30 100% cPaP TMax 101.5 F  WBC 17.89 (25.15 8/19) TBili 1.9 ALT 87 T 0.06   CT PE negative  CXR stable from prior    Pt given cefepime, levofloxacin, vanco, 2.8L LR, tylenol, solumedrol 125, placed on bipap    Hr improved to low 100s    Pt admitted to sdu for copd exacerbation (27 Aug 2022 18:48)     INTERVAL EVENTS:  8/30: patient appears very weak and ill with poor mental status on bipap. family at bedside for meeting.     ADVANCE DIRECTIVES:    MOLST  [ ]  Living Will  [ ]   DECISION MAKER(s):  [ ] Health Care Proxy(s)  [ X] Surrogate(s)  [ ] Guardian           Name(s): Phone Number(s): Oma Chi and Jane     BASELINE (I)ADL(s) (prior to admission):  [place]: [ ]Total  [ X] Moderate [ ]Dependent  Palliative Performance Status Version 2:      30   %    http://npcrc.org/files/news/palliative_performance_scale_ppsv2.pdf    Allergies    Black pepper (Anaphylaxis)  No Known Drug Allergies    Intolerances    MEDICATIONS  (STANDING):  ALBUTerol    0.083% 2.5 milliGRAM(s) Nebulizer every 4 hours  ALBUTerol    0.083%.. 2.5 milliGRAM(s) Nebulizer every 20 minutes  albuterol/ipratropium for Nebulization 3 milliLiter(s) Nebulizer every 6 hours  albuterol/ipratropium for Nebulization. 3 milliLiter(s) Nebulizer once  dextrose 5% + sodium chloride 0.9%. 1000 milliLiter(s) (75 mL/Hr) IV Continuous <Continuous>  dextrose 5%. 1000 milliLiter(s) (100 mL/Hr) IV Continuous <Continuous>  dextrose 5%. 1000 milliLiter(s) (50 mL/Hr) IV Continuous <Continuous>  dextrose 50% Injectable 25 Gram(s) IV Push once  dextrose 50% Injectable 12.5 Gram(s) IV Push once  dextrose 50% Injectable 25 Gram(s) IV Push once  enoxaparin Injectable 40 milliGRAM(s) SubCutaneous every 24 hours  glucagon  Injectable 1 milliGRAM(s) IntraMuscular once  insulin lispro (ADMELOG) corrective regimen sliding scale   SubCutaneous three times a day before meals  lactated ringers Bolus 1800 milliLiter(s) IV Bolus once  methylPREDNISolone sodium succinate Injectable 60 milliGRAM(s) IV Push every 12 hours    MEDICATIONS  (PRN):  dextrose Oral Gel 15 Gram(s) Oral once PRN Blood Glucose LESS THAN 70 milliGRAM(s)/deciliter  morphine  - Injectable 4 milliGRAM(s) IV Push every 4 hours PRN pain (4-10), dyspnea    PRESENT SYMPTOMS: [X ]Unable to obtain due to poor mentation   Source if other than patient:  [ ]Family   [ ]Team     Pain: [ ]yes [ X]no  QOL impact -   Location -                    Aggravating factors -  Quality -  Radiation -  Timing-  Severity (0-10 scale):  Minimal acceptable level (0-10 scale):     CPOT:  4  https://www.[facility].org/getattachment/[email or web address]/Critical-Care-Pain-Observation-Tool-(CPOT)    Dyspnea:                           [ ]Mild [X ]Moderate [ ]Severe - observed  Anxiety:                             [ ]Mild [ ]Moderate [ ]Severe  Fatigue:                             [ ]Mild [ ]Moderate [ ]Severe  Nausea:                             [ ]Mild [ ]Moderate [ ]Severe  Loss of appetite:              [ ]Mild [ ]Moderate [ ]Severe  Constipation:                    [ ]Mild [ ]Moderate [ ]Severe    Other Symptoms:  [ X]All other review of systems negative     Palliative Performance Status Version 2:        10 %    http://[email or web address]/files/news/palliative_performance_scale_ppsv2.pdf    PHYSICAL EXAM:  Vital Signs Last 24 Hrs  T(C): 36.9 (30 Aug 2022 06:36), Max: 36.9 (30 Aug 2022 06:36)  T(F): 98.4 (30 Aug 2022 06:36), Max: 98.4 (30 Aug 2022 06:36)  HR: 120 (30 Aug 2022 10:00) (111 - 130)  BP: 90/57 (30 Aug 2022 10:00) (90/57 - 125/70)  BP(mean): 67 (30 Aug 2022 10:00) (67 - 92)  RR: 23 (30 Aug 2022 10:00) (19 - 37)  SpO2: 94% (30 Aug 2022 10:00) (94% - 97%)    GENERAL:  [ ]Alert  [ ]Oriented x   [ X]Lethargic  [ X]Cachexia  [ ]Unarousable  [ ]Verbal  [ ]Non-Verbal  Behavioral:   [ ] Anxiety  [ ] Delirium [ ] Agitation [ X] Calm   HEENT:  [X ]Normal   [ ]Dry mouth   [ ]ET Tube/Trach  [ ]Oral lesions  PULMONARY:   [ ]Clear [ X]Tachypnea  [ ]Audible excessive secretions   On Bipap, + accessory muscle usage   CARDIOVASCULAR:    [X ]Regular [ ]Irregular [ ]Tachy  [ ]Arnaldo [ ]Murmur [ ]Other  GASTROINTESTINAL:  [ X]Soft  [ ]Distended   [ ]+BS  [ ]Non tender [ ]Tender  [ ]PEG [ ]OGT/ NGT  Last BM:   GENITOURINARY:  [X ]Normal [ ] Incontinent   [ ]Oliguria/Anuria   [ ]Gutierrez  MUSCULOSKELETAL:   [ ]Normal   [ ]Weakness  [ X]Bed/Wheelchair bound [ ]Edema  NEUROLOGIC:   [ ]No focal deficits  [X ]Cognitive impairment  [ ]Dysphagia [ ]Dysarthria [ ]Paresis [ ]Other   SKIN:   [ X]Normal    [ ]Rash  [ ]Pressure ulcer(s)       Present on admission [ ]y [ ]n    CRITICAL CARE:  [ ] Shock Present  [ ]Septic [ ]Cardiogenic [ ]Neurologic [ ]Hypovolemic  [ ]  Vasopressors [ ]  Inotropes   [ ]Respiratory failure present [ ]Mechanical ventilation [X ]Non-invasive ventilatory support [ ]High flow  [ ]Acute  [ ]Chronic [ ]Hypoxic  [ ]Hypercarbic [ ]Other  [ ]Other organ failure     LABS:                        11.3   10.03 )-----------( 215      ( 29 Aug 2022 06:51 )             33.5   08-29    138  |  99  |  24<H>  ----------------------------<  175<H>  4.2   |  27  |  <0.5<L>    Ca    8.6      29 Aug 2022 06:51  Phos  3.0     08-29  Mg     2.1     08-29    TPro  4.9<L>  /  Alb  2.8<L>  /  TBili  1.1  /  DBili  x   /  AST  27  /  ALT  59<H>  /  AlkPhos  71  08-29        RADIOLOGY & ADDITIONAL STUDIES:    < from: Xray Chest 1 View-PORTABLE IMMEDIATE (08.27.22 @ 13:07) >    Impression:    No radiographic evidence of acute cardiopulmonary disease.        --- End of Report ---    < end of copied text >           HPI:    73yo man with hx of hx COPD on home O2 w/ multiple hospitalizations for exacerbation, w/ Intubation x 3 d in 2004, last d/c 8/19, Asthma, DM s/p right transmetatarsal amputation following infection.     BIB EMS alerted by daughters for SOB and wheezing since last night. Pt also complained of episode of CP radiating to back, resolved now. Pt endorses that this episode feels similar to prior admissions for exacerbations. Pt also endorses dark urine.  Of note pt last dose of prednisone from prior admission was today.    Since last d/c pt has remained bed ridden, unable to walk, PT was scheduled to come this Thursday but didn't show.    ED   /94  RR 30 100% cPaP TMax 101.5 F  WBC 17.89 (25.15 8/19) TBili 1.9 ALT 87 T 0.06   CT PE negative  CXR stable from prior    Pt given cefepime, levofloxacin, vanco, 2.8L LR, tylenol, solumedrol 125, placed on bipap    Hr improved to low 100s    Pt admitted to sdu for copd exacerbation (27 Aug 2022 18:48)     INTERVAL EVENTS:  8/30: patient appears very weak and ill with poor mental status on bipap. family at bedside for meeting.     ADVANCE DIRECTIVES:    MOLST  [X]  Living Will  [ ]   DECISION MAKER(s):  [ ] Health Care Proxy(s)  [ X] Surrogate(s)  [ ] Guardian           Name(s): Phone Number(s): Oma Chi and Jane     BASELINE (I)ADL(s) (prior to admission):  [place]: [ ]Total  [ X] Moderate [ ]Dependent  Palliative Performance Status Version 2:      30   %    http://npcrc.org/files/news/palliative_performance_scale_ppsv2.pdf    Allergies    Black pepper (Anaphylaxis)  No Known Drug Allergies    Intolerances    MEDICATIONS  (STANDING):  ALBUTerol    0.083% 2.5 milliGRAM(s) Nebulizer every 4 hours  ALBUTerol    0.083%.. 2.5 milliGRAM(s) Nebulizer every 20 minutes  albuterol/ipratropium for Nebulization 3 milliLiter(s) Nebulizer every 6 hours  albuterol/ipratropium for Nebulization. 3 milliLiter(s) Nebulizer once  dextrose 5% + sodium chloride 0.9%. 1000 milliLiter(s) (75 mL/Hr) IV Continuous <Continuous>  dextrose 5%. 1000 milliLiter(s) (100 mL/Hr) IV Continuous <Continuous>  dextrose 5%. 1000 milliLiter(s) (50 mL/Hr) IV Continuous <Continuous>  dextrose 50% Injectable 25 Gram(s) IV Push once  dextrose 50% Injectable 12.5 Gram(s) IV Push once  dextrose 50% Injectable 25 Gram(s) IV Push once  enoxaparin Injectable 40 milliGRAM(s) SubCutaneous every 24 hours  glucagon  Injectable 1 milliGRAM(s) IntraMuscular once  insulin lispro (ADMELOG) corrective regimen sliding scale   SubCutaneous three times a day before meals  lactated ringers Bolus 1800 milliLiter(s) IV Bolus once  methylPREDNISolone sodium succinate Injectable 60 milliGRAM(s) IV Push every 12 hours    MEDICATIONS  (PRN):  dextrose Oral Gel 15 Gram(s) Oral once PRN Blood Glucose LESS THAN 70 milliGRAM(s)/deciliter  morphine  - Injectable 4 milliGRAM(s) IV Push every 4 hours PRN pain (4-10), dyspnea    PRESENT SYMPTOMS: [X ]Unable to obtain due to poor mentation   Source if other than patient:  [ ]Family   [ ]Team     Pain: [ ]yes [ X]no  QOL impact -   Location -                    Aggravating factors -  Quality -  Radiation -  Timing-  Severity (0-10 scale):  Minimal acceptable level (0-10 scale):     CPOT:  4  https://www.[facility].org/getattachment/[email or web address]/Critical-Care-Pain-Observation-Tool-(CPOT)    Dyspnea:                           [ ]Mild [X ]Moderate [ ]Severe - observed  Anxiety:                             [ ]Mild [ ]Moderate [ ]Severe  Fatigue:                             [ ]Mild [ ]Moderate [ ]Severe  Nausea:                             [ ]Mild [ ]Moderate [ ]Severe  Loss of appetite:              [ ]Mild [ ]Moderate [ ]Severe  Constipation:                    [ ]Mild [ ]Moderate [ ]Severe    Other Symptoms:  [ X]All other review of systems negative     Palliative Performance Status Version 2:        10 %    http://[email or web address]/files/news/palliative_performance_scale_[email or web address].pdf    PHYSICAL EXAM:  Vital Signs Last 24 Hrs  T(C): 36.9 (30 Aug 2022 06:36), Max: 36.9 (30 Aug 2022 06:36)  T(F): 98.4 (30 Aug 2022 06:36), Max: 98.4 (30 Aug 2022 06:36)  HR: 120 (30 Aug 2022 10:00) (111 - 130)  BP: 90/57 (30 Aug 2022 10:00) (90/57 - 125/70)  BP(mean): 67 (30 Aug 2022 10:00) (67 - 92)  RR: 23 (30 Aug 2022 10:00) (19 - 37)  SpO2: 94% (30 Aug 2022 10:00) (94% - 97%)    GENERAL:  [ ]Alert  [ ]Oriented x   [ X]Lethargic  [ X]Cachexia  [ ]Unarousable  [ ]Verbal  [ ]Non-Verbal  Behavioral:   [ ] Anxiety  [ ] Delirium [ ] Agitation [ X] Calm   HEENT:  [X ]Normal   [ ]Dry mouth   [ ]ET Tube/Trach  [ ]Oral lesions  PULMONARY:   [ ]Clear [ X]Tachypnea  [ ]Audible excessive secretions   On Bipap, + accessory muscle usage   CARDIOVASCULAR:    [X ]Regular [ ]Irregular [ ]Tachy  [ ]Arnaldo [ ]Murmur [ ]Other  GASTROINTESTINAL:  [ X]Soft  [ ]Distended   [ ]+BS  [ ]Non tender [ ]Tender  [ ]PEG [ ]OGT/ NGT  Last BM:   GENITOURINARY:  [X ]Normal [ ] Incontinent   [ ]Oliguria/Anuria   [ ]Gutierrez  MUSCULOSKELETAL:   [ ]Normal   [ ]Weakness  [ X]Bed/Wheelchair bound [ ]Edema  NEUROLOGIC:   [ ]No focal deficits  [X ]Cognitive impairment  [ ]Dysphagia [ ]Dysarthria [ ]Paresis [ ]Other   SKIN:   [ X]Normal    [ ]Rash  [ ]Pressure ulcer(s)       Present on admission [ ]y [ ]n    CRITICAL CARE:  [ ] Shock Present  [ ]Septic [ ]Cardiogenic [ ]Neurologic [ ]Hypovolemic  [ ]  Vasopressors [ ]  Inotropes   [ ]Respiratory failure present [ ]Mechanical ventilation [X ]Non-invasive ventilatory support [ ]High flow  [ ]Acute  [ ]Chronic [ ]Hypoxic  [ ]Hypercarbic [ ]Other  [ ]Other organ failure     LABS:                          11.3   10.03 )-----------( 215      ( 29 Aug 2022 06:51 )             33.5     08-29    138  |  99  |  24<H>  ----------------------------<  175<H>  4.2   |  27  |  <0.5<L>    Ca    8.6      29 Aug 2022 06:51  Phos  3.0     08-29  Mg     2.1     08-29        RADIOLOGY & ADDITIONAL STUDIES:    < from: Xray Chest 1 View-PORTABLE IMMEDIATE (08.27.22 @ 13:07) >    Impression:    No radiographic evidence of acute cardiopulmonary disease.        --- End of Report ---    < end of copied text >

## 2022-08-30 NOTE — PROGRESS NOTE ADULT - SUBJECTIVE AND OBJECTIVE BOX
Over Night Events: events noted, bipap, palliative reviewed    PHYSICAL EXAM    ICU Vital Signs Last 24 Hrs  T(C): 36.9 (30 Aug 2022 06:36), Max: 36.9 (30 Aug 2022 06:36)  T(F): 98.4 (30 Aug 2022 06:36), Max: 98.4 (30 Aug 2022 06:36)  HR: 110 (30 Aug 2022 17:00) (110 - 130)  BP: 97/57 (30 Aug 2022 15:00) (89/51 - 148/68)  BP(mean): 71 (30 Aug 2022 15:00) (65 - 98)  RR: 17 (30 Aug 2022 17:00) (17 - 40)  SpO2: 93% (30 Aug 2022 17:00) (93% - 97%)    O2 Parameters below as of 30 Aug 2022 09:00  Patient On (Oxygen Delivery Method): BiPAP/CPAP            General: ill looking, cachectic  Lungs: dec bs both bases  Cardiovascular: Regular   Abdomen: Soft, Positive BS  Extremities: No clubbing   Skin: Warm  Neurological: Non focal       08-29-22 @ 07:01  -  08-30-22 @ 07:00  --------------------------------------------------------  IN:    dextrose 5% + sodium chloride 0.9%: 75 mL  Total IN: 75 mL    OUT:  Total OUT: 0 mL    Total NET: 75 mL      08-30-22 @ 07:01  -  08-30-22 @ 18:49  --------------------------------------------------------  IN:    dextrose 5% + sodium chloride 0.9%: 150 mL  Total IN: 150 mL    OUT:  Total OUT: 0 mL    Total NET: 150 mL          LABS:                          11.3   10.03 )-----------( 215      ( 29 Aug 2022 06:51 )             33.5                                               08-29    138  |  99  |  24<H>  ----------------------------<  175<H>  4.2   |  27  |  <0.5<L>    Ca    8.6      29 Aug 2022 06:51  Phos  3.0     08-29  Mg     2.1     08-29    TPro  4.9<L>  /  Alb  2.8<L>  /  TBili  1.1  /  DBili  x   /  AST  27  /  ALT  59<H>  /  AlkPhos  71  08-29                                                                                           LIVER FUNCTIONS - ( 29 Aug 2022 06:51 )  Alb: 2.8 g/dL / Pro: 4.9 g/dL / ALK PHOS: 71 U/L / ALT: 59 U/L / AST: 27 U/L / GGT: x                                                                                                                                   ABG - ( 30 Aug 2022 07:28 )  pH, Arterial: 7.35  pH, Blood: x     /  pCO2: 60    /  pO2: 107   / HCO3: 33    / Base Excess: 6.0   /  SaO2: 99.3                MEDICATIONS  (STANDING):  ALBUTerol    0.083% 2.5 milliGRAM(s) Nebulizer every 4 hours  ALBUTerol    0.083%.. 2.5 milliGRAM(s) Nebulizer every 20 minutes  albuterol/ipratropium for Nebulization 3 milliLiter(s) Nebulizer every 6 hours  albuterol/ipratropium for Nebulization. 3 milliLiter(s) Nebulizer once  dextrose 5% + sodium chloride 0.9%. 1000 milliLiter(s) (75 mL/Hr) IV Continuous <Continuous>  dextrose 5%. 1000 milliLiter(s) (100 mL/Hr) IV Continuous <Continuous>  dextrose 5%. 1000 milliLiter(s) (50 mL/Hr) IV Continuous <Continuous>  dextrose 50% Injectable 25 Gram(s) IV Push once  dextrose 50% Injectable 12.5 Gram(s) IV Push once  dextrose 50% Injectable 25 Gram(s) IV Push once  enoxaparin Injectable 40 milliGRAM(s) SubCutaneous every 24 hours  glucagon  Injectable 1 milliGRAM(s) IntraMuscular once  insulin lispro (ADMELOG) corrective regimen sliding scale   SubCutaneous three times a day before meals  lactated ringers Bolus 1800 milliLiter(s) IV Bolus once  methylPREDNISolone sodium succinate Injectable 60 milliGRAM(s) IV Push every 12 hours    MEDICATIONS  (PRN):  dextrose Oral Gel 15 Gram(s) Oral once PRN Blood Glucose LESS THAN 70 milliGRAM(s)/deciliter  morphine  - Injectable 4 milliGRAM(s) IV Push every 2 hours PRN Pain or dyspnea      Xrays:                                                                                     ECHO

## 2022-08-30 NOTE — PROGRESS NOTE ADULT - SUBJECTIVE AND OBJECTIVE BOX
NILE VALDEZ  72y Male    Patient is a 72y old  Male who presents with a chief complaint of SOB and weakness.    INTERVAL HPI/OVERNIGHT EVENTS:    ROS: Pt is seen and examined at bedside. Pt remains on BIPAP. Pt is more lethargic and barely open his eyes to verbal stimuli and unable to answer any questions.    Overnight events: No acute events overnight.    Vital Signs Last 24 Hrs  T(C): 36.9 (30 Aug 2022 06:36), Max: 36.9 (30 Aug 2022 06:36)  T(F): 98.4 (30 Aug 2022 06:36), Max: 98.4 (30 Aug 2022 06:36)  HR: 110 (30 Aug 2022 17:00) (110 - 130)  BP: 97/57 (30 Aug 2022 15:00) (89/51 - 148/68)  BP(mean): 71 (30 Aug 2022 15:00) (65 - 98)  ABP: --  ABP(mean): --  RR: 17 (30 Aug 2022 17:00) (17 - 40)  SpO2: 93% (30 Aug 2022 17:00) (93% - 97%)    O2 Parameters below as of 30 Aug 2022 09:00  Patient On (Oxygen Delivery Method): BiPAP/CPAP        I&O's Summary    29 Aug 2022 07:01  -  30 Aug 2022 07:00  --------------------------------------------------------  IN: 75 mL / OUT: 0 mL / NET: 75 mL    30 Aug 2022 07:01  -  30 Aug 2022 17:24  --------------------------------------------------------  IN: 150 mL / OUT: 0 mL / NET: 150 mL    Black pepper (Anaphylaxis)  No Known Drug Allergies    MEDICATIONS  (STANDING):  ALBUTerol    0.083% 2.5 milliGRAM(s) Nebulizer every 4 hours  ALBUTerol    0.083%.. 2.5 milliGRAM(s) Nebulizer every 20 minutes  albuterol/ipratropium for Nebulization 3 milliLiter(s) Nebulizer every 6 hours  albuterol/ipratropium for Nebulization. 3 milliLiter(s) Nebulizer once  dextrose 5% + sodium chloride 0.9%. 1000 milliLiter(s) (75 mL/Hr) IV Continuous <Continuous>  dextrose 5%. 1000 milliLiter(s) (100 mL/Hr) IV Continuous <Continuous>  dextrose 5%. 1000 milliLiter(s) (50 mL/Hr) IV Continuous <Continuous>  dextrose 50% Injectable 25 Gram(s) IV Push once  dextrose 50% Injectable 12.5 Gram(s) IV Push once  dextrose 50% Injectable 25 Gram(s) IV Push once  enoxaparin Injectable 40 milliGRAM(s) SubCutaneous every 24 hours  glucagon  Injectable 1 milliGRAM(s) IntraMuscular once  insulin lispro (ADMELOG) corrective regimen sliding scale   SubCutaneous three times a day before meals  lactated ringers Bolus 1800 milliLiter(s) IV Bolus once  methylPREDNISolone sodium succinate Injectable 60 milliGRAM(s) IV Push every 12 hours    MEDICATIONS  (PRN):  dextrose Oral Gel 15 Gram(s) Oral once PRN Blood Glucose LESS THAN 70 milliGRAM(s)/deciliter  morphine  - Injectable 4 milliGRAM(s) IV Push every 2 hours PRN Pain or dyspnea      PHYSICAL EXAM:  General Appearance: In moderate respiratory distress using accessory muscles, remains on BIPAP, cachetic, normal for age and gender. 	  Neck: Normal JVP, no bruit.   Eyes: Conjunctiva clear, Extra Ocular muscles intact. No scleral icterus.  ENMT: No facial lesions. No oral lesions.  Cardiovascular: Regular rate and rhythm S1 S2, No JVD, No murmurs.  Respiratory: Poor air entry bilaterally. No wheezes, rales or rhonchi.  Psychiatry: Alert and oriented x 3, Mood & affect appropriate.  Gastrointestinal:  Soft, Non-tender, Non-distended.  Neurologic: Non-focal deficits.  Musculoskeletal/ extremities: Move all extremities, No clubbing, cyanosis or edema. s/p right TMA  Vascular: Peripheral pulses palpable on left foot.  Skin/Integumen: No rashes, No ecchymoses, No cyanosis.    LABS:                        11.3   10.03 )-----------( 215      ( 29 Aug 2022 06:51 )             33.5     08-29    138  |  99  |  24<H>  ----------------------------<  175<H>  4.2   |  27  |  <0.5<L>    Ca    8.6      29 Aug 2022 06:51  Phos  3.0     08-29  Mg     2.1     08-29    TPro  4.9<L>  /  Alb  2.8<L>  /  TBili  1.1  /  DBili  x   /  AST  27  /  ALT  59<H>  /  AlkPhos  71  08-29    RADIOLOGY & ADDITIONAL TESTS:

## 2022-08-30 NOTE — PROGRESS NOTE ADULT - PROBLEM SELECTOR PLAN 3
- met with children at bedside and they decided on DNR/DNI  - family is considering CMO (possibly tomorrow) but needs to notify family first  - continue current medical management for now  - will FU to clarify GOC tomorrow AM - met with children at bedside and they decided on DNR/DNI  - family is considering CMO (possibly tomorrow) but needs to notify family first  - continue current medical management for now  - will FU to clarify GOC tomorrow AM    see above  > 16 mins spent

## 2022-08-30 NOTE — PROGRESS NOTE ADULT - PROBLEM SELECTOR PLAN 1
2/2 COPD  morphine 4 mg IVP  now available PRN Q 2 H given patients declining status  continue on Bipap for now and continue current medical management

## 2022-08-31 NOTE — PROGRESS NOTE ADULT - TIME BILLING
72M PMHx COPD on home o2, HTN, DM2 here with acute on chronic hypoxic resp failure.    #Acute on chronic hypoxic resp failure  cta no pe  hypercapnia at baseline  repeat cxr with concern for pna  procal elevated  start zosyn  solumedrol 60 iv bid  bipap q4; 70% fio2  appreciate pulm, palliatve  ongoing goc; family meeting today  Symbicort  duoneb q6  singulair 10  #HTN  controlled off medications  #DM2  ssi  #DVT ppx  lovenox    #Progress Note Handoff:  Pending (specify):  Consults_________, Tests________, Test Results_______, Other____iv steroids, goc, family meeting_____  Family discussion: d/w pt at bedside re: treatment plan, primary dx  Disposition: Home___/SNF___/Other________/Unknown at this time____x____.

## 2022-08-31 NOTE — PROGRESS NOTE ADULT - PROBLEM SELECTOR PLAN 1
2/2 COPD  morphine 4 mg IVP  now available PRN Q 2 H given patients declining status  continue on Bipap for now and continue current medical management 2/2 COPD  morphine 4 mg IVP  now available PRN Q 2 H given patients declining status - used 2 doses/24 hours  continue on Bipap for now and continue current medical management

## 2022-08-31 NOTE — PROGRESS NOTE ADULT - SUBJECTIVE AND OBJECTIVE BOX
INTERVAL HPI/OVERNIGHT EVENTS:    SUBJECTIVE: Patient seen and examined at bedside.     cc: sob  unable to obtain ros    OBJECTIVE:    VITAL SIGNS:  Vital Signs Last 24 Hrs  T(C): 37.3 (31 Aug 2022 07:27), Max: 37.9 (31 Aug 2022 00:00)  T(F): 99.1 (31 Aug 2022 07:27), Max: 100.3 (31 Aug 2022 00:00)  HR: 124 (31 Aug 2022 09:00) (110 - 126)  BP: 106/55 (31 Aug 2022 09:00) (93/65 - 111/54)  BP(mean): 78 (31 Aug 2022 09:00) (67 - 78)  RR: 32 (31 Aug 2022 06:00) (16 - 36)  SpO2: 95% (31 Aug 2022 08:00) (90% - 100%)          PHYSICAL EXAM:    General: mild-mod resp distress  HEENT: NC/AT; PERRL, clear conjunctiva  Neck: supple  Respiratory: base crackles  Cardiovascular: +S1/S2; RRR  Abdomen: soft, NT/ND; +BS x4  Extremities: WWP, 2+ peripheral pulses b/l; no LE edema  Skin: normal color and turgor; no rash  Neurological:    MEDICATIONS:  MEDICATIONS  (STANDING):  ALBUTerol    0.083% 2.5 milliGRAM(s) Nebulizer every 4 hours  ALBUTerol    0.083%.. 2.5 milliGRAM(s) Nebulizer every 20 minutes  albuterol/ipratropium for Nebulization 3 milliLiter(s) Nebulizer every 6 hours  albuterol/ipratropium for Nebulization. 3 milliLiter(s) Nebulizer once  dextrose 5%. 1000 milliLiter(s) (100 mL/Hr) IV Continuous <Continuous>  dextrose 5%. 1000 milliLiter(s) (50 mL/Hr) IV Continuous <Continuous>  dextrose 50% Injectable 25 Gram(s) IV Push once  dextrose 50% Injectable 12.5 Gram(s) IV Push once  dextrose 50% Injectable 25 Gram(s) IV Push once  enoxaparin Injectable 40 milliGRAM(s) SubCutaneous every 24 hours  glucagon  Injectable 1 milliGRAM(s) IntraMuscular once  insulin lispro (ADMELOG) corrective regimen sliding scale   SubCutaneous three times a day before meals  methylPREDNISolone sodium succinate Injectable 60 milliGRAM(s) IV Push every 12 hours  piperacillin/tazobactam IVPB.. 3.375 Gram(s) IV Intermittent every 8 hours    MEDICATIONS  (PRN):  dextrose Oral Gel 15 Gram(s) Oral once PRN Blood Glucose LESS THAN 70 milliGRAM(s)/deciliter  morphine  - Injectable 4 milliGRAM(s) IV Push every 2 hours PRN Pain or dyspnea      ALLERGIES:  Allergies    Black pepper (Anaphylaxis)  No Known Drug Allergies    Intolerances        LABS:                        9.4    9.24  )-----------( 201      ( 30 Aug 2022 23:46 )             29.9     Hemoglobin: 9.4 g/dL (08-30 @ 23:46)  Hemoglobin: 11.3 g/dL (08-29 @ 06:51)  Hemoglobin: 11.9 g/dL (08-28 @ 05:37)  Hemoglobin: 13.7 g/dL (08-27 @ 12:53)    CBC Full  -  ( 30 Aug 2022 23:46 )  WBC Count : 9.24 K/uL  RBC Count : 3.25 M/uL  Hemoglobin : 9.4 g/dL  Hematocrit : 29.9 %  Platelet Count - Automated : 201 K/uL  Mean Cell Volume : 92.0 fL  Mean Cell Hemoglobin : 28.9 pg  Mean Cell Hemoglobin Concentration : 31.4 g/dL  Auto Neutrophil # : 8.56 K/uL  Auto Lymphocyte # : 0.23 K/uL  Auto Monocyte # : 0.30 K/uL  Auto Eosinophil # : 0.04 K/uL  Auto Basophil # : 0.01 K/uL  Auto Neutrophil % : 92.7 %  Auto Lymphocyte % : 2.5 %  Auto Monocyte % : 3.2 %  Auto Eosinophil % : 0.4 %  Auto Basophil % : 0.1 %    08-30    146  |  105  |  22<H>  ----------------------------<  148<H>  3.9   |  34<H>  |  <0.5<L>    Ca    8.5      30 Aug 2022 23:46  Mg     2.0     08-30    TPro  4.7<L>  /  Alb  2.8<L>  /  TBili  0.6  /  DBili  x   /  AST  25  /  ALT  52<H>  /  AlkPhos  64  08-30    Creatinine Trend: <0.5<--, <0.5<--, 0.5<--, 0.6<--, 0.6<--, 0.7<--  LIVER FUNCTIONS - ( 30 Aug 2022 23:46 )  Alb: 2.8 g/dL / Pro: 4.7 g/dL / ALK PHOS: 64 U/L / ALT: 52 U/L / AST: 25 U/L / GGT: x               hs Troponin:    ABG - ( 30 Aug 2022 07:28 )  pH, Arterial: 7.35  pH, Blood: x     /  pCO2: 60    /  pO2: 107   / HCO3: 33    / Base Excess: 6.0   /  SaO2: 99.3                      CSF:                      EKG:   MICROBIOLOGY:    IMAGING:      Labs, imaging, EKG personally reviewed    RADIOLOGY & ADDITIONAL TESTS: Reviewed.

## 2022-08-31 NOTE — CHART NOTE - NSCHARTNOTEFT_GEN_A_CORE
PALLIATIVE MEDICINE INTERDISCIPLINARY TEAM NOTE    Provider:                                         [  X ] Initial visit        Met with: [   ] Patient  [ X  ] Family  [   ] Other:    Primary Language: [ X  ] English [   ] Other*:                      *Interpretation provided by:    SUPPORT DIAGNOSES            (Check all that apply)    [ X  ] EOL issues  [  X ] Advanced Illness  [   ] Cultural / spiritual concerns  [  X ] Pain / suffering  [   ] Dementia / AMS  [   ] Other:  [  X ] AD issues  [  X ] Grief / loss / sadness  [   ] Discharge issues  [  X ] Distress / coping    PSYCHOSOCIAL ASSESSMENT OF PATIENT         (Check all that apply)    [  X ] Initial Assessment            [   ] Reassessment          [   ] Not Applicable this visit    Pain/suffering acuity:  [   ] None to mild (0-3)           [   ] Moderate (4-6)        [   ] High (7-10)    Mental Status:  [   ] Alert/oriented (x3)          [  X ] Confused/Altered(x0)         [   ] Non-resp    Functional status:  [   ] Independent w ADLs      [   ] Needs Assistance             [  X ] Bedbound/Full Care    Coping:  [   ] Coping well                     [ X  ] Coping w/difficulty            [   ] Poor coping    Support system:  [ X  ] Strong                              [   ] Adequate                        [   ] Inadequate      Past history and medications for:     [ ] Anxiety       [ ] Depression    [ ] Sleep disorders       SERVICE PROVIDED  [   ]Discharge support / facilitation  [   ]AD / goals of care counseling                                  [   ]EOL / death / bereavement counseling  [  X ]Counseling / support                                                [   ] Family meeting  [   ]Prayer / sacrament / ritual                                      [   ] Referral   [   ]Other                                                                       NOTE and Plan of Care (PoC):    patient is a 71 y/o M with noted pmhx, presenting with c/o acute chronic hypoxic resp failure. visited patient earlier today. patient on bipap. spk with patient's son - Kin over the phone. reviewed role of palliative care SW. He discussed family is not ready to remove bipap at this time. plan to f/u with family tomorrow. [phone]

## 2022-08-31 NOTE — CHART NOTE - NSCHARTNOTEFT_GEN_A_CORE
Family is concerned that patient is hypotensive and would like to keep him alive till other family members are able to visit. explained to family that starting pressors will be of little benefit but family would like to try pressors. However, only peripheral pressors; they would not like to pursue further invasive procedures like a central line  Outcome: peripheral levophed was started

## 2022-08-31 NOTE — PROGRESS NOTE ADULT - SUBJECTIVE AND OBJECTIVE BOX
Over Night Events: events noted, BIPAP dependant, palliative reviewed, low grade fever    PHYSICAL EXAM    ICU Vital Signs Last 24 Hrs  T(C): 37.9 (31 Aug 2022 00:00), Max: 37.9 (31 Aug 2022 00:00)  T(F): 100.3 (31 Aug 2022 00:00), Max: 100.3 (31 Aug 2022 00:00)  HR: 120 (31 Aug 2022 03:18) (110 - 126)  BP: 93/65 (31 Aug 2022 03:18) (89/51 - 148/68)  BP(mean): 74 (31 Aug 2022 03:18) (65 - 98)  RR: 16 (31 Aug 2022 03:18) (16 - 40)  SpO2: 100% (31 Aug 2022 03:18) (90% - 100%)    O2 Parameters below as of 30 Aug 2022 09:00  Patient On (Oxygen Delivery Method): BiPAP/CPAP            General: ill looking, cachectic  Lungs: dec bs diffuselu  Cardiovascular: Regular   Abdomen: Soft, Positive BS  Extremities: No clubbing   Neurological: Non focal       08-29-22 @ 07:01  -  08-30-22 @ 07:00  --------------------------------------------------------  IN:    dextrose 5% + sodium chloride 0.9%: 75 mL  Total IN: 75 mL    OUT:  Total OUT: 0 mL    Total NET: 75 mL      08-30-22 @ 07:01  -  08-31-22 @ 06:40  --------------------------------------------------------  IN:    dextrose 5% + sodium chloride 0.9%: 900 mL  Total IN: 900 mL    OUT:  Total OUT: 0 mL    Total NET: 900 mL          LABS:                          9.4    9.24  )-----------( 201      ( 30 Aug 2022 23:46 )             29.9                                               08-30    146  |  105  |  22<H>  ----------------------------<  148<H>  3.9   |  34<H>  |  <0.5<L>    Ca    8.5      30 Aug 2022 23:46  Phos  3.0     08-29  Mg     2.0     08-30    TPro  4.7<L>  /  Alb  2.8<L>  /  TBili  0.6  /  DBili  x   /  AST  25  /  ALT  52<H>  /  AlkPhos  64  08-30                                                                                           LIVER FUNCTIONS - ( 30 Aug 2022 23:46 )  Alb: 2.8 g/dL / Pro: 4.7 g/dL / ALK PHOS: 64 U/L / ALT: 52 U/L / AST: 25 U/L / GGT: x                                                                                                                                   ABG - ( 30 Aug 2022 07:28 )  pH, Arterial: 7.35  pH, Blood: x     /  pCO2: 60    /  pO2: 107   / HCO3: 33    / Base Excess: 6.0   /  SaO2: 99.3                MEDICATIONS  (STANDING):  ALBUTerol    0.083% 2.5 milliGRAM(s) Nebulizer every 4 hours  ALBUTerol    0.083%.. 2.5 milliGRAM(s) Nebulizer every 20 minutes  albuterol/ipratropium for Nebulization 3 milliLiter(s) Nebulizer every 6 hours  albuterol/ipratropium for Nebulization. 3 milliLiter(s) Nebulizer once  dextrose 5% + sodium chloride 0.9%. 1000 milliLiter(s) (75 mL/Hr) IV Continuous <Continuous>  dextrose 5%. 1000 milliLiter(s) (100 mL/Hr) IV Continuous <Continuous>  dextrose 5%. 1000 milliLiter(s) (50 mL/Hr) IV Continuous <Continuous>  dextrose 50% Injectable 25 Gram(s) IV Push once  dextrose 50% Injectable 12.5 Gram(s) IV Push once  dextrose 50% Injectable 25 Gram(s) IV Push once  enoxaparin Injectable 40 milliGRAM(s) SubCutaneous every 24 hours  glucagon  Injectable 1 milliGRAM(s) IntraMuscular once  insulin lispro (ADMELOG) corrective regimen sliding scale   SubCutaneous three times a day before meals  lactated ringers Bolus 1800 milliLiter(s) IV Bolus once  methylPREDNISolone sodium succinate Injectable 60 milliGRAM(s) IV Push every 12 hours    MEDICATIONS  (PRN):  dextrose Oral Gel 15 Gram(s) Oral once PRN Blood Glucose LESS THAN 70 milliGRAM(s)/deciliter  morphine  - Injectable 4 milliGRAM(s) IV Push every 2 hours PRN Pain or dyspnea

## 2022-08-31 NOTE — PROGRESS NOTE ADULT - TREATMENT GUIDELINE COMMENT
DNR/DNI  Continue current level of care
DNR/DNI  Continue current medical management   Symptom tx with morphine

## 2022-08-31 NOTE — CHART NOTE - NSCHARTNOTEFT_GEN_A_CORE
Patient remains on peripheral levophed @ 0.5 for severe hypotension 2/2 severe COPD exacerbation. Remans on BiPAP. Per prior documentation family at bedside and does not wish to escalate care with central line insertion at this time. Levophed was up-titrated to 0.8 for BP control. Family's wishes are for the patient to be made comfort measures only at midnight tonight. We will continue to run peripheral levophed @ 0.8 for now until midnight, at which point CMO will be initiated in accordance to the family's wishes. Risk of levophed infiltration and necrotic injury was explained to family members and they choose to continue with peripheral levophed until midnight despite the risk in order to maintain BP.     Plan is to begin CMO at midnight tonight and d/c Levophed/Bipap at that time.

## 2022-08-31 NOTE — PROGRESS NOTE ADULT - SUBJECTIVE AND OBJECTIVE BOX
HPI:    73yo man with hx of hx COPD on home O2 w/ multiple hospitalizations for exacerbation, w/ Intubation x 3 d in 2004, last d/c 8/19, Asthma, DM s/p right transmetatarsal amputation following infection.     BIB EMS alerted by daughters for SOB and wheezing since last night. Pt also complained of episode of CP radiating to back, resolved now. Pt endorses that this episode feels similar to prior admissions for exacerbations. Pt also endorses dark urine.  Of note pt last dose of prednisone from prior admission was today.    Since last d/c pt has remained bed ridden, unable to walk, PT was scheduled to come this Thursday but didn't show.    ED   /94  RR 30 100% cPaP TMax 101.5 F  WBC 17.89 (25.15 8/19) TBili 1.9 ALT 87 T 0.06   CT PE negative  CXR stable from prior    Pt given cefepime, levofloxacin, vanco, 2.8L LR, tylenol, solumedrol 125, placed on bipap    Hr improved to low 100s    Pt admitted to sdu for copd exacerbation (27 Aug 2022 18:48)     INTERVAL EVENTS:  8/30: patient appears very weak and ill with poor mental status on bipap. family at bedside for meeting.     ADVANCE DIRECTIVES:    MOLST  [X]  Living Will  [ ]   DECISION MAKER(s):  [ ] Health Care Proxy(s)  [ X] Surrogate(s)  [ ] Guardian           Name(s): Phone Number(s): Oma Chi and Jane     BASELINE (I)ADL(s) (prior to admission):  [place]: [ ]Total  [ X] Moderate [ ]Dependent  Palliative Performance Status Version 2:      30   %    http://npcrc.org/files/news/palliative_performance_scale_ppsv2.pdf    Allergies    Black pepper (Anaphylaxis)  No Known Drug Allergies    Intolerances    MEDICATIONS  (STANDING):  ALBUTerol    0.083% 2.5 milliGRAM(s) Nebulizer every 4 hours  ALBUTerol    0.083%.. 2.5 milliGRAM(s) Nebulizer every 20 minutes  albuterol/ipratropium for Nebulization 3 milliLiter(s) Nebulizer every 6 hours  albuterol/ipratropium for Nebulization. 3 milliLiter(s) Nebulizer once  dextrose 5% + sodium chloride 0.9%. 1000 milliLiter(s) (75 mL/Hr) IV Continuous <Continuous>  dextrose 5%. 1000 milliLiter(s) (100 mL/Hr) IV Continuous <Continuous>  dextrose 5%. 1000 milliLiter(s) (50 mL/Hr) IV Continuous <Continuous>  dextrose 50% Injectable 25 Gram(s) IV Push once  dextrose 50% Injectable 12.5 Gram(s) IV Push once  dextrose 50% Injectable 25 Gram(s) IV Push once  enoxaparin Injectable 40 milliGRAM(s) SubCutaneous every 24 hours  glucagon  Injectable 1 milliGRAM(s) IntraMuscular once  insulin lispro (ADMELOG) corrective regimen sliding scale   SubCutaneous three times a day before meals  lactated ringers Bolus 1800 milliLiter(s) IV Bolus once  methylPREDNISolone sodium succinate Injectable 60 milliGRAM(s) IV Push every 12 hours    MEDICATIONS  (PRN):  dextrose Oral Gel 15 Gram(s) Oral once PRN Blood Glucose LESS THAN 70 milliGRAM(s)/deciliter  morphine  - Injectable 4 milliGRAM(s) IV Push every 4 hours PRN pain (4-10), dyspnea    PRESENT SYMPTOMS: [X ]Unable to obtain due to poor mentation   Source if other than patient:  [X ]Family   [ X]Team     Pain: [ ]yes [ X]no  QOL impact -   Location -                    Aggravating factors -  Quality -  Radiation -  Timing-  Severity (0-10 scale):  Minimal acceptable level (0-10 scale):     CPOT:  2  https://www.[facility].org/getattachment/[email or web address]/Critical-Care-Pain-Observation-Tool-(CPOT)    Dyspnea:                           [ ]Mild [X ]Moderate [ ]Severe - observed  Anxiety:                             [ ]Mild [ ]Moderate [ ]Severe  Fatigue:                             [ ]Mild [ ]Moderate [ ]Severe  Nausea:                             [ ]Mild [ ]Moderate [ ]Severe  Loss of appetite:              [ ]Mild [ ]Moderate [ ]Severe  Constipation:                    [ ]Mild [ ]Moderate [ ]Severe    Other Symptoms:  [ X]All other review of systems negative     Palliative Performance Status Version 2:        10 %    http://[facility].org/files/news/palliative_performance_scale_ppsv2.pdf    PHYSICAL EXAM:  ICU Vital Signs Last 24 Hrs  T(C): 37.3 (31 Aug 2022 07:27), Max: 37.9 (31 Aug 2022 00:00)  T(F): 99.1 (31 Aug 2022 07:27), Max: 100.3 (31 Aug 2022 00:00)  HR: 124 (31 Aug 2022 09:00) (110 - 126)  BP: 106/55 (31 Aug 2022 09:00) (93/65 - 111/54)  BP(mean): 78 (31 Aug 2022 09:00) (67 - 78)  RR: 32 (31 Aug 2022 06:00) (16 - 36)  SpO2: 95% (31 Aug 2022 08:00) (90% - 100%)    GENERAL:  [ ]Alert  [ ]Oriented x   [ X]Lethargic  [ X]Cachexia  [ ]Unarousable  [ ]Verbal  [ ]Non-Verbal  Behavioral:   [ ] Anxiety  [ ] Delirium [ ] Agitation [ X] Calm   HEENT:  [X ]Normal   [ ]Dry mouth   [ ]ET Tube/Trach  [ ]Oral lesions  PULMONARY:   [ ]Clear [ X]Tachypnea  [ ]Audible excessive secretions   On Bipap, + accessory muscle usage   CARDIOVASCULAR:    [X ]Regular [ ]Irregular [ ]Tachy  [ ]Arnaldo [ ]Murmur [ ]Other  GASTROINTESTINAL:  [ X]Soft  [ ]Distended   [ ]+BS  [ ]Non tender [ ]Tender  [ ]PEG [ ]OGT/ NGT  Last BM:   GENITOURINARY:  [X ]Normal [ ] Incontinent   [ ]Oliguria/Anuria   [ ]Gutierrez  MUSCULOSKELETAL:   [ ]Normal   [ ]Weakness  [ X]Bed/Wheelchair bound [ ]Edema  NEUROLOGIC:   [ ]No focal deficits  [X ]Cognitive impairment  [ ]Dysphagia [ ]Dysarthria [ ]Paresis [ ]Other   SKIN:   [ X]Normal    [ ]Rash  [ ]Pressure ulcer(s)       Present on admission [ ]y [ ]n    CRITICAL CARE:  [ ] Shock Present  [ ]Septic [ ]Cardiogenic [ ]Neurologic [ ]Hypovolemic  [ ]  Vasopressors [ ]  Inotropes   [ ]Respiratory failure present [ ]Mechanical ventilation [X ]Non-invasive ventilatory support [ ]High flow  [ ]Acute  [ ]Chronic [ ]Hypoxic  [ ]Hypercarbic [ ]Other  [ ]Other organ failure     LABS:                          11.3   10.03 )-----------( 215      ( 29 Aug 2022 06:51 )             33.5     08-29    138  |  99  |  24<H>  ----------------------------<  175<H>  4.2   |  27  |  <0.5<L>    Ca    8.6      29 Aug 2022 06:51  Phos  3.0     08-29  Mg     2.1     08-29        RADIOLOGY & ADDITIONAL STUDIES:    < from: Xray Chest 1 View-PORTABLE IMMEDIATE (08.27.22 @ 13:07) >    Impression:    No radiographic evidence of acute cardiopulmonary disease.        --- End of Report ---    < end of copied text >           HPI:    73yo man with hx of hx COPD on home O2 w/ multiple hospitalizations for exacerbation, w/ Intubation x 3 d in 2004, last d/c 8/19, Asthma, DM s/p right transmetatarsal amputation following infection.     BIB EMS alerted by daughters for SOB and wheezing since last night. Pt also complained of episode of CP radiating to back, resolved now. Pt endorses that this episode feels similar to prior admissions for exacerbations. Pt also endorses dark urine.  Of note pt last dose of prednisone from prior admission was today.    Since last d/c pt has remained bed ridden, unable to walk, PT was scheduled to come this Thursday but didn't show.    ED   /94  RR 30 100% cPaP TMax 101.5 F  WBC 17.89 (25.15 8/19) TBili 1.9 ALT 87 T 0.06   CT PE negative  CXR stable from prior    Pt given cefepime, levofloxacin, vanco, 2.8L LR, tylenol, solumedrol 125, placed on bipap    Hr improved to low 100s    Pt admitted to sdu for copd exacerbation (27 Aug 2022 18:48)     INTERVAL EVENTS:  8/30: patient appears very weak and ill with poor mental status on bipap. family at bedside for meeting.   8/31: patient on BiPAP, has some labored breathing    ADVANCE DIRECTIVES:    MOLST  [X]  Living Will  [ ]   DECISION MAKER(s):  [ ] Health Care Proxy(s)  [ X] Surrogate(s)  [ ] Guardian           Name(s): Phone Number(s): Oma Chi and Jane     BASELINE (I)ADL(s) (prior to admission):  [place]: [ ]Total  [ X] Moderate [ ]Dependent  Palliative Performance Status Version 2:      30   %    http://npcrc.org/files/news/palliative_performance_scale_ppsv2.pdf    Allergies    Black pepper (Anaphylaxis)  No Known Drug Allergies    Intolerances    MEDICATIONS  (STANDING):  ALBUTerol    0.083% 2.5 milliGRAM(s) Nebulizer every 4 hours  ALBUTerol    0.083%.. 2.5 milliGRAM(s) Nebulizer every 20 minutes  albuterol/ipratropium for Nebulization 3 milliLiter(s) Nebulizer every 6 hours  albuterol/ipratropium for Nebulization. 3 milliLiter(s) Nebulizer once  budesonide 160 MICROgram(s)/formoterol 4.5 MICROgram(s) Inhaler 2 Puff(s) Inhalation two times a day  dextrose 5%. 1000 milliLiter(s) (100 mL/Hr) IV Continuous <Continuous>  dextrose 5%. 1000 milliLiter(s) (50 mL/Hr) IV Continuous <Continuous>  dextrose 50% Injectable 25 Gram(s) IV Push once  dextrose 50% Injectable 12.5 Gram(s) IV Push once  dextrose 50% Injectable 25 Gram(s) IV Push once  enoxaparin Injectable 40 milliGRAM(s) SubCutaneous every 24 hours  glucagon  Injectable 1 milliGRAM(s) IntraMuscular once  insulin lispro (ADMELOG) corrective regimen sliding scale   SubCutaneous three times a day before meals  methylPREDNISolone sodium succinate Injectable 60 milliGRAM(s) IV Push every 12 hours  montelukast 10 milliGRAM(s) Oral daily  piperacillin/tazobactam IVPB.. 3.375 Gram(s) IV Intermittent every 8 hours    MEDICATIONS  (PRN):  dextrose Oral Gel 15 Gram(s) Oral once PRN Blood Glucose LESS THAN 70 milliGRAM(s)/deciliter  morphine  - Injectable 4 milliGRAM(s) IV Push every 2 hours PRN Pain or dyspnea - 2 doses/24 hours used    PRESENT SYMPTOMS: [X ]Unable to obtain due to poor mentation   Source if other than patient:  [X ]Family   [ X]Team     Pain: [ ]yes [ X]no  QOL impact -   Location -                    Aggravating factors -  Quality -  Radiation -  Timing-  Severity (0-10 scale):  Minimal acceptable level (0-10 scale):     CPOT:  2  https://www.[facility].org/getattachment/[email or web address]-[email or web address]-6977i8381q2k/Critical-Care-Pain-Observation-Tool-(CPOT)    Dyspnea:                           [ ]Mild [X ]Moderate [ ]Severe - observed  Anxiety:                             [ ]Mild [ ]Moderate [ ]Severe  Fatigue:                             [ ]Mild [ ]Moderate [ ]Severe  Nausea:                             [ ]Mild [ ]Moderate [ ]Severe  Loss of appetite:              [ ]Mild [ ]Moderate [ ]Severe  Constipation:                    [ ]Mild [ ]Moderate [ ]Severe    Other Symptoms:  [ X]All other review of systems negative     Palliative Performance Status Version 2:        10 %    http://npcrc.org/files/news/palliative_performance_scale_ppsv2.pdf    PHYSICAL EXAM:  ICU Vital Signs Last 24 Hrs  T(C): 37.3 (31 Aug 2022 07:27), Max: 37.9 (31 Aug 2022 00:00)  T(F): 99.1 (31 Aug 2022 07:27), Max: 100.3 (31 Aug 2022 00:00)  HR: 124 (31 Aug 2022 09:00) (110 - 126)  BP: 106/55 (31 Aug 2022 09:00) (93/65 - 111/54)  BP(mean): 78 (31 Aug 2022 09:00) (67 - 78)  RR: 32 (31 Aug 2022 06:00) (16 - 36)  SpO2: 95% (31 Aug 2022 08:00) (90% - 100%)    GENERAL:  [ ]Alert  [ ]Oriented x   [ X]Lethargic  [ X]Cachexia  [ ]Unarousable  [ ]Verbal  [ ]Non-Verbal  Behavioral:   [ ] Anxiety  [ ] Delirium [ ] Agitation [ X] Calm   HEENT:  [X ]Normal   [ ]Dry mouth   [ ]ET Tube/Trach  [ ]Oral lesions  PULMONARY:   [ ]Clear [ X]Tachypnea  [ ]Audible excessive secretions   On Bipap, + accessory muscle usage   CARDIOVASCULAR:    [X ]Regular [ ]Irregular [ ]Tachy  [ ]Arnaldo [ ]Murmur [ ]Other  GASTROINTESTINAL:  [ X]Soft  [ ]Distended   [ ]+BS  [ ]Non tender [ ]Tender  [ ]PEG [ ]OGT/ NGT  Last BM:   GENITOURINARY:  [X ]Normal [ ] Incontinent   [ ]Oliguria/Anuria   [ ]Gutierrez  MUSCULOSKELETAL:   [ ]Normal   [ ]Weakness  [ X]Bed/Wheelchair bound [ ]Edema  NEUROLOGIC:   [ ]No focal deficits  [X ]Cognitive impairment  [ ]Dysphagia [ ]Dysarthria [ ]Paresis [ ]Other   SKIN:   [ X]Normal    [ ]Rash  [ ]Pressure ulcer(s)       Present on admission [ ]y [ ]n    CRITICAL CARE:  [ ] Shock Present  [ ]Septic [ ]Cardiogenic [ ]Neurologic [ ]Hypovolemic  [ ]  Vasopressors [ ]  Inotropes   [ ]Respiratory failure present [ ]Mechanical ventilation [X ]Non-invasive ventilatory support [ ]High flow  [ ]Acute  [ ]Chronic [ ]Hypoxic  [ ]Hypercarbic [ ]Other  [ ]Other organ failure     LABS:                          9.4    9.24  )-----------( 201      ( 30 Aug 2022 23:46 )             29.9     08-30    146  |  105  |  22<H>  ----------------------------<  148<H>  3.9   |  34<H>  |  <0.5<L>    Ca    8.5      30 Aug 2022 23:46  Mg     2.0     08-30          RADIOLOGY & ADDITIONAL STUDIES:    < from: Xray Chest 1 View-PORTABLE IMMEDIATE (08.27.22 @ 13:07) >    Impression:    No radiographic evidence of acute cardiopulmonary disease.        --- End of Report ---    < end of copied text >

## 2022-08-31 NOTE — PROGRESS NOTE ADULT - PROBLEM SELECTOR PLAN 4
DNR/DNI  Continue med mgmt  Family considering CMO  Dtrs are decision makers  Will FU
DNR/DNI  Continue med mgmt  Family considering CMO  Dtrs are decision makers  Will FU

## 2022-08-31 NOTE — PROGRESS NOTE ADULT - NS ATTEND AMEND GEN_ALL_CORE FT
Agree with above, patient on IV morphine PRN, to remain on BiPAP for now per family's wishes, will continue to follow  ______________  Jaun Rosales MD  Palliative Medicine  [facility]   of Geriatric and Palliative Medicine  [phone]
Agree with above, after interdisciplinary GOC discussion, family decided on DNR/DNI and likely CMO in next few days, but understand he may pass sooner, including on BiPAP. Will follow, agree with increased frequency of morphine for dyspnea or pain  ______________  Jaun Rosales MD  Palliative Medicine  [facility]   of Geriatric and Palliative Medicine  [phone]

## 2022-08-31 NOTE — PROGRESS NOTE ADULT - ASSESSMENT
IMPRESSION:    Severe COPD now exacerbation/ recurrent admission/ ct chest noted  possible aspiration pneumonia  HO End stage COPD   Acute hypoxemic resp failure with hypercapnia       PLAN:    IV abx ( zosyn)  Continue Oxygen therapy to keep pulse ox 88 TO 92%.   Nebs q4hr and PRN;   NIV 4 hrs on/4hrs off and QHS  solumedrol 60 q 12  DVT prophylaxis  Poor prognosis  DNR/ I
73 y/o M with PMH of end-stage COPD on home O2, h/o intubations, recent d/c on 8/19 presented with worsening SOB for COPD exacerbation and deconditioning    # Acute COPD exacerbation  # End stage COPD with h/o multiple intubations in the past, recurrent admission   - no evidence of infection on CT chest, pt with poor nutrition, deconditioning, muscle wasting with fatigued respiratory effort  - c/w IV steriod solumedrol 60 mg BID as per Pulm(completed prednisone taper from home)  - follow up pulm, managed under step-down unit  - BIPAP for now on FiO2 35%  - duoneb q6h and prn    # NPO status for past 4 days  - IV hydration  - palliative on board, family meeting regarding goals of care tomorrow  - IV morphine 4 mg q4h prn    # Elevated troponin likely demand ischemia  - trend cardiac enzymes, EKG without ischemia changes    # Deconditioning   - PT/Rehab when off BIPAP    # Asthma  - c/w loratadine, montelukast    # DM  - monitor fsg, NPO while on BIPAP    # HTN, stable  - c/w cardizem    # GERD  - c/w Protonix    # [place] hernia, pulmonary nodules  # Hepatic Nodule likely hemangioma, 1.1 cm focal enhancement of the right hepatic lobe on CTA chest  - seen on CTA Chest prior admission  - outpatient f/u    # Code status: Full code  # DVT prophylaxis: on lovenox subcut
IMPRESSION:    Severe COPD now exacerbation/ recurrent admission/ ct chest noted  HO End stage COPD   Acute hypoxemic resp failure with hypercapnia       PLAN:      Continue Oxygen therapy to keep pulse ox 88 TO 92%. Avoid hyperoxia.   Nebs q4hr and PRN; Continue Symbicort 160 and Spiriva  NIV 4 hrs on/4hrs off and QHS  solumedrol 60 q 12  DVT prophylaxis  Poor prognosis  GOC  
71 y/o M with PMH of end-stage COPD on home O2, h/o intubations, recent d/c on 8/19 presented with worsening SOB for COPD exacerbation and deconditioning    # Acute COPD exacerbation  # End stage COPD with h/o multiple intubations in the past, recurrent admission   - no evidence of infection on CT chest, pt with poor nutrition, deconditioning, muscle wasting with fatigued respiratory effort  - c/w IV steriod solumedrol 60 mg BID as per Pulm  - follow up pulm, managed under step-down unit  - BIPAP for now on FiO2 35%  - duoneb q6h and prn  - discussed goals of care with pulmonary team, palliative team and patient's children, family decided against aggressive measures, made pt DNR/DNI, possible comfort measures     # NPO status for past 5 days  - IV hydration for now, no plan at this time for tube feeding or TPN/PPN, possible CMO tomorrow  - IV morphine 4 mg q4h prn    # Elevated troponin likely demand ischemia  - trend cardiac enzymes, EKG without ischemia changes    # Deconditioning   - PT/Rehab when off BIPAP    # Asthma  - c/w loratadine, montelukast    # DM  - monitor fsg, NPO while on BIPAP    # HTN, stable  - c/w cardizem    # GERD  - c/w protonix    # [place] hernia, pulmonary nodules  # Hepatic Nodule likely hemangioma, 1.1 cm focal enhancement of the right hepatic lobe on CTA chest  - seen on CTA Chest prior admission  - outpatient f/u    # Code status: DNR/DNI  # DVT prophylaxis: on lovenox subcut
72M with PMH of COPD on home O2 with multiple hospitalizations, asthma, DM s/p R transmetatarsal amputation, here with worsening dyspnea after discharge on 8/19/22. Imaging here did not indicate infection, patient continued on steroid taper, nebs, and maintained on BiPAP. Nutrition consulted, recommended replete via small-bore naso-duodenal tube. Palliative care consulted for GOC.    Spoke with family at bedside. Patient is clinically deteriorating.   They decided they do NOT want him intubated and placed DNR/DNI order.   They are considering CMO, but would like to continue current med mgmt for now.     MEDD (morphine equivalent daily dose): 36      See Recs below.    Please call [phone] with questions or concerns 24/7.   We will continue to follow.     Discussed with primary MD, RN.  
72M with PMH of COPD on home O2 with multiple hospitalizations, asthma, DM s/p R transmetatarsal amputation, here with worsening dyspnea after discharge on 8/19/22. Imaging here did not indicate infection, patient continued on steroid taper, nebs, and maintained on BiPAP. Nutrition consulted, recommended replete via small-bore naso-duodenal tube. Palliative care consulted for GO.    Spoke with family at bedside. Patient is clinically deteriorating.   They decided they do NOT want him intubated and placed DNR/DNI order.   They are strongly considering CMO, but would like to continue current med mgmt for now.     MEDD (morphine equivalent daily dose): 0      See Recs below.    Please call [phone] with questions or concerns 24/7.   We will continue to follow.     Discussed with primary MD.

## 2022-08-31 NOTE — PROGRESS NOTE ADULT - PROBLEM SELECTOR PLAN 3
- met with children at bedside and they decided on DNR/DNI  - family is considering CMO but not ready yet   - continue current medical management for now  - will FU to clarify GOC tomorrow AM    see above  > 16 mins spent - met with children at bedside and they decided on DNR/DNI on 8/30/22  - family is considering CMO but not ready yet   - continue current medical management for now  - will FU to clarify GOC tomorrow AM    see above  > 16 mins spent

## 2022-08-31 NOTE — PROGRESS NOTE ADULT - CONVERSATION DETAILS
Spoke with family at bedside. Discussed option for comfort care. Explained that there are two options- one is to continue current medical management with Bipap, IVF, etc. Explained that it is still likely the patient will pass away even with maximum medical intervention, and that continuing the Bipap will prolong that process. Explained the alternative which is to take the bipap off, give morphine for dyspnea and treat only symptoms. This would include stopping  labs, vitals, IVF. Explained that given his poor respiratory function, he would likely pass away within at most hours.     The family does not feel ready to remove bipap and do full comfort care. They would like to keep things as is for now, and continue to treat his symptoms with PRN morphine.

## 2022-09-01 NOTE — DISCHARGE NOTE FOR THE EXPIRED PATIENT - HOSPITAL COURSE
73yo man with hx of hx COPD on home O2 w/ multiple hospitalizations for exacerbation, w/ Intubation x 3 d in 2004, last d/c 8/19, Asthma, DM s/p right transmetatarsal amputation following infection.     BIB EMS alerted by daughters for SOB and wheezing since last night. Pt also complained of episode of CP radiating to back, resolved now. Pt endorses that this episode feels similar to prior admissions for exacerbations. Pt also endorses dark urine.  Of note pt last dose of prednisone from prior admission was today.    Since last d/c pt has remained bed ridden, unable to walk, PT was scheduled to come this Thursday but didn't show.    ED   /94  RR 30 100% cPaP TMax 101.5 F  WBC 17.89 (25.15 8/19) TBili 1.9 ALT 87 T 0.06   CT PE negative  CXR stable from prior    Pt given cefepime, levofloxacin, vanco, 2.8L LR, tylenol, solumedrol 125, placed on bipap    Hr improved to low 100s    Pt admitted to sdu for copd     The patient's hospital course was complicated by worsening COPD exacerbation requiring BiPAP and septic shock suspected to be in the setting of aspiration pneumonia. During the day on 8/31, the patient began to rapidly deteriorate and septic shock worsened, requiring peripheral Levophed to maintain BP. At this point in time, family began discussions about making patient CMO. He remained on Bipap and required peripheral levophed for severe hypotension in the setting of severe copd exacerbation c/b aspiration PNA and septic shock. Family verbalized good understanding that should further vasopressor support be required, central line insertion and escalation of care would become necessary.    At 8PM patients BP noted to be 70s/50s w/MAP 50s on 0.4 peripheral Levophed, which was rapidly up-titrated to 0.8, and again up-titrated to 1.1 mcg/kg/min. Family discussion was had at the bedside, and in continuation with prior conversations documented during the day, family does not wish for further escalation -  patent was made DNI/DNR during the day, and they stated clearly the patient's and their wishes would be for comfort and no central lines for additional BP support. Their main request was to continue peripheral levophed, despite risk of injury and infiltration, until midnight and then proceed with CMO.     At midnight tonight, Levophed was discontinued in accordance with the family's wishes. Family at the bedside, expressed good understanding of the patient's clinical status and prognosis. They understand all efforts that will be made through CMO to make the patient as comfortable as possible. CMO measures initiated w/IV morphine, glycopyrrolate, NC for comfort. The patient remained comfortable, and family remained present at the bedside overnight. Patient was pronounced dead at 1:26 AM on 9/1.      Patient is a 73yo man with hx of hx COPD on home O2 w/ multiple hospitalizations for exacerbation, w/ Intubation x 3 d in 2004, last d/c 8/19, Asthma, DM s/p right transmetatarsal amputation following infection. BIB EMS alerted by daughters for SOB and wheezing since last night. Pt also complained of episode of CP radiating to back, resolved now. Pt endorses that this episode feels similar to prior admissions for exacerbations. Pt also endorses dark urine.  Of note pt last dose of prednisone from prior admission was today. Since last d/c pt has remained bed ridden, unable to walk, PT was scheduled to come this Thursday but didn't show.    In the ED:  /94  RR 30 100% cPaP TMax 101.5 F  WBC 17.89 (25.15 8/19) TBili 1.9 ALT 87 T 0.06   CT PE negative  CXR stable from prior    Pt given cefepime, levofloxacin, vanco, 2.8L LR, tylenol, solumedrol 125, placed on bipap    Hr improved to low 100s    Pt admitted to sdu for copd     The patient's hospital course was complicated by worsening COPD exacerbation requiring BiPAP and septic shock suspected to be in the setting of aspiration pneumonia. During the day on 8/31, the patient began to rapidly deteriorate and septic shock worsened, requiring peripheral Levophed to maintain BP. At this point in time, family began discussions about making patient CMO. He remained on Bipap and required peripheral levophed for severe hypotension in the setting of severe copd exacerbation c/b aspiration PNA and septic shock. Family verbalized good understanding that should further vasopressor support be required, central line insertion and escalation of care would become necessary. GO at this time documented family's desire to not pursue central line for further BP support.    At 8PM patients BP noted to be 70s/50s w/MAP 50s on 0.4 peripheral Levophed, which was rapidly up-titrated to 0.8, and again up-titrated to 1.1 mcg/kg/min. Family discussion was had at the bedside, and in continuation with prior conversations documented during the day, family does not wish for further escalation -  patent was made DNI/DNR during the day, and they stated clearly the patient's and their wishes would be for comfort and no central lines for additional BP support. Their main request was to continue peripheral levophed, despite risk of injury and infiltration, until midnight and then proceed with CMO.     At midnight tonight, Levophed was discontinued in accordance with the family's wishes. Family at the bedside, expressed good understanding of the patient's clinical status and prognosis. They understand all efforts that will be made through CMO to make the patient as comfortable as possible. CMO measures initiated w/IV morphine, glycopyrrolate, Ativan, NC for comfort. The patient remained comfortable, and family remained present at the bedside overnight. Patient was pronounced dead at 1:26 AM on 9/1, family ad bedside notified.     Patient is a 73yo man with hx of hx COPD on home O2 w/ multiple hospitalizations for exacerbation, w/ Intubation x 3 d in 2004, last d/c 8/19, Asthma, DM s/p right transmetatarsal amputation following infection. BIB EMS alerted by daughters for SOB and wheezing since last night. Pt also complained of episode of CP radiating to back, resolved now. Pt endorses that this episode feels similar to prior admissions for exacerbations. Pt also endorses dark urine.  Of note pt last dose of prednisone from prior admission was today. Since last d/c pt has remained bed ridden, unable to walk, PT was scheduled to come this Thursday but didn't show.    In the ED:  /94  RR 30 100% cPaP TMax 101.5 F  WBC 17.89 (25.15 8/19) TBili 1.9 ALT 87 T 0.06   CT PE negative  CXR stable from prior    Pt given cefepime, levofloxacin, vanco, 2.8L LR, tylenol, solumedrol 125, placed on bipap    Hr improved to low 100s    Pt admitted to sdu for copd     The patient's hospital course was complicated by worsening COPD exacerbation requiring BiPAP and septic shock suspected to be in the setting of aspiration pneumonia. During the day on 8/31, the patient began to rapidly deteriorate and septic shock worsened, requiring peripheral Levophed to maintain BP. At this point in time, family began discussions about making patient CMO. He remained on Bipap and required peripheral levophed for severe hypotension in the setting of severe copd exacerbation c/b aspiration PNA and septic shock. Family verbalized good understanding that should further vasopressor support be required, central line insertion and escalation of care would become necessary. GO at this time documented family's desire to not pursue central line for further BP support.    At 8PM patients BP noted to be 70s/50s w/MAP 50s on 0.4 peripheral Levophed, which was rapidly up-titrated to 0.8, and again up-titrated to 1.1 mcg/kg/min. Family discussion was had at the bedside, and in continuation with prior conversations documented during the day, family does not wish for further escalation -  patent was made DNI/DNR during the day, and they stated clearly the patient's and their wishes would be for comfort and no central lines for additional BP support. Their main request was to continue peripheral levophed, despite risk of injury and infiltration, until midnight and then proceed with CMO.     At midnight tonight, Levophed was discontinued in accordance with the family's wishes. Family at the bedside, expressed good understanding of the patient's clinical status and prognosis. They understand all efforts that will be made through CMO to make the patient as comfortable as possible. CMO initiated w/IV morphine, glycopyrrolate, Ativan, NC for comfort. The patient remained comfortable, and family remained present at the bedside overnight. Patient was pronounced dead at 1:26 AM on 9/1, family at bedside notified.

## 2022-09-01 NOTE — GOALS OF CARE CONVERSATION - ADVANCED CARE PLANNING - WHAT MATTERS MOST
Continue treatment until midnight, then keep patient as comfortable as possible at end of life Continue treatment until midnight, then keep patient as comfortable as possible at end of life with CMO care

## 2022-09-01 NOTE — GOALS OF CARE CONVERSATION - ADVANCED CARE PLANNING - CONVERSATION DETAILS
Patient's clinical status rapidly deteriorated throughout the day, and family began discussions about making patient CMO. He remained on Bipap and required peripheral levophed for severe hypotension in the setting of severe copd exacerbation c/b aspiration PNA and septic shock.     At 8PM patients BP noted to be 70s/50s on 0.4 Levophed, which was rapidly up-titrated to 0.8. Family discussion was had at the bedside, and in continuation with prior conversations documented during the day, family does not wish for further escalation and patent was made DNI/DNR during the day, and they stated clearly the patient's and their wishes would be for comfort and no central lines for additional BP support. Their main request was to continue peripheral levophed, despite risk of injury and infiltration, until midnight and then proceed with CMO.     At midnight tonight, Levophed was discontinued in accordance with the family's wishes. Family at the bedside, expressed good understanding of the patient's clinical status and prognosis. They understand all efforts that will be made through CMO to make the patient as comfortable as possible. CMO measures initiated w/IV morphine, glycopyrrolate, NC Patient's clinical status rapidly deteriorated throughout the day, and family began discussions about making patient CMO. He remained on Bipap and required peripheral levophed for severe hypotension in the setting of severe copd exacerbation c/b aspiration PNA and septic shock. Family verbalized good understanding that should further vasopressor support be required, central line insertion and escalation of care would become necessary.    At 8PM patients BP noted to be 70s/50s w/MAP 50s on 0.4 peripheral Levophed, which was rapidly up-titrated to 0.8, and again up-titrated to 1.1 mcg/kg/min. Family discussion was had at the bedside, and in continuation with prior conversations documented during the day, family does not wish for further escalation -  patent was made DNI/DNR during the day, and they stated clearly the patient's and their wishes would be for comfort and no central lines for additional BP support. Their main request was to continue peripheral levophed, despite risk of injury and infiltration, until midnight and then proceed with CMO.     At midnight tonight, Levophed was discontinued in accordance with the family's wishes. Family at the bedside, expressed good understanding of the patient's clinical status and prognosis. They understand all efforts that will be made through CMO to make the patient as comfortable as possible. CMO measures initiated w/IV morphine, glycopyrrolate, NC for comfort.

## 2022-09-15 NOTE — PATIENT PROFILE ADULT. - AS SC BRADEN MOISTURE
Flexible bronchoscopy with BAL of LEELA. EBUS with lymph node biopsy of station 7 and 4R. TBBx of RUL performed. Flexible bronchoscopy with BAL of LEELA. EBUS with lymph node biopsy of station 7 and 4R. TBBx of RUL performed.    #[id number] (3) occasionally moist

## 2022-09-27 NOTE — PROGRESS NOTE ADULT - NS ATTEND OPT1 GEN_ALL_CORE
I attest my time as attending is greater than 50% of the total combined time spent on qualifying patient care activities by the PA/NP and attending. Detail Level: Detailed

## 2023-02-24 NOTE — ED PROVIDER NOTE - PSH
Discharge instructions given. Patient verbalizes understanding. No other noted or stated problems at this time. Patient will follow up with primary care.      Jaclyn Dior RN  02/24/23 [id number]
S/P transmetatarsal amputation of foot, right

## 2023-05-03 NOTE — ED PROVIDER NOTE - CHIEF COMPLAINT
Patient returned call to clinic in regards to ming barr lab result. Notified patient of negative result per provider advice below. Patient had no further questions or concerns at this time.    Ghazal Park MD  to Deja Lua, RN • [facility] Nurse Msg Pool    SK    5/3/23  2:29 PM   Please notify patient that he has immunity to EBV. No acute infection. Final culture is pending.      The patient is a 68y Male complaining of chest pain.

## 2024-03-27 NOTE — DISCHARGE NOTE ADULT - PROVIDER TOKENS
Recent PHQ 2/9 Score    PHQ 2:  PHQ 2 Score Adult PHQ 2 Score Adult PHQ 2 Interpretation Little interest or pleasure in activity?   3/27/2024   9:53 AM 0 No further screening needed 0       PHQ 9:       Health Maintenance Due   Topic Date Due    Colorectal Cancer Screen-  Never done    Shingles Vaccine (1 of 2) Never done    COVID-19 Vaccine (3 - 2023-24 season) 09/01/2023       Patient is due for topics as listed above but is not proceeding with Immunization(s) COVID-19 and Shingles and Colorectal Cancer Screening: Colonoscopy at this time.      FREE:[LAST:[olga],FIRST:[sonia],PHONE:[phone]],FAX:[(   [phone]    -],ADDRESS:[[address] [place] [address]]],TOKEN:'[id number]'

## 2025-04-04 NOTE — H&P ADULT - NSCORESITESY/N_GEN_A_CORE_RD
CC: Patient is a 75y old  Male who presents with a chief complaint of Thoracic epidural abscess s/p laminectomy and fusion surgery (03 Apr 2025 11:03)      Interval History:    No overnight events.  No new complaints.    ALLERGIES:  No Known Allergies    MEDICATIONS  (STANDING):  ammonium lactate 12% Lotion 1 Application(s) Topical two times a day  AQUAPHOR (petrolatum Ointment) 1 Application(s) Topical three times a day  buMETAnide 1 milliGRAM(s) Oral two times a day  cefTRIAXone   IVPB 2000 milliGRAM(s) IV Intermittent every 24 hours  chlorhexidine 4% Liquid 1 Application(s) Topical <User Schedule>  DAPTOmycin IVPB 650 milliGRAM(s) IV Intermittent every 24 hours  docusate sodium Enema 283 milliGRAM(s) Rectal every 24 hours  heparin   Injectable 5000 Unit(s) SubCutaneous every 8 hours  levothyroxine 150 MICROGram(s) Oral daily  metoprolol succinate ER 25 milliGRAM(s) Oral daily  oxyCODONE  ER Tablet 15 milliGRAM(s) Oral every 12 hours  pantoprazole    Tablet 40 milliGRAM(s) Oral before breakfast  polyethylene glycol 3350 17 Gram(s) Oral <User Schedule>  rosuvastatin 5 milliGRAM(s) Oral at bedtime  senna 2 Tablet(s) Oral at bedtime  tamsulosin 0.8 milliGRAM(s) Oral at bedtime    MEDICATIONS  (PRN):  aluminum hydroxide/magnesium hydroxide/simethicone Suspension 30 milliLiter(s) Oral every 4 hours PRN Dyspepsia  cyclobenzaprine 5 milliGRAM(s) Oral three times a day PRN Muscle Spasm  lactulose Syrup 20 Gram(s) Oral three times a day PRN for constipation  melatonin 3 milliGRAM(s) Oral at bedtime PRN Insomnia  oxyCODONE    IR 15 milliGRAM(s) Oral every 6 hours PRN Moderate Pain (4 - 6)  sodium chloride 0.9% lock flush 10 milliLiter(s) IV Push every 1 hour PRN Pre/post blood products, medications, blood draw, and to maintain line patency    Vital Signs Last 24 Hrs  T(F): 98.1 (04 Apr 2025 08:40), Max: 98.6 (03 Apr 2025 19:08)  HR: 86 (04 Apr 2025 08:40) (77 - 86)  BP: 110/67 (04 Apr 2025 08:40) (99/63 - 110/67)  RR: 16 (04 Apr 2025 08:40) (16 - 16)  SpO2: 93% (04 Apr 2025 08:40) (93% - 95%)  I&O's Summary    03 Apr 2025 07:01  -  04 Apr 2025 07:00  --------------------------------------------------------  IN: 0 mL / OUT: 2200 mL / NET: -2200 mL          PHYSICAL EXAM:  GENERAL: NAD  CHEST/LUNG: No rales, rhonchi, wheezing; normal respiratory effort  HEART: RRR; No murmurs, rubs, or gallops  ABDOMEN: Soft, Nontender, Nondistended; Bowel sounds present  MSK/EXT:  No peripheral edema, 2+ Peripheral Pulses, No clubbing or digital cyanosis  PSYCH: Appropriate affect, Alert & Oriented    LABS:                        8.8    8.61  )-----------( 244      ( 03 Apr 2025 06:13 )             27.6       04-03    139  |  99  |  32  ----------------------------<  117  4.1   |  35  |  1.02    Ca    9.1      03 Apr 2025 06:13    TPro  6.0  /  Alb  2.6  /  TBili  0.5  /  DBili  x   /  AST  29  /  ALT  41  /  AlkPhos  66  04-03                   Urinalysis Basic - ( 03 Apr 2025 06:13 )    Color: x / Appearance: x / SG: x / pH: x  Gluc: 117 mg/dL / Ketone: x  / Bili: x / Urobili: x   Blood: x / Protein: x / Nitrite: x   Leuk Esterase: x / RBC: x / WBC x   Sq Epi: x / Non Sq Epi: x / Bacteria: x        COVID-19 PCR: NotDetec (03-25-25 @ 21:35)      Care Discussed with Consultants/Other Providers: Yes No